# Patient Record
Sex: MALE | Race: WHITE | NOT HISPANIC OR LATINO | Employment: OTHER | URBAN - METROPOLITAN AREA
[De-identification: names, ages, dates, MRNs, and addresses within clinical notes are randomized per-mention and may not be internally consistent; named-entity substitution may affect disease eponyms.]

---

## 2017-01-20 ENCOUNTER — SURGERY - HEALTHEAST (OUTPATIENT)
Dept: CARDIOLOGY | Facility: CLINIC | Age: 77
End: 2017-01-20

## 2017-01-20 ENCOUNTER — OFFICE VISIT - HEALTHEAST (OUTPATIENT)
Dept: CARDIOLOGY | Facility: CLINIC | Age: 77
End: 2017-01-20

## 2017-01-20 DIAGNOSIS — I25.83 CORONARY ARTERY DISEASE DUE TO LIPID RICH PLAQUE: ICD-10-CM

## 2017-01-20 DIAGNOSIS — I48.0 PAROXYSMAL ATRIAL FIBRILLATION (H): ICD-10-CM

## 2017-01-20 DIAGNOSIS — G45.8 OTHER SPECIFIED TRANSIENT CEREBRAL ISCHEMIAS: ICD-10-CM

## 2017-01-20 DIAGNOSIS — I25.10 CORONARY ARTERY DISEASE DUE TO LIPID RICH PLAQUE: ICD-10-CM

## 2017-01-20 ASSESSMENT — MIFFLIN-ST. JEOR: SCORE: 1612.57

## 2017-02-10 ENCOUNTER — RECORDS - HEALTHEAST (OUTPATIENT)
Dept: ADMINISTRATIVE | Facility: OTHER | Age: 77
End: 2017-02-10

## 2017-02-10 ENCOUNTER — AMBULATORY - HEALTHEAST (OUTPATIENT)
Dept: CARDIOLOGY | Facility: CLINIC | Age: 77
End: 2017-02-10

## 2017-02-10 DIAGNOSIS — G45.9 BRAIN TIA: ICD-10-CM

## 2017-02-16 ENCOUNTER — OFFICE VISIT - HEALTHEAST (OUTPATIENT)
Dept: INTERNAL MEDICINE | Facility: CLINIC | Age: 77
End: 2017-02-16

## 2017-02-16 DIAGNOSIS — G45.8 OTHER SPECIFIED TRANSIENT CEREBRAL ISCHEMIAS: ICD-10-CM

## 2017-02-16 LAB — PSA SERPL-MCNC: 2.4 NG/ML (ref 0–6.5)

## 2017-02-16 ASSESSMENT — MIFFLIN-ST. JEOR: SCORE: 1594.43

## 2017-02-17 ENCOUNTER — COMMUNICATION - HEALTHEAST (OUTPATIENT)
Dept: INTERNAL MEDICINE | Facility: CLINIC | Age: 77
End: 2017-02-17

## 2017-02-20 ENCOUNTER — RECORDS - HEALTHEAST (OUTPATIENT)
Dept: ADMINISTRATIVE | Facility: OTHER | Age: 77
End: 2017-02-20

## 2017-02-21 ENCOUNTER — COMMUNICATION - HEALTHEAST (OUTPATIENT)
Dept: INTERNAL MEDICINE | Facility: CLINIC | Age: 77
End: 2017-02-21

## 2017-02-21 DIAGNOSIS — E78.5 HYPERLIPEMIA: ICD-10-CM

## 2017-03-17 ENCOUNTER — RECORDS - HEALTHEAST (OUTPATIENT)
Dept: ADMINISTRATIVE | Facility: OTHER | Age: 77
End: 2017-03-17

## 2017-03-17 ENCOUNTER — HOSPITAL ENCOUNTER (OUTPATIENT)
Dept: NEUROLOGY | Facility: CLINIC | Age: 77
Discharge: HOME | End: 2017-03-17

## 2017-03-17 DIAGNOSIS — G45.9 BRAIN TIA: ICD-10-CM

## 2017-03-17 DIAGNOSIS — R68.89 OTHER GENERAL SYMPTOMS AND SIGNS: ICD-10-CM

## 2017-03-30 ENCOUNTER — OFFICE VISIT - HEALTHEAST (OUTPATIENT)
Dept: INTERNAL MEDICINE | Facility: CLINIC | Age: 77
End: 2017-03-30

## 2017-03-30 ENCOUNTER — RECORDS - HEALTHEAST (OUTPATIENT)
Dept: ADMINISTRATIVE | Facility: OTHER | Age: 77
End: 2017-03-30

## 2017-03-30 DIAGNOSIS — G45.8 OTHER SPECIFIED TRANSIENT CEREBRAL ISCHEMIAS: ICD-10-CM

## 2017-03-30 LAB
CHOLEST SERPL-MCNC: 129 MG/DL
FASTING STATUS PATIENT QL REPORTED: YES
HDLC SERPL-MCNC: 53 MG/DL
LDLC SERPL CALC-MCNC: 65 MG/DL
TRIGL SERPL-MCNC: 57 MG/DL

## 2017-03-30 ASSESSMENT — MIFFLIN-ST. JEOR: SCORE: 1598.97

## 2017-03-31 ENCOUNTER — COMMUNICATION - HEALTHEAST (OUTPATIENT)
Dept: INTERNAL MEDICINE | Facility: CLINIC | Age: 77
End: 2017-03-31

## 2017-04-04 ENCOUNTER — COMMUNICATION - HEALTHEAST (OUTPATIENT)
Dept: INTERNAL MEDICINE | Facility: CLINIC | Age: 77
End: 2017-04-04

## 2017-04-04 ENCOUNTER — COMMUNICATION - HEALTHEAST (OUTPATIENT)
Dept: CARDIOLOGY | Facility: CLINIC | Age: 77
End: 2017-04-04

## 2017-05-09 ENCOUNTER — RECORDS - HEALTHEAST (OUTPATIENT)
Dept: ADMINISTRATIVE | Facility: OTHER | Age: 77
End: 2017-05-09

## 2017-05-16 ENCOUNTER — COMMUNICATION - HEALTHEAST (OUTPATIENT)
Dept: INTERNAL MEDICINE | Facility: CLINIC | Age: 77
End: 2017-05-16

## 2017-05-16 ENCOUNTER — OFFICE VISIT - HEALTHEAST (OUTPATIENT)
Dept: INTERNAL MEDICINE | Facility: CLINIC | Age: 77
End: 2017-05-16

## 2017-05-16 DIAGNOSIS — H26.9 CATARACT: ICD-10-CM

## 2017-05-16 ASSESSMENT — MIFFLIN-ST. JEOR: SCORE: 1589.89

## 2017-05-19 ENCOUNTER — COMMUNICATION - HEALTHEAST (OUTPATIENT)
Dept: INTERNAL MEDICINE | Facility: CLINIC | Age: 77
End: 2017-05-19

## 2017-05-30 ENCOUNTER — RECORDS - HEALTHEAST (OUTPATIENT)
Dept: ADMINISTRATIVE | Facility: OTHER | Age: 77
End: 2017-05-30

## 2017-07-06 ENCOUNTER — RECORDS - HEALTHEAST (OUTPATIENT)
Dept: ADMINISTRATIVE | Facility: OTHER | Age: 77
End: 2017-07-06

## 2017-07-07 ENCOUNTER — COMMUNICATION - HEALTHEAST (OUTPATIENT)
Dept: INTERNAL MEDICINE | Facility: CLINIC | Age: 77
End: 2017-07-07

## 2017-07-31 ENCOUNTER — COMMUNICATION - HEALTHEAST (OUTPATIENT)
Dept: INTERNAL MEDICINE | Facility: CLINIC | Age: 77
End: 2017-07-31

## 2017-07-31 DIAGNOSIS — D64.9 ANEMIA: ICD-10-CM

## 2017-08-01 ENCOUNTER — COMMUNICATION - HEALTHEAST (OUTPATIENT)
Dept: INTERNAL MEDICINE | Facility: CLINIC | Age: 77
End: 2017-08-01

## 2017-08-11 ENCOUNTER — COMMUNICATION - HEALTHEAST (OUTPATIENT)
Dept: INTERNAL MEDICINE | Facility: CLINIC | Age: 77
End: 2017-08-11

## 2017-08-17 ENCOUNTER — COMMUNICATION - HEALTHEAST (OUTPATIENT)
Dept: CARDIOLOGY | Facility: CLINIC | Age: 77
End: 2017-08-17

## 2017-08-17 DIAGNOSIS — I48.0 PAROXYSMAL A-FIB (H): ICD-10-CM

## 2017-08-17 DIAGNOSIS — I25.84 CORONARY ATHEROSCLEROSIS DUE TO SEVERELY CALCIFIED CORONARY LESION: ICD-10-CM

## 2017-08-23 ENCOUNTER — OFFICE VISIT - HEALTHEAST (OUTPATIENT)
Dept: INTERNAL MEDICINE | Facility: CLINIC | Age: 77
End: 2017-08-23

## 2017-08-23 DIAGNOSIS — G45.8 OTHER SPECIFIED TRANSIENT CEREBRAL ISCHEMIAS: ICD-10-CM

## 2017-08-23 LAB
CHOLEST SERPL-MCNC: 144 MG/DL
FASTING STATUS PATIENT QL REPORTED: YES
HDLC SERPL-MCNC: 55 MG/DL
LDLC SERPL CALC-MCNC: 75 MG/DL
TRIGL SERPL-MCNC: 71 MG/DL

## 2017-08-23 ASSESSMENT — MIFFLIN-ST. JEOR: SCORE: 1576.29

## 2017-08-24 ENCOUNTER — COMMUNICATION - HEALTHEAST (OUTPATIENT)
Dept: INTERNAL MEDICINE | Facility: CLINIC | Age: 77
End: 2017-08-24

## 2017-09-13 ENCOUNTER — COMMUNICATION - HEALTHEAST (OUTPATIENT)
Dept: INTERNAL MEDICINE | Facility: CLINIC | Age: 77
End: 2017-09-13

## 2017-09-14 ENCOUNTER — RECORDS - HEALTHEAST (OUTPATIENT)
Dept: ADMINISTRATIVE | Facility: OTHER | Age: 77
End: 2017-09-14

## 2017-10-17 ENCOUNTER — RECORDS - HEALTHEAST (OUTPATIENT)
Dept: ADMINISTRATIVE | Facility: OTHER | Age: 77
End: 2017-10-17

## 2017-10-25 ENCOUNTER — AMBULATORY - HEALTHEAST (OUTPATIENT)
Dept: NURSING | Facility: CLINIC | Age: 77
End: 2017-10-25

## 2017-10-25 DIAGNOSIS — Z23 NEED FOR IMMUNIZATION AGAINST INFLUENZA: ICD-10-CM

## 2017-11-03 ENCOUNTER — RECORDS - HEALTHEAST (OUTPATIENT)
Dept: ADMINISTRATIVE | Facility: OTHER | Age: 77
End: 2017-11-03

## 2017-11-17 ENCOUNTER — COMMUNICATION - HEALTHEAST (OUTPATIENT)
Dept: CARDIOLOGY | Facility: CLINIC | Age: 77
End: 2017-11-17

## 2017-11-17 DIAGNOSIS — I25.84 CORONARY ARTERY DISEASE DUE TO CALCIFIED CORONARY LESION: ICD-10-CM

## 2017-11-17 DIAGNOSIS — I10 ESSENTIAL HYPERTENSION: ICD-10-CM

## 2017-11-17 DIAGNOSIS — I25.10 CORONARY ARTERY DISEASE DUE TO CALCIFIED CORONARY LESION: ICD-10-CM

## 2017-12-12 ENCOUNTER — COMMUNICATION - HEALTHEAST (OUTPATIENT)
Dept: INTERNAL MEDICINE | Facility: CLINIC | Age: 77
End: 2017-12-12

## 2017-12-12 ENCOUNTER — AMBULATORY - HEALTHEAST (OUTPATIENT)
Dept: INTERNAL MEDICINE | Facility: CLINIC | Age: 77
End: 2017-12-12

## 2017-12-12 DIAGNOSIS — H91.90 HEARING LOSS: ICD-10-CM

## 2017-12-22 ENCOUNTER — COMMUNICATION - HEALTHEAST (OUTPATIENT)
Dept: ADMINISTRATIVE | Facility: CLINIC | Age: 77
End: 2017-12-22

## 2017-12-28 ENCOUNTER — OFFICE VISIT - HEALTHEAST (OUTPATIENT)
Dept: INTERNAL MEDICINE | Facility: CLINIC | Age: 77
End: 2017-12-28

## 2017-12-28 DIAGNOSIS — G45.8 OTHER SPECIFIED TRANSIENT CEREBRAL ISCHEMIAS: ICD-10-CM

## 2017-12-28 LAB
CHOLEST SERPL-MCNC: 128 MG/DL
FASTING STATUS PATIENT QL REPORTED: YES
HDLC SERPL-MCNC: 61 MG/DL
LDLC SERPL CALC-MCNC: 59 MG/DL
TRIGL SERPL-MCNC: 40 MG/DL

## 2017-12-28 ASSESSMENT — MIFFLIN-ST. JEOR: SCORE: 1567.21

## 2017-12-29 ENCOUNTER — COMMUNICATION - HEALTHEAST (OUTPATIENT)
Dept: INTERNAL MEDICINE | Facility: CLINIC | Age: 77
End: 2017-12-29

## 2018-01-02 ENCOUNTER — COMMUNICATION - HEALTHEAST (OUTPATIENT)
Dept: CARDIOLOGY | Facility: CLINIC | Age: 78
End: 2018-01-02

## 2018-02-13 ENCOUNTER — COMMUNICATION - HEALTHEAST (OUTPATIENT)
Dept: INTERNAL MEDICINE | Facility: CLINIC | Age: 78
End: 2018-02-13

## 2018-02-13 DIAGNOSIS — E78.5 HYPERLIPEMIA: ICD-10-CM

## 2018-02-20 ENCOUNTER — AMBULATORY - HEALTHEAST (OUTPATIENT)
Dept: CARDIOLOGY | Facility: CLINIC | Age: 78
End: 2018-02-20

## 2018-02-20 ENCOUNTER — RECORDS - HEALTHEAST (OUTPATIENT)
Dept: ADMINISTRATIVE | Facility: OTHER | Age: 78
End: 2018-02-20

## 2018-02-21 ENCOUNTER — OFFICE VISIT - HEALTHEAST (OUTPATIENT)
Dept: CARDIOLOGY | Facility: CLINIC | Age: 78
End: 2018-02-21

## 2018-02-21 ENCOUNTER — AMBULATORY - HEALTHEAST (OUTPATIENT)
Dept: NURSING | Facility: CLINIC | Age: 78
End: 2018-02-21

## 2018-02-21 DIAGNOSIS — Z23 NEED FOR PROPHYLACTIC VACCINATION WITH COMBINED DIPHTHERIA-TETANUS-PERTUSSIS (DTP) VACCINE: ICD-10-CM

## 2018-02-21 DIAGNOSIS — E78.5 DYSLIPIDEMIA, GOAL LDL BELOW 70: ICD-10-CM

## 2018-02-21 DIAGNOSIS — I25.84 CORONARY ATHEROSCLEROSIS DUE TO SEVERELY CALCIFIED CORONARY LESION: ICD-10-CM

## 2018-02-21 DIAGNOSIS — I10 ESSENTIAL HYPERTENSION: ICD-10-CM

## 2018-02-21 DIAGNOSIS — Z23 IMMUNIZATION DUE: ICD-10-CM

## 2018-02-21 ASSESSMENT — MIFFLIN-ST. JEOR: SCORE: 1551.34

## 2018-02-22 ENCOUNTER — COMMUNICATION - HEALTHEAST (OUTPATIENT)
Dept: INTERNAL MEDICINE | Facility: CLINIC | Age: 78
End: 2018-02-22

## 2018-02-22 ENCOUNTER — AMBULATORY - HEALTHEAST (OUTPATIENT)
Dept: CARDIOLOGY | Facility: CLINIC | Age: 78
End: 2018-02-22

## 2018-02-22 DIAGNOSIS — I25.84 CORONARY ATHEROSCLEROSIS DUE TO SEVERELY CALCIFIED CORONARY LESION: ICD-10-CM

## 2018-03-08 ENCOUNTER — RECORDS - HEALTHEAST (OUTPATIENT)
Dept: ADMINISTRATIVE | Facility: OTHER | Age: 78
End: 2018-03-08

## 2018-03-20 ENCOUNTER — OFFICE VISIT - HEALTHEAST (OUTPATIENT)
Dept: INTERNAL MEDICINE | Facility: CLINIC | Age: 78
End: 2018-03-20

## 2018-03-20 DIAGNOSIS — I25.10 CORONARY ARTERY DISEASE INVOLVING NATIVE HEART WITHOUT ANGINA PECTORIS, UNSPECIFIED VESSEL OR LESION TYPE: ICD-10-CM

## 2018-03-20 ASSESSMENT — MIFFLIN-ST. JEOR: SCORE: 1576.29

## 2018-04-30 ENCOUNTER — OFFICE VISIT - HEALTHEAST (OUTPATIENT)
Dept: INTERNAL MEDICINE | Facility: CLINIC | Age: 78
End: 2018-04-30

## 2018-04-30 ENCOUNTER — COMMUNICATION - HEALTHEAST (OUTPATIENT)
Dept: INTERNAL MEDICINE | Facility: CLINIC | Age: 78
End: 2018-04-30

## 2018-04-30 DIAGNOSIS — G45.8 OTHER SPECIFIED TRANSIENT CEREBRAL ISCHEMIAS: ICD-10-CM

## 2018-04-30 LAB
CHOLEST SERPL-MCNC: 140 MG/DL
FASTING STATUS PATIENT QL REPORTED: NORMAL
HDLC SERPL-MCNC: 61 MG/DL
LDLC SERPL CALC-MCNC: 69 MG/DL
TRIGL SERPL-MCNC: 50 MG/DL
TSH SERPL DL<=0.005 MIU/L-ACNC: 0.93 UIU/ML (ref 0.3–5)

## 2018-04-30 ASSESSMENT — MIFFLIN-ST. JEOR: SCORE: 1576.29

## 2018-06-12 ENCOUNTER — RECORDS - HEALTHEAST (OUTPATIENT)
Dept: ADMINISTRATIVE | Facility: OTHER | Age: 78
End: 2018-06-12

## 2018-07-12 ENCOUNTER — COMMUNICATION - HEALTHEAST (OUTPATIENT)
Dept: SCHEDULING | Facility: CLINIC | Age: 78
End: 2018-07-12

## 2018-07-12 ENCOUNTER — COMMUNICATION - HEALTHEAST (OUTPATIENT)
Dept: INTERNAL MEDICINE | Facility: CLINIC | Age: 78
End: 2018-07-12

## 2018-07-13 ENCOUNTER — OFFICE VISIT - HEALTHEAST (OUTPATIENT)
Dept: INTERNAL MEDICINE | Facility: CLINIC | Age: 78
End: 2018-07-13
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
## 2018-07-13 DIAGNOSIS — R21 RASH AND NONSPECIFIC SKIN ERUPTION: ICD-10-CM

## 2018-07-13 LAB
ERYTHROCYTE [DISTWIDTH] IN BLOOD BY AUTOMATED COUNT: 11.9 % (ref 11–14.5)
ERYTHROCYTE [SEDIMENTATION RATE] IN BLOOD BY WESTERGREN METHOD: 2 MM/HR (ref 0–15)
HCT VFR BLD AUTO: 41.8 % (ref 40–54)
HGB BLD-MCNC: 14.1 G/DL (ref 14–18)
MCH RBC QN AUTO: 31 PG (ref 27–34)
MCHC RBC AUTO-ENTMCNC: 33.8 G/DL (ref 32–36)
MCV RBC AUTO: 92 FL (ref 80–100)
PLATELET # BLD AUTO: 150 THOU/UL (ref 140–440)
PMV BLD AUTO: 9.2 FL (ref 7–10)
RBC # BLD AUTO: 4.56 MILL/UL (ref 4.4–6.2)
WBC: 4.5 THOU/UL (ref 4–11)

## 2018-07-16 ENCOUNTER — COMMUNICATION - HEALTHEAST (OUTPATIENT)
Dept: INTERNAL MEDICINE | Facility: CLINIC | Age: 78
End: 2018-07-16

## 2018-07-16 LAB — B BURGDOR IGG+IGM SER QL: 0.06 INDEX VALUE

## 2018-07-24 ENCOUNTER — RECORDS - HEALTHEAST (OUTPATIENT)
Dept: ADMINISTRATIVE | Facility: OTHER | Age: 78
End: 2018-07-24

## 2018-07-26 ENCOUNTER — OFFICE VISIT - HEALTHEAST (OUTPATIENT)
Dept: INTERNAL MEDICINE | Facility: CLINIC | Age: 78
End: 2018-07-26

## 2018-07-26 DIAGNOSIS — I25.10 CORONARY ARTERY DISEASE INVOLVING NATIVE HEART WITHOUT ANGINA PECTORIS, UNSPECIFIED VESSEL OR LESION TYPE: ICD-10-CM

## 2018-07-26 LAB
CHOLEST SERPL-MCNC: 152 MG/DL
FASTING STATUS PATIENT QL REPORTED: NORMAL
HDLC SERPL-MCNC: 69 MG/DL
LDLC SERPL CALC-MCNC: 69 MG/DL
TRIGL SERPL-MCNC: 71 MG/DL
VIT B12 SERPL-MCNC: 348 PG/ML (ref 213–816)

## 2018-07-26 ASSESSMENT — MIFFLIN-ST. JEOR: SCORE: 1562.68

## 2018-07-27 ENCOUNTER — COMMUNICATION - HEALTHEAST (OUTPATIENT)
Dept: INTERNAL MEDICINE | Facility: CLINIC | Age: 78
End: 2018-07-27

## 2018-07-30 ENCOUNTER — COMMUNICATION - HEALTHEAST (OUTPATIENT)
Dept: CARDIOLOGY | Facility: CLINIC | Age: 78
End: 2018-07-30

## 2018-08-12 ENCOUNTER — COMMUNICATION - HEALTHEAST (OUTPATIENT)
Dept: CARDIOLOGY | Facility: CLINIC | Age: 78
End: 2018-08-12

## 2018-08-12 DIAGNOSIS — I10 ESSENTIAL HYPERTENSION: ICD-10-CM

## 2018-08-12 DIAGNOSIS — I25.10 CORONARY ARTERY DISEASE DUE TO CALCIFIED CORONARY LESION: ICD-10-CM

## 2018-08-12 DIAGNOSIS — I25.84 CORONARY ARTERY DISEASE DUE TO CALCIFIED CORONARY LESION: ICD-10-CM

## 2018-08-30 ENCOUNTER — COMMUNICATION - HEALTHEAST (OUTPATIENT)
Dept: INTERNAL MEDICINE | Facility: CLINIC | Age: 78
End: 2018-08-30

## 2018-08-30 DIAGNOSIS — D64.9 ANEMIA: ICD-10-CM

## 2018-09-05 ENCOUNTER — RECORDS - HEALTHEAST (OUTPATIENT)
Dept: ADMINISTRATIVE | Facility: OTHER | Age: 78
End: 2018-09-05

## 2018-09-13 ENCOUNTER — RECORDS - HEALTHEAST (OUTPATIENT)
Dept: ADMINISTRATIVE | Facility: OTHER | Age: 78
End: 2018-09-13

## 2018-10-04 ENCOUNTER — RECORDS - HEALTHEAST (OUTPATIENT)
Dept: ADMINISTRATIVE | Facility: OTHER | Age: 78
End: 2018-10-04

## 2018-10-22 ENCOUNTER — RECORDS - HEALTHEAST (OUTPATIENT)
Dept: ADMINISTRATIVE | Facility: OTHER | Age: 78
End: 2018-10-22

## 2018-10-29 ENCOUNTER — AMBULATORY - HEALTHEAST (OUTPATIENT)
Dept: NURSING | Facility: CLINIC | Age: 78
End: 2018-10-29

## 2018-10-29 DIAGNOSIS — Z23 FLU VACCINE NEED: ICD-10-CM

## 2018-11-12 ENCOUNTER — RECORDS - HEALTHEAST (OUTPATIENT)
Dept: ADMINISTRATIVE | Facility: OTHER | Age: 78
End: 2018-11-12

## 2018-12-13 ENCOUNTER — OFFICE VISIT - HEALTHEAST (OUTPATIENT)
Dept: INTERNAL MEDICINE | Facility: CLINIC | Age: 78
End: 2018-12-13

## 2018-12-13 ENCOUNTER — AMBULATORY - HEALTHEAST (OUTPATIENT)
Dept: INTERNAL MEDICINE | Facility: CLINIC | Age: 78
End: 2018-12-13

## 2018-12-13 DIAGNOSIS — R31.29 MICROSCOPIC HEMATURIA: ICD-10-CM

## 2018-12-13 DIAGNOSIS — Z00.00 ROUTINE GENERAL MEDICAL EXAMINATION AT A HEALTH CARE FACILITY: ICD-10-CM

## 2018-12-13 DIAGNOSIS — Z12.5 SCREENING FOR PROSTATE CANCER: ICD-10-CM

## 2018-12-13 LAB
ALBUMIN SERPL-MCNC: 3.6 G/DL (ref 3.5–5)
ALBUMIN UR-MCNC: ABNORMAL MG/DL
ALP SERPL-CCNC: 75 U/L (ref 45–120)
ALT SERPL W P-5'-P-CCNC: 22 U/L (ref 0–45)
AMORPH CRY #/AREA URNS HPF: ABNORMAL /[HPF]
ANION GAP SERPL CALCULATED.3IONS-SCNC: 9 MMOL/L (ref 5–18)
APPEARANCE UR: CLEAR
AST SERPL W P-5'-P-CCNC: 19 U/L (ref 0–40)
BACTERIA #/AREA URNS HPF: ABNORMAL HPF
BILIRUB SERPL-MCNC: 0.5 MG/DL (ref 0–1)
BILIRUB UR QL STRIP: NEGATIVE
BUN SERPL-MCNC: 18 MG/DL (ref 8–28)
CALCIUM SERPL-MCNC: 9.6 MG/DL (ref 8.5–10.5)
CHLORIDE BLD-SCNC: 107 MMOL/L (ref 98–107)
CHOLEST SERPL-MCNC: 151 MG/DL
CO2 SERPL-SCNC: 24 MMOL/L (ref 22–31)
COLOR UR AUTO: YELLOW
CREAT SERPL-MCNC: 0.83 MG/DL (ref 0.7–1.3)
ERYTHROCYTE [DISTWIDTH] IN BLOOD BY AUTOMATED COUNT: 12.2 % (ref 11–14.5)
FASTING STATUS PATIENT QL REPORTED: YES
GFR SERPL CREATININE-BSD FRML MDRD: >60 ML/MIN/1.73M2
GLUCOSE BLD-MCNC: 90 MG/DL (ref 70–125)
GLUCOSE UR STRIP-MCNC: NEGATIVE MG/DL
HCT VFR BLD AUTO: 43.3 % (ref 40–54)
HDLC SERPL-MCNC: 67 MG/DL
HGB BLD-MCNC: 14.5 G/DL (ref 14–18)
HGB UR QL STRIP: ABNORMAL
HYALINE CASTS #/AREA URNS LPF: ABNORMAL LPF
KETONES UR STRIP-MCNC: NEGATIVE MG/DL
LDLC SERPL CALC-MCNC: 75 MG/DL
LEUKOCYTE ESTERASE UR QL STRIP: NEGATIVE
MCH RBC QN AUTO: 31 PG (ref 27–34)
MCHC RBC AUTO-ENTMCNC: 33.4 G/DL (ref 32–36)
MCV RBC AUTO: 93 FL (ref 80–100)
MUCOUS THREADS #/AREA URNS LPF: ABNORMAL LPF
NITRATE UR QL: NEGATIVE
PH UR STRIP: 5.5 [PH] (ref 5–8)
PLATELET # BLD AUTO: 167 THOU/UL (ref 140–440)
PMV BLD AUTO: 9.2 FL (ref 7–10)
POTASSIUM BLD-SCNC: 4.3 MMOL/L (ref 3.5–5)
PROT SERPL-MCNC: 5.9 G/DL (ref 6–8)
PSA SERPL-MCNC: 1.7 NG/ML (ref 0–6.5)
RBC # BLD AUTO: 4.67 MILL/UL (ref 4.4–6.2)
RBC #/AREA URNS AUTO: ABNORMAL HPF
SODIUM SERPL-SCNC: 140 MMOL/L (ref 136–145)
SP GR UR STRIP: >=1.03 (ref 1–1.03)
SQUAMOUS #/AREA URNS AUTO: ABNORMAL LPF
TRIGL SERPL-MCNC: 45 MG/DL
TSH SERPL DL<=0.005 MIU/L-ACNC: 1.34 UIU/ML (ref 0.3–5)
UROBILINOGEN UR STRIP-ACNC: ABNORMAL
WBC #/AREA URNS AUTO: ABNORMAL HPF
WBC: 4.1 THOU/UL (ref 4–11)

## 2018-12-13 ASSESSMENT — MIFFLIN-ST. JEOR: SCORE: 1603.5

## 2018-12-14 ENCOUNTER — COMMUNICATION - HEALTHEAST (OUTPATIENT)
Dept: INTERNAL MEDICINE | Facility: CLINIC | Age: 78
End: 2018-12-14

## 2018-12-17 ENCOUNTER — COMMUNICATION - HEALTHEAST (OUTPATIENT)
Dept: INTERNAL MEDICINE | Facility: CLINIC | Age: 78
End: 2018-12-17

## 2019-01-03 ENCOUNTER — RECORDS - HEALTHEAST (OUTPATIENT)
Dept: ADMINISTRATIVE | Facility: OTHER | Age: 79
End: 2019-01-03

## 2019-01-07 ENCOUNTER — COMMUNICATION - HEALTHEAST (OUTPATIENT)
Dept: ADMINISTRATIVE | Facility: CLINIC | Age: 79
End: 2019-01-07

## 2019-01-30 ENCOUNTER — COMMUNICATION - HEALTHEAST (OUTPATIENT)
Dept: INTERNAL MEDICINE | Facility: CLINIC | Age: 79
End: 2019-01-30

## 2019-01-30 DIAGNOSIS — E78.5 HYPERLIPEMIA: ICD-10-CM

## 2019-02-28 ENCOUNTER — OFFICE VISIT - HEALTHEAST (OUTPATIENT)
Dept: CARDIOLOGY | Facility: CLINIC | Age: 79
End: 2019-02-28

## 2019-02-28 DIAGNOSIS — I10 ESSENTIAL HYPERTENSION: ICD-10-CM

## 2019-02-28 DIAGNOSIS — E78.5 DYSLIPIDEMIA, GOAL LDL BELOW 70: ICD-10-CM

## 2019-02-28 DIAGNOSIS — I25.10 CORONARY ARTERY DISEASE DUE TO LIPID RICH PLAQUE: ICD-10-CM

## 2019-02-28 DIAGNOSIS — I25.83 CORONARY ARTERY DISEASE DUE TO LIPID RICH PLAQUE: ICD-10-CM

## 2019-02-28 ASSESSMENT — MIFFLIN-ST. JEOR: SCORE: 1612.57

## 2019-03-22 ENCOUNTER — RECORDS - HEALTHEAST (OUTPATIENT)
Dept: ADMINISTRATIVE | Facility: OTHER | Age: 79
End: 2019-03-22

## 2019-04-12 ENCOUNTER — COMMUNICATION - HEALTHEAST (OUTPATIENT)
Dept: CARDIOLOGY | Facility: CLINIC | Age: 79
End: 2019-04-12

## 2019-04-12 DIAGNOSIS — I25.84 CORONARY ATHEROSCLEROSIS DUE TO SEVERELY CALCIFIED CORONARY LESION: ICD-10-CM

## 2019-04-17 ENCOUNTER — AMBULATORY - HEALTHEAST (OUTPATIENT)
Dept: CARDIOLOGY | Facility: CLINIC | Age: 79
End: 2019-04-17

## 2019-04-17 DIAGNOSIS — I25.84 CORONARY ARTERY DISEASE DUE TO CALCIFIED CORONARY LESION: ICD-10-CM

## 2019-04-17 DIAGNOSIS — I10 ESSENTIAL HYPERTENSION: ICD-10-CM

## 2019-04-17 DIAGNOSIS — I25.10 CORONARY ARTERY DISEASE DUE TO CALCIFIED CORONARY LESION: ICD-10-CM

## 2019-05-15 ENCOUNTER — RECORDS - HEALTHEAST (OUTPATIENT)
Dept: ADMINISTRATIVE | Facility: OTHER | Age: 79
End: 2019-05-15

## 2019-05-31 ENCOUNTER — OFFICE VISIT - HEALTHEAST (OUTPATIENT)
Dept: INTERNAL MEDICINE | Facility: CLINIC | Age: 79
End: 2019-05-31

## 2019-05-31 DIAGNOSIS — I25.10 CORONARY ARTERY DISEASE DUE TO LIPID RICH PLAQUE: ICD-10-CM

## 2019-05-31 DIAGNOSIS — I25.83 CORONARY ARTERY DISEASE DUE TO LIPID RICH PLAQUE: ICD-10-CM

## 2019-05-31 LAB
CHOLEST SERPL-MCNC: 134 MG/DL
FASTING STATUS PATIENT QL REPORTED: YES
HDLC SERPL-MCNC: 63 MG/DL
LDLC SERPL CALC-MCNC: 61 MG/DL
TRIGL SERPL-MCNC: 48 MG/DL

## 2019-06-03 ENCOUNTER — COMMUNICATION - HEALTHEAST (OUTPATIENT)
Dept: INTERNAL MEDICINE | Facility: CLINIC | Age: 79
End: 2019-06-03

## 2019-09-05 ENCOUNTER — AMBULATORY - HEALTHEAST (OUTPATIENT)
Dept: INTERNAL MEDICINE | Facility: CLINIC | Age: 79
End: 2019-09-05

## 2019-09-05 ENCOUNTER — COMMUNICATION - HEALTHEAST (OUTPATIENT)
Dept: INTERNAL MEDICINE | Facility: CLINIC | Age: 79
End: 2019-09-05

## 2019-09-05 ENCOUNTER — COMMUNICATION - HEALTHEAST (OUTPATIENT)
Dept: SCHEDULING | Facility: CLINIC | Age: 79
End: 2019-09-05

## 2019-09-05 DIAGNOSIS — T17.320A CHOKING DUE TO FOOD (REGURGITATED): ICD-10-CM
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
## 2019-09-05 DIAGNOSIS — R13.10 DYSPHAGIA: ICD-10-CM

## 2019-09-06 ENCOUNTER — RECORDS - HEALTHEAST (OUTPATIENT)
Dept: ADMINISTRATIVE | Facility: OTHER | Age: 79
End: 2019-09-06

## 2019-10-04 ENCOUNTER — COMMUNICATION - HEALTHEAST (OUTPATIENT)
Dept: INTERNAL MEDICINE | Facility: CLINIC | Age: 79
End: 2019-10-04

## 2019-10-04 DIAGNOSIS — D64.9 ANEMIA: ICD-10-CM

## 2019-10-29 ENCOUNTER — OFFICE VISIT - HEALTHEAST (OUTPATIENT)
Dept: INTERNAL MEDICINE | Facility: CLINIC | Age: 79
End: 2019-10-29

## 2019-10-29 DIAGNOSIS — F33.9 EPISODE OF RECURRENT MAJOR DEPRESSIVE DISORDER, UNSPECIFIED DEPRESSION EPISODE SEVERITY (H): ICD-10-CM

## 2019-10-29 DIAGNOSIS — I10 ESSENTIAL HYPERTENSION: ICD-10-CM

## 2019-10-29 DIAGNOSIS — I48.0 PAROXYSMAL ATRIAL FIBRILLATION (H): ICD-10-CM

## 2019-10-29 ASSESSMENT — PATIENT HEALTH QUESTIONNAIRE - PHQ9: SUM OF ALL RESPONSES TO PHQ QUESTIONS 1-9: 2

## 2019-10-29 ASSESSMENT — MIFFLIN-ST. JEOR: SCORE: 1585.36

## 2019-10-30 ENCOUNTER — COMMUNICATION - HEALTHEAST (OUTPATIENT)
Dept: INTERNAL MEDICINE | Facility: CLINIC | Age: 79
End: 2019-10-30

## 2019-10-30 LAB
CHOLEST SERPL-MCNC: 135 MG/DL
FASTING STATUS PATIENT QL REPORTED: YES
HDLC SERPL-MCNC: 64 MG/DL
LDLC SERPL CALC-MCNC: 63 MG/DL
TRIGL SERPL-MCNC: 39 MG/DL

## 2019-11-13 ENCOUNTER — OFFICE VISIT - HEALTHEAST (OUTPATIENT)
Dept: PODIATRY | Facility: CLINIC | Age: 79
End: 2019-11-13

## 2019-11-13 DIAGNOSIS — M20.10 HALLUX VALGUS, UNSPECIFIED LATERALITY: ICD-10-CM

## 2019-11-13 DIAGNOSIS — M20.42 HAMMER TOE OF LEFT FOOT: ICD-10-CM

## 2019-11-13 ASSESSMENT — MIFFLIN-ST. JEOR: SCORE: 1585.36

## 2020-01-07 ENCOUNTER — COMMUNICATION - HEALTHEAST (OUTPATIENT)
Dept: CARDIOLOGY | Facility: CLINIC | Age: 80
End: 2020-01-07

## 2020-01-07 DIAGNOSIS — I10 ESSENTIAL HYPERTENSION: ICD-10-CM

## 2020-01-07 DIAGNOSIS — I25.84 CORONARY ATHEROSCLEROSIS DUE TO SEVERELY CALCIFIED CORONARY LESION: ICD-10-CM

## 2020-01-07 DIAGNOSIS — I25.10 CORONARY ARTERY DISEASE DUE TO CALCIFIED CORONARY LESION: ICD-10-CM

## 2020-01-07 DIAGNOSIS — I25.84 CORONARY ARTERY DISEASE DUE TO CALCIFIED CORONARY LESION: ICD-10-CM

## 2020-01-23 ENCOUNTER — RECORDS - HEALTHEAST (OUTPATIENT)
Dept: ADMINISTRATIVE | Facility: OTHER | Age: 80
End: 2020-01-23

## 2020-02-03 ENCOUNTER — AMBULATORY - HEALTHEAST (OUTPATIENT)
Dept: INTERNAL MEDICINE | Facility: CLINIC | Age: 80
End: 2020-02-03

## 2020-02-03 ENCOUNTER — COMMUNICATION - HEALTHEAST (OUTPATIENT)
Dept: INTERNAL MEDICINE | Facility: CLINIC | Age: 80
End: 2020-02-03

## 2020-02-03 DIAGNOSIS — T17.320A CHOKING DUE TO FOOD (REGURGITATED): ICD-10-CM

## 2020-02-03 DIAGNOSIS — R13.10 DYSPHAGIA: ICD-10-CM
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
## 2020-02-03 DIAGNOSIS — Z86.73 HISTORY OF TIA (TRANSIENT ISCHEMIC ATTACK) AND STROKE: ICD-10-CM

## 2020-02-04 ENCOUNTER — RECORDS - HEALTHEAST (OUTPATIENT)
Dept: ADMINISTRATIVE | Facility: OTHER | Age: 80
End: 2020-02-04

## 2020-02-11 ENCOUNTER — AMBULATORY - HEALTHEAST (OUTPATIENT)
Dept: INTERNAL MEDICINE | Facility: CLINIC | Age: 80
End: 2020-02-11

## 2020-02-11 ENCOUNTER — OFFICE VISIT - HEALTHEAST (OUTPATIENT)
Dept: INTERNAL MEDICINE | Facility: CLINIC | Age: 80
End: 2020-02-11

## 2020-02-11 DIAGNOSIS — Z00.00 ROUTINE GENERAL MEDICAL EXAMINATION AT A HEALTH CARE FACILITY: ICD-10-CM

## 2020-02-11 DIAGNOSIS — R31.29 MICROSCOPIC HEMATURIA: ICD-10-CM

## 2020-02-11 DIAGNOSIS — Z12.5 SCREENING FOR PROSTATE CANCER: ICD-10-CM

## 2020-02-11 LAB
ALBUMIN SERPL-MCNC: 3.7 G/DL (ref 3.5–5)
ALBUMIN UR-MCNC: ABNORMAL MG/DL
ALP SERPL-CCNC: 80 U/L (ref 45–120)
ALT SERPL W P-5'-P-CCNC: 22 U/L (ref 0–45)
ANION GAP SERPL CALCULATED.3IONS-SCNC: 8 MMOL/L (ref 5–18)
APPEARANCE UR: ABNORMAL
AST SERPL W P-5'-P-CCNC: 19 U/L (ref 0–40)
BACTERIA #/AREA URNS HPF: ABNORMAL HPF
BILIRUB SERPL-MCNC: 0.8 MG/DL (ref 0–1)
BILIRUB UR QL STRIP: NEGATIVE
BUN SERPL-MCNC: 22 MG/DL (ref 8–28)
CALCIUM SERPL-MCNC: 9.4 MG/DL (ref 8.5–10.5)
CHLORIDE BLD-SCNC: 109 MMOL/L (ref 98–107)
CHOLEST SERPL-MCNC: 138 MG/DL
CO2 SERPL-SCNC: 24 MMOL/L (ref 22–31)
COLOR UR AUTO: YELLOW
CREAT SERPL-MCNC: 0.74 MG/DL (ref 0.7–1.3)
ERYTHROCYTE [DISTWIDTH] IN BLOOD BY AUTOMATED COUNT: 12.9 % (ref 11–14.5)
FASTING STATUS PATIENT QL REPORTED: YES
GFR SERPL CREATININE-BSD FRML MDRD: >60 ML/MIN/1.73M2
GLUCOSE BLD-MCNC: 76 MG/DL (ref 70–125)
GLUCOSE UR STRIP-MCNC: NEGATIVE MG/DL
HCT VFR BLD AUTO: 41.2 % (ref 40–54)
HDLC SERPL-MCNC: 62 MG/DL
HGB BLD-MCNC: 14.2 G/DL (ref 14–18)
HGB UR QL STRIP: ABNORMAL
KETONES UR STRIP-MCNC: NEGATIVE MG/DL
LDLC SERPL CALC-MCNC: 70 MG/DL
LEUKOCYTE ESTERASE UR QL STRIP: NEGATIVE
MCH RBC QN AUTO: 31.6 PG (ref 27–34)
MCHC RBC AUTO-ENTMCNC: 34.4 G/DL (ref 32–36)
MCV RBC AUTO: 92 FL (ref 80–100)
MUCOUS THREADS #/AREA URNS LPF: ABNORMAL LPF
NITRATE UR QL: NEGATIVE
PH UR STRIP: 5.5 [PH] (ref 5–8)
PLATELET # BLD AUTO: 173 THOU/UL (ref 140–440)
PMV BLD AUTO: 8.7 FL (ref 7–10)
POTASSIUM BLD-SCNC: 4.4 MMOL/L (ref 3.5–5)
PROT SERPL-MCNC: 5.9 G/DL (ref 6–8)
PSA SERPL-MCNC: 2.1 NG/ML (ref 0–6.5)
RBC # BLD AUTO: 4.49 MILL/UL (ref 4.4–6.2)
RBC #/AREA URNS AUTO: ABNORMAL HPF
SODIUM SERPL-SCNC: 141 MMOL/L (ref 136–145)
SP GR UR STRIP: >=1.03 (ref 1–1.03)
SQUAMOUS #/AREA URNS AUTO: ABNORMAL LPF
TRIGL SERPL-MCNC: 32 MG/DL
TSH SERPL DL<=0.005 MIU/L-ACNC: 0.98 UIU/ML (ref 0.3–5)
URATE SERPL-MCNC: 4.5 MG/DL (ref 3–8)
UROBILINOGEN UR STRIP-ACNC: ABNORMAL
WBC #/AREA URNS AUTO: ABNORMAL HPF
WBC: 8.5 THOU/UL (ref 4–11)

## 2020-02-11 ASSESSMENT — PATIENT HEALTH QUESTIONNAIRE - PHQ9: SUM OF ALL RESPONSES TO PHQ QUESTIONS 1-9: 2

## 2020-02-11 ASSESSMENT — MIFFLIN-ST. JEOR: SCORE: 1585.36

## 2020-02-14 ENCOUNTER — OFFICE VISIT - HEALTHEAST (OUTPATIENT)
Dept: CARDIOLOGY | Facility: CLINIC | Age: 80
End: 2020-02-14

## 2020-02-14 DIAGNOSIS — I25.83 CORONARY ARTERY DISEASE DUE TO LIPID RICH PLAQUE: ICD-10-CM

## 2020-02-14 DIAGNOSIS — E78.5 DYSLIPIDEMIA, GOAL LDL BELOW 70: ICD-10-CM

## 2020-02-14 DIAGNOSIS — I25.10 CORONARY ARTERY DISEASE DUE TO LIPID RICH PLAQUE: ICD-10-CM

## 2020-02-14 ASSESSMENT — MIFFLIN-ST. JEOR: SCORE: 1589.89

## 2020-02-18 ENCOUNTER — RECORDS - HEALTHEAST (OUTPATIENT)
Dept: ADMINISTRATIVE | Facility: OTHER | Age: 80
End: 2020-02-18

## 2020-03-02 ENCOUNTER — COMMUNICATION - HEALTHEAST (OUTPATIENT)
Dept: CARDIOLOGY | Facility: CLINIC | Age: 80
End: 2020-03-02

## 2020-03-02 DIAGNOSIS — I25.84 CORONARY ATHEROSCLEROSIS DUE TO SEVERELY CALCIFIED CORONARY LESION: ICD-10-CM

## 2020-03-23 ENCOUNTER — COMMUNICATION - HEALTHEAST (OUTPATIENT)
Dept: CARDIOLOGY | Facility: CLINIC | Age: 80
End: 2020-03-23

## 2020-03-23 DIAGNOSIS — I10 ESSENTIAL HYPERTENSION: ICD-10-CM

## 2020-03-23 DIAGNOSIS — I25.84 CORONARY ARTERY DISEASE DUE TO CALCIFIED CORONARY LESION: ICD-10-CM

## 2020-03-23 DIAGNOSIS — E78.5 DYSLIPIDEMIA, GOAL LDL BELOW 70: ICD-10-CM

## 2020-03-23 DIAGNOSIS — I25.10 CORONARY ARTERY DISEASE DUE TO CALCIFIED CORONARY LESION: ICD-10-CM

## 2020-04-03 ENCOUNTER — COMMUNICATION - HEALTHEAST (OUTPATIENT)
Dept: INTERNAL MEDICINE | Facility: CLINIC | Age: 80
End: 2020-04-03

## 2020-06-04 ENCOUNTER — RECORDS - HEALTHEAST (OUTPATIENT)
Dept: ADMINISTRATIVE | Facility: OTHER | Age: 80
End: 2020-06-04

## 2020-07-10 ENCOUNTER — RECORDS - HEALTHEAST (OUTPATIENT)
Dept: ADMINISTRATIVE | Facility: OTHER | Age: 80
End: 2020-07-10

## 2020-07-23 ENCOUNTER — RECORDS - HEALTHEAST (OUTPATIENT)
Dept: ADMINISTRATIVE | Facility: OTHER | Age: 80
End: 2020-07-23

## 2020-08-18 ENCOUNTER — ANESTHESIA - HEALTHEAST (OUTPATIENT)
Dept: SURGERY | Facility: CLINIC | Age: 80
End: 2020-08-18

## 2020-08-18 ENCOUNTER — SURGERY - HEALTHEAST (OUTPATIENT)
Dept: SURGERY | Facility: CLINIC | Age: 80
End: 2020-08-18

## 2020-08-18 ASSESSMENT — MIFFLIN-ST. JEOR: SCORE: 1567.21

## 2020-08-19 ENCOUNTER — COMMUNICATION - HEALTHEAST (OUTPATIENT)
Dept: SCHEDULING | Facility: CLINIC | Age: 80
End: 2020-08-19

## 2020-08-20 ENCOUNTER — RECORDS - HEALTHEAST (OUTPATIENT)
Dept: ADMINISTRATIVE | Facility: OTHER | Age: 80
End: 2020-08-20

## 2020-08-21 ENCOUNTER — COMMUNICATION - HEALTHEAST (OUTPATIENT)
Dept: INTERNAL MEDICINE | Facility: CLINIC | Age: 80
End: 2020-08-21

## 2020-08-24 ENCOUNTER — RECORDS - HEALTHEAST (OUTPATIENT)
Dept: ADMINISTRATIVE | Facility: OTHER | Age: 80
End: 2020-08-24

## 2020-08-24 ENCOUNTER — OFFICE VISIT - HEALTHEAST (OUTPATIENT)
Dept: INTERNAL MEDICINE | Facility: CLINIC | Age: 80
End: 2020-08-24

## 2020-08-24 DIAGNOSIS — I10 ESSENTIAL HYPERTENSION: ICD-10-CM

## 2020-08-24 ASSESSMENT — PATIENT HEALTH QUESTIONNAIRE - PHQ9: SUM OF ALL RESPONSES TO PHQ QUESTIONS 1-9: 2

## 2020-08-28 ENCOUNTER — RECORDS - HEALTHEAST (OUTPATIENT)
Dept: ADMINISTRATIVE | Facility: OTHER | Age: 80
End: 2020-08-28

## 2020-08-28 ENCOUNTER — COMMUNICATION - HEALTHEAST (OUTPATIENT)
Dept: INTERNAL MEDICINE | Facility: CLINIC | Age: 80
End: 2020-08-28

## 2020-08-28 DIAGNOSIS — E88.810 METABOLIC SYNDROME: ICD-10-CM

## 2020-09-03 ENCOUNTER — RECORDS - HEALTHEAST (OUTPATIENT)
Dept: ADMINISTRATIVE | Facility: OTHER | Age: 80
End: 2020-09-03

## 2020-09-08 ENCOUNTER — COMMUNICATION - HEALTHEAST (OUTPATIENT)
Dept: INTERNAL MEDICINE | Facility: CLINIC | Age: 80
End: 2020-09-08

## 2020-09-08 ENCOUNTER — RECORDS - HEALTHEAST (OUTPATIENT)
Dept: INTERNAL MEDICINE | Facility: CLINIC | Age: 80
End: 2020-09-08

## 2020-09-08 ENCOUNTER — HOSPITAL ENCOUNTER (OUTPATIENT)
Dept: GENERAL RADIOLOGY | Facility: CLINIC | Age: 80
Discharge: HOME | End: 2020-09-08
Attending: INTERNAL MEDICINE | Admitting: INTERNAL MEDICINE
Payer: MEDICARE

## 2020-09-08 DIAGNOSIS — E88.810 METABOLIC SYNDROME: ICD-10-CM

## 2020-09-08 DIAGNOSIS — R13.19 ESOPHAGEAL DYSPHAGIA: ICD-10-CM

## 2020-09-08 DIAGNOSIS — K22.2 BENIGN ESOPHAGEAL STRICTURE: ICD-10-CM

## 2020-09-08 PROCEDURE — 25500045 ZZH RX 255: Performed by: INTERNAL MEDICINE

## 2020-09-08 PROCEDURE — 74220 X-RAY XM ESOPHAGUS 1CNTRST: CPT

## 2020-09-08 RX ADMIN — ANTACID/ANTIFLATULENT 4 G: 380; 550; 10; 10 GRANULE, EFFERVESCENT ORAL at 09:05

## 2020-11-11 ENCOUNTER — COMMUNICATION - HEALTHEAST (OUTPATIENT)
Dept: SCHEDULING | Facility: CLINIC | Age: 80
End: 2020-11-11

## 2021-01-11 ENCOUNTER — COMMUNICATION - HEALTHEAST (OUTPATIENT)
Dept: CARDIOLOGY | Facility: CLINIC | Age: 81
End: 2021-01-11

## 2021-01-11 DIAGNOSIS — I25.84 CORONARY ATHEROSCLEROSIS DUE TO SEVERELY CALCIFIED CORONARY LESION: ICD-10-CM

## 2021-01-18 ENCOUNTER — RECORDS - HEALTHEAST (OUTPATIENT)
Dept: ADMINISTRATIVE | Facility: OTHER | Age: 81
End: 2021-01-18

## 2021-01-31 ENCOUNTER — IMMUNIZATION (OUTPATIENT)
Dept: NURSING | Facility: CLINIC | Age: 81
End: 2021-01-31
Payer: COMMERCIAL

## 2021-01-31 PROCEDURE — 91300 PR COVID VAC PFIZER DIL RECON 30 MCG/0.3 ML IM: CPT

## 2021-01-31 PROCEDURE — 0001A PR COVID VAC PFIZER DIL RECON 30 MCG/0.3 ML IM: CPT

## 2021-02-04 ENCOUNTER — RECORDS - HEALTHEAST (OUTPATIENT)
Dept: ADMINISTRATIVE | Facility: OTHER | Age: 81
End: 2021-02-04

## 2021-02-17 ENCOUNTER — COMMUNICATION - HEALTHEAST (OUTPATIENT)
Dept: ADMINISTRATIVE | Facility: CLINIC | Age: 81
End: 2021-02-17

## 2021-02-21 ENCOUNTER — IMMUNIZATION (OUTPATIENT)
Dept: NURSING | Facility: CLINIC | Age: 81
End: 2021-02-21
Attending: INTERNAL MEDICINE
Payer: COMMERCIAL

## 2021-02-21 PROCEDURE — 91300 PR COVID VAC PFIZER DIL RECON 30 MCG/0.3 ML IM: CPT

## 2021-02-21 PROCEDURE — 0002A PR COVID VAC PFIZER DIL RECON 30 MCG/0.3 ML IM: CPT

## 2021-02-22 ENCOUNTER — COMMUNICATION - HEALTHEAST (OUTPATIENT)
Dept: CARDIOLOGY | Facility: CLINIC | Age: 81
End: 2021-02-22

## 2021-02-22 DIAGNOSIS — E78.5 DYSLIPIDEMIA, GOAL LDL BELOW 70: ICD-10-CM

## 2021-02-23 ENCOUNTER — COMMUNICATION - HEALTHEAST (OUTPATIENT)
Dept: INTERNAL MEDICINE | Facility: CLINIC | Age: 81
End: 2021-02-23

## 2021-02-23 ENCOUNTER — OFFICE VISIT - HEALTHEAST (OUTPATIENT)
Dept: INTERNAL MEDICINE | Facility: CLINIC | Age: 81
End: 2021-02-23

## 2021-02-23 DIAGNOSIS — Z12.5 SCREENING FOR PROSTATE CANCER: ICD-10-CM

## 2021-02-23 DIAGNOSIS — Z00.00 ROUTINE GENERAL MEDICAL EXAMINATION AT A HEALTH CARE FACILITY: ICD-10-CM

## 2021-02-23 LAB
ALBUMIN SERPL-MCNC: 3.5 G/DL (ref 3.5–5)
ALBUMIN UR-MCNC: ABNORMAL MG/DL
ALP SERPL-CCNC: 92 U/L (ref 45–120)
ALT SERPL W P-5'-P-CCNC: 20 U/L (ref 0–45)
ANION GAP SERPL CALCULATED.3IONS-SCNC: 6 MMOL/L (ref 5–18)
APPEARANCE UR: ABNORMAL
AST SERPL W P-5'-P-CCNC: 18 U/L (ref 0–40)
BACTERIA #/AREA URNS HPF: ABNORMAL HPF
BILIRUB SERPL-MCNC: 0.7 MG/DL (ref 0–1)
BILIRUB UR QL STRIP: NEGATIVE
BUN SERPL-MCNC: 20 MG/DL (ref 8–28)
CALCIUM SERPL-MCNC: 9 MG/DL (ref 8.5–10.5)
CHLORIDE BLD-SCNC: 106 MMOL/L (ref 98–107)
CHOLEST SERPL-MCNC: 117 MG/DL
CO2 SERPL-SCNC: 29 MMOL/L (ref 22–31)
COLOR UR AUTO: YELLOW
CREAT SERPL-MCNC: 0.76 MG/DL (ref 0.7–1.3)
ERYTHROCYTE [DISTWIDTH] IN BLOOD BY AUTOMATED COUNT: 12.4 % (ref 11–14.5)
FASTING STATUS PATIENT QL REPORTED: YES
GFR SERPL CREATININE-BSD FRML MDRD: >60 ML/MIN/1.73M2
GLUCOSE BLD-MCNC: 94 MG/DL (ref 70–125)
GLUCOSE UR STRIP-MCNC: NEGATIVE MG/DL
HCT VFR BLD AUTO: 39.1 % (ref 40–54)
HDLC SERPL-MCNC: 56 MG/DL
HGB BLD-MCNC: 13.6 G/DL (ref 14–18)
HGB UR QL STRIP: ABNORMAL
KETONES UR STRIP-MCNC: NEGATIVE MG/DL
LDLC SERPL CALC-MCNC: 54 MG/DL
LEUKOCYTE ESTERASE UR QL STRIP: NEGATIVE
MCH RBC QN AUTO: 31.3 PG (ref 27–34)
MCHC RBC AUTO-ENTMCNC: 34.8 G/DL (ref 32–36)
MCV RBC AUTO: 90 FL (ref 80–100)
MUCOUS THREADS #/AREA URNS LPF: ABNORMAL LPF
NITRATE UR QL: NEGATIVE
PH UR STRIP: 6 [PH] (ref 5–8)
PLATELET # BLD AUTO: 159 THOU/UL (ref 140–440)
PMV BLD AUTO: 10.6 FL (ref 7–10)
POTASSIUM BLD-SCNC: 4.2 MMOL/L (ref 3.5–5)
PROT SERPL-MCNC: 5.6 G/DL (ref 6–8)
PSA SERPL-MCNC: 2 NG/ML (ref 0–6.5)
RBC # BLD AUTO: 4.35 MILL/UL (ref 4.4–6.2)
RBC #/AREA URNS AUTO: >100 HPF
SODIUM SERPL-SCNC: 141 MMOL/L (ref 136–145)
SP GR UR STRIP: 1.02 (ref 1–1.03)
SQUAMOUS #/AREA URNS AUTO: ABNORMAL LPF
TRIGL SERPL-MCNC: 35 MG/DL
TSH SERPL DL<=0.005 MIU/L-ACNC: 1.24 UIU/ML (ref 0.3–5)
UROBILINOGEN UR STRIP-ACNC: ABNORMAL
WBC #/AREA URNS AUTO: ABNORMAL HPF
WBC: 3.7 THOU/UL (ref 4–11)

## 2021-02-23 ASSESSMENT — MIFFLIN-ST. JEOR: SCORE: 1542.27

## 2021-02-25 ENCOUNTER — COMMUNICATION - HEALTHEAST (OUTPATIENT)
Dept: INTERNAL MEDICINE | Facility: CLINIC | Age: 81
End: 2021-02-25

## 2021-03-18 ENCOUNTER — RECORDS - HEALTHEAST (OUTPATIENT)
Dept: ADMINISTRATIVE | Facility: OTHER | Age: 81
End: 2021-03-18

## 2021-03-19 ENCOUNTER — OFFICE VISIT - HEALTHEAST (OUTPATIENT)
Dept: CARDIOLOGY | Facility: CLINIC | Age: 81
End: 2021-03-19

## 2021-03-19 DIAGNOSIS — E78.5 DYSLIPIDEMIA, GOAL LDL BELOW 70: ICD-10-CM

## 2021-03-19 DIAGNOSIS — I25.83 CORONARY ARTERY DISEASE DUE TO LIPID RICH PLAQUE: ICD-10-CM

## 2021-03-19 DIAGNOSIS — I10 ESSENTIAL HYPERTENSION: ICD-10-CM

## 2021-03-19 DIAGNOSIS — I25.10 CORONARY ARTERY DISEASE DUE TO LIPID RICH PLAQUE: ICD-10-CM

## 2021-03-19 DIAGNOSIS — I25.84 CORONARY ARTERY DISEASE DUE TO CALCIFIED CORONARY LESION: ICD-10-CM

## 2021-03-19 DIAGNOSIS — I25.10 CORONARY ARTERY DISEASE DUE TO CALCIFIED CORONARY LESION: ICD-10-CM

## 2021-03-19 DIAGNOSIS — I48.0 PAROXYSMAL ATRIAL FIBRILLATION (H): ICD-10-CM

## 2021-03-19 DIAGNOSIS — I25.84 CORONARY ATHEROSCLEROSIS DUE TO SEVERELY CALCIFIED CORONARY LESION: ICD-10-CM

## 2021-05-22 ENCOUNTER — HEALTH MAINTENANCE LETTER (OUTPATIENT)
Age: 81
End: 2021-05-22

## 2021-05-26 ASSESSMENT — PATIENT HEALTH QUESTIONNAIRE - PHQ9: SUM OF ALL RESPONSES TO PHQ QUESTIONS 1-9: 2

## 2021-05-27 ASSESSMENT — PATIENT HEALTH QUESTIONNAIRE - PHQ9
SUM OF ALL RESPONSES TO PHQ QUESTIONS 1-9: 2
SUM OF ALL RESPONSES TO PHQ QUESTIONS 1-9: 2

## 2021-05-29 NOTE — TELEPHONE ENCOUNTER
Question following Office Visit  When did you see your provider: 5/31/19  What is your question: Patient stated he talked to Adonis Trotter MD about getting samples of Viagra but he never got them on Friday. Please the patient know if he can  the Viagra samples.  Okay to leave a detailed message: Yes

## 2021-05-29 NOTE — PROGRESS NOTES
Office Visit - Follow up    Juan Carlos Marley   78 y.o. male    Date of Visit: 5/31/2019    Chief Complaint   Patient presents with     Hypertension     Cardiology follow up     Coronary Artery Disease     Follow-up     fasting       Subjective: Coronary artery disease secondary to lipid rich plaque history of coronary bypass grafting to 5 vessels in 2016 tolerates this well no chest pain or shortness of breath.    Issues with erectile dysfunction try Viagra half tablet of sildenafil 100 mill discussed side effects and mechanism of action.    Spells of dizziness slow to speak TIA versus seizure no associated chest pain the spells have been less frequent which is encouraging.    Previous consultations with neurologist suggested some abnormality of the cerebral vasculature that was not amenable to surgical correction.    No blood in urine occasional blood in stool discussed Metamucil.  Last colonoscopy showed a tubular adenomatous colon polyp with [facility] presiding on February 4, 2015 background diverticulosis was also noted.    Medication list reviewed reconciled no known drug allergies.    ROS: A comprehensive review of systems was performed and was otherwise negative    Medications:  Prior to Admission medications    Medication Sig Start Date End Date Taking? Authorizing Provider   ascorbic acid (VITAMIN C) 500 MG tablet Take 500 mg by mouth daily.   Yes PROVIDER, HISTORICAL   aspirin 81 mg chewable tablet Chew 81 mg daily.   Yes PROVIDER, HISTORICAL   atorvastatin (LIPITOR) 40 MG tablet Take 1 tablet (40 mg total) by mouth daily. As directed 2/28/19  Yes Lorne House MD   CALCIUM CARBONATE (CALCIUM 600 ORAL) Take 60 mg by mouth daily.    Yes PROVIDER, HISTORICAL   clopidogrel (PLAVIX) 75 mg tablet  2/13/18  Yes PROVIDER, HISTORICAL   famotidine (PEPCID) 20 MG tablet Take 20 mg by mouth 2 (two) times a day.   Yes PROVIDER, HISTORICAL   ferrous gluconate (FERGON) 324 MG tablet Take 324 mg by mouth daily.  3/26/18  Yes PROVIDER, HISTORICAL   ferrous sulfate 325 (65 FE) MG tablet TAKE ONE TABLET BY MOUTH EVERY DAY 8/30/18  Yes Adonis Trotter MD   losartan (COZAAR) 25 MG tablet TAKE ONE TABLET BY MOUTH EVERY DAY 4/12/19  Yes Lorne House MD   metoprolol tartrate (LOPRESSOR) 25 MG tablet Take 1 tablet (25 mg total) by mouth 2 (two) times a day. 4/17/19  Yes Lorne House MD   multivitamin therapeutic (THERAGRAN) tablet Take 1 tablet by mouth daily. Patient breaks multivitamin in half; takes half tab in the morning and half tab in the evening   Yes PROVIDER, HISTORICAL   nitroglycerin (NITROSTAT) 0.4 MG SL tablet Place 1 tablet (0.4 mg total) under the tongue every 5 (five) minutes as needed for chest pain. 8/3/16  Yes Lorne House MD   tamsulosin (FLOMAX) 0.4 mg Cp24  3/9/18  Yes PROVIDER, HISTORICAL   sildenafil (VIAGRA) 100 MG tablet Take 1 tablet (100 mg total) by mouth as needed for erectile dysfunction. 5/31/19   Adonis Trotter MD   ketoconazole (NIZORAL) 2 % shampoo Apply 1 application topically once a week.  5/11/16 5/31/19  PROVIDER, HISTORICAL   omeprazole (PRILOSEC) 20 MG capsule Take 1 capsule by mouth daily. 9/3/13 5/31/19  PROVIDER, HISTORICAL       Allergies: No Known Allergies    Immunizations:   Immunization History   Administered Date(s) Administered     Influenza [id number], 01/13/2010     Influenza high dose, seasonal 10/23/2015, 11/28/2016, 10/25/2017, 10/29/2018     Influenza, inj, historic,unspecified 10/27/1993, 11/22/1995, 10/16/1996, 10/09/1997, 10/27/1998, 11/12/1999, 10/30/2002, 10/24/2003, 12/27/2004, 11/02/2005, 11/09/2006, 10/02/2007, 11/12/2008, 10/05/2009, 11/04/2010, 11/01/2011     Influenza, seasonal,quad inj 6-35 mos 12/06/2012, 11/22/2013, 11/07/2014     Pneumo Conj 13-V (2010&after) 01/19/2015     Pneumo Polysac 23-V 02/25/2008     Td, Adult, Absorbed 11/22/1995, 05/25/2002     Td,adult,historic,unspecified 02/25/2008     Tdap 02/21/2018     ZOSTER, LIVE  01/01/1900, 07/30/2012       Exam Chest clear to auscultation and percussion.  Heart tones regular rhythm without murmur rub or gallop.  Abdomen soft nontender no organomegaly.  No peritoneal signs.  Extremities free of edema cyanosis or clubbing.  Neck veins nondistended no thyromegaly or scleral icterus noted, carotids full.  Skin warm and dry easily conversant good spirited.  Normal intelligence.  Neurologically intact no gross localizing findings.    120/68 pulse 63 respirations 18 O2 sats 94% on room air.    Assessment and Plan  Coronary artery disease secondary to lipid rich plaque plus hypertension and hyperlipidemia and spells of transient ischemic attacks.  Continue same meds and cares.  Check lipid panel today also followed by cardiology group from [facility] here in [place] Dr. Lorne House presiding.    Tubular adenomatous colon polyp with diverticulosis Metamucil suggested 1 heaping tablespoon daily with occasional light hematochezia last colonoscopy dated February 4, 2015 if no better needs repeat colonoscopy or flexible sigmoidoscopy.    Erectile dysfunction discussed sildenafil 100 mg take half tablet daily as needed #20 dispensed 1 refill discussed potential side effects of mechanism of action.  RTC 3 months time.    Time: total time spent with the patient was 25 minutes of which >50% was spent in counseling and coordination of care    The following high BMI interventions were performed this visit: encouragement to exercise    Adonis Trotter MD    Patient Active Problem List   Diagnosis     Essential hypertension     Dyslipidemia, goal LDL below 70     Coronary artery disease due to lipid rich plaque     Major depressive disorder, recurrent episode (H)

## 2021-05-30 VITALS — WEIGHT: 197 LBS | HEIGHT: 69 IN | BODY MASS INDEX: 29.18 KG/M2

## 2021-05-30 VITALS — WEIGHT: 200 LBS | BODY MASS INDEX: 29.62 KG/M2 | HEIGHT: 69 IN

## 2021-05-30 VITALS — BODY MASS INDEX: 29.03 KG/M2 | HEIGHT: 69 IN | WEIGHT: 196 LBS

## 2021-05-31 ENCOUNTER — RECORDS - HEALTHEAST (OUTPATIENT)
Dept: ADMINISTRATIVE | Facility: CLINIC | Age: 81
End: 2021-05-31

## 2021-05-31 VITALS — HEIGHT: 69 IN | WEIGHT: 195 LBS | BODY MASS INDEX: 28.88 KG/M2

## 2021-05-31 VITALS — HEIGHT: 69 IN | BODY MASS INDEX: 28.14 KG/M2 | WEIGHT: 190 LBS

## 2021-05-31 VITALS — BODY MASS INDEX: 28.44 KG/M2 | HEIGHT: 69 IN | WEIGHT: 192 LBS

## 2021-06-01 ENCOUNTER — RECORDS - HEALTHEAST (OUTPATIENT)
Dept: ADMINISTRATIVE | Facility: CLINIC | Age: 81
End: 2021-06-01

## 2021-06-01 VITALS — BODY MASS INDEX: 28.44 KG/M2 | WEIGHT: 192.56 LBS

## 2021-06-01 VITALS — HEIGHT: 69 IN | WEIGHT: 192 LBS | BODY MASS INDEX: 28.44 KG/M2

## 2021-06-01 VITALS — BODY MASS INDEX: 27.99 KG/M2 | WEIGHT: 189 LBS | HEIGHT: 69 IN

## 2021-06-01 VITALS — WEIGHT: 186.5 LBS | BODY MASS INDEX: 27.62 KG/M2 | HEIGHT: 69 IN

## 2021-06-01 NOTE — TELEPHONE ENCOUNTER
Referral Request  Type of referral: Gastroenterology  Who s requesting: Patient  Why the request: Patient stated when he eats he chokes and his food gets stuck. Patient would like to go see a specialist to stretch his throat. Patient stated he's been seen for this before but his gastroenterologist has retired. Patient stated the last time he had this issue was 5 years ago. Patient was transferred to triage.  Have you been seen for this request: No:  Appointment Offered:  declined  Does patient have a preference on a group/provider? No  Okay to leave a detailed message?  Yes  [phone]

## 2021-06-01 NOTE — TELEPHONE ENCOUNTER
Coughing / choking on food last night       Yes was able to clear food on his own      Did have a hx of these spells in the past and had been seen by Dr Mtz (sp ?) - GI  - believes he has retired and had undergone occasional procedures to stretch the esphagus       Has not needed them until 4-5 months ago when this issue recurred         Pt is just requesting referral out to GI (unless Dr Trotter needs to see him first)    A/P:   > I will get message over to his team to review              Pt tele# [phone]    OK to talk freely with whoever answers the

## 2021-06-01 NOTE — TELEPHONE ENCOUNTER
Felicia for [facility] consult for evaluation of dysphagia with choking spells.  Please call patient and set this referral up and I will be glad to cosign.  Please assist patient with GI consult.  [facility] presiding.

## 2021-06-01 NOTE — TELEPHONE ENCOUNTER
Referral Request  Type of referral: Gastroentrologist  Who s requesting: Patient  Why the request: Food gets caught in throat , feels like throat is closing off at times when eating- Patient has appointment scheduled with Dr Trotter in October and is asking if it would be recommended by PCP to see gastroenterologist instead?  Have you been seen for this request: Yes  Does patient have a preference on a group/provider?  Would like to stay in network with insurance, Patient typically sees a Dr. Mtz in Gastroenterology, however, this provider is retiring soon.  Okay to leave a detailed message?  Yes

## 2021-06-02 VITALS — BODY MASS INDEX: 29.33 KG/M2 | HEIGHT: 69 IN | WEIGHT: 198 LBS

## 2021-06-02 VITALS — WEIGHT: 200 LBS | HEIGHT: 69 IN | BODY MASS INDEX: 29.62 KG/M2

## 2021-06-02 NOTE — PROGRESS NOTES
Office Visit - Follow up    Juan Carlos Marley   79 y.o. male    Date of Visit: 10/29/2019    Chief Complaint   Patient presents with     Coronary Artery Disease     Follow-up     ER Esophageal obstruction     Cold Feet       Subjective: Hypertension with history of coronary artery disease.  In the remote past 2013 he did have coronary bypass grafting to 5 vessels.    Feet get cold.    Emergency room visit for esophageal obstruction related to food.  Chicken cleared on its own.  Seen by mid Dr. Aguirre [facility] for dysphagia on September 6, 2019.  He has questions regarding Plavix and aspirin and anticipated colonoscopy I believe early part of 2020.  I asked the patient to consult with the gastroenterologist and/or his neurologist.  He has had multiple TIAs necessitating the presence of both aspirin and Plavix in his medicine regimen.  The patient of late has not had any TIAs or stroke completed strokes.    No chest pain no shortness of breath no blood in the urine.  Occasional blood in the stool if he exercises or walks.  Occasional chest pain at night.    Medication list reviewed well-tolerated normal effects reconciled in the chart.    He does not smoke he does not abuse alcohol.    Colonoscopy done by Dr. Mtz of [facility] on February 4, 2015 showed a tubular adenomatous colon polyps and background diverticulosis with no cancer.    ROS: A comprehensive review of systems was performed and was otherwise negative    Medications:  Prior to Admission medications    Medication Sig Start Date End Date Taking? Authorizing Provider   ascorbic acid (VITAMIN C) 500 MG tablet Take 500 mg by mouth daily.   Yes PROVIDER, HISTORICAL   aspirin 81 mg chewable tablet Chew 81 mg daily.   Yes PROVIDER, HISTORICAL   atorvastatin (LIPITOR) 40 MG tablet Take 1 tablet (40 mg total) by mouth daily. As directed 2/28/19  Yes Lorne House MD   CALCIUM CARBONATE (CALCIUM 600 ORAL) Take 60 mg by mouth daily.    Yes PROVIDER,  HISTORICAL   clopidogrel (PLAVIX) 75 mg tablet  2/13/18  Yes PROVIDER, HISTORICAL   famotidine (PEPCID) 20 MG tablet Take 20 mg by mouth 2 (two) times a day.   Yes PROVIDER, HISTORICAL   ferrous sulfate 325 (65 FE) MG tablet TAKE ONE TABLET BY MOUTH EVERY DAY 10/4/19  Yes Adonis Trotter MD   losartan (COZAAR) 25 MG tablet TAKE ONE TABLET BY MOUTH EVERY DAY 4/12/19  Yes Lorne House MD   metoprolol tartrate (LOPRESSOR) 25 MG tablet Take 1 tablet (25 mg total) by mouth 2 (two) times a day. 4/17/19  Yes Lorne House MD   multivitamin therapeutic (THERAGRAN) tablet Take 1 tablet by mouth daily. Patient breaks multivitamin in half; takes half tab in the morning and half tab in the evening   Yes PROVIDER, HISTORICAL   tamsulosin (FLOMAX) 0.4 mg Cp24  3/9/18  Yes PROVIDER, HISTORICAL   ferrous gluconate (FERGON) 324 MG tablet Take 324 mg by mouth daily. 3/26/18 10/29/19 Yes PROVIDER, HISTORICAL   nitroglycerin (NITROSTAT) 0.4 MG SL tablet Place 1 tablet (0.4 mg total) under the tongue every 5 (five) minutes as needed for chest pain. 8/3/16   Lorne House MD   sildenafil (VIAGRA) 100 MG tablet Take 1 tablet (100 mg total) by mouth as needed for erectile dysfunction. 5/31/19   Adonis Trotter MD       Allergies: No Known Allergies    Immunizations:   Immunization History   Administered Date(s) Administered     Influenza [id number], 01/13/2010     Influenza high dose,seasonal,PF, 65+ yrs 10/23/2015, 11/28/2016, 10/25/2017, 10/29/2018, 10/29/2019     Influenza, inj, historic,unspecified 10/27/1993, 11/22/1995, 10/16/1996, 10/09/1997, 10/27/1998, 11/12/1999, 10/30/2002, 10/24/2003, 12/27/2004, 11/02/2005, 11/09/2006, 10/02/2007, 11/12/2008, 10/05/2009, 11/04/2010, 11/01/2011     Influenza, seasonal,quad inj 6-35 mos 12/06/2012, 11/22/2013, 11/07/2014     Pneumo Conj 13-V (2010&after) 01/19/2015     Pneumo Polysac 23-V 02/25/2008     Td, Adult, Absorbed 11/22/1995, 05/25/2002      Td,adult,historic,unspecified 02/25/2008     Tdap 02/21/2018     ZOSTER, LIVE 01/01/1900, 07/30/2012       Exam Chest clear to auscultation and percussion.  Heart tones regular rhythm without murmur rub or gallop.  Abdomen soft nontender no organomegaly.  No peritoneal signs.  Extremities free of edema cyanosis or clubbing.  Neck veins nondistended no thyromegaly or scleral icterus noted, carotids full.  Skin warm and dry easily conversant good spirited.  Normal intelligence.  Neurologically intact no gross localizing findings.    126/64 pulse 60 respirations 18 O2 sats 96% on room air.  Weight down 2 pounds from previous current weight 194 pounds BMI elevated at 29.    Assessment and Plan  Hypertension with coronary artery disease and chronic bypass grafting to 5 vessels check lipid panel today.    History of recurrent transient ischemic attacks continue aspirin and Plavix.    Dysphagia needs repeat EGD and possible esophageal dilatation for recurrent esophageal stricture.  May be a risk on Plavix and aspirin.    Screening colonoscopy for prior history of tubular adenomatous colon polyps noted on February 4, 2015.  The issue of continuation of aspirin and Plavix in anticipation of these endoscopic procedures including colonoscopy and EGD poses a significant problem I asked the patient to consult with his consultants from neurology gastroenterology and/or cardiology regarding continuation of Plavix and aspirin in light of these upcoming GI endoscopy procedures.  RTC 3 months time fasting.        The following high BMI interventions were performed this visit: encouragement to exercise    Adonis Trotter MD    Patient Active Problem List   Diagnosis     Essential hypertension     Paroxysmal atrial fibrillation (H)     Dyslipidemia, goal LDL below 70     Coronary artery disease due to lipid rich plaque     Major depressive disorder, recurrent episode (H)

## 2021-06-03 VITALS
DIASTOLIC BLOOD PRESSURE: 64 MMHG | WEIGHT: 194 LBS | BODY MASS INDEX: 28.73 KG/M2 | OXYGEN SATURATION: 96 % | HEART RATE: 60 BPM | HEIGHT: 69 IN | SYSTOLIC BLOOD PRESSURE: 126 MMHG

## 2021-06-03 VITALS — BODY MASS INDEX: 28.94 KG/M2 | WEIGHT: 196 LBS

## 2021-06-03 VITALS
HEART RATE: 68 BPM | OXYGEN SATURATION: 97 % | HEIGHT: 69 IN | BODY MASS INDEX: 28.73 KG/M2 | DIASTOLIC BLOOD PRESSURE: 78 MMHG | WEIGHT: 194 LBS | SYSTOLIC BLOOD PRESSURE: 142 MMHG

## 2021-06-03 NOTE — PROGRESS NOTES
FOOT AND ANKLE SURGERY/PODIATRY CONSULT NOTE         ASSESSMENT:   Hallux abductovalgus right foot  Hammertoe second toe left foot      TREATMENT:  I recommend the patient continue to use his toe separators and pads to protect the areas from shoe irritation.  If the patient's pain progresses I would recommend a bunionectomy of the right foot and a hammertoe correction of the second toe left foot        HPI:Juan Carlos Marley presented to the clinic today complaining of mild pain on the top of the second toe left foot.  The patient stated that he does get some mild shoe pressure.  He wears a pad on the toe to protect from his shoe gear.  He has had this problem for several months.  He stated that he can tolerate the pain but he notices that the toe has changed over the past several months.  He notices that the toe was not straight as it once was.  He denies any trauma to the toe.  He has not had any redness or swelling.  The pain is relieved after removing his shoe gear.  The patient also complained of pain on the inside portion of the second toe right foot because the right great toe is irritating the second toe.  He stated that this can be quite painful but he was a toe separator between the 2 toes to give him relief.        Past Medical History:   Diagnosis Date     Cataract, nuclear sclerotic, left eye 6/2/2017     Coronary artery disease due to lipid rich plaque      Dyslipidemia, goal LDL below 70 12/20/2014     Essential hypertension with goal blood pressure less than 130/85      Nonsustained ventricular tachycardia (H)     6 beat run, asymptomatic per Dr. Hackett     Paroxysmal atrial fibrillation (H) 09/02/2013    at the time of CABG; [facility] did a 4-week monitor in 2017 and did not find any atrial fibrillation and therefore stopped his Eliquis.     Transient ischemic attack 11/20/2016     Transient ischemic attack 01/24/2016    left cerebral hemisphere     Transient ischemic attack 12/20/2014     Transient  ischemic attack 09/2018    transient speech difficulty and dizziness while in [place]; did not seek medical attention     Transient ischemic attack 10/2018    same symptoms as in [place]       Past Surgical History:   Procedure Laterality Date     CORONARY ARTERY BYPASS GRAFT  09/2013    underwent 5-vessel coronary bypass grafting at the [facility].     FOOT SURGERY Left 12/19/2014    Dr. Carr     FRACTURE SURGERY Right     hand     HERNIA REPAIR  11/2013     ORCHIECTOMY Right      ORIF FEMUR FRACTURE Right      PROSTATECTOMY  11/2013       No Known Allergies      Current Outpatient Medications:      ascorbic acid (VITAMIN C) 500 MG tablet, Take 500 mg by mouth daily., Disp: , Rfl:      aspirin 81 mg chewable tablet, Chew 81 mg daily., Disp: , Rfl:      atorvastatin (LIPITOR) 40 MG tablet, Take 1 tablet (40 mg total) by mouth daily. As directed, Disp: 90 tablet, Rfl: 3     CALCIUM CARBONATE (CALCIUM 600 ORAL), Take 60 mg by mouth daily. , Disp: , Rfl:      clopidogrel (PLAVIX) 75 mg tablet, , Disp: , Rfl:      famotidine (PEPCID) 20 MG tablet, Take 20 mg by mouth 2 (two) times a day., Disp: , Rfl:      ferrous sulfate 325 (65 FE) MG tablet, TAKE ONE TABLET BY MOUTH EVERY DAY, Disp: 100 tablet, Rfl: 10     losartan (COZAAR) 25 MG tablet, TAKE ONE TABLET BY MOUTH EVERY DAY, Disp: 90 tablet, Rfl: 2     metoprolol tartrate (LOPRESSOR) 25 MG tablet, Take 1 tablet (25 mg total) by mouth 2 (two) times a day., Disp: 180 tablet, Rfl: 2     multivitamin therapeutic (THERAGRAN) tablet, Take 1 tablet by mouth daily. Patient breaks multivitamin in half; takes half tab in the morning and half tab in the evening, Disp: , Rfl:      nitroglycerin (NITROSTAT) 0.4 MG SL tablet, Place 1 tablet (0.4 mg total) under the tongue every 5 (five) minutes as needed for chest pain., Disp: 25 tablet, Rfl: 11     sildenafil (VIAGRA) 100 MG tablet, Take 1 tablet (100 mg total) by mouth as needed for erectile dysfunction., Disp: 20 tablet, Rfl:  1     tamsulosin (FLOMAX) 0.4 mg Cp24, , Disp: , Rfl:     Family History   Problem Relation Age of Onset     Aortic aneurysm Father          84     Other Mother          accident age 49     Other Sister         autistic      No Medical Problems Daughter      Pulmonary Hypertension Neg Hx      Congenital heart disease Neg Hx        Social History     Socioeconomic History     Marital status:      Spouse name: Salud     Number of children: 1     Years of education: Not on file     Highest education level: Not on file   Occupational History     Occupation:  at [employer], commercial      Employer: RETIRED   Social Needs     Financial resource strain: Not on file     Food insecurity:     Worry: Not on file     Inability: Not on file     Transportation needs:     Medical: Not on file     Non-medical: Not on file   Tobacco Use     Smoking status: Former Smoker     Packs/day: 1.00     Years: 10.00     Pack years: 10.00     Types: Cigarettes, Pipe     Last attempt to quit: 1970     Years since quittin.7     Smokeless tobacco: Never Used   Substance and Sexual Activity     Alcohol use: Yes     Alcohol/week: 10.0 standard drinks     Types: 10 Glasses of wine per week     Comment: 1-2 glass of wine before dinner     Drug use: No     Sexual activity: Yes     Partners: Female     Birth control/protection: Post-menopausal   Lifestyle     Physical activity:     Days per week: Not on file     Minutes per session: Not on file     Stress: Not on file   Relationships     Social connections:     Talks on phone: Not on file     Gets together: Not on file     Attends Mandaeism service: Not on file     Active member of club or organization: Not on file     Attends meetings of clubs or organizations: Not on file     Relationship status: Not on file     Intimate partner violence:     Fear of current or ex partner: Not on file     Emotionally abused: Not on file     Physically abused: Not  on file     Forced sexual activity: Not on file   Other Topics Concern     Not on file   Social History Narrative    Lives with his wife Salud in [place] in a twin home.  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  One daughter.         Review of Systems - Patient denies fever, chills, rash, wound, stiffness, limping, numbness, weakness, heart burn, blood in stool, chest pain with activity, calf pain when walking, shortness of breath with activity, chronic cough, easy bleeding/bruising, swelling of ankles, excessive thirst, fatigue, depression, anxiety.  Patient admits to bilateral foot pain.      OBJECTIVE:  Appearance: alert, well appearing, and in no distress.    Vitals:    11/13/19 1259   BP: 142/78   Pulse: 68   SpO2: 97%       BMI= Body mass index is 28.65 kg/m .    General appearance: Patient is alert and fully cooperative with history & exam.  No sign of distress is noted during the visit.  Psychiatric: Affect is pleasant & appropriate.  Patient appears motivated to improve health.  Respiratory: Breathing is regular & unlabored while sitting.  HEENT: Hearing is intact to spoken word.  Speech is clear.  No gross evidence of visual impairment that would impact ambulation.    Vascular: Dorsalis pedis and posterior tibial pulses are palpable. There is no pedal hair growth bilaterally.  CFT < 3 sec from anterior tibial surface to distal digits bilaterally. There is no appreciable edema noted.  Dermatologic: Turgor and texture are within normal limits. No coloration or temperature changes. No primary or secondary lesions noted.  Neurologic: All epicritic and proprioceptive sensations are grossly intact bilaterally.  Musculoskeletal: All active and passive ankle, subtalar, midtarsal, and 1st MPJ range of motion are grossly intact without pain or crepitus, with the exception of none. Manual muscle strength is bilaterally. All dorsiflexors, plantarflexors, invertors, evertors are intact bilaterally.  No tenderness  present to bilateral feet on palpation.  No tenderness to bilateral feet with range of motion.  There is a semirigid flexion deformity noted at the proximal interphalangeal joint second toe left foot.  There is a large firm subcutaneous mass on the mid tarsal right foot.  The right great toe is laterally deviated and buttresses the second toe right foot.  Calf is soft/non-tender with/without warmth/induration    Imaging:     No results found.       TREATMENT:  I informed the patient that he would require surgical correction of his foot deformities to alleviate his mild symptoms.  The patient is currently taking 2 blood thinners.  I told the patient that if he can tolerate the discomfort and he gets relief from his padding to protect the area from his shoe gear I would recommend he continue to do so.  I told the patient however if his pain persist and he is unable to participate in his favorite activities.  He is unable to wear shoes that I would recommend a hammertoe correction of the second toe left foot and a bunionectomy of the right foot.  The patient is to return to the clinic as needed.    Maykel Farr; ERIN  [facility] Foot & Ankle Surgery/Podiatry

## 2021-06-04 VITALS
HEIGHT: 69 IN | SYSTOLIC BLOOD PRESSURE: 118 MMHG | WEIGHT: 195 LBS | DIASTOLIC BLOOD PRESSURE: 68 MMHG | BODY MASS INDEX: 28.88 KG/M2 | RESPIRATION RATE: 16 BRPM | HEART RATE: 56 BPM

## 2021-06-04 VITALS — BODY MASS INDEX: 28.14 KG/M2 | HEIGHT: 69 IN | WEIGHT: 190 LBS

## 2021-06-04 VITALS
BODY MASS INDEX: 28.73 KG/M2 | DIASTOLIC BLOOD PRESSURE: 72 MMHG | OXYGEN SATURATION: 98 % | WEIGHT: 194 LBS | HEIGHT: 69 IN | SYSTOLIC BLOOD PRESSURE: 130 MMHG | HEART RATE: 57 BPM

## 2021-06-05 VITALS
WEIGHT: 184 LBS | SYSTOLIC BLOOD PRESSURE: 116 MMHG | BODY MASS INDEX: 27.17 KG/M2 | DIASTOLIC BLOOD PRESSURE: 58 MMHG | OXYGEN SATURATION: 95 % | HEART RATE: 57 BPM | RESPIRATION RATE: 16 BRPM

## 2021-06-05 VITALS
DIASTOLIC BLOOD PRESSURE: 60 MMHG | SYSTOLIC BLOOD PRESSURE: 108 MMHG | HEIGHT: 69 IN | TEMPERATURE: 96.2 F | RESPIRATION RATE: 18 BRPM | BODY MASS INDEX: 27.33 KG/M2 | HEART RATE: 56 BPM | WEIGHT: 184.5 LBS | OXYGEN SATURATION: 98 %

## 2021-06-05 NOTE — TELEPHONE ENCOUNTER
Spoke with the patient and relayed message below from Dr. Trotter.  He verbalized understanding and had no further questions at this time.    Referrals have been placed for Dr. Trotter to review per message below.  Madhavi MARQUIS, BRADLY/CMT....................12:26 PM

## 2021-06-05 NOTE — TELEPHONE ENCOUNTER
Referral Request  Type of referral: [facility]   Who s requesting: Patient  Why the request: [facility] states needs referrals to see them  Have you been seen for this request: Yes  Does patient have a preference on a group/provider? [facility]  Okay to leave a detailed message?  Yes

## 2021-06-05 NOTE — TELEPHONE ENCOUNTER
Felicia Escudero to place referrals as requested below for [place]?    Imelda JADE LPN .......... 11:14 AM  02/03/20

## 2021-06-06 NOTE — PROGRESS NOTES
Annual wellness visit   Assessment and Plan:   Annual wellness visit    1. Routine general medical examination at a health care facility  Annual wellness visit  - Comprehensive Metabolic Panel  - HM2(CBC w/o Differential)  - Lipid Cascade  - Thyroid Stimulating Hormone (TSH)  - Urinalysis-UC if Indicated  - Uric Acid    2. Screening for prostate cancer  Annual wellness visit and screen for prostate cancer.  - PSA (Prostatic-Specific Antigen), Annual Screen     The patient's current medical problems were reviewed.    I have had an Advance Directives discussion with the patient.  The following health maintenance schedule was reviewed with the patient and provided in printed form in the after visit summary:   Health Maintenance   Topic Date Due     DEPRESSION ACTION PLAN  1940     ZOSTER VACCINES (2 of 3) 09/24/2012     MEDICARE ANNUAL WELLNESS VISIT  12/13/2019     FALL RISK ASSESSMENT  10/29/2020     ADVANCE CARE PLANNING  12/13/2023     LIPID  10/29/2024     TD 18+ HE  02/21/2028     PNEUMOCOCCAL IMMUNIZATION 65+ LOW/MEDIUM RISK  Completed     INFLUENZA VACCINE RULE BASED  Completed        Subjective:   Chief Complaint: Juan Carlos Marley is an 79 y.o. male here for an Annual Wellness visit.   HPI: Annual wellness visit and screen for prostate cancer for this 79-year-old retired .    Choking episodes.  Suffered ugi stricture in the past with history of dilatation and upcoming GI appointment with Dr. Mtz plan.    Rainouts phenomena wonders if uric acid level is linked to it.    Blas yin discussed this medication.    Appointment with cardiologist Dr. Lorne House slaprashanth for this Friday.    Colonoscopy forthcoming last colonoscopy showed tubular adenomatous colon polyp on February 4, 2015 with Dr. Mtz.  Prior history of adenomatous polyps plus background diverticulosis and esophageal stricture.  Questions regarding Plavix answered in terms of upcoming esophageal stricture dilatation.    No  known drug allergies.    Non-smoker no excess alcohol.  One daughter with type 2 diabetes 1 Kocher spaniel.  Wife living and well also patient of this examiner previous treatment for leukocytoclastic vasculitis.    Immunizations reviewed and up-to-date.  Prior history of dermatology consult for sebaceous cyst infected requiring multiple incisions and drainage.    2 glasses of wine each evening non-smoker.  History of cataract surgery bilateral May 2017 at [facility].    2013 required coronary artery bypass grafting to 5 vessels and history of paroxysmal atrial fibrillation now on Plavix.  Followed by cardiology.  Dr. Lorne House.  Occasional chest pain at night after eating.  Previously the patient had been on warfarin and Eliquis now Plavix.  Symptoms of transient ischemic attacks may be related to an anomalous and variant cerebral arterial circulation.  Right side.    Prior hernia repairs multiple.    TURP in the past for BPH.    Mother  49 accidental.    Father  84 ruptured abdominal aortic aneurysm.  As noted earlier one daughter with type 2 diabetes no grandchildren.  Daughter is single.  Wife supportive and well prior history of leukocytoclastic vasculitis also patient of this examiner.  Retired  now lives in a twin home in [facility] [place] [facility].  Does not exercise regularly encourage daily exercise.  Simple walking for 30 minutes.  Would be beneficial.    Review of Systems:    Please see above.  The rest of the review of systems are negative for all systems.    Patient Care Team:  Adonis Trotter MD as PCP - General  Adonis Trotter MD Bozivich, Michael J, MD as Assigned PCP     Patient Active Problem List   Diagnosis     Essential hypertension     Paroxysmal atrial fibrillation (H)     Dyslipidemia, goal LDL below 70     Coronary artery disease due to lipid rich plaque     Major depressive disorder, recurrent episode (H)     Past Medical History:   Diagnosis  Date     Cataract, nuclear sclerotic, left eye 2017     Coronary artery disease due to lipid rich plaque      Dyslipidemia, goal LDL below 70 2014     Essential hypertension with goal blood pressure less than 130/85      Nonsustained ventricular tachycardia (H)     6 beat run, asymptomatic per Dr. Hackett     Paroxysmal atrial fibrillation (H) 2013    at the time of CABG; [facility] did a 4-week monitor in 2017 and did not find any atrial fibrillation and therefore stopped his Eliquis.     Transient ischemic attack 2016     Transient ischemic attack 2016    left cerebral hemisphere     Transient ischemic attack 2014     Transient ischemic attack 2018    transient speech difficulty and dizziness while in [place]; did not seek medical attention     Transient ischemic attack 10/2018    same symptoms as in [place]      Past Surgical History:   Procedure Laterality Date     CORONARY ARTERY BYPASS GRAFT  2013    underwent 5-vessel coronary bypass grafting at the [facility].     FOOT SURGERY Left 2014    Dr. Carr     FRACTURE SURGERY Right     hand     HERNIA REPAIR  2013     ORCHIECTOMY Right      ORIF FEMUR FRACTURE Right      PROSTATECTOMY  2013      Family History   Problem Relation Age of Onset     Aortic aneurysm Father          84     Other Mother          accident age 49     Other Sister         autistic      No Medical Problems Daughter      Pulmonary Hypertension Neg Hx      Congenital heart disease Neg Hx       Social History     Socioeconomic History     Marital status:      Spouse name: Salud     Number of children: 1     Years of education: Not on file     Highest education level: Not on file   Occupational History     Occupation:  at [employer], commercial      Employer: RETIRED   Social Needs     Financial resource strain: Not on file     Food insecurity:     Worry: Not on file     Inability: Not on file      Transportation needs:     Medical: Not on file     Non-medical: Not on file   Tobacco Use     Smoking status: Former Smoker     Packs/day: 1.00     Years: 10.00     Pack years: 10.00     Types: Cigarettes, Pipe     Last attempt to quit: 1970     Years since quittin.9     Smokeless tobacco: Never Used   Substance and Sexual Activity     Alcohol use: Yes     Alcohol/week: 8.0 standard drinks     Types: 8 Glasses of wine per week     Comment: 1-2 glass of wine before dinner     Drug use: No     Sexual activity: Yes     Partners: Female     Birth control/protection: Post-menopausal   Lifestyle     Physical activity:     Days per week: Not on file     Minutes per session: Not on file     Stress: Not on file   Relationships     Social connections:     Talks on phone: Not on file     Gets together: Not on file     Attends Gnosticist service: Not on file     Active member of club or organization: Not on file     Attends meetings of clubs or organizations: Not on file     Relationship status: Not on file     Intimate partner violence:     Fear of current or ex partner: Not on file     Emotionally abused: Not on file     Physically abused: Not on file     Forced sexual activity: Not on file   Other Topics Concern     Not on file   Social History Narrative    Lives with his wife Salud in [place] in a twin home.  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  One daughter.        Current Outpatient Medications   Medication Sig Dispense Refill     ascorbic acid (VITAMIN C) 500 MG tablet Take 500 mg by mouth daily.       aspirin 81 mg chewable tablet Chew 81 mg daily.       atorvastatin (LIPITOR) 40 MG tablet Take 1 tablet (40 mg total) by mouth daily. As directed 90 tablet 3     CALCIUM CARBONATE (CALCIUM 600 ORAL) Take 60 mg by mouth daily.        clopidogrel (PLAVIX) 75 mg tablet        famotidine (PEPCID) 20 MG tablet Take 20 mg by mouth 2 (two) times a day.       ferrous sulfate 325 (65 FE) MG tablet TAKE ONE TABLET  "BY MOUTH EVERY  tablet 10     losartan (COZAAR) 25 MG tablet TAKE ONE TABLET BY MOUTH EVERY DAY 90 tablet 0     metoprolol tartrate (LOPRESSOR) 25 MG tablet TAKE ONE TABLET BY MOUTH TWICE A  tablet 0     multivitamin therapeutic (THERAGRAN) tablet Take 1 tablet by mouth daily. Patient breaks multivitamin in half; takes half tab in the morning and half tab in the evening       tamsulosin (FLOMAX) 0.4 mg Cp24        nitroglycerin (NITROSTAT) 0.4 MG SL tablet Place 1 tablet (0.4 mg total) under the tongue every 5 (five) minutes as needed for chest pain. 25 tablet 11     sildenafil (VIAGRA) 100 MG tablet Take 1 tablet (100 mg total) by mouth as needed for erectile dysfunction. 20 tablet 1     No current facility-administered medications for this visit.       Objective:   Vital Signs:   Visit Vitals  /72 (Patient Site: Right Arm, Patient Position: Sitting)   Pulse (!) 57   Ht 5' 9\" (1.753 m)   Wt 194 lb (88 kg)   SpO2 98%   BMI 28.65 kg/m         VisionScreening:  No exam data present     PHYSICAL EXAM  Chest clear to auscultation and percussion.  Heart tones regular rhythm without murmur rub or gallop.  Abdomen soft nontender no organomegaly.  No peritoneal signs.  Extremities free of edema cyanosis or clubbing.  Neck veins nondistended no thyromegaly or scleral icterus noted, carotids full.  Skin warm and dry easily conversant good spirited.  Normal intelligence.  Neurologically intact no gross localizing findings.  Skin negative lymph negative neuro negative psych normal HEENT negative back straight no severe spine tenderness genital rectal examination negative except prostate was enlarged at 3-4 over 4 without nodularity nothing to suggest malignancy the prostate was smooth.  Good pulse tone in all 4 extremities no carotid bruits or thyromegaly.  Abdomen benign heart tones were occasionally irregular.  Premature beats?.  Dr. Manav House from [facility] Department of cardiology to " evaluate this coming Friday.    Assessment Results 2/11/2020   Activities of Daily Living No help needed   Instrumental Activities of Daily Living No help needed   Get Up and Go Score -   Mini Cog Total Score 3   Some recent data might be hidden     A Mini-Cog score of 0-2 suggests the possibility of dementia, score of 3-5 suggests no dementia    Identified Health Risks:     The patient was provided with suggestions to help him develop a healthy physical lifestyle.   He is at risk for lack of exercise and has been provided with information to increase physical activity for the benefit of his well-being.  The patient was counseled and encouraged to consider modifying their diet and eating habits. He was provided with information on recommended healthy diet options.  He is at risk for falling and has been provided with information to reduce the risk of falling at home.  Patient's advanced directive was discussed and I am comfortable with the patient's wishes.

## 2021-06-06 NOTE — PATIENT INSTRUCTIONS - HE
Juan Carlos,    It was a pleasure to see you today at [facility].     Please use the nitro tabs if you develop angina; call 911 if you don't get relief from 3 tabs in 15 minutes.    You will see Dr. House in one year.     Please call us if you have any questions or concerns about your heart.      Manav House M.D.

## 2021-06-07 NOTE — TELEPHONE ENCOUNTER
Type of referral:  Gastroenterology  What provider or facility are you being referred to?    [facility]  [phone]  Do you have an appointment scheduled?  No  What is your question?    Patient states [place] needs the referral faxed to the above clinic.  Please call and get fax number.  Thank you  Okay to leave a detailed message: Yes     Please reach out to patient to schedule at:  [phone].  Please let it ring at least 5 times per patient.

## 2021-06-07 NOTE — TELEPHONE ENCOUNTER
Spoke with [facility] they have what is needed for the referral. They haven't had any openings since we sent the referral and schedules are now blocked. Will be evaluated in May.     Relayed this message to patient.

## 2021-06-08 NOTE — PROGRESS NOTES
Office Visit - Follow up    Juan Carlos Marley   76 y.o. male    Date of Visit: 2/16/2017    Chief Complaint   Patient presents with     Hypertension     Coronary Artery Disease     Hematuria     noticed three days ago       Subjective: TIA on blood thinners specifically apixaban 5 mg twice daily.    Other meds noted.  Gross hematuria experienced.  History of TIAs.  Seen by cardiologist as well as neurology.  The ladder on February 10 the form on February 2.  Electroencephalogram sleep deprivation is planned in March by neurology.  Dr. PERRY.    The patient notes some blood after defecation also hematuria.  In the past he's had benign prostatic hypertrophy and much of his prostate removed via transurethral resection.  Dr. MCKEON at [facility] urology presiding then.    Colonoscopy done February 4, 2015 by Dr. Mtz showed a tubular adenomatous polyp 5 year follow-up suggested.    No chest pain or shortness of breath medication list reviewed generally well tolerated.    ROS: A comprehensive review of systems was performed and was otherwise negative    Medications:  Prior to Admission medications    Medication Sig Start Date End Date Taking? Authorizing Provider   apixaban (ELIQUIS) 5 mg Tab tablet Take 1 tablet (5 mg total) by mouth 2 (two) times a day. 11/23/16  Yes Lorne House MD   ascorbic acid (VITAMIN C) 500 MG tablet Take 500 mg by mouth daily.   Yes PROVIDER, HISTORICAL   atorvastatin (LIPITOR) 20 MG tablet Take 20 mg by mouth bedtime.   Yes PROVIDER, HISTORICAL   CALCIUM CARBONATE (CALCIUM 600 ORAL) Take 60 mg by mouth daily.    Yes PROVIDER, HISTORICAL   ferrous sulfate 325 (65 FE) MG tablet Take 1 tablet by mouth daily with breakfast.   Yes PROVIDER, HISTORICAL   ketoconazole (NIZORAL) 2 % shampoo Apply 1 application topically once a week.  5/11/16  Yes PROVIDER, HISTORICAL   losartan (COZAAR) 25 MG tablet Take 1 tablet (25 mg total) by mouth daily. 11/28/16 11/28/17 Yes Lorne House MD   metoprolol tartrate  (LOPRESSOR) 25 MG tablet Take 1 tablet (25 mg total) by mouth 2 (two) times a day. 11/22/16  Yes Lorne House MD   mirabegron (MYRBETRIQ) 25 mg Tb24 Take 25 mg by mouth daily.   Yes PROVIDER, HISTORICAL   multivitamin therapeutic (THERAGRAN) tablet Take 1 tablet by mouth daily. Patient breaks multivitamin in half; takes half tab in the morning and half tab in the evening   Yes PROVIDER, HISTORICAL   omeprazole (PRILOSEC) 20 MG capsule TAKE ONE CAPSULE BY MOUTH EVERY DAY 11/28/16  Yes Adonis Trotter MD   nitroglycerin (NITROSTAT) 0.4 MG SL tablet Place 1 tablet (0.4 mg total) under the tongue every 5 (five) minutes as needed for chest pain. 8/3/16   Lorne House MD       Allergies: No Known Allergies    Immunizations:   Immunization History   Administered Date(s) Administered     Influenza [id number], 01/13/2010     Influenza high dose, seasonal 10/23/2015, 11/28/2016     Influenza, inj, historic 11/12/2008, 10/05/2009, 11/04/2010, 11/01/2011     Influenza, seasonal,quad inj 6-35 mos 12/06/2012, 11/22/2013, 11/07/2014     Pneumo Conj 13-V (2010&after) 01/19/2015     Pneumo Polysac 23-V 02/25/2008     Td, historic 02/25/2008     ZOSTER 01/01/1900, 07/30/2012       Exam Chest clear to auscultation and percussion.  Heart tones regular rhythm without murmur rub or gallop.  Abdomen soft nontender no organomegaly.  No peritoneal signs.  Extremities free of edema cyanosis or clubbing.  Neck veins nondistended no thyromegaly or scleral icterus noted, carotids full.  Skin warm and dry easily conversant good spirited.  Normal intelligence.  Neurologically intact no gross localizing findings.      Assessment and Plan   TIA on blood thinners specifically as noted above with history of hypertension hyperlipidemia and coronary artery disease the latter necessitating coronary bypass grafting.    Gross hematuria with history of TURP for BPH or in the remote past by Dr. MCKEON at [facility] urology.  Suggest UA UC today plus PSA  plus [facility] consult.  Orders sent.  RTC 1 month    Time: total time spent with the patient was 25 minutes of which >50% was spent in counseling and coordination of care    The following high BMI interventions were performed this visit: encouragement to exercise    Adonis Trotter MD    Patient Active Problem List   Diagnosis     Essential hypertension with goal blood pressure less than 130/85     Paroxysmal atrial fibrillation     Dyslipidemia, goal LDL below 70     Coronary artery disease due to lipid rich plaque     TIA (transient ischemic attack)

## 2021-06-09 NOTE — PROGRESS NOTES
Office Visit - Follow up    Juan Carlos Marley   76 y.o. male    Date of Visit: 3/30/2017    Chief Complaint   Patient presents with     Coronary Artery Disease     Hypertension     Transient Ischemic Attack     history of       Medication Questions       Subjective: TIAs.  Hypertension.  Coronary artery disease.    Concerns regarding TIAs versus seizures.  Suggested [facility] consultation.  Previously seen by neurologic associate Dr. Narinder MCCRARY  Questions regarding electroencephalogram done 2 weeks prior formal report not in chart.    Medications questions regarding omeprazole.    DEXA bone density scan okay at a later date no recent long bone or vertebral body fractures no history of osteoporosis.  Father  abdominal aortic aneurysm patient has a history of atherosclerosis and coronary artery disease.    Colon polyp seen on colonoscopy dated 2015 tubular adenomatous polyp was noted.    No blood in stool or urine medication list reviewed generally well-tolerated.    ROS: A comprehensive review of systems was performed and was otherwise negative    Medications:  Prior to Admission medications    Medication Sig Start Date End Date Taking? Authorizing Provider   apixaban (ELIQUIS) 5 mg Tab tablet Take 1 tablet (5 mg total) by mouth 2 (two) times a day. 16  Yes Lorne House MD   ascorbic acid (VITAMIN C) 500 MG tablet Take 500 mg by mouth daily.   Yes PROVIDER, HISTORICAL   atorvastatin (LIPITOR) 20 MG tablet TAKE ONE TABLET BY MOUTH EVERY DAY AS DIRECTED 17  Yes Adonis Trotter MD   CALCIUM CARBONATE (CALCIUM 600 ORAL) Take 60 mg by mouth daily.    Yes PROVIDER, HISTORICAL   ferrous sulfate 325 (65 FE) MG tablet Take 1 tablet by mouth daily with breakfast.   Yes PROVIDER, HISTORICAL   losartan (COZAAR) 25 MG tablet Take 1 tablet (25 mg total) by mouth daily. 16 Yes Lorne House MD   metoprolol tartrate (LOPRESSOR) 25 MG tablet Take 1 tablet (25 mg total) by mouth 2 (two)  times a day. 11/22/16  Yes Lorne House MD   multivitamin therapeutic (THERAGRAN) tablet Take 1 tablet by mouth daily. Patient breaks multivitamin in half; takes half tab in the morning and half tab in the evening   Yes PROVIDER, HISTORICAL   omeprazole (PRILOSEC) 20 MG capsule TAKE ONE CAPSULE BY MOUTH EVERY DAY 11/28/16  Yes Adonis Trotter MD   finasteride (PROSCAR) 5 mg tablet  2/21/17   PROVIDER, HISTORICAL   ketoconazole (NIZORAL) 2 % shampoo Apply 1 application topically once a week.  5/11/16   PROVIDER, HISTORICAL   nitroglycerin (NITROSTAT) 0.4 MG SL tablet Place 1 tablet (0.4 mg total) under the tongue every 5 (five) minutes as needed for chest pain. 8/3/16   Lorne House MD   atorvastatin (LIPITOR) 20 MG tablet Take 20 mg by mouth bedtime.  3/30/17  PROVIDER, HISTORICAL   mirabegron (MYRBETRIQ) 25 mg Tb24 Take 25 mg by mouth daily.  3/30/17  PROVIDER, HISTORICAL       Allergies: No Known Allergies    Immunizations:   Immunization History   Administered Date(s) Administered     Influenza [id number], 01/13/2010     Influenza high dose, seasonal 10/23/2015, 11/28/2016     Influenza, inj, historic 11/12/2008, 10/05/2009, 11/04/2010, 11/01/2011     Influenza, seasonal,quad inj 6-35 mos 12/06/2012, 11/22/2013, 11/07/2014     Pneumo Conj 13-V (2010&after) 01/19/2015     Pneumo Polysac 23-V 02/25/2008     Td, historic 02/25/2008     ZOSTER 01/01/1900, 07/30/2012       Exam Chest clear to auscultation and percussion.  Heart tones regular rhythm without murmur rub or gallop.  Abdomen soft nontender no organomegaly.  No peritoneal signs.  Extremities free of edema cyanosis or clubbing.  Neck veins nondistended no thyromegaly or scleral icterus noted, carotids full.  Skin warm and dry easily conversant good spirited.  Normal intelligence.  Neurologically intact no gross localizing findings.    Assessment and Plan  TIAs versus seizures suggest [facility] consultation and close follow-up with [facility]    Hypertension and coronary artery disease.    Father  of abdominal aortic aneurysm rupture check ultrasound of abdomen today check lipid panel linked to diagnosis #1.    Colon polyp tubular adenomatous in type see colonoscopy report 2015.  RTC in 1 month's time.    Time: total time spent with the patient was 25 minutes of which >50% was spent in counseling and coordination of care    The following high BMI interventions were performed this visit: encouragement to exercise    Adonis Trotter MD    Patient Active Problem List   Diagnosis     Essential hypertension with goal blood pressure less than 130/85     Paroxysmal atrial fibrillation     Dyslipidemia, goal LDL below 70     Coronary artery disease due to lipid rich plaque     TIA (transient ischemic attack)

## 2021-06-10 NOTE — TELEPHONE ENCOUNTER
Patient requesting to go back on Omeprazole. Ok to set up for you to sign?    Betsy Salinas, CMA

## 2021-06-10 NOTE — PROGRESS NOTES
Office Visit - Physical    Juan Carlos Marley   76 y.o. male    Date of Visit: 5/16/2017    Chief Complaint   Patient presents with     Pre-op Exam     right cataract Dr. Lázaro Garduno on 5/25/2017 at [place] fax [phone]       Subjective: Preop    Cataract surgery 76-year-old male surgery date May 25, 2017 [facility].    Dr. Lázaro Garduno.    Fax #[phone].    Impaired vision cataract surgery to be done perhaps bilaterally.        ROS: A comprehensive review of systems was performed and was otherwise negative    Medications:   Prior to Admission medications    Medication Sig Start Date End Date Taking? Authorizing Provider   apixaban (ELIQUIS) 5 mg Tab tablet Take 1 tablet (5 mg total) by mouth 2 (two) times a day. 11/23/16  Yes Lorne House MD   ascorbic acid (VITAMIN C) 500 MG tablet Take 500 mg by mouth daily.   Yes PROVIDER, HISTORICAL   atorvastatin (LIPITOR) 20 MG tablet TAKE ONE TABLET BY MOUTH EVERY DAY AS DIRECTED 2/21/17  Yes Adonis Trotter MD   CALCIUM CARBONATE (CALCIUM 600 ORAL) Take 60 mg by mouth daily.    Yes PROVIDER, HISTORICAL   ferrous sulfate 325 (65 FE) MG tablet Take 1 tablet by mouth daily with breakfast.   Yes PROVIDER, HISTORICAL   finasteride (PROSCAR) 5 mg tablet  2/21/17  Yes PROVIDER, HISTORICAL   ketoconazole (NIZORAL) 2 % shampoo Apply 1 application topically once a week.  5/11/16  Yes PROVIDER, HISTORICAL   losartan (COZAAR) 25 MG tablet Take 1 tablet (25 mg total) by mouth daily. 11/28/16 11/28/17 Yes Lorne House MD   metoprolol tartrate (LOPRESSOR) 25 MG tablet Take 1 tablet (25 mg total) by mouth 2 (two) times a day. 11/22/16  Yes Lorne House MD   multivitamin therapeutic (THERAGRAN) tablet Take 1 tablet by mouth daily. Patient breaks multivitamin in half; takes half tab in the morning and half tab in the evening   Yes PROVIDER, HISTORICAL   omeprazole (PRILOSEC) 20 MG capsule TAKE ONE CAPSULE BY MOUTH EVERY DAY 11/28/16  Yes Adonis Trotter MD   nitroglycerin  (NITROSTAT) 0.4 MG SL tablet Place 1 tablet (0.4 mg total) under the tongue every 5 (five) minutes as needed for chest pain. 8/3/16   Lorne House MD       Allergies:No Known Allergies    Immunizations:   Immunization History   Administered Date(s) Administered     Influenza [id number], 2010     Influenza high dose, seasonal 10/23/2015, 2016     Influenza, inj, historic 2008, 10/05/2009, 2010, 2011     Influenza, seasonal,quad inj 6-35 mos 2012, 2013, 2014     Pneumo Conj 13-V (2010&after) 2015     Pneumo Polysac 23-V 2008     Td, historic 2008     ZOSTER 1900, 2012       Health Maintenance: Immunizations reviewed up-to-date.    Colonoscopy dated 2015 with Dr. Mtz showed tubular adenomatous polyp follow-up suggested 5 years time.    Non-smoker alcohol social perhaps 2 glasses of wine each night.    Wife accompanies him here today very supportive she has had recent knee problems.    History of atrial fibrillation paroxysmal.  No known drug allergies at this juncture.    Past Medical History: History of atrial fibrillation.  Off warfarin currently on Eliquis.    And coronary artery disease with hypertension and hyperlipidemia.    Past Surgical History: Coronary bypass grafting to 5 vessels in 2013.    TURP for BPH.    Multiple hernia repairs.    Family History: Mother  49 accidental.    Father  80 ruptured abdominal aortic aneurysm.    One daughter with type 2 diabetes.    Wife supportive present during exam history of leukocytoclastic vasculitis.    Social History: Retired .  Lives in a twin home in [facility].  Formerly a neighbor of this examiner.    Exam Chest clear to auscultation and percussion.  Heart tones regular rhythm without murmur rub or gallop.  Abdomen soft nontender no organomegaly.  No peritoneal signs.  Extremities free of edema cyanosis or clubbing.  Neck veins  nondistended no thyromegaly or scleral icterus noted, carotids full.  Skin warm and dry easily conversant good spirited.  Normal intelligence.  Neurologically intact no gross localizing findings.    Assessment and Plan  Cataracts bilateral need surgical intervention check potassium level today.    Coronary artery disease by history status post coronary bypass grafting to 5 vessels in September 2013.  Stable.    Hypertension and hyperlipidemia.    History of paroxysmal atrial fibrillation.  Eliquis.    Total time spent with the patient today was 40 minutes of which greater than 50% was spent in counseling and coordination of care.    Adonis Trotter MD    Patient Active Problem List   Diagnosis     Essential hypertension with goal blood pressure less than 130/85     Paroxysmal atrial fibrillation     Dyslipidemia, goal LDL below 70     Coronary artery disease due to lipid rich plaque     TIA (transient ischemic attack)

## 2021-06-10 NOTE — PROGRESS NOTES
"Juan Carlos Marley is a 80 y.o. male who is being evaluated via a billable telephone visit.      The patient has been notified of following:     \"This telephone visit will be conducted via a call between you and your physician/provider. We have found that certain health care needs can be provided without the need for a physical exam.  This service lets us provide the care you need with a short phone conversation.  If a prescription is necessary we can send it directly to your pharmacy.  If lab work is needed we can place an order for that and you can then stop by our lab to have the test done at a later time.    Telephone visits are billed at different rates depending on your insurance coverage. During this emergency period, for some insurers they may be billed the same as an in-person visit.  Please reach out to your insurance provider with any questions.    If during the course of the call the physician/provider feels a telephone visit is not appropriate, you will not be charged for this service.\"    Patient has given verbal consent to a Telephone visit? Yes    What phone number would you like to be contacted at? [phone]    Patient would like to receive their AVS by AVS Preference: Bridget.    Additional provider notes: Hypertension.    History of coronary artery disease coronary bypass grafting.    History of strokes recurrent on Eliquis switch to Plavix at the [facility].    The patient needs esophageal dilatation by Dr. Mtz at [facility] for esophageal stricture recurrent.  The patient and [facility] artery were elected to proceed with dilatation of the esophagus with esophageal stricture in light of the fact that he is on Plavix.    The patient had urinary retention Lawrence catheter placed Dr. MCKEON [facility] now catheter out only on tamsulosin must consider Avodart.    No blood in the stool or urine medication list reviewed reconciled in the chart.  Non-smoker no excess alcohol no known " drug allergies.  Wife is also on the call with him today extensive conversation had among the 3 of us.    Assessment/Plan:  Coronary artery disease status post coronary bypass grafting with hypertension controlled.    Urinary retention status post Lawrence catheter placement consider addition of Avodart to tamsulosin [facility] presiding Dr. MCKEON specifically.    Esophageal stricture needs dilatation on Plavix for recurrent TIAs.  Off Eliquis.  Patient cannot be bridged with Lovenox while on Plavix and I suggested the patient consider esophageal dilatation gently with Dr. Mtz presiding from [facility].  Patient may not be a candidate for discontinuation of Plavix even temporarily.    The patient was advised to recontact the [facility] physician who prescribed the Plavix and switch from Eliquis to Plavix which has been successful in preventing TIAs we had a good lengthy discussion.  Call in 1 month's time for follow-up.      Phone call duration:  21 minutes    Salud Suarez CMA

## 2021-06-10 NOTE — TELEPHONE ENCOUNTER
Who is calling:  Juan Carlos  Reason for Call:  He would like to speak to Dr. Trotter regarding some referrals he wants to get.  One to [facility] and another to a speech pathologist.  Patient is in need of esophageal stretching but can't find a doctor to do it because he is taking Plavix and ASA 81 mg.  He is having closing of the throat and choking issues.  He also thinks he needs to have his prostate surgery redone.    Date of last appointment with primary care: 2/11/20  Okay to leave a detailed message: Yes

## 2021-06-10 NOTE — TELEPHONE ENCOUNTER
Medication Request  Medication name:    Disp  Refills  Start  End     famotidine (PEPCID) 20 MG tablet         Sig - Route: Take 20 mg by mouth 2 (two) times a day. - Oral     Class: Historical Med         Requested Pharmacy: [facility]   Reason for request: Caller is wanting to get back on to Omeprazole 90 day supply again since this medication wont be to harsh on the stomach for him.    When did you use medication last?:    Patient offered appointment:  patient declined  Okay to leave a detailed message: yes

## 2021-06-10 NOTE — TELEPHONE ENCOUNTER
Felicia for omeprazole 20 mg tablets number 90 tablets take 1 tablet 30 minutes before main meal with 3 refills.  Please call patient and pharmacy.

## 2021-06-11 NOTE — TELEPHONE ENCOUNTER
FYI - Status Update  Who is Calling: Patient  Update: Patient will  prescription at [facility] but would like the remaining refills to go to [facility].  Okay to leave a detailed message?:  No return call needed

## 2021-06-12 NOTE — PROGRESS NOTES
Office Visit - Follow up    Juan Carlos Marley   77 y.o. male    Date of Visit: 8/23/2017    Chief Complaint   Patient presents with     Follow-up     ER for TIA       Subjective: Transient ischemic attack with history of coronary artery disease hypertension and hyperlipidemia.    Emergency room visit for TIA recurrent fifth 1 on August 21, 2017.  Advised to take aspirin low-dose 81 mg daily with Eliquis.  Similar symptoms ×5.  They are dizziness with slurred slowed speech no headache.  Generalized weakness lasting 45 minutes.  Anticipates [facility] consultation tomorrow 8/24/2017.  For further evaluation and treatment.  Questionable diagnosis of atrial fibrillation paroxysmal versus permanent.    No blood in stool or urine no chest pain currently no syncopal spells.  Medication list reviewed well-tolerated normal effects.    ROS: A comprehensive review of systems was performed and was otherwise negative    Medications:  Prior to Admission medications    Medication Sig Start Date End Date Taking? Authorizing Provider   apixaban (ELIQUIS) 5 mg Tab tablet Take 1 tablet (5 mg total) by mouth 2 (two) times a day. 8/17/17  Yes Lorne House MD   ascorbic acid (VITAMIN C) 500 MG tablet Take 500 mg by mouth daily.   Yes PROVIDER, HISTORICAL   atorvastatin (LIPITOR) 20 MG tablet TAKE ONE TABLET BY MOUTH EVERY DAY AS DIRECTED 2/21/17  Yes Adonis Trotter MD   CALCIUM CARBONATE (CALCIUM 600 ORAL) Take 60 mg by mouth daily.    Yes PROVIDER, HISTORICAL   ferrous sulfate 325 (65 FE) MG tablet TAKE ONE TABLET BY MOUTH EVERY DAY 7/31/17  Yes Adonis Trotter MD   finasteride (PROSCAR) 5 mg tablet  2/21/17  Yes PROVIDER, HISTORICAL   ketoconazole (NIZORAL) 2 % shampoo Apply 1 application topically once a week.  5/11/16  Yes PROVIDER, HISTORICAL   losartan (COZAAR) 25 MG tablet Take 1 tablet (25 mg total) by mouth daily. 8/17/17 8/17/18 Yes Lorne House MD   metoprolol tartrate (LOPRESSOR) 25 MG tablet Take 1 tablet (25  mg total) by mouth 2 (two) times a day. 11/22/16  Yes Lorne House MD   multivitamin therapeutic (THERAGRAN) tablet Take 1 tablet by mouth daily. Patient breaks multivitamin in half; takes half tab in the morning and half tab in the evening   Yes PROVIDER, HISTORICAL   nitroglycerin (NITROSTAT) 0.4 MG SL tablet Place 1 tablet (0.4 mg total) under the tongue every 5 (five) minutes as needed for chest pain. 8/3/16  Yes Lorne House MD   ferrous sulfate 325 (65 FE) MG tablet Take 1 tablet by mouth daily with breakfast.    PROVIDER, HISTORICAL   omeprazole (PRILOSEC) 20 MG capsule TAKE ONE CAPSULE BY MOUTH EVERY DAY 5/19/17   Adonis Trotter MD       Allergies: No Known Allergies    Immunizations:   Immunization History   Administered Date(s) Administered     Influenza [id number], 01/13/2010     Influenza high dose, seasonal 10/23/2015, 11/28/2016     Influenza, inj, historic 11/12/2008, 10/05/2009, 11/04/2010, 11/01/2011     Influenza, seasonal,quad inj 6-35 mos 12/06/2012, 11/22/2013, 11/07/2014     Pneumo Conj 13-V (2010&after) 01/19/2015     Pneumo Polysac 23-V 02/25/2008     Td, historic 02/25/2008     ZOSTER 01/01/1900, 07/30/2012       Exam Chest clear to auscultation and percussion.  Heart tones regular rhythm without murmur rub or gallop.  Abdomen soft nontender no organomegaly.  No peritoneal signs.  Extremities free of edema cyanosis or clubbing.  Neck veins nondistended no thyromegaly or scleral icterus noted, carotids full.  Skin warm and dry easily conversant good spirited.  Normal intelligence.  Neurologically intact no gross localizing findings.  Heart tone seemed irregular 82 pulse.  Not in acute distress wife at his side very supportive no neck vein distention skin warm and dry not toxic appearing not acute or chronically ill mild centripetal obesity noted discussed.    Assessment and Plan  Transient ischemic attack with history of hypertension and coronary artery disease status post coronary  bypass grafting advised continuation of aspirin 81 mg daily plus Eliquis same 5 mg twice daily.  Return to clinic 1 month's time.  Check lipid panel today.  [facility] consultation reaffirmed for tomorrow 8/24/2017.  Encouraged close follow-up with [facility] Department of cardiology Dr. Lorne House presiding.    Time: total time spent with the patient was 25 minutes of which >50% was spent in counseling and coordination of care    The following high BMI interventions were performed this visit: encouragement to exercise    Adonis Trotter MD    Patient Active Problem List   Diagnosis     Essential hypertension with goal blood pressure less than 130/85     Paroxysmal atrial fibrillation     Dyslipidemia, goal LDL below 70     Coronary artery disease due to lipid rich plaque     TIA (transient ischemic attack)

## 2021-06-13 NOTE — PROGRESS NOTES
Juan Carlos Marley is here today for his flu shot. Consent form reviewed and verified. Immunization given without incident.     Zofia Morales LPN

## 2021-06-13 NOTE — TELEPHONE ENCOUNTER
Caller states he slipped and fell on a pile of rocks while feeding the birds  3 days ago;  He is on coumadin  Has pain in his  side and fears he cracked a rib; has pain with movement and  minimal pain with deep breath; also hit his head and  has hand and facial bruising but denies headache or any neuro symptoms     Triage protocol reviewed ;advised in home care and demonstrates deep breathing and coughing  Advised to be seen  tomorrow in clinic  Advised to call for any new or worsening symptoms   Caller understands and will comply   Call transferred to   Danielle Mccray RN  FNA    Reason for Disposition    [1] After 72 hours AND [2] chest pain not improving    Additional Information    Negative: Major injury from dangerous force or speed (e.g., MVA, fall > 10 feet or 3 meters)    Negative: Bullet wound, knife wound, or other penetrating object    Negative: Puncture wound that sounds life-threatening to the triager    Negative: Severe difficulty breathing (e.g., struggling for each breath, speaks in single words)    Negative: [1] Major bleeding (e.g., actively dripping or spurting) AND [2] can't be stopped    Negative: Open wound of the chest with sound of moving air (sucking wound) or visible air bubbles    Negative: Shock suspected (e.g., cold/pale/clammy skin, too weak to stand, low BP, rapid pulse)    Negative: Coughing or spitting up blood    Negative: Bluish (or gray) lips or face now    Negative: Unconscious or was unconscious    Negative: Sounds like a life-threatening emergency to the triager    Negative: [1] Injuries at more than 1 site AND [2] unsure which guideline to use    Negative: Chest pain not from an injury OR cause is unknown    Negative: Wound looks infected    Negative: SEVERE chest pain    Negative: [1] Difficulty breathing AND [2] not severe    Negative: Skin split open or gaping    Negative: [1] Bleeding AND [2] won't stop after 10 minutes of direct pressure (using correct  technique)    Negative: Sounds like a serious injury to the triager    Negative: [1] Can't take a deep breath BUT [2] no respiratory distress    Negative: Shallow puncture wound    Negative: [1] Collarbone is painful AND [2] difficulty raising arm    Negative: Suspicious history for the injury    Negative: Patient is confused or is an unreliable provider of information (e.g., dementia, profound mental retardation, alcohol intoxication)    Negative: [1] High-risk adult (e.g., age > 60, osteoporosis, chronic steroid use) AND [2] still hurts    Negative: [1] No prior tetanus shots (or is not fully vaccinated) AND [2] any wound (e.g., cut, scrape)    Protocols used: CHEST INJURY-A-AH

## 2021-06-15 NOTE — TELEPHONE ENCOUNTER
Patient calling today to give some updates to his chart.  He would like these entered into our computer system if possible.    Patient reports he did receive his flu shot from a [email or web address] in October of 2020.    He also wanted to let us know that he did get is first Covid 19 vaccine from a [facility] location just recently and is scheduled for his second in the coming weeks.    No need for call back per patient, he just wants his medical documents here to reflect the above information.

## 2021-06-15 NOTE — PROGRESS NOTES
Office Visit - Follow up    Juan Carlos Marley   77 y.o. male    Date of Visit: 12/28/2017    Chief Complaint   Patient presents with     Cough     X Monday     Transient Ischemic Attack     2 months ago and thinks he had one on Tuesday night, had vertigo and layed in bed        Subjective: URI with TIA.  Wants lipids checked.  Symptoms started with vomiting and no diarrhea but decreased appetite now rhinitis and eyes are irritated injected.  Symptoms of TIA when he mistakenly went off Eliquis and when off Plavix as well low-dose aspirin to continue.  No blood in sputum but there is occasional blood in the urine with negative urologic workup previously also occasional blood in stool with negative colon exam recently.  Medication list reviewed well-tolerated.    ROS: A comprehensive review of systems was performed and was otherwise negative    Medications:  Prior to Admission medications    Medication Sig Start Date End Date Taking? Authorizing Provider   apixaban (ELIQUIS) 5 mg Tab tablet Take 1 tablet (5 mg total) by mouth 2 (two) times a day. 8/17/17   Lorne House MD   ascorbic acid (VITAMIN C) 500 MG tablet Take 500 mg by mouth daily.    PROVIDER, HISTORICAL   atorvastatin (LIPITOR) 20 MG tablet TAKE ONE TABLET BY MOUTH EVERY DAY AS DIRECTED 2/21/17   Adonis Trotter MD   azithromycin (ZITHROMAX) 250 MG tablet Take 500mg (2 tablets) day one, and then 250mg (1 tablet) days 2-5 12/28/17   Adonis Trotter MD   CALCIUM CARBONATE (CALCIUM 600 ORAL) Take 60 mg by mouth daily.     PROVIDER, HISTORICAL   ferrous sulfate 325 (65 FE) MG tablet Take 1 tablet by mouth daily with breakfast.    PROVIDER, HISTORICAL   ferrous sulfate 325 (65 FE) MG tablet TAKE ONE TABLET BY MOUTH EVERY DAY 7/31/17   Adonis Trotter MD   finasteride (PROSCAR) 5 mg tablet  2/21/17   PROVIDER, HISTORICAL   ketoconazole (NIZORAL) 2 % shampoo Apply 1 application topically once a week.  5/11/16   PROVIDER, HISTORICAL   losartan (COZAAR)  25 MG tablet Take 1 tablet (25 mg total) by mouth daily. 8/17/17 8/17/18  Lorne House MD   metoprolol tartrate (LOPRESSOR) 25 MG tablet Take 1 tablet (25 mg total) by mouth 2 (two) times a day. 11/17/17   Lorne House MD   multivitamin therapeutic (THERAGRAN) tablet Take 1 tablet by mouth daily. Patient breaks multivitamin in half; takes half tab in the morning and half tab in the evening    PROVIDER, ALBERTA   nitroglycerin (NITROSTAT) 0.4 MG SL tablet Place 1 tablet (0.4 mg total) under the tongue every 5 (five) minutes as needed for chest pain. 8/3/16   Lorne House MD   omeprazole (PRILOSEC) 20 MG capsule TAKE ONE CAPSULE BY MOUTH EVERY DAY 5/19/17   Adonis Trotter MD       Allergies: No Known Allergies    Immunizations:   Immunization History   Administered Date(s) Administered     Influenza [id number], 01/13/2010     Influenza high dose, seasonal 10/23/2015, 11/28/2016, 10/25/2017     Influenza, inj, historic,unspecified 10/27/1993, 11/22/1995, 10/16/1996, 10/09/1997, 10/27/1998, 11/12/1999, 10/30/2002, 10/24/2003, 12/27/2004, 11/02/2005, 11/09/2006, 10/02/2007, 11/12/2008, 10/05/2009, 11/04/2010, 11/01/2011     Influenza, seasonal,quad inj 6-35 mos 12/06/2012, 11/22/2013, 11/07/2014     Pneumo Conj 13-V (2010&after) 01/19/2015     Pneumo Polysac 23-V 02/25/2008     Td, Adult, Absorbed 11/22/1995, 05/25/2002     Td,adult,historic,unspecified 02/25/2008     ZOSTER 01/01/1900, 07/30/2012       Exam Chest clear to auscultation and percussion.  Heart tones regular rhythm without murmur rub or gallop.  Abdomen soft nontender no organomegaly.  No peritoneal signs.  Extremities free of edema cyanosis or clubbing.  Neck veins nondistended no thyromegaly or scleral icterus noted, carotids full.  Skin warm and dry easily conversant good spirited.  Normal intelligence.  Neurologically intact no gross localizing findings.  Wearing a mass chest is clear heart tones normal no rales rhonchi or wheezes  anteriorly or posteriorly.  Not toxic appearing temperature normal.  No central acrocyanosis no tachypnea.    Assessment and Plan  TIA check lipid panel today go on Plavix or Eliquis daily with aspirin.  Follow-up closely with neurologic service.  Previously seen at the [facility] and they are advising him as well.    Sinusitis upper respiratory infection.  Z-Jose M push fluids rest.  Extra strength acetaminophen 2 tablets of 650 mg each 3 times a day.    Time: total time spent with the patient was 25 minutes of which >50% was spent in counseling and coordination of care    The following high BMI interventions were performed this visit: encouragement to exercise    Adonis Trotter MD    Patient Active Problem List   Diagnosis     Essential hypertension with goal blood pressure less than 130/85     Paroxysmal atrial fibrillation     Dyslipidemia, goal LDL below 70     Coronary artery disease due to lipid rich plaque     TIA (transient ischemic attack)

## 2021-06-15 NOTE — PROGRESS NOTES
Assessment and Plan:   Annual wellness visit  Patient has been advised of split billing requirements and indicates understanding: Yes  1. Routine general medical examination at a health care facility  Annual wellness visit  - HM2(CBC w/o Differential)  - Comprehensive Metabolic Panel  - Lipid Cascade  - Thyroid Stimulating Hormone (TSH)  - Urinalysis-UC if Indicated    2. Screening for prostate cancer  Annual wellness visit and screen for prostate cancer.  - PSA (Prostatic-Specific Antigen), Annual Screen     The patient's current medical problems were reviewed.    I have had an Advance Directives discussion with the patient.  The following health maintenance schedule was reviewed with the patient and provided in printed form in the after visit summary:   Health Maintenance   Topic Date Due     DEPRESSION ACTION PLAN  1940     HEPATITIS C SCREENING  1940     ZOSTER VACCINES (2 of 3) 09/24/2012     MEDICARE ANNUAL WELLNESS VISIT  02/23/2022     FALL RISK ASSESSMENT  02/23/2022     LIPID  02/11/2025     ADVANCE CARE PLANNING  02/11/2025     TD 18+ HE  02/21/2028     Pneumococcal Vaccine: 65+ Years  Completed     INFLUENZA VACCINE RULE BASED  Completed     COVID-19 Vaccine  Completed     Pneumococcal Vaccine: Pediatrics (0 to 5 Years) and At-Risk Patients (6 to 64 Years)  Aged Out     HEPATITIS B VACCINES  Aged Out        Subjective:   Chief Complaint: Juan Carlos Marley is an 80 y.o. male here for an Annual Wellness visit.   HPI: Annual wellness visit screen for prostate cancer part of this physical examination.  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  Accompanied today by his wife.  She excused herself during the genital rectal exam.    Left shoulder pain after fall also some right knee pain but range of motion is full has been seen by orthopedist.    Problems with urination requiring catheterization at one time with a leg bag for short period followed by [facility] Dr. MCKEON presiding.    Dermatology  treatment for actinic keratoses on his scalp.  Embarrassing as to the effect of the chemical use.?  5-FU.    Colonoscopy dated 2020 with Dr. Mtz showed diverticulosis otherwise normal.  No known drug allergies.  Non-smoker no excess alcohol.  Retired now.  Lives in [place] with his wife 1 daughter.  Also lives with him she has 7 cognitive challenges.    Prior history of esophageal stricture requiring dilatation.  Followed by [facility].  Raynaud's phenomena.    Seen by Dr. House from [facility] Department of cardiology for coronary artery disease.  In 2013 he required 5 vessel coronary bypass grafting.  Paroxysmal atrial fibrillation with TIA strokelike syndromes.  Previously on warfarin Eliquis stop Plavix.  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  1 daughter with type 2 diabetes.  Wife has been treated for leukocytoclastic vasculitis by this examiner.  Prior history of recurrent sebaceous cyst with infection and drainage by dermatology.  Bilateral cataract surgery in May 2017.    Prior hernia repairs multiple.    History of TURP for BPH Dr. MCKEON presiding at [facility].    Mother  accidentally age 49.    Father  age 84 ruptured abdominal aortic aneurysm.  1 daughter with type 2 diabetes no grandchildren.  Wife is otherwise well previously had not been a regular exerciser retired  now lives in [facility].  Encourage activity exercise walking.  When able.    Review of Systems:    Please see above.  The rest of the review of systems are negative for all systems.    Patient Care Team:  Adonis Trotter MD as PCP - General  Adonis Trotter MD Bozivich, Michael J, MD as Assigned PCP  Maykel Farr DPM as Assigned Musculoskeletal Provider  Lorne House MD as Assigned Heart and Vascular Provider     Patient  Active Problem List   Diagnosis     Essential hypertension     Paroxysmal atrial fibrillation (H)     Dyslipidemia, goal LDL below 70     Coronary artery disease due to lipid rich plaque     Major depressive disorder, recurrent episode (H)     Foreign body in esophagus, initial encounter     Past Medical History:   Diagnosis Date     Cataract, nuclear sclerotic, left eye 2017     Coronary artery disease due to lipid rich plaque      Dyslipidemia, goal LDL below 70 2014     Essential hypertension with goal blood pressure less than 130/85      Nonsustained ventricular tachycardia (H)     6 beat run, asymptomatic per Dr. Hackett     Paroxysmal atrial fibrillation (H) 2013    at the time of CABG; [facility] did a 4-week monitor in  and did not find any atrial fibrillation and therefore stopped his Eliquis.     Stricture of esophagus     dilated twice since then     Transient ischemic attack 2016     Transient ischemic attack 2016    left cerebral hemisphere     Transient ischemic attack 2014     Transient ischemic attack 2018    transient speech difficulty and dizziness while in [place]; did not seek medical attention     Transient ischemic attack 10/2018    same symptoms as in [place]      Past Surgical History:   Procedure Laterality Date     CORONARY ARTERY BYPASS GRAFT  2013    underwent 5-vessel coronary bypass grafting at the [facility].     FOOT SURGERY Left 2014    Dr. Carr     FRACTURE SURGERY Right     hand     HERNIA REPAIR  2013     ORCHIECTOMY Right      ORIF FEMUR FRACTURE Right      PROSTATECTOMY  2013      Family History   Problem Relation Age of Onset     Aortic aneurysm Father          84     Other Mother          accident age 49     Other Sister         autistic      No Medical Problems Daughter      Pulmonary Hypertension Neg Hx      Congenital heart disease Neg Hx       Social History     Socioeconomic History     Marital status:       Spouse name: Salud     Number of children: 1     Years of education: Not on file     Highest education level: Not on file   Occupational History     Occupation:  at [employer], commercial      Employer: RETIRED   Social Needs     Financial resource strain: Not on file     Food insecurity     Worry: Not on file     Inability: Not on file     Transportation needs     Medical: Not on file     Non-medical: Not on file   Tobacco Use     Smoking status: Former Smoker     Packs/day: 1.00     Years: 10.00     Pack years: 10.00     Types: Cigarettes, Pipe     Quit date: 1970     Years since quittin.0     Smokeless tobacco: Never Used   Substance and Sexual Activity     Alcohol use: Yes     Alcohol/week: 8.0 standard drinks     Types: 8 Glasses of wine per week     Comment: 1-2 glass of wine before dinner     Drug use: No     Sexual activity: Yes     Partners: Female     Birth control/protection: Post-menopausal   Lifestyle     Physical activity     Days per week: Not on file     Minutes per session: Not on file     Stress: Not on file   Relationships     Social connections     Talks on phone: Not on file     Gets together: Not on file     Attends Mandaeism service: Not on file     Active member of club or organization: Not on file     Attends meetings of clubs or organizations: Not on file     Relationship status: Not on file     Intimate partner violence     Fear of current or ex partner: Not on file     Emotionally abused: Not on file     Physically abused: Not on file     Forced sexual activity: Not on file   Other Topics Concern     Not on file   Social History Narrative    Lives with his wife Salud in [place] in a twin home.  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  One daughter.        Current Outpatient Medications   Medication Sig Dispense Refill     ascorbic acid (VITAMIN C) 500 MG tablet Take 500 mg by mouth daily.       aspirin 81 mg chewable tablet Chew 81 mg  "daily.       atorvastatin (LIPITOR) 40 MG tablet Take 1 tablet (40 mg total) by mouth daily. As directed 90 tablet 0     CALCIUM CARBONATE (CALCIUM 600 ORAL) Take 60 mg by mouth daily.        clobetasoL (TEMOVATE) 0.05 % external solution Apply 1 application topically 2 (two) times a day.       clopidogrel (PLAVIX) 75 mg tablet        ferrous sulfate 325 (65 FE) MG tablet TAKE ONE TABLET BY MOUTH EVERY  tablet 10     fluorouraciL 5 % Soln Apply 1 application topically as needed.       ketoconazole (NIZORAL) 2 % shampoo Apply 1 application topically as needed.       losartan (COZAAR) 25 MG tablet TAKE ONE TABLET BY MOUTH EVERY DAY 90 tablet 0     metoprolol tartrate (LOPRESSOR) 25 MG tablet Take 1 tablet (25 mg total) by mouth 2 (two) times a day. 180 tablet 3     multivitamin therapeutic (THERAGRAN) tablet Take 1 tablet by mouth daily. Patient breaks multivitamin in half; takes half tab in the morning and half tab in the evening       nitroglycerin (NITROSTAT) 0.4 MG SL tablet Place 1 tablet (0.4 mg total) under the tongue every 5 (five) minutes as needed for chest pain. 25 tablet 2     omeprazole (PRILOSEC) 20 MG capsule Take 1 capsule (20 mg total) by mouth daily before breakfast. 90 capsule 3     sildenafil (VIAGRA) 100 MG tablet Take 1 tablet (100 mg total) by mouth as needed for erectile dysfunction. 20 tablet 1     tamsulosin (FLOMAX) 0.4 mg Cp24        famotidine (PEPCID) 20 MG tablet Take 20 mg by mouth 2 (two) times a day.       No current facility-administered medications for this visit.       Objective:   Vital Signs:   Visit Vitals  /60 (Patient Site: Right Arm, Patient Position: Sitting, Cuff Size: Adult Regular)   Pulse (!) 56   Temp (!) 96.2  F (35.7  C) (Temporal)   Resp 18   Ht 5' 9\" (1.753 m)   Wt 184 lb 8 oz (83.7 kg)   SpO2 98%   BMI 27.25 kg/m           VisionScreening:  No exam data present     PHYSICAL EXAM  Chest clear to auscultation and percussion.  Heart tones regular rhythm " without murmur rub or gallop.  Abdomen soft nontender no organomegaly.  No peritoneal signs.  Extremities free of edema cyanosis or clubbing.  Neck veins nondistended no thyromegaly or scleral icterus noted, carotids full.  Skin warm and dry easily conversant good spirited.  Normal intelligence.  Neurologically intact no gross localizing findings.  Skin is dry lymph negative neuro negative psych normal HEENT negative bilateral ceruminosis noted wears hearing aids at times.  Wears a mask when he is out in public    The patient's vital signs are as noted in the chart afebrile.  Not in acute distress not toxic he is easily conversant good spirited.  Prostate is minimally enlarged no nodularity.  Genital exam negative testicles are atrophic bilaterally.  No groin hernias.  Good pulse in all 4 extremities no carotid bruits.  Review of the vital signs showed a blood pressure 108/68 right arm sitting his BMI is 27+ pulse 56 irregular temperature this morning 96.2 respiratory rate 18 unlabored.  O2 sats 98% on room air he is 184 pounds +5 foot 9 inches tall.        Assessment Results 2/23/2021   Activities of Daily Living No help needed   Instrumental Activities of Daily Living 1 - Full function   Get Up and Go Score Less than 12 seconds   Mini Cog Total Score 3   Some recent data might be hidden     A Mini-Cog score of 0-2 suggests the possibility of dementia, score of 3-5 suggests no dementia    Identified Health Risks:     The patient was provided with suggestions to help him develop a healthy physical lifestyle.   He is at risk for lack of exercise and has been provided with information to increase physical activity for the benefit of his well-being.  The patient was counseled and encouraged to consider modifying their diet and eating habits. He was provided with information on recommended healthy diet options.  The patient reports that he has difficulty with instrumental activities of daily living.  He was provided  with a list of local organizations that provide support services and advised to make a follow up appointment in 12 weeks to address this further.   The patient was provided with written information regarding signs of hearing loss.  He is at risk for falling and has been provided with information to reduce the risk of falling at home.  Patient's advanced directive was discussed and I am comfortable with the patient's wishes.

## 2021-06-15 NOTE — TELEPHONE ENCOUNTER
----- Message from Adonis Trotter MD sent at 2/23/2021  8:40 AM CST -----  Considerable microscopic hematuria noted and would recommend close follow-up with [facility] Dr. VERONICA Irizarry .  Hemogram satisfactory mild anemia noted that is normochromic normocytic and mild leukopenia noted with normal platelet count.

## 2021-06-16 NOTE — PROGRESS NOTES
Juan Carlos Marley arrives in clinic today for his Tdap booster. Injection was given in Lt deltoid without incident. Patient notified of common side effects (soreness in arm, mild fever, possible swelling & redness in injection site, etc and verbalized understanding.     Irene [facility] - Internal Medicine

## 2021-06-16 NOTE — PROGRESS NOTES
Office Visit - Follow up    Juan Carlos Marley   77 y.o. male    Date of Visit: 3/20/2018    Chief Complaint   Patient presents with     Coronary Artery Disease     Atrial Fibrillation     Hypertension     Medication Questions     Plavix and ASA       Subjective: Coronary artery disease with history of atrial fibrillation and hypertension.  Currently on aspirin and Plavix.  Has been seen at the [facility] and has been seen by a local cardiologist and neurologist.    The patient has dizzy spells associated with a thick tongue and feeling weak one-sided weakness no tonic-clonic activity no incontinence of stool or urine no jerky movements no loss of consciousness.  Nothing to suggest electrical seizure disorder.    Medication list reviewed generally well-tolerated no known drug allergies no blood in stool or urine denies chest pain or shortness of breath.  Prior history of paroxysmal atrial fibrillation and transient aches ischemic attacks as well as coronary artery disease.    ROS: A comprehensive review of systems was performed and was otherwise negative    Medications:  Prior to Admission medications    Medication Sig Start Date End Date Taking? Authorizing Provider   ascorbic acid (VITAMIN C) 500 MG tablet Take 500 mg by mouth daily.   Yes PROVIDER, HISTORICAL   aspirin 81 mg chewable tablet Chew 81 mg daily.   Yes PROVIDER, HISTORICAL   atorvastatin (LIPITOR) 20 MG tablet TAKE ONE TABLET BY MOUTH EVERY DAY AS DIRECTED 2/13/18  Yes Adonis Trotter MD   CALCIUM CARBONATE (CALCIUM 600 ORAL) Take 60 mg by mouth daily.    Yes PROVIDER, HISTORICAL   ferrous sulfate 325 (65 FE) MG tablet TAKE ONE TABLET BY MOUTH EVERY DAY 7/31/17  Yes Adonis Trotter MD   losartan (COZAAR) 25 MG tablet Take 1 tablet (25 mg total) by mouth daily. 2/22/18 2/22/19 Yes Lorne House MD   metoprolol tartrate (LOPRESSOR) 25 MG tablet Take 1 tablet (25 mg total) by mouth 2 (two) times a day. 2/21/18  Yes Lorne House MD    multivitamin therapeutic (THERAGRAN) tablet Take 1 tablet by mouth daily. Patient breaks multivitamin in half; takes half tab in the morning and half tab in the evening   Yes PROVIDER, HISTORICAL   ferrous sulfate 325 (65 FE) MG tablet Take 1 tablet by mouth daily with breakfast.  3/20/18 Yes PROVIDER, HISTORICAL   clopidogrel (PLAVIX) 75 mg tablet  2/13/18   PROVIDER, HISTORICAL   ketoconazole (NIZORAL) 2 % shampoo Apply 1 application topically once a week.  5/11/16   PROVIDER, HISTORICAL   nitroglycerin (NITROSTAT) 0.4 MG SL tablet Place 1 tablet (0.4 mg total) under the tongue every 5 (five) minutes as needed for chest pain. 8/3/16   Lorne House MD   tamsulosin (FLOMAX) 0.4 mg Cp24  3/9/18   PROVIDER, HISTORICAL   azithromycin (ZITHROMAX) 250 MG tablet Take 500mg (2 tablets) day one, and then 250mg (1 tablet) days 2-5 12/28/17 3/20/18  Adonis Trotter MD   dutasteride (AVODART) 0.5 mg capsule  2/13/18 3/20/18  PROVIDER, HISTORICAL   finasteride (PROSCAR) 5 mg tablet  2/21/17 3/20/18  PROVIDER, HISTORICAL   omeprazole (PRILOSEC) 20 MG capsule TAKE ONE CAPSULE BY MOUTH EVERY DAY 5/19/17 3/20/18  Adonis Trotter MD       Allergies: No Known Allergies    Immunizations:   Immunization History   Administered Date(s) Administered     Influenza Y2o5-30, 01/13/2010     Influenza high dose, seasonal 10/23/2015, 11/28/2016, 10/25/2017     Influenza, inj, historic,unspecified 10/27/1993, 11/22/1995, 10/16/1996, 10/09/1997, 10/27/1998, 11/12/1999, 10/30/2002, 10/24/2003, 12/27/2004, 11/02/2005, 11/09/2006, 10/02/2007, 11/12/2008, 10/05/2009, 11/04/2010, 11/01/2011     Influenza, seasonal,quad inj 6-35 mos 12/06/2012, 11/22/2013, 11/07/2014     Pneumo Conj 13-V (2010&after) 01/19/2015     Pneumo Polysac 23-V 02/25/2008     Td, Adult, Absorbed 11/22/1995, 05/25/2002     Td,adult,historic,unspecified 02/25/2008     Tdap 02/21/2018     ZOSTER, LIVE 01/01/1900, 07/30/2012       Exam Chest clear to auscultation and  percussion.  Heart tones regular rhythm without murmur rub or gallop.  Abdomen soft nontender no organomegaly.  No peritoneal signs.  Extremities free of edema cyanosis or clubbing.  Neck veins nondistended no thyromegaly or scleral icterus noted, carotids full.  Skin warm and dry easily conversant good spirited.  Normal intelligence.  Neurologically intact no gross localizing findings.  Some irregularity in the heart tones.  Neck veins were nondistended no carotid bruits extremities are free of edema accompanied by his wife who is very supportive.    Assessment and Plan  Coronary artery disease with history of episodic spells sounds like transient ischemic attacks recurrent.  Paroxysmal atrial fibrillation has been seen by cardiology neurology and at the [facility].    Paroxysmal atrial fibrillation hypertension.  Transient ischemic attacks with associated depressive reaction.  The latter may be contributing?.  Advised salt restriction regular exercise maintenance of good weight BMI slightly elevated overweight at 28+.  O2 sats today 95%.  Periodic checks of lipid panel plus blood pressure control are good with hydration and more exercise daily.    Time: total time spent with the patient was 25 minutes of which >50% was spent in counseling and coordination of care    The following high BMI interventions were performed this visit: encouragement to exercise    Adonis Trotter MD    Patient Active Problem List   Diagnosis     Essential hypertension with goal blood pressure less than 130/85     Paroxysmal atrial fibrillation     Dyslipidemia, goal LDL below 70     Coronary artery disease due to lipid rich plaque     TIA (transient ischemic attack)     Major depressive disorder, recurrent episode

## 2021-06-16 NOTE — PATIENT INSTRUCTIONS - HE
Juan Carlos,    It was a pleasure to see you today at [facility].    Please discuss your memory difficulties with Dr. Trotter.    You will return to the Heart Clinic in one year.    Please call us if you have any questions or concerns about your heart.      Manav House M.D.  [facility]

## 2021-06-17 NOTE — PROGRESS NOTES
Office Visit - Follow up    Juan Carlos Marley   77 y.o. male    Date of Visit: 4/30/2018    Chief Complaint   Patient presents with     Hypertension       Subjective: TIAs problems focusing history of atrial fibrillation hypertension and coronary artery disease.  No known drug allergies.  Issues are a problem when he centered around trouble focusing.  And memory issues.    Prior history of at least 5-6 recurrent TIAs.  Hearing aids to help.    No blood in stool or urine or sputum medication list reviewed well-tolerated.    History of coronary artery bypass grafting    ROS: A comprehensive review of systems was performed and was otherwise negative    Medications:  Prior to Admission medications    Medication Sig Start Date End Date Taking? Authorizing Provider   ascorbic acid (VITAMIN C) 500 MG tablet Take 500 mg by mouth daily.   Yes PROVIDER, HISTORICAL   aspirin 81 mg chewable tablet Chew 81 mg daily.   Yes PROVIDER, HISTORICAL   atorvastatin (LIPITOR) 20 MG tablet TAKE ONE TABLET BY MOUTH EVERY DAY AS DIRECTED 2/13/18  Yes Adonis Trotter MD   CALCIUM CARBONATE (CALCIUM 600 ORAL) Take 60 mg by mouth daily.    Yes PROVIDER, HISTORICAL   clopidogrel (PLAVIX) 75 mg tablet  2/13/18  Yes PROVIDER, HISTORICAL   ferrous sulfate 325 (65 FE) MG tablet TAKE ONE TABLET BY MOUTH EVERY DAY 7/31/17  Yes Adonis Trotter MD   ketoconazole (NIZORAL) 2 % shampoo Apply 1 application topically once a week.  5/11/16  Yes PROVIDER, HISTORICAL   losartan (COZAAR) 25 MG tablet Take 1 tablet (25 mg total) by mouth daily. 2/22/18 2/22/19 Yes Lorne House MD   metoprolol tartrate (LOPRESSOR) 25 MG tablet Take 1 tablet (25 mg total) by mouth 2 (two) times a day. 2/21/18  Yes Lorne House MD   multivitamin therapeutic (THERAGRAN) tablet Take 1 tablet by mouth daily. Patient breaks multivitamin in half; takes half tab in the morning and half tab in the evening   Yes PROVIDER, HISTORICAL   nitroglycerin (NITROSTAT) 0.4 MG SL  tablet Place 1 tablet (0.4 mg total) under the tongue every 5 (five) minutes as needed for chest pain. 8/3/16  Yes Lorne House MD   tamsulosin (FLOMAX) 0.4 mg Cp24  3/9/18  Yes PROVIDER, HISTORICAL       Allergies: No Known Allergies    Immunizations:   Immunization History   Administered Date(s) Administered     Influenza V2l6-40, 01/13/2010     Influenza high dose, seasonal 10/23/2015, 11/28/2016, 10/25/2017     Influenza, inj, historic,unspecified 10/27/1993, 11/22/1995, 10/16/1996, 10/09/1997, 10/27/1998, 11/12/1999, 10/30/2002, 10/24/2003, 12/27/2004, 11/02/2005, 11/09/2006, 10/02/2007, 11/12/2008, 10/05/2009, 11/04/2010, 11/01/2011     Influenza, seasonal,quad inj 6-35 mos 12/06/2012, 11/22/2013, 11/07/2014     Pneumo Conj 13-V (2010&after) 01/19/2015     Pneumo Polysac 23-V 02/25/2008     Td, Adult, Absorbed 11/22/1995, 05/25/2002     Td,adult,historic,unspecified 02/25/2008     Tdap 02/21/2018     ZOSTER, LIVE 01/01/1900, 07/30/2012       Exam Chest clear to auscultation and percussion.  Heart tones regular rhythm without murmur rub or gallop.  Abdomen soft nontender no organomegaly.  No peritoneal signs.  Extremities free of edema cyanosis or clubbing.  Neck veins nondistended no thyromegaly or scleral icterus noted, carotids full.  Skin warm and dry easily conversant good spirited.  Normal intelligence.  Neurologically intact no gross localizing findings.  No blood in stool or urine medication list reviewed well-tolerated.    No known drug allergies.    Assessment and Plan  TIAs recurrent with troubles focusing.  Neurologic consultation plus TSH and lipid panel.  Also notes cold intolerance.    Hypertension and coronary artery disease status post coronary bypass grafting problem list includes atrial fibrillation that is paroxysmal.    Colon polyps tubular adenomatous in type with background diverticulosis see colonoscopy report February 4, 2015.    Time: total time spent with the patient was 25  minutes of which >50% was spent in counseling and coordination of care    The following high BMI interventions were performed this visit: encouragement to exercise return to clinic 6 weeks time encourage more exercise and daily program.    Adonis Trotter MD    Patient Active Problem List   Diagnosis     Essential hypertension with goal blood pressure less than 130/85     Paroxysmal atrial fibrillation     Dyslipidemia, goal LDL below 70     Coronary artery disease due to lipid rich plaque     TIA (transient ischemic attack)     Major depressive disorder, recurrent episode

## 2021-06-18 NOTE — PATIENT INSTRUCTIONS - HE
Patient Instructions by Adonis Trotter MD at 2/23/2021  8:00 AM     Author: Adonis Trotter MD Service: -- Author Type: Physician    Filed: 2/23/2021  8:06 AM Encounter Date: 2/23/2021 Status: Signed    : Adonis Trotter MD (Physician)         Patient Education     Your Health Risk Assessment indicates you feel you are not in good physical health.    A healthy lifestyle helps keep the body fit and the mind alert. It helps protect you from disease, helps you fight disease, and helps prevent chronic disease (disease that doesn't go away) from getting worse. This is important as you get older and begin to notice twinges in muscles and joints and a decline in the strength and stamina you once took for granted. A healthy lifestyle includes good healthcare, good nutrition, weight control, recreation, and regular exercise. Avoid harmful substances and do what you can to keep safe. Another part of a healthy lifestyle is stay mentally active and socially involved.    Good healthcare     Have a wellness visit every year.     If you have new symptoms, let us know right away. Don't wait until the next checkup.     Take medicines exactly as prescribed and keep your medicines in a safe place. Tell us if your medicine causes problems.   Healthy diet and weight control     Eat 3 or 4 small, nutritious, low-fat, high-fiber meals a day. Include a variety of fruits, vegetables, and whole-grain foods.     Make sure you get enough calcium in your diet. Calcium, vitamin D, and exercise help prevent osteoporosis (bone thinning).     If you live alone, try eating with others when you can. That way you get a good meal and have company while you eat it.     Try to keep a healthy weight. If you eat more calories than your body uses for energy, it will be stored as fat and you will gain weight.     Recreation   Recreation is not limited to sports and team events. It includes any activity that provides relaxation,  interest, enjoyment, and exercise. Recreation provides an outlet for physical, mental, and social energy. It can give a sense of worth and achievement. It can help you stay healthy.       Patient Education     Exercise for a Healthier Heart  You may wonder how you can improve the health of your heart. If youre thinking about exercise, youre on the right track. You dont need to become an athlete, but you do need a certain amount of brisk exercise to help strengthen your heart. If you have been diagnosed with a heart condition, your doctor may recommend exercise to help stabilize your condition. To help make exercise a habit, choose safe, fun activities.       Be sure to check with your health care provider before starting an exercise program.    Why exercise?  Exercising regularly offers many healthy rewards. It can help you do all of the following:    Improve your blood cholesterol levels to help prevent further heart trouble    Lower your blood pressure to help prevent a stroke or heart attack    Control diabetes, or reduce your risk of getting this disease    Improve your heart and lung function    Reach and maintain a healthy weight    Make your muscles stronger and more limber so you can stay active    Prevent falls and fractures by slowing the loss of bone mass (osteoporosis)    Manage stress better  Exercise tips  Ease into your routine. Set small goals. Then build on them.  Exercise on most days. Aim for a total of 150 or more minutes of moderate to  vigorous intensity activity each week. Consider 40 minutes, 3 to 4 times a week. For best results, activity should last for 40 minutes on average. It is OK to work up to the 40 minute period over time. Examples of moderate-intensity activity is walking one mile in 15 minutes or 30 to 45 minutes of yard work.  Step up your daily activity level. Along with your exercise program, try being more active throughout the day. Walk instead of drive. Do more household  tasks or yard work.  Choose one or more activities you enjoy. Walking is one of the easiest things you can do. You can also try swimming, riding a bike, or taking an exercise class.  Stop exercising and call your doctor if you:    Have chest pain or feel dizzy or lightheaded    Feel burning, tightness, pressure, or heaviness in your chest, neck, shoulders, back, or arms    Have unusual shortness of breath    Have increased joint or muscle pain    Have palpitations or an irregular heartbeat      9494-7618 [employer]. [address] [place] [address]. All rights reserved. This information is not intended as a substitute for professional medical care. Always follow your healthcare professional's instructions.         Patient Education   Understanding [facility] MyPlate  The USDA (US Department of Agriculture) has guidelines to help you make healthy food choices. These are called MyPlate. MyPlate shows the food groups that make up healthy meals using the image of a place setting. Before you eat, think about the healthiest choices for what to put onto your plate or into your cup or bowl. To learn more about building a healthy plate, visit www.choosemyplate.gov.       The Food Groups    Fruits: Any fruit or 100% fruit juice counts as part of the Fruit Group. Fruits may be fresh, canned, frozen, or dried, and may be whole, cut-up, or pureed. Make half your plate fruits and vegetables.    Vegetables: Any vegetable or 100% vegetable juice counts as a member of the Vegetable Group. Vegetables may be fresh, frozen, canned, or dried. They can be served raw or cooked and may be whole, cut-up, or mashed. Make half your plate fruits and vegetables.     Grains: All foods made from grains are part of the Grains Group. These include wheat, rice, oats, cornmeal, and barley such as bread, pasta, oatmeal, cereal, tortillas, and grits. Grains should be no more than a quarter of your plate. At least half of your grains  should be whole grains.    Protein: This group includes meat, poultry, seafood, beans and peas, eggs, processed soy products (like tofu), nuts (including nut butters), and seeds. Make protein choices no more than a quarter of your plate. Meat and poultry choices should be lean or low fat.    Dairy: All fluid milk products and foods made from milk that contain calcium, like yogurt and cheese are part of the Dairy Group. (Foods that have little calcium, such as cream, butter, and cream cheese, are not part of the group.) Most dairy choices should be low-fat or fat-free.    Oils: These are fats that are liquid at room temperature. They include canola, corn, olive, soybean, and sunflower oil. Foods that are mainly oil include mayonnaise, certain salad dressings, and soft margarines. You should have only 5 to 7 teaspoons of oils a day. You probably already get this much from the food you eat.  Use [employer] to Help Build Your Meals  The SuperTracker can help you plan and track your meals and activity. You can look up individual foods to see or compare their nutritional value. You can get guidelines for what and how much you should eat. You can compare your food choices. And you can assess personal physical activities and see ways you can improve. Go to www.[employer].gov/supertracker/.    3229-0616 The [employer]. [address] [place] [address], [place] [address]. All rights reserved. This information is not intended as a substitute for professional medical care. Always follow your healthcare professional's instructions.           Patient Education   Instrumental Activities of Daily Living  Your Health Risk Assessment indicates you have difficulties with instrumental activities of daily living which include laundry, housekeeping, banking, shopping, using the telephone, food preparation, transportation, or taking your own medications. Please make a follow up appointment for us to address this issue in more  detail.    [facility] has resources available on the following website: [email or web address]     Also, here is a local agency that provides help with meals and other assistance:   [facility] Line: [phone]     Patient Education   Signs of Hearing Loss  Hearing loss is a problem shared by many people. In fact, it is one of the most common health conditions, particularly as people age. Most people over age 65 have some hearing loss, and by age 80, almost everyone does. Because hearing loss usually occurs slowly over the years, you may not realize your hearing ability has gotten worse.       Have your hearing checked  Contact your [facility] care provider if you:    Have to strain to hear normal conversation.    Have to watch other peoples faces very carefully to follow what theyre saying.    Need to ask people to repeat what theyve said.    Often misunderstand what people are saying.    Turn the volume of the television or radio up so high that others complain.    Feel that people are mumbling when theyre talking to you.    Find that the effort to hear leaves you feeling tired and irritated.    Notice, when using the phone, that you hear better with 1 ear than the other.    3203-5253 [employer]. [address] [place] [address], [place] [address]. All rights reserved. This information is not intended as a substitute for professional medical care. Always follow your healthcare professional's instructions.         Patient Education   Preventing Falls in the Home  As you get older, falls are more likely. Thats because your reaction time slows. Your muscles and joints may also get stiffer, making them less flexible. Illness, medications, and vision changes can also affect your balance. A fall could leave you unable to live on your own. To make your home safer, follow these tips:    Floors    Put nonskid pads under area rugs.    Remove throw rugs.    Replace worn floor coverings.    Tack  carpets firmly to each step on carpeted stairs. Put nonskid strips on the edges of uncarpeted stairs.    Keep floors and stairs free of clutter and cords.    Arrange furniture so there are clear pathways.    Clean up any spills right away.    Bathrooms    Install grab bars in the tub or shower.    Apply nonskid strips or put a nonskid rubber mat in the tub or shower.    Sit on a bath chair to bathe.    Use bathmats with nonskid backing.    Lighting    Keep a flashlight in each room.    Put a nightlight along the pathway between the bedroom and the bathroom.    3828-3419 [employer]. [address] [place] [address], [place] [address]. All rights reserved. This information is not intended as a substitute for professional medical care. Always follow your healthcare professional's instructions.           Advance Directive  Patients advance directive was discussed and I am comfortable with the patients wishes.  Patient Education   Personalized Prevention Plan  You are due for the preventive services outlined below.  Your care team is available to assist you in scheduling these services.  If you have already completed any of these items, please share that information with your care team to update in your medical record.  Health Maintenance   Topic Date Due   ? DEPRESSION ACTION PLAN  1940   ? HEPATITIS C SCREENING  1940   ? ZOSTER VACCINES (2 of 3) 09/24/2012   ? MEDICARE ANNUAL WELLNESS VISIT  02/23/2022   ? FALL RISK ASSESSMENT  02/23/2022   ? LIPID  02/11/2025   ? ADVANCE CARE PLANNING  02/11/2025   ? TD 18+ HE  02/21/2028   ? Pneumococcal Vaccine: 65+ Years  Completed   ? INFLUENZA VACCINE RULE BASED  Completed   ? COVID-19 Vaccine  Completed   ? Pneumococcal Vaccine: Pediatrics (0 to 5 Years) and At-Risk Patients (6 to 64 Years)  Aged Out   ? HEPATITIS B VACCINES  Aged Out

## 2021-06-18 NOTE — LETTER
Letter by Lorne House MD at      Author: Lorne oHuse MD Service: -- Author Type: --    Filed:  Encounter Date: 1/7/2019 Status: (Other)       Juan Carlos Marley  [address] [facility]  [place] [address]      January 7, 2019      Dear Juan Carlos,    This letter is to remind you that you will be due for your follow up appointment with Dr. Lorne House  . To help ensure you are in the best health possible, a regular follow-up with your cardiologist is essential.     Please call our Patient Scheduling Line at [phone] to schedule your appointment at your earliest convenience.  If you have recently scheduled an appointment, please disregard this letter.    We look forward to seeing you again. As always, we are available at the number  above for any questions or concerns you may have.      Sincerely,     The Physicians and Staff of [facility]

## 2021-06-18 NOTE — PATIENT INSTRUCTIONS - HE
Patient Instructions by Adonis Trotter MD at 2/11/2020  9:40 AM     Author: Adonis Trotter MD Service: -- Author Type: Physician    Filed: 2/11/2020  9:59 AM Encounter Date: 2/11/2020 Status: Signed    : Adonis Trotter MD (Physician)         Patient Education     Your Health Risk Assessment indicates you feel you are not in good physical health.    A healthy lifestyle helps keep the body fit and the mind alert. It helps protect you from disease, helps you fight disease, and helps prevent chronic disease (disease that doesn't go away) from getting worse. This is important as you get older and begin to notice twinges in muscles and joints and a decline in the strength and stamina you once took for granted. A healthy lifestyle includes good healthcare, good nutrition, weight control, recreation, and regular exercise. Avoid harmful substances and do what you can to keep safe. Another part of a healthy lifestyle is stay mentally active and socially involved.    Good healthcare     Have a wellness visit every year.     If you have new symptoms, let us know right away. Don't wait until the next checkup.     Take medicines exactly as prescribed and keep your medicines in a safe place. Tell us if your medicine causes problems.   Healthy diet and weight control     Eat 3 or 4 small, nutritious, low-fat, high-fiber meals a day. Include a variety of fruits, vegetables, and whole-grain foods.     Make sure you get enough calcium in your diet. Calcium, vitamin D, and exercise help prevent osteoporosis (bone thinning).     If you live alone, try eating with others when you can. That way you get a good meal and have company while you eat it.     Try to keep a healthy weight. If you eat more calories than your body uses for energy, it will be stored as fat and you will gain weight.     Recreation   Recreation is not limited to sports and team events. It includes any activity that provides relaxation,  interest, enjoyment, and exercise. Recreation provides an outlet for physical, mental, and social energy. It can give a sense of worth and achievement. It can help you stay healthy.       Patient Education     Exercise for a Healthier Heart  You may wonder how you can improve the health of your heart. If youre thinking about exercise, youre on the right track. You dont need to become an athlete, but you do need a certain amount of brisk exercise to help strengthen your heart. If you have been diagnosed with a heart condition, your doctor may recommend exercise to help stabilize your condition. To help make exercise a habit, choose safe, fun activities.       Be sure to check with your health care provider before starting an exercise program.    Why exercise?  Exercising regularly offers many healthy rewards. It can help you do all of the following:    Improve your blood cholesterol levels to help prevent further heart trouble    Lower your blood pressure to help prevent a stroke or heart attack    Control diabetes, or reduce your risk of getting this disease    Improve your heart and lung function    Reach and maintain a healthy weight    Make your muscles stronger and more limber so you can stay active    Prevent falls and fractures by slowing the loss of bone mass (osteoporosis)    Manage stress better  Exercise tips  Ease into your routine. Set small goals. Then build on them.  Exercise on most days. Aim for a total of 150 or more minutes of moderate to  vigorous intensity activity each week. Consider 40 minutes, 3 to 4 times a week. For best results, activity should last for 40 minutes on average. It is OK to work up to the 40 minute period over time. Examples of moderate-intensity activity is walking one mile in 15 minutes or 30 to 45 minutes of yard work.  Step up your daily activity level. Along with your exercise program, try being more active throughout the day. Walk instead of drive. Do more household  tasks or yard work.  Choose one or more activities you enjoy. Walking is one of the easiest things you can do. You can also try swimming, riding a bike, or taking an exercise class.  Stop exercising and call your doctor if you:    Have chest pain or feel dizzy or lightheaded    Feel burning, tightness, pressure, or heaviness in your chest, neck, shoulders, back, or arms    Have unusual shortness of breath    Have increased joint or muscle pain    Have palpitations or an irregular heartbeat      4030-1524 [employer]. [address] [place] [address]. All rights reserved. This information is not intended as a substitute for professional medical care. Always follow your healthcare professional's instructions.         Patient Education   Understanding [employer] MyPlate  The USDA (US Department of Agriculture) has guidelines to help you make healthy food choices. These are called MyPlate. MyPlate shows the food groups that make up healthy meals using the image of a place setting. Before you eat, think about the healthiest choices for what to put onto your plate or into your cup or bowl. To learn more about building a healthy plate, visit www.choosemyplate.gov.       The Food Groups    Fruits: Any fruit or 100% fruit juice counts as part of the Fruit Group. Fruits may be fresh, canned, frozen, or dried, and may be whole, cut-up, or pureed. Make half your plate fruits and vegetables.    Vegetables: Any vegetable or 100% vegetable juice counts as a member of the Vegetable Group. Vegetables may be fresh, frozen, canned, or dried. They can be served raw or cooked and may be whole, cut-up, or mashed. Make half your plate fruits and vegetables.     Grains: All foods made from grains are part of the Grains Group. These include wheat, rice, oats, cornmeal, and barley such as bread, pasta, oatmeal, cereal, tortillas, and grits. Grains should be no more than a quarter of your plate. At least half of your grains  should be whole grains.    Protein: This group includes meat, poultry, seafood, beans and peas, eggs, processed soy products (like tofu), nuts (including nut butters), and seeds. Make protein choices no more than a quarter of your plate. Meat and poultry choices should be lean or low fat.    Dairy: All fluid milk products and foods made from milk that contain calcium, like yogurt and cheese are part of the Dairy Group. (Foods that have little calcium, such as cream, butter, and cream cheese, are not part of the group.) Most dairy choices should be low-fat or fat-free.    Oils: These are fats that are liquid at room temperature. They include canola, corn, olive, soybean, and sunflower oil. Foods that are mainly oil include mayonnaise, certain salad dressings, and soft margarines. You should have only 5 to 7 teaspoons of oils a day. You probably already get this much from the food you eat.  Use [employer] to Help Build Your Meals  The SuperTracker can help you plan and track your meals and activity. You can look up individual foods to see or compare their nutritional value. You can get guidelines for what and how much you should eat. You can compare your food choices. And you can assess personal physical activities and see ways you can improve. Go to www.[employer].gov/supertracker/.    5824-0182 The [employer]. [address] [place] [address], [place] [address]. All rights reserved. This information is not intended as a substitute for professional medical care. Always follow your healthcare professional's instructions.           Patient Education   Preventing Falls in the Home  As you get older, falls are more likely. Thats because your reaction time slows. Your muscles and joints may also get stiffer, making them less flexible. Illness, medications, and vision changes can also affect your balance. A fall could leave you unable to live on your own. To make your home safer, follow these tips:    Floors    Put  nonskid pads under area rugs.    Remove throw rugs.    Replace worn floor coverings.    Tack carpets firmly to each step on carpeted stairs. Put nonskid strips on the edges of uncarpeted stairs.    Keep floors and stairs free of clutter and cords.    Arrange furniture so there are clear pathways.    Clean up any spills right away.    Bathrooms    Install grab bars in the tub or shower.    Apply nonskid strips or put a nonskid rubber mat in the tub or shower.    Sit on a bath chair to bathe.    Use bathmats with nonskid backing.    Lighting    Keep a flashlight in each room.    Put a nightlight along the pathway between the bedroom and the bathroom.    0056-9649 [employer]. [address] [place] [address]. All rights reserved. This information is not intended as a substitute for professional medical care. Always follow your healthcare professional's instructions.           Advance Directive  Patients advance directive was discussed and I am comfortable with the patients wishes.  Patient Education   Personalized Prevention Plan  You are due for the preventive services outlined below.  Your care team is available to assist you in scheduling these services.  If you have already completed any of these items, please share that information with your care team to update in your medical record.  Health Maintenance   Topic Date Due   ? DEPRESSION ACTION PLAN  1940   ? ZOSTER VACCINES (2 of 3) 09/24/2012   ? MEDICARE ANNUAL WELLNESS VISIT  12/13/2019   ? FALL RISK ASSESSMENT  10/29/2020   ? ADVANCE CARE PLANNING  12/13/2023   ? LIPID  10/29/2024   ? TD 18+ HE  02/21/2028   ? PNEUMOCOCCAL IMMUNIZATION 65+ LOW/MEDIUM RISK  Completed   ? INFLUENZA VACCINE RULE BASED  Completed

## 2021-06-19 NOTE — LETTER
Letter by Adonis Trotter MD at      Author: Adonis Trotter MD Service: -- Author Type: --    Filed:  Encounter Date: 6/3/2019 Status: (Other)         Juan Carlos Marley  [address] [facility] [place] [address]             Dana 3, 2019         Dear Terrence Marley,    Below are the results from your recent visit:    Resulted Orders   Lipid Cascade   Result Value Ref Range    Cholesterol 134 <=199 mg/dL    Triglycerides 48 <=149 mg/dL    HDL Cholesterol 63 >=40 mg/dL    LDL Calculated 61 <=129 mg/dL    Patient Fasting > 8hrs? Yes        All very good results    Please call with questions or contact us using [employer].    Sincerely,        Electronically signed by Adonis Trotter MD

## 2021-06-19 NOTE — PROGRESS NOTES
[facility] Follow Up Note    Assessment/Plan:  1. 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  Rash is 3 weeks old and has significantly faded.  He is very worried for Lyme disease.  Recommendation: We will proceed with Lyme testing.  Prescription provided for doxycycline but he may choose to hold until results are available.    - HM2(CBC w/o Differential)  - Erythrocyte Sedimentation Rate  - Lyme Antibody Cascade    2. Rash and nonspecific skin eruption  Additionally, he has a diffuse maculopapular rash that is dry and scaly on extremities and trunk.  This seemed to occur after the above.  Recommendation: Tapered low-dose prednisone.  Eucerin cream daily      Follow-up with Dr. Trotter as needed    Alba Singh MD    Chief Complaint:  Chief Complaint   Patient presents with     Insect Bite     on left thigh 2 weeks ago       History of Present Illness:  Juan Carlos is a 78 y.o. male who is here today because he is worried about luz Lyme disease.  Of note, he was working in his yard 3 weeks ago.  He states his yard has deer and numerous tics.  He believes that he may have had a tick bite.  He noted an area in the left groin that looked like a blackhead.  It had a target lesion surrounding it.  He tended to ignore it.  The circular lesion faded.  He then developed a diffuse maculopapular rash.  Initially, this was pruritic but now that has faded.  He states that he realizes that he should have been in sooner.  He denies any current systemic symptoms such as fever, chills, arthralgias or aches and pains.  He denies any lightheadedness or dizziness that could occur with a low heart rate.  He otherwise is in good shape.  He denies the addition of any new medications.    Medical history is reviewed.  He denies any medication allergies.    Review of Systems:  A comprehensive review of systems was performed and was otherwise negative    PFSH:  Social  History: He is accompanied by his significant other here today.  History   Smoking Status     Former Smoker     Packs/day: 1.00     Years: 10.00     Types: Cigarettes, Pipe     Quit date: 2/24/1970   Smokeless Tobacco     Never Used       Past History:   Past Medical History:   Diagnosis Date     Cataract, nuclear sclerotic, left eye 6/2/2017     Coronary artery disease due to lipid rich plaque      Dyslipidemia, goal LDL below 70 12/20/2014     Essential hypertension with goal blood pressure less than 130/85      Nonsustained ventricular tachycardia (H)     6 beat run, asymptomatic per Dr. Hackett     Paroxysmal atrial fibrillation (H) 9/2/2013     TIA (Left hemisphere) 01/24/2016     TIA (transient ischemic attack) 11/20/2016     Transient ischemic attack (TIA) 12/20/2014       Current Outpatient Prescriptions   Medication Sig Dispense Refill     ascorbic acid (VITAMIN C) 500 MG tablet Take 500 mg by mouth daily.       aspirin 81 mg chewable tablet Chew 81 mg daily.       atorvastatin (LIPITOR) 20 MG tablet TAKE ONE TABLET BY MOUTH EVERY DAY AS DIRECTED 90 tablet 3     CALCIUM CARBONATE (CALCIUM 600 ORAL) Take 60 mg by mouth daily.        clopidogrel (PLAVIX) 75 mg tablet        ferrous sulfate 325 (65 FE) MG tablet TAKE ONE TABLET BY MOUTH EVERY  tablet 10     ketoconazole (NIZORAL) 2 % shampoo Apply 1 application topically once a week.        losartan (COZAAR) 25 MG tablet Take 1 tablet (25 mg total) by mouth daily. 90 tablet 3     metoprolol tartrate (LOPRESSOR) 25 MG tablet Take 1 tablet (25 mg total) by mouth 2 (two) times a day. 180 tablet 3     multivitamin therapeutic (THERAGRAN) tablet Take 1 tablet by mouth daily. Patient breaks multivitamin in half; takes half tab in the morning and half tab in the evening       nitroglycerin (NITROSTAT) 0.4 MG SL tablet Place 1 tablet (0.4 mg total) under the tongue every 5 (five) minutes as needed for chest pain. 25 tablet 11     tamsulosin (FLOMAX) 0.4 mg Cp24         doxycycline (VIBRA-TABS) 100 MG tablet Take 1 tablet (100 mg total) by mouth 2 (two) times a day for 10 days. 20 tablet 0     predniSONE (DELTASONE) 10 mg tablet 2 tablets daily for 5 days One tablet daily for 5 days Half tab for 4 days then discontinue 17 tablet 0     No current facility-administered medications for this visit.        Family History: Noncontributory    Physical Exam:  General Appearance:   He is pleasant.  He does not appear acutely ill.  Vitals:    07/13/18 1430   BP: 110/58   Patient Site: Left Arm   Patient Position: Sitting   Cuff Size: Adult Regular   Pulse: 72   SpO2: 95%   Weight: 192 lb 9 oz (87.3 kg)     Wt Readings from Last 3 Encounters:   07/13/18 192 lb 9 oz (87.3 kg)   04/30/18 192 lb (87.1 kg)   03/20/18 192 lb (87.1 kg)     Body mass index is 28.44 kg/(m^2).    Skin is examined.  There is a maculopapular rash that is somewhat scaly noted on his arms abdomen back and lower extremities.  No mucosal lesions are noted.  Left groin area is examined.  There is mild induration at the center with a hint of a rim of erythema.  Cardiac exam reveals a regular heart rate.  Lungs are there to auscultation    Data Review:    Analysis and Summary of Old Records (2): Briefly reviewed records    Records Requested (1):       Other History Summarized (from other people in the room) (2):     Radiology Tests Summarized (XRAY/CT/MRI/DXA) (1):     Labs Reviewed (1): Labs ordered    Medicine Tests Reviewed (EKG/ECHO/COLONOSCOPY/EGD) (1):     Independent Review of EKG or X-RAY (2):

## 2021-06-19 NOTE — PROGRESS NOTES
Office Visit - Follow up    Juan Carlos Marley   78 y.o. male    Date of Visit: 7/26/2018    Chief Complaint   Patient presents with     Coronary Artery Disease     Memory Loss     saw a neurologist       Subjective: Coronary artery disease the patient is fasting today.    Recently seen by neurologist for recurrent transient ischemic attacks.  The patient has problems with short-term memory as well the patient requested a vitamin B12 level to be done as well.  A TIA yesterday was mainly consistent to be mild and associated with dizziness lightheadedness but no vertigo.  He thought that there was some right hand paresthesias but no definite weakness no speech or language difficulty and no problems with vision.  The patient had been seen for a rash that was a target-like lesion and prescribed prednisone with doxycycline by Dr. Alba ARMASAat the [place] [facility] on July 13, 2018 serologic test for Lyme disease were negative.  The patient was seen by neurologist yesterday.  And another mild TIA that lasted only moments occurred as described above consisting mainly of dizziness or lightheadedness no vertigo and some short-lived sensation in his right hand.    ROS: A comprehensive review of systems was performed and was otherwise negative    Medications:  Prior to Admission medications    Medication Sig Start Date End Date Taking? Authorizing Provider   aspirin 81 mg chewable tablet Chew 81 mg daily.   Yes PROVIDER, HISTORICAL   atorvastatin (LIPITOR) 20 MG tablet TAKE ONE TABLET BY MOUTH EVERY DAY AS DIRECTED 2/13/18  Yes Adonis Trotter MD   CALCIUM CARBONATE (CALCIUM 600 ORAL) Take 60 mg by mouth daily.    Yes PROVIDER, HISTORICAL   clopidogrel (PLAVIX) 75 mg tablet  2/13/18  Yes PROVIDER, HISTORICAL   famotidine (PEPCID) 20 MG tablet Take 20 mg by mouth 2 (two) times a day.   Yes PROVIDER, HISTORICAL   metoprolol tartrate (LOPRESSOR) 25 MG tablet Take 1 tablet (25 mg total) by mouth 2 (two) times a day. 2/21/18  Yes  Lorne House MD   multivitamin therapeutic (THERAGRAN) tablet Take 1 tablet by mouth daily. Patient breaks multivitamin in half; takes half tab in the morning and half tab in the evening   Yes PROVIDER, HISTORICAL   tamsulosin (FLOMAX) 0.4 mg Cp24  3/9/18  Yes PROVIDER, HISTORICAL   ascorbic acid (VITAMIN C) 500 MG tablet Take 500 mg by mouth daily.    PROVIDER, HISTORICAL   ferrous sulfate 325 (65 FE) MG tablet TAKE ONE TABLET BY MOUTH EVERY DAY 7/31/17   Adonis Trotter MD   ketoconazole (NIZORAL) 2 % shampoo Apply 1 application topically once a week.  5/11/16   PROVIDER, HISTORICAL   losartan (COZAAR) 25 MG tablet Take 1 tablet (25 mg total) by mouth daily. 2/22/18 2/22/19  Lorne House MD   nitroglycerin (NITROSTAT) 0.4 MG SL tablet Place 1 tablet (0.4 mg total) under the tongue every 5 (five) minutes as needed for chest pain. 8/3/16   Lorne House MD   predniSONE (DELTASONE) 10 mg tablet 2 tablets daily for 5 days One tablet daily for 5 days Half tab for 4 days then discontinue 7/13/18 7/26/18  Alba Singh MD       Allergies: No Known Allergies    Immunizations:   Immunization History   Administered Date(s) Administered     Influenza [id number], 01/13/2010     Influenza high dose, seasonal 10/23/2015, 11/28/2016, 10/25/2017     Influenza, inj, historic,unspecified 10/27/1993, 11/22/1995, 10/16/1996, 10/09/1997, 10/27/1998, 11/12/1999, 10/30/2002, 10/24/2003, 12/27/2004, 11/02/2005, 11/09/2006, 10/02/2007, 11/12/2008, 10/05/2009, 11/04/2010, 11/01/2011     Influenza, seasonal,quad inj 6-35 mos 12/06/2012, 11/22/2013, 11/07/2014     Pneumo Conj 13-V (2010&after) 01/19/2015     Pneumo Polysac 23-V 02/25/2008     Td, Adult, Absorbed 11/22/1995, 05/25/2002     Td,adult,historic,unspecified 02/25/2008     Tdap 02/21/2018     ZOSTER, LIVE 01/01/1900, 07/30/2012       Exam Chest clear to auscultation and percussion.  Heart tones regular rhythm without murmur rub or gallop.  Abdomen soft nontender no  organomegaly.  No peritoneal signs.  Extremities free of edema cyanosis or clubbing.  Neck veins nondistended no thyromegaly or scleral icterus noted, carotids full.  Skin warm and dry easily conversant good spirited.  Normal intelligence.  Neurologically intact no gross localizing findings.  No blood in stool or urine medication list reviewed well-tolerated no known drug allergies.    Assessment and Plan  Proximal atrial fibrillation with transient ischemic attacks and history of coronary artery disease.  Recent TIA again yesterday with mild short-lived.  Seen by neurologist Dr. VENCES part of our [facility] neurology team also seen by Dr. Alba Singh from the [facility] for a rash with serologic test negative for Lyme disease.    We had a long discussion regarding course of treatment in light of recurrent TIAs and paroxysmal atrial fibrillation.  Ideally the patient should be on warfarin and/or Eliquis as a novel anticoagulant.  However the patient was taken off both warfarin and Eliquis by a [facility] neurologist and he suggested a more potent combination of aspirin and Plavix.  The patient will have lipid panel and vitamin B12 level checked today.    It may be necessary for the patient to reconsult cardiology regarding paroxysmal atrial fibrillation coronary artery disease and recurrent mild TIAs.  It may be time to switch back to Eliquis and/or warfarin for this individual in light of his recurrent TIAs on the current regimen of low-dose aspirin 81 mg plus Plavix 75 mg daily    Time: total time spent with the patient was 40 minutes of which >50% was spent in counseling and coordination of care    The following high BMI interventions were performed this visit: encouragement to exercise    Adonis Trotter MD    Patient Active Problem List   Diagnosis     Essential hypertension with goal blood pressure less than 130/85     Paroxysmal atrial fibrillation (H)     Dyslipidemia, goal LDL below 70     Coronary  artery disease due to lipid rich plaque     TIA (transient ischemic attack)     Major depressive disorder, recurrent episode (H)

## 2021-06-19 NOTE — LETTER
Letter by Adonis Trotter MD at      Author: Adonis Trotter MD Service: -- Author Type: --    Filed:  Encounter Date: 10/30/2019 Status: Signed         Juan Carlos Marley  [address] [facility]  [place] [address]             October 30, 2019         Dear Terrence Marley,    Below are the results from your recent visit:    Resulted Orders   Lipid Cascade   Result Value Ref Range    Cholesterol 135 <=199 mg/dL    Triglycerides 39 <=149 mg/dL    HDL Cholesterol 64 >=40 mg/dL    LDL Calculated 63 <=129 mg/dL    Patient Fasting > 8hrs? Yes         All very good results     Please call with questions or contact us using [employer].    Sincerely,        Electronically signed by Adonis Trotter MD

## 2021-06-21 NOTE — LETTER
Letter by Adonis Trotter MD at      Author: Adonis Trotter MD Service: -- Author Type: --    Filed:  Encounter Date: 2/25/2021 Status: (Other)         Juan Carlos Marley  [address]  [place] [address]             February 25, 2021         Dear Mr. Marley,    Below are the results from your recent visit:    Resulted Orders   HM2(CBC w/o Differential)   Result Value Ref Range    WBC 3.7 (L) 4.0 - 11.0 thou/uL    RBC 4.35 (L) 4.40 - 6.20 mill/uL    Hemoglobin 13.6 (L) 14.0 - 18.0 g/dL    Hematocrit 39.1 (L) 40.0 - 54.0 %    MCV 90 80 - 100 fL    MCH 31.3 27.0 - 34.0 pg    MCHC 34.8 32.0 - 36.0 g/dL    RDW 12.4 11.0 - 14.5 %    Platelets 159 140 - 440 thou/uL    MPV 10.6 (H) 7.0 - 10.0 fL   Comprehensive Metabolic Panel   Result Value Ref Range    Sodium 141 136 - 145 mmol/L    Potassium 4.2 3.5 - 5.0 mmol/L    Chloride 106 98 - 107 mmol/L    CO2 29 22 - 31 mmol/L    Anion Gap, Calculation 6 5 - 18 mmol/L    Glucose 94 70 - 125 mg/dL    BUN 20 8 - 28 mg/dL    Creatinine 0.76 0.70 - 1.30 mg/dL    GFR MDRD Af Amer >60 >60 mL/min/1.73m2    GFR MDRD Non Af Amer >60 >60 mL/min/1.73m2    Bilirubin, Total 0.7 0.0 - 1.0 mg/dL    Calcium 9.0 8.5 - 10.5 mg/dL    Protein, Total 5.6 (L) 6.0 - 8.0 g/dL    Albumin 3.5 3.5 - 5.0 g/dL    Alkaline Phosphatase 92 45 - 120 U/L    AST 18 0 - 40 U/L    ALT 20 0 - 45 U/L    Narrative    Fasting Glucose reference range is 70-99 mg/dL per  American Diabetes Association (ADA) guidelines.   Lipid Cascade   Result Value Ref Range    Cholesterol 117 <=199 mg/dL    Triglycerides 35 <=149 mg/dL    HDL Cholesterol 56 >=40 mg/dL    LDL Calculated 54 <=129 mg/dL    Patient Fasting > 8hrs? Yes    Thyroid Stimulating Hormone (TSH)   Result Value Ref Range    TSH 1.24 0.30 - 5.00 uIU/mL   Urinalysis-UC if Indicated   Result Value Ref Range    Color, UA Yellow Colorless, Yellow, Straw, Light Yellow    Clarity, UA Cloudy (!) Clear    Glucose, UA Negative Negative    Bilirubin, UA Negative  Negative    Ketones, UA Negative Negative    Specific Gravity, UA 1.025 1.005 - 1.030    Blood, UA Moderate (!) Negative    pH, UA 6.0 5.0 - 8.0    Protein, UA 30 mg/dL (!) Negative mg/dL    Urobilinogen, UA 0.2 E.U./dL 0.2 E.U./dL, 1.0 E.U./dL    Nitrite, UA Negative Negative    Leukocytes, UA Negative Negative    Bacteria, UA None Seen None Seen hpf    RBC, UA >100 (!) None Seen, 0-2 hpf    WBC, UA None Seen None Seen, 0-5 hpf    Squam Epithel, UA None Seen None Seen, 0-5 lpf    Mucus, UA Moderate (!) None Seen lpf    Narrative    UC not indicated   PSA (Prostatic-Specific Antigen), Annual Screen   Result Value Ref Range    PSA 2.0 0.0 - 6.5 ng/mL    Narrative    Method is Abbott Prostate-Specific Antigen (PSA)  Standard-WHO 1st International (90:10)       Considerable microscopic hematuria noted and would recommend close follow-up with [place] [facility] Dr. VERONICA Irizarry .  Hemogram satisfactory mild anemia noted that is normochromic normocytic and mild leukopenia noted with normal platelet count. All other labs  Very good    Please call with questions or contact us using [employer].    Sincerely,        Electronically signed by Adonis Trotter MD

## 2021-06-22 NOTE — PROGRESS NOTES
Assessment and Plan:   Annual wellness visit.        1. Routine general medical examination at a health care facility  Annual wellness visit.  - HM2(CBC w/o Differential)  - Comprehensive Metabolic Panel  - Lipid Cascade  - Thyroid Stimulating Hormone (TSH)  - Urinalysis-UC if Indicated    2. Screening for prostate cancer  Annual wellness visit  - PSA (Prostatic-Specific Antigen), Annual Screen     The patient's current medical problems were reviewed.    I have had an Advance Directives discussion with the patient.  The following health maintenance schedule was reviewed with the patient and provided in printed form in the after visit summary:   Health Maintenance   Topic Date Due     ZOSTER VACCINES (2 of 3) 09/24/2012     DEPRESSION FOLLOW UP  06/13/2019     FALL RISK ASSESSMENT  12/13/2019     ADVANCE DIRECTIVES DISCUSSED WITH PATIENT  12/13/2023     TD 18+ HE  02/21/2028     PNEUMOCOCCAL POLYSACCHARIDE VACCINE AGE 65 AND OVER  Completed     INFLUENZA VACCINE RULE BASED  Completed     PNEUMOCOCCAL CONJUGATE VACCINE FOR ADULTS (PCV13 OR PREVNAR)  Completed        Subjective:   Chief Complaint: Juan Carlos Marley is an 78 y.o. male here for an Annual Wellness visit.   HPI: Annual wellness visit physical exam.    78-year-old retired .    Recent dermatology consults x3 with incision and drainage sebaceous cyst.    Colonoscopy dated February 4, 2015 showed a tubular adenomatous colon polyp with Dr. Mtz.  Background diverticulosis noted.    2 wine glasses each evening non-smoker.    Allergies none known.  History of bilateral cataract surgery in May 2017 [facility].    History of coronary artery disease and coronary bypass grafting to 5 vessels in September 2013.  Paroxysmal atrial fibrillation now on include Apidra gel.  Followed by cardiology.  Previously the patient been treated with warfarin and Eliquis.    Prior history of hypertension and hyperlipidemia.    BPH and history of TURP for  same.    Multiple hernia repairs previously.    Mother  49 accidental.    Father  age 84 ruptured abdominal aortic aneurysm.    One daughter with type 2 diabetes.  No grandchildren.  Wife well supportive with history of leukocytoclastic vasculitis.    Twin home in [facility].  Retired .    Review of Systems:    Please see above.  The rest of the review of systems are negative for all systems.    Patient Care Team:  Adonis Trotter MD as PCP - General  Adonis Trotter MD     Patient Active Problem List   Diagnosis     Essential hypertension with goal blood pressure less than 130/85     Paroxysmal atrial fibrillation (H)     Dyslipidemia, goal LDL below 70     Coronary artery disease due to lipid rich plaque     TIA (transient ischemic attack)     Major depressive disorder, recurrent episode (H)     Past Medical History:   Diagnosis Date     Cataract, nuclear sclerotic, left eye 2017     Coronary artery disease due to lipid rich plaque      Dyslipidemia, goal LDL below 70 2014     Essential hypertension with goal blood pressure less than 130/85      Nonsustained ventricular tachycardia (H)     6 beat run, asymptomatic per Dr. Hackett     Paroxysmal atrial fibrillation (H) 2013     TIA (Left hemisphere) 2016     TIA (transient ischemic attack) 2016     Transient ischemic attack (TIA) 2014      Past Surgical History:   Procedure Laterality Date     CORONARY ARTERY BYPASS GRAFT  2013    underwent 5-vessel coronary bypass grafting at the [facility].     FOOT SURGERY Left 2014    Dr. Carr     FRACTURE SURGERY Right     hand     HERNIA REPAIR  2013     ORCHIECTOMY Right      ORIF FEMUR FRACTURE Right      PROSTATECTOMY  2013      Family History   Problem Relation Age of Onset     Aortic aneurysm Father          84     Other Mother          accident age 49     Other Sister         autistic      No Medical Problems Daughter       Pulmonary Hypertension Neg Hx      Congenital heart disease Neg Hx       Social History     Socioeconomic History     Marital status:      Spouse name: Salud     Number of children: 1     Years of education: Not on file     Highest education level: Not on file   Social Needs     Financial resource strain: Not on file     Food insecurity - worry: Not on file     Food insecurity - inability: Not on file     Transportation needs - medical: Not on file     Transportation needs - non-medical: Not on file   Occupational History     Occupation:  at [employer], commercial      Employer: RETIRED   Tobacco Use     Smoking status: Former Smoker     Packs/day: 1.00     Years: 10.00     Pack years: 10.00     Types: Cigarettes, Pipe     Last attempt to quit: 1970     Years since quittin.8     Smokeless tobacco: Never Used   Substance and Sexual Activity     Alcohol use: Yes     Alcohol/week: 6.0 oz     Types: 10 Glasses of wine per week     Comment: 1-2 glass of wine before dinner     Drug use: No     Sexual activity: Yes     Partners: Female     Birth control/protection: Post-menopausal   Other Topics Concern     Not on file   Social History Narrative    Lives with his wife Salud in [place] in a twin home.  Retired from [employer].  One daughter.        Current Outpatient Medications   Medication Sig Dispense Refill     ascorbic acid (VITAMIN C) 500 MG tablet Take 500 mg by mouth daily.       aspirin 81 mg chewable tablet Chew 81 mg daily.       atorvastatin (LIPITOR) 20 MG tablet TAKE ONE TABLET BY MOUTH EVERY DAY AS DIRECTED 90 tablet 3     CALCIUM CARBONATE (CALCIUM 600 ORAL) Take 60 mg by mouth daily.        clopidogrel (PLAVIX) 75 mg tablet        famotidine (PEPCID) 20 MG tablet Take 20 mg by mouth 2 (two) times a day.       ferrous sulfate 325 (65 FE) MG tablet TAKE ONE TABLET BY MOUTH EVERY  tablet 10     ketoconazole (NIZORAL) 2 % shampoo Apply 1  "application topically once a week.        losartan (COZAAR) 25 MG tablet Take 1 tablet (25 mg total) by mouth daily. 90 tablet 3     metoprolol tartrate (LOPRESSOR) 25 MG tablet TAKE ONE TABLET BY MOUTH TWICE A  tablet 2     multivitamin therapeutic (THERAGRAN) tablet Take 1 tablet by mouth daily. Patient breaks multivitamin in half; takes half tab in the morning and half tab in the evening       tamsulosin (FLOMAX) 0.4 mg Cp24        nitroglycerin (NITROSTAT) 0.4 MG SL tablet Place 1 tablet (0.4 mg total) under the tongue every 5 (five) minutes as needed for chest pain. 25 tablet 11     No current facility-administered medications for this visit.       Objective:   Vital Signs:   Visit Vitals  /68 (Patient Site: Right Arm, Patient Position: Sitting)   Pulse 64   Ht 5' 9\" (1.753 m)   Wt 198 lb (89.8 kg)   SpO2 98%   BMI 29.24 kg/m         VisionScreening:  No exam data present     PHYSICAL EXAM  Chest clear to auscultation and percussion.  Heart tones regular rhythm without murmur rub or gallop.  Abdomen soft nontender no organomegaly.  No peritoneal signs.  Extremities free of edema cyanosis or clubbing.  Neck veins nondistended no thyromegaly or scleral icterus noted, carotids full.  Skin warm and dry easily conversant good spirited.  Normal intelligence.  Neurologically intact no gross localizing findings.  One testicle absent scrotal contents otherwise negative prostate was enlarged no nodularity 1-2/4 good pulses noted in all 4 extremities no carotid bruits skin pale lymph negative neuro negative psych normal skin showed a resolving sebaceous cyst after incision and drainage of right back.  No sign of malignancy HEENT negative no carotid bruits or thyromegaly rest of the examination was unremarkable.  He is pleasant easily conversant his weight is up 9 pounds discussed the importance of caloric restriction regular exercise weight loss and salt restriction and diet.    Assessment Results 12/13/2018 "   Activities of Daily Living No help needed   Instrumental Activities of Daily Living No help needed   Get Up and Go Score Less than 12 seconds   Mini Cog Total Score 5   Some recent data might be hidden     A Mini-Cog score of 0-2 suggests the possibility of dementia, score of 3-5 suggests no dementia    Identified Health Risks:     The patient was provided with suggestions to help him develop a healthy physical lifestyle.   He is at risk for lack of exercise and has been provided with information to increase physical activity for the benefit of his well-being.  The patient was counseled and encouraged to consider modifying their diet and eating habits. He was provided with information on recommended healthy diet options.  The patient was provided with written information regarding signs of hearing loss.  Patient's advanced directive was discussed and I am comfortable with the patient's wishes.

## 2021-06-24 NOTE — PATIENT INSTRUCTIONS - HE
Juan Carlos,    It was a pleasure to see you today at the [facility].     I strongly recommend that you discuss your two most recent TIA events with Dr. Trotter to get his advice about wether or not you should see Dr. Moore or the [place] neurologist again.    Please increase your atorvastatin to 40 mg orally daily; we will recheck your lipid profile in 3 months.    You may use Viagra or a similar ED medication but cannot take nitro within 24 hours of using it.    You will see Dr. House in one year.     Please call us if you have any questions or concerns about your heart.      Manav House M.D.

## 2021-06-25 NOTE — PROGRESS NOTES
Progress Notes by Lorne House MD at 1/20/2017  8:50 AM     Author: Lorne House MD Service: -- Author Type: Physician    Filed: 1/20/2017  9:24 AM Encounter Date: 1/20/2017 Status: Signed    : Lorne House MD (Physician)           Click to link to [facility] Note      Assessment:    1. Paroxysmal atrial fibrillation  Echo BLAKE W Bubble   2. Other specified transient cerebral ischemias     3. Coronary artery disease due to lipid rich plaque         Plan:    1.  BLAKE to investigate possible cardiac causes of recurrent TIA while on Eliquis for paroxysmal atrial fibrillation.  2.  Juan Carlos will follow-up with Dr. Narinder Moore; I indicated that Dr. Moore might add aspirin and/or dipyridamole to his medical regimen.  3.  Return to see Dr. House in one year.    An After Visit Summary was printed and given to the patient.    Subjective:    Juan Carlos Marley returned for and unplanned follow up visit.    He had another TIA late last year.  He was seen in consultation by my colleagues.  We reviewed the cardiac test results that he has had done today.  The repeat Holter monitor did not demonstrate atrial fibrillation, though, that does not change his CHADS-VASC score, which is 4.    Juan Carlos does not experience angina pectoris, unusual shortness of breath, orthopnea, loss of consciousness, or lower extremity edema.  He has gained 8 pounds of weight over the holidays due to increased eating and reduced physical activity.    Past Medical History:    Patient Active Problem List   Diagnosis   ? Essential hypertension with goal blood pressure less than 130/85   ? Paroxysmal atrial fibrillation   ? Dyslipidemia, goal LDL below 70   ? Coronary artery disease due to lipid rich plaque   ? TIA (transient ischemic attack)       Past Medical History   Diagnosis Date   ? Coronary artery disease due to lipid rich plaque    ? Dyslipidemia, goal LDL below 70 12/20/2014   ? Essential hypertension  with goal blood pressure less than 130/85    ? Nonsustained ventricular tachycardia      6 beat run, asymptomatic per Dr. Hackett   ? Paroxysmal atrial fibrillation 2013   ? TIA (Left hemisphere) 2016   ? TIA (transient ischemic attack) 2016   ? Transient ischemic attack (TIA) 2014       Past Surgical History   Procedure Laterality Date   ? Coronary artery bypass graft  2013     underwent 5-vessel coronary bypass grafting at the [facility].   ? Prostatectomy  2013   ? Hernia repair  2013   ? Orif femur fracture Right    ? Orchiectomy Right    ? Foot surgery Left 2014     Dr. Carr   ? Fracture surgery Right      hand       Social History     Social History   ? Marital status:      Spouse name: Salud   ? Number of children: 1   ? Years of education: N/A     Occupational History   ?  at [employer]  Retired     Social History Main Topics   ? Smoking status: Former Smoker     Packs/day: 1.00     Years: 10.00     Types: Cigarettes, Pipe     Quit date: 1970   ? Smokeless tobacco: Never Used   ? Alcohol use 6.0 oz/week     10 Glasses of wine per week      Comment: 1-2 glass of wine before dinner   ? Drug use: No   ? Sexual activity: Yes     Partners: Female     Birth control/ protection: Post-menopausal     Other Topics Concern   ? Not on file     Social History Narrative    Lives with his wife Salud in [place] in a twin home.  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  One daughter.         Family History   Problem Relation Age of Onset   ? Aortic aneurysm Father       84   ? Other Mother       accident age 49   ? Other Sister      autistic    ? No Medical Problems Daughter    ? Pulmonary Hypertension Neg Hx    ? Congenital heart disease Neg Hx        Outpatient Encounter Prescriptions as of 2017   Medication Sig Dispense Refill   ? apixaban (ELIQUIS) 5 mg Tab tablet Take 1 tablet (5 mg total) by mouth 2 (two) times a  "day. 180 tablet 2   ? ascorbic acid (VITAMIN C) 500 MG tablet Take 500 mg by mouth daily.     ? atorvastatin (LIPITOR) 20 MG tablet Take 20 mg by mouth bedtime.     ? CALCIUM CARBONATE (CALCIUM 600 ORAL) Take 60 mg by mouth daily.      ? ferrous sulfate 325 (65 FE) MG tablet Take 1 tablet by mouth daily with breakfast.     ? ketoconazole (NIZORAL) 2 % shampoo Apply 1 application topically once a week.      ? losartan (COZAAR) 25 MG tablet Take 1 tablet (25 mg total) by mouth daily. 90 tablet 2   ? metoprolol tartrate (LOPRESSOR) 25 MG tablet Take 1 tablet (25 mg total) by mouth 2 (two) times a day. 180 tablet 3   ? mirabegron (MYRBETRIQ) 25 mg Tb24 Take 25 mg by mouth daily.     ? multivitamin therapeutic (THERAGRAN) tablet Take 1 tablet by mouth daily. Patient breaks multivitamin in half; takes half tab in the morning and half tab in the evening     ? nitroglycerin (NITROSTAT) 0.4 MG SL tablet Place 1 tablet (0.4 mg total) under the tongue every 5 (five) minutes as needed for chest pain. 25 tablet 11   ? omeprazole (PRILOSEC) 20 MG capsule TAKE ONE CAPSULE BY MOUTH EVERY DAY 90 capsule 1     No facility-administered encounter medications on file as of 1/20/2017.        Review of Systems:     General: Weight Gain  Eyes: WNL  Ears/Nose/Throat: WNL  Lungs: WNL  Heart: WNL  Stomach: WNL  Bladder: Frequent Urination at Night  Muscle/Joints: WNL  Skin: WNL  Nervous System: WNL  Mental Health: WNL     Blood: Easy Bleeding    Objective:     Visit Vitals   ? /70 (Patient Site: Left Arm, Patient Position: Sitting, Cuff Size: Adult Large)   ? Pulse 60   ? Resp 16   ? Ht 5' 9\" (1.753 m)   ? Wt 200 lb (90.7 kg)   ? BMI 29.53 kg/m2     Wt Readings from Last 5 Encounters:   01/20/17 200 lb (90.7 kg)   12/27/16 198 lb (89.8 kg)   12/01/16 190 lb (86.2 kg)   11/21/16 189 lb 3 oz (85.8 kg)   09/13/16 192 lb (87.1 kg)        Physical Exam:    GENERAL APPEARANCE: alert, no apparent distress  EYES: no scleral icterus  NECK: no " carotid bruits or adenopathy, jugular venous pressure is difficult to evaluate due to obesity  CHEST: symmetric, the lungs are clear to auscultation  CARDIOVASCULAR: regular rhythm without murmur or gallop  EXTREMITIES: no cyanosis, clubbing or edema  SKIN: no xanthelasma  NEUROLOGIC: normal gait and coordination  PSYCHIATRIC: mood and affect are normal    Cardiac Testing:    CTA coronaries 2016:    Severe coronary artery disease    Widely patent Left ZAK graft to the left anterior descending.    Widely patent jump SVG to the ramus and 1st obtuse marginal branches. Initial anastomosis to 1st diag appears occluded.    Widely patent SVG to RPDA    HOLTER MONITOR      IMPRESSION:  1. A 24-hour Holter monitor was applied 2016 with date of analysis  interpretation 2016.  2. Sinus rhythm is present with normal electrocardiographic intervals. The average  heart rate is 60 beats per minute. Heart rate ranges between 43 to 91 beats per  minute. Longest pause was 1.6 seconds occurring after an ectopic beat.  3. Infrequent noncomplex ventricular ectopy; 180 premature ventricular contractions  (PVCs) are seen. These are all singular.  4. Rare supraventricular ectopy with 93 premature atrial contractions (PACs). This  does include a 4 beat albino of atrial tachycardia at 3:26 p.m. at 138 beats per  minute. Otherwise, no complex atrial ectopy is seen, no atrial tachycardia or  atrial fibrillation.  5. Patient activity diary is completed, but no symptoms noted.     YUE MOLINA MD     Results for orders placed during the hospital encounter of 16   NM Pharmacologic Stress Test [WPC587] 2016    [facility] Nuclear Cardiology  Pharmacological Stress Test Report    Patient: Juan Carlos Marley   MRN: [id number]  : 1940  Primary Care Provider: Adonis Trotter MD  Ordering Physician: Lorne House MD  Indication: Chest pain [R07.9]<br />CAD (coronary artery disease)  [I25.10]         STRESS SUMMARY: 0.4 mg of intravenous Lexiscan was administered over 15   seconds under the supervision of Dr. Severino Owens . No cardiac symptoms   were reported. The stress electrocardiogram was negative for inducible   ischemia.     TECHNICAL COMMENTS: Nuclear imaging was accomplished utilizing 3.97 mCi of     thallium-201 injected at rest and 28.0 mCi of 99m technetium sestamibi   injected at   the completion of Lexiscan infusion. The  images are   satisfactory.     FINDINGS: The Lexiscan stress and rest images demonstrate normal left   ventricular   size and tracer uptake. The gated images reveal normal regional wall   motion and global systolic performance. The measured left ventricular   ejection fraction is 68%.     CONCLUSION:   1. Lexiscan stress nuclear study is negative for inducible myocardial   ischemia or infarction.     Lorne House MD  7/6/2016 1:46 PM            Imaging:    No results found.    Lab Results:    Lab Results   Component Value Date    CREATININE 0.62 (L) 11/21/2016    BUN 13 11/21/2016     11/21/2016    K 4.0 11/21/2016     (H) 11/21/2016    CO2 25 11/21/2016     Lab Results   Component Value Date    CHOL 156 12/27/2016    TRIG 32 12/27/2016    HDL 77 12/27/2016     BNP (pg/mL)   Date Value   02/24/2015 162 (H)     CREATININE (mg/dL)   Date Value   11/21/2016 0.62 (L)   11/20/2016 0.75   01/24/2016 0.74   01/18/2016 0.72     LDL CALCULATED (mg/dL)   Date Value   12/27/2016 73   09/13/2016 59   07/08/2016 69     Lab Results   Component Value Date    WBC 5.6 11/20/2016    WBC 5.7 07/14/2015    HGB 13.6 (L) 11/20/2016    HCT 39.3 (L) 11/20/2016    MCV 90 11/20/2016     11/20/2016           Much or all of the text in this note was generated through the use of the Dragon Dictate voice-to-text software. Errors in spelling or words which seem out of context are unintentional. Sound alike errors, in particular, may have escaped  editing.

## 2021-06-26 NOTE — PROGRESS NOTES
Progress Notes by Lorne House MD at 2/21/2018 11:30 AM     Author: Lorne House MD Service: -- Author Type: Physician    Filed: 2/21/2018 12:06 PM Encounter Date: 2/21/2018 Status: Signed    : Lorne House MD (Physician)           Click to link to [facility] Clinic Note      Assessment:    1. Coronary atherosclerosis due to severely calcified coronary lesion    2. Essential hypertension    3. Dyslipidemia, goal LDL below 70        Plan:    1.  Juan Carlos will discuss a possible second opinion regarding his neurological spells with Dr. Adonis Trotter.  2.  I told Juan Carlos that I share his wife's concern about his ability to manage his medications.  He recently failed to take Plavix for almost a week and his [facility] neurologist thought this was factorial in a recent neurological spell.  3.  I also counseled Juan Carlos that I agreed with his wife that he should restrict his intake of cholesterol and saturated fats.  4.  Return to see Dr. House in 1 year.    An After Visit Summary was printed and given to the patient.    Subjective:    Juan Carlos Marley returned for a planned annual follow up visit.  His wife, Salud, accompanied him to the visit.    We spent most of our visit reviewing his records from [facility].  Juan Carlos initially told me that the [facility] stopped his Eliquis and put him on Pradaxa.  However, in reviewing the mail records it was clear that his neurologist there stopped Eliquis and put him on Plavix.  Following that, Juan Carlos failed to put it in his pillbox and did not take it for almost a week.  The [facility] records indicate that he has basilar artery stenosis and this is thought to be the cause of his recurrent neurological episodes.  He had a 30 day monitor placed at [facility] to see if he would qualify for the watchman device.  No atrial fibrillation was found according to his wife.  Therefore, the watchman device was not recommended.  Wilmer and his wife do not know what the  next steps will be.  He would like to get a second opinion and I recommended that he discuss that with Dr. Stroud.    He does not describe angina pectoris, orthopnea, unusual shortness of breath, loss of consciousness, or lower extremity edema.    Past Medical History:    Patient Active Problem List   Diagnosis   ? Essential hypertension with goal blood pressure less than 130/85   ? Paroxysmal atrial fibrillation   ? Dyslipidemia, goal LDL below 70   ? Coronary artery disease due to lipid rich plaque   ? TIA (transient ischemic attack)   ? Major depressive disorder, recurrent episode       Past Medical History:   Diagnosis Date   ? Cataract, nuclear sclerotic, left eye 2017   ? Coronary artery disease due to lipid rich plaque    ? Dyslipidemia, goal LDL below 70 2014   ? Essential hypertension with goal blood pressure less than 130/85    ? Nonsustained ventricular tachycardia     6 beat run, asymptomatic per Dr. Hackett   ? Paroxysmal atrial fibrillation 2013   ? TIA (Left hemisphere) 2016   ? TIA (transient ischemic attack) 2016   ? Transient ischemic attack (TIA) 2014       Past Surgical History:   Procedure Laterality Date   ? CORONARY ARTERY BYPASS GRAFT  2013    underwent 5-vessel coronary bypass grafting at the [facility].   ? FOOT SURGERY Left 2014    Dr. Carr   ? FRACTURE SURGERY Right     hand   ? HERNIA REPAIR  2013   ? ORCHIECTOMY Right    ? ORIF FEMUR FRACTURE Right    ? PROSTATECTOMY  2013        reports that he quit smoking about 48 years ago. His smoking use included Cigarettes and Pipe. He has a 10.00 pack-year smoking history. He has never used smokeless tobacco. He reports that he drinks about 6.0 oz of alcohol per week  He reports that he does not use illicit drugs.    Family History   Problem Relation Age of Onset   ? Aortic aneurysm Father       84   ? Other Mother       accident age 49   ? Other Sister      autistic    ? No Medical  Problems Daughter    ? Pulmonary Hypertension Neg Hx    ? Congenital heart disease Neg Hx        Outpatient Encounter Prescriptions as of 2/21/2018   Medication Sig Dispense Refill   ? ascorbic acid (VITAMIN C) 500 MG tablet Take 500 mg by mouth daily.     ? aspirin 81 mg chewable tablet Chew 81 mg daily.     ? atorvastatin (LIPITOR) 20 MG tablet TAKE ONE TABLET BY MOUTH EVERY DAY AS DIRECTED 90 tablet 3   ? CALCIUM CARBONATE (CALCIUM 600 ORAL) Take 60 mg by mouth daily.      ? clopidogrel (PLAVIX) 75 mg tablet      ? dutasteride (AVODART) 0.5 mg capsule      ? ferrous sulfate 325 (65 FE) MG tablet Take 1 tablet by mouth daily with breakfast.     ? losartan (COZAAR) 25 MG tablet Take 1 tablet (25 mg total) by mouth daily. 90 tablet 3   ? metoprolol tartrate (LOPRESSOR) 25 MG tablet Take 1 tablet (25 mg total) by mouth 2 (two) times a day. 180 tablet 3   ? multivitamin therapeutic (THERAGRAN) tablet Take 1 tablet by mouth daily. Patient breaks multivitamin in half; takes half tab in the morning and half tab in the evening     ? nitroglycerin (NITROSTAT) 0.4 MG SL tablet Place 1 tablet (0.4 mg total) under the tongue every 5 (five) minutes as needed for chest pain. 25 tablet 11   ? [DISCONTINUED] losartan (COZAAR) 25 MG tablet Take 1 tablet (25 mg total) by mouth daily. 90 tablet 2   ? [DISCONTINUED] metoprolol tartrate (LOPRESSOR) 25 MG tablet Take 1 tablet (25 mg total) by mouth 2 (two) times a day. 180 tablet 2   ? azithromycin (ZITHROMAX) 250 MG tablet Take 500mg (2 tablets) day one, and then 250mg (1 tablet) days 2-5 6 tablet 0   ? ferrous sulfate 325 (65 FE) MG tablet TAKE ONE TABLET BY MOUTH EVERY  tablet 10   ? finasteride (PROSCAR) 5 mg tablet      ? ketoconazole (NIZORAL) 2 % shampoo Apply 1 application topically once a week.      ? omeprazole (PRILOSEC) 20 MG capsule TAKE ONE CAPSULE BY MOUTH EVERY DAY 90 capsule 1   ? [DISCONTINUED] apixaban (ELIQUIS) 5 mg Tab tablet Take 1 tablet (5 mg total) by  "mouth 2 (two) times a day. 180 tablet 2     No facility-administered encounter medications on file as of 2018.        Review of Systems:     General: Weight Gain  Eyes: WNL  Ears/Nose/Throat: Hearing Loss  Lungs: WNL  Heart: WNL  Stomach: WNL  Bladder: WNL  Muscle/Joints: WNL  Skin: WNL  Nervous System: Dizziness, Loss of Balance  Mental Health: WNL     Blood: WNL    Objective:     /56 (Patient Site: Left Arm, Patient Position: Sitting, Cuff Size: Adult Regular)  Pulse 65  Ht 5' 9\" (1.753 m)  Wt 186 lb 8 oz (84.6 kg)  BMI 27.54 kg/m2  Wt Readings from Last 5 Encounters:   18 186 lb 8 oz (84.6 kg)   17 190 lb (86.2 kg)   17 192 lb (87.1 kg)   17 195 lb (88.5 kg)   17 195 lb 12.8 oz (88.8 kg)        Physical Exam:    GENERAL APPEARANCE: alert, no apparent distress  EYES: no scleral icterus  NECK: no carotid bruits or adenopathy, jugular venous pressure is less than 5 cm  CHEST: symmetric, the lungs are clear to auscultation  CARDIOVASCULAR: regular rhythm without murmur or gallop  EXTREMITIES: no cyanosis, clubbing or edema  SKIN: no xanthelasma  NEUROLOGIC: normal gait and coordination  PSYCHIATRIC: mood and affect are normal    Cardiac Testing:    TE echocardiogram:      Normal left ventricular size and systolic function.LVEF estimated at 60%    No thrombus detected    Negative echo contrast bubble study    Mild biatrial enlargement    No significant valve abnormality    Left ventricle ejection fraction is normal.       Results for orders placed during the hospital encounter of 16   NM Pharmacologic Stress Test [XXW778] 2016    [facility] Nuclear Cardiology  Pharmacological Stress Test Report    Patient: Juan Carlos Marley   MRN: [id number]  : 1940  Primary Care Provider: Adonis Trotetr MD  Ordering Physician: Lorne House MD  Indication: Chest pain [R07.9]<br />CAD (coronary artery disease) [I25.10]         STRESS SUMMARY: " 0.4 mg of intravenous Lexiscan was administered over 15   seconds under the supervision of Dr. Severino Owens . No cardiac symptoms   were reported. The stress electrocardiogram was negative for inducible   ischemia.     TECHNICAL COMMENTS: Nuclear imaging was accomplished utilizing 3.97 mCi of     thallium-201 injected at rest and 28.0 mCi of 99m technetium sestamibi   injected at   the completion of Lexiscan infusion. The  images are   satisfactory.     FINDINGS: The Lexiscan stress and rest images demonstrate normal left   ventricular   size and tracer uptake. The gated images reveal normal regional wall   motion and global systolic performance. The measured left ventricular   ejection fraction is 68%.     CONCLUSION:   1. Lexiscan stress nuclear study is negative for inducible myocardial   ischemia or infarction.     Lorne House MD  7/6/2016 1:46 PM              Imaging:    No results found.    Lab Results:    Lab Results   Component Value Date    CREATININE 0.77 08/21/2017    BUN 17 08/21/2017     08/21/2017    K 4.4 08/21/2017     (H) 08/21/2017    CO2 27 08/21/2017     Lab Results   Component Value Date    CHOL 128 12/28/2017    TRIG 40 12/28/2017    HDL 61 12/28/2017     BNP (pg/mL)   Date Value   02/24/2015 162 (H)     Creatinine (mg/dL)   Date Value   08/21/2017 0.77   11/21/2016 0.62 (L)   11/20/2016 0.75   01/24/2016 0.74     LDL Calculated (mg/dL)   Date Value   12/28/2017 59   08/23/2017 75   03/30/2017 65     Lab Results   Component Value Date    WBC 6.5 08/21/2017    WBC 5.7 07/14/2015    HGB 14.1 08/21/2017    HCT 40.7 08/21/2017    MCV 92 08/21/2017     08/21/2017           Much or all of the text in this note was generated through the use of the Dragon Dictate voice-to-text software. Errors in spelling or words which seem out of context are unintentional. Sound alike errors, in particular, may have escaped editing.

## 2021-06-27 NOTE — PROGRESS NOTES
Progress Notes by Lorne House MD at 2/28/2019  8:30 AM     Author: Lorne House MD Service: -- Author Type: Physician    Filed: 2/28/2019  9:07 AM Encounter Date: 2/28/2019 Status: Signed    : Lorne House MD (Physician)           Click to link to [facility] Clinic Note      Assessment:    1. Coronary artery disease due to lipid rich plaque     2. Dyslipidemia, goal LDL below 70  atorvastatin (LIPITOR) 40 MG tablet    Lipid Profile   3. Essential hypertension         Plan:    1. Increase atorvastatin to 40 mg orally daily and recheck lipid profile in 3 months.  2. I counseled Wilmer that he needs to discuss his neurological symptoms with Dr. Adonis Trotter.  3. He may use erectile dysfunction aid such as Viagra or Cialis provided that he does not use nitroglycerin within 24 hours.  4. Return to see Dr. House in 1 year.    An After Visit Summary was printed and given to the patient.    Subjective:    Juan Carlos Marley returned for a planned annual follow up visit.    Juan Carlos reports prolonged bleeding as he is on dual antiplatelet therapy as advised by his neurologist at the [facility].  We discussed how I was not comfortable changing his blood thinner regimen which is based on his care at the [facility] including coronary bypass surgery in 2013 with associated paroxysmal atrial fibrillation, and subsequent extensive neurological consultation including discontinuing Eliquis and putting him on dual antiplatelet therapy.    He also brought up that he had 2 more episodes of what he felt were transient cerebral ischemia in September and October of last year.  He did not discussed either of these events at his visit in December with Dr. Stroud and I strongly encouraged him to return to see that provider and get further advice.  Of note, Wilmer also forgot that he had seen Dr. Maykel Moore last July for a neurological opinion.    He does not have any cardiovascular  symptoms to report today with the exception of a infrequent nonexertional focal dime sized area of burning in the left lower pectoral area while he is watching television.  There are no associated symptoms such as diaphoresis, dyspnea, nausea, or weakness.    Past Medical History:    Patient Active Problem List   Diagnosis   ? Essential hypertension   ? Dyslipidemia, goal LDL below 70   ? Coronary artery disease due to lipid rich plaque   ? Major depressive disorder, recurrent episode (H)       Past Medical History:   Diagnosis Date   ? Cataract, nuclear sclerotic, left eye 6/2/2017   ? Coronary artery disease due to lipid rich plaque    ? Dyslipidemia, goal LDL below 70 12/20/2014   ? Essential hypertension with goal blood pressure less than 130/85    ? Nonsustained ventricular tachycardia (H)     6 beat run, asymptomatic per Dr. Hackett   ? Paroxysmal atrial fibrillation (H) 09/02/2013    at the time of CABG; [facility] did a 4-week monitor in 2017 and did not find any atrial fibrillation and therefore stopped his Eliquis.   ? Transient ischemic attack 11/20/2016   ? Transient ischemic attack 01/24/2016    left cerebral hemisphere   ? Transient ischemic attack 12/20/2014   ? Transient ischemic attack 09/2018    transient speech difficulty and dizziness while in [place]; did not seek medical attention   ? Transient ischemic attack 10/2018    same symptoms as in [place]       Past Surgical History:   Procedure Laterality Date   ? CORONARY ARTERY BYPASS GRAFT  09/2013    underwent 5-vessel coronary bypass grafting at the [facility].   ? FOOT SURGERY Left 12/19/2014    Dr. Carr   ? FRACTURE SURGERY Right     hand   ? HERNIA REPAIR  11/2013   ? ORCHIECTOMY Right    ? ORIF FEMUR FRACTURE Right    ? PROSTATECTOMY  11/2013        reports that he quit smoking about 49 years ago. His smoking use included cigarettes and pipe. He has a 10.00 pack-year smoking history. he has never used smokeless tobacco. He reports that he  drinks about 6.0 oz of alcohol per week. He reports that he does not use drugs.    Family History   Problem Relation Age of Onset   ? Aortic aneurysm Father          84   ? Other Mother          accident age 49   ? Other Sister         autistic    ? No Medical Problems Daughter    ? Pulmonary Hypertension Neg Hx    ? Congenital heart disease Neg Hx        Outpatient Encounter Medications as of 2019   Medication Sig Dispense Refill   ? ascorbic acid (VITAMIN C) 500 MG tablet Take 500 mg by mouth daily.     ? aspirin 81 mg chewable tablet Chew 81 mg daily.     ? atorvastatin (LIPITOR) 40 MG tablet Take 1 tablet (40 mg total) by mouth daily. As directed 90 tablet 3   ? CALCIUM CARBONATE (CALCIUM 600 ORAL) Take 60 mg by mouth daily.      ? clopidogrel (PLAVIX) 75 mg tablet      ? famotidine (PEPCID) 20 MG tablet Take 20 mg by mouth 2 (two) times a day.     ? ferrous gluconate (FERGON) 324 MG tablet Take 324 mg by mouth daily.     ? ketoconazole (NIZORAL) 2 % shampoo Apply 1 application topically once a week.      ? losartan (COZAAR) 25 MG tablet Take 1 tablet (25 mg total) by mouth daily. 90 tablet 3   ? metoprolol tartrate (LOPRESSOR) 25 MG tablet TAKE ONE TABLET BY MOUTH TWICE A  tablet 2   ? multivitamin therapeutic (THERAGRAN) tablet Take 1 tablet by mouth daily. Patient breaks multivitamin in half; takes half tab in the morning and half tab in the evening     ? nitroglycerin (NITROSTAT) 0.4 MG SL tablet Place 1 tablet (0.4 mg total) under the tongue every 5 (five) minutes as needed for chest pain. 25 tablet 11   ? omeprazole (PRILOSEC) 20 MG capsule Take 1 capsule by mouth daily.     ? tamsulosin (FLOMAX) 0.4 mg Cp24      ? [DISCONTINUED] atorvastatin (LIPITOR) 20 MG tablet TAKE ONE TABLET BY MOUTH EVERY DAY AS DIRECTED 90 tablet 3   ? ferrous sulfate 325 (65 FE) MG tablet TAKE ONE TABLET BY MOUTH EVERY  tablet 10     No facility-administered encounter medications on file as of 2019.  "       Review of Systems:     General: Weight Gain  Eyes: WNL  Ears/Nose/Throat: WNL  Lungs: WNL  Heart: Chest Pain  Stomach: WNL  Bladder: Frequent Urination at Night  Muscle/Joints: WNL  Skin: Poor Wound Healing  Nervous System: WNL  Mental Health: WNL     Blood: Easy Bleeding, Easy Bruising    Objective:     /68 (Patient Site: Left Arm, Patient Position: Sitting, Cuff Size: Adult Large)   Pulse 72   Resp 16   Ht 5' 9\" (1.753 m)   Wt 200 lb (90.7 kg)   BMI 29.53 kg/m    Wt Readings from Last 5 Encounters:   02/28/19 200 lb (90.7 kg)   12/13/18 198 lb (89.8 kg)   07/26/18 189 lb (85.7 kg)   07/13/18 192 lb 9 oz (87.3 kg)   04/30/18 192 lb (87.1 kg)        Physical Exam:    GENERAL APPEARANCE: alert, no apparent distress  EYES: no scleral icterus  NECK: no carotid bruits or adenopathy, jugular venous pressure is less than 5 cm  CHEST: symmetric, the lungs are clear to auscultation  CARDIOVASCULAR: regular rhythm without murmur or gallop  EXTREMITIES: no cyanosis, clubbing or edema  SKIN: no xanthelasma  NEUROLOGIC: normal gait and coordination  PSYCHIATRIC: mood and affect are normal    Cardiac Testing:    Echocardiogram:   Results for orders placed during the hospital encounter of 02/02/17   Echo BLAKE W Bubble [ECH12] 02/02/2017    Narrative   Normal left ventricular size and systolic function.LVEF estimated at 60%    No thrombus detected    Negative echo contrast bubble study    Mild biatrial enlargement    No significant valve abnormality    Left ventricle ejection fraction is normal.          Cardiac Catheterization:   Results for orders placed during the hospital encounter of 07/17/15   CV R L HC [place] Possible Pulmonary Vasodiolator Study [CATH74] 07/17/2015    Narrative Cardiac Diagnostic Report     Demographics      Patient Name ERNESTO CRISTINA    Referring Physician    ANA URBAN MD      MRN #        [id number]          Diagnostic Physician   KENDY CHONG MD      Account #    [id number]    "        Interventional                                   Physician      YESENIA        1940         Report Status:      Date of      07/17/2015 08:39   Study        AM     Procedure     Procedure Type      Diagnostic procedure:Left Heart Catheterization , Right Heart   Cath, Coronary Angiogram, I Stat      Conclusions      Procedure Summary   Known multivessel CAD s/p CABG      Referred for R&LHC for pulm HTN      Found to have no significant pulm HTN      PA 35/13/19   PCWP mean 12   RV 37/6   RA mean 7   LVEDP 13mmHg      CO 4.2, CI 2.1      No significant step ups in O2 between rt sided chambers   (PA 75, RA 75, RA 76, IVC 78, SVC 68%)      Normal Rt and Lt sided filling pressures      Severe native CAD with subtotally occluded LAD and LCx and   occluded RCA   Presumed patent LIMA to LAD with competitive flow in LAD   Patent seq SVG to ramus and OM (op report described this as   having 3 touchdowns but the anastamosis to diagonal not seen,   and presumably that limb of the sequential graft is occluded)   Patent SVG to RCA      Recommendations   Cont risk factor control      Signatures      Electronically signed by KENDY CHONG MD   (Diagnostic Physician) on 07/17/2015 at 09:44 AM     Angiographic Findings      Diagnostic Findings      Cardiac Arteries and Lesion Findings     LMCA: Minimal disease.20% distal stenosis    LAD: Severe disease.90% ostial, 99% mid with competitive flow from known  LIMA to LAD    LCx: Severe disease.95% ostial, no major OMs arising from Cx    RCA: Severe disease.occluded mid RCA    Procedure Data  Procedure Date  Date: 07/17/2015Start: 08:39 AMEnd: 09:41 AM    The procedure was explained in detail to the patient. Risks, complications  and alternative treatments were reviewed. Written consent was obtained.    Diagnostic Cath Status: Elective    Indications: CAD.    Entry Locations    - Percutaneous access was performed through the Right Femoral artery. A 4      Fr sheath was  inserted.      - Percutaneous access was performed through the Right Femoral vein. A 7 Fr      sheath was inserted.    Procedure Medications    - Fentanyl I.V. /per verbal M.D. order 25 mcg.      - Midazolam (Versed) I.V. /per verbal M.D. order 0.5 mg.      - Fentanyl I.V. /per verbal M.D. order 25 mcg.      - Midazolam (Versed) I.V. /per verbal M.D. order 0.5 mg.    Diagnostic Catheters    - A4 FrCordis 4.0 Fr JL 4.0was used for:Left coronary angiography.      - A4 FrCordis 4.0 Fr JR 4.0was used for:Right coronary angiography.      - A4 FrCordis 4.0 Fr PIGTAIL STRAIGHTwas used for:Left ventriculography.      - A4 FrCordis 4.0 Fr JR 4.0was used for:Right coronary angiography.    Contrast Material    - Omnipaque 350 mgl/ml54 ml    Fluoroscopy Time: Diagnostic: 7:19 minutes. Total: 7:19 minutes.    Medical History    Allergies    - No known allergies.     Risk Factors      The patient risk factors include:last creatinine 0.8.     Admission Data  Admission Date: 07/17/2015 Admission Time: 06:24 AM    Hemodynamics      Condition: Rest      O2 Consumption: Estimated: 215.64Heart Rate: 49 bpm     Oxygen Saturation  +--------+-----+----+----------------------+----+--------------------------+  !Location!pCO2 !pO2 !% Saturation          !Hgb !O2 Content                !  +--------+-----+----+----------------------+----+--------------------------+  !AO      !     !    !97                    !14.3!                          !  +--------+-----+----+----------------------+----+--------------------------+  !PA      !     !    !75                    !14.3!                          !  +--------+-----+----+----------------------+----+--------------------------+  !RA      !     !    !79                    !14.3!                          !  +--------+-----+----+----------------------+----+--------------------------+  !IVC     !     !    !78                    !14.3!                           !  +--------+-----+----+----------------------+----+--------------------------+  !SVC     !     !    !68                    !14.3!                          !  +--------+-----+----+----------------------+----+--------------------------+    Pressures (mmHg)  +-----+--------------------------------------------------------------------+  !Site !Pressure                                                            !  +-----+--------------------------------------------------------------------+  !AO   !149/72 (103)                                                        !  +-----+--------------------------------------------------------------------+  !LV   !156/-1 ,28                                                          !  +-----+--------------------------------------------------------------------+  !LV   !141/-10 ,13                                                         !  +-----+--------------------------------------------------------------------+  !AO   !151/62 (98)                                                         !  +-----+--------------------------------------------------------------------+  !LV   !145/-9 ,16                                                          !  +-----+--------------------------------------------------------------------+  !PA   !35/13 (19)                                                          !  +-----+--------------------------------------------------------------------+  !PA   !35/10 (19)                                                          !  +-----+--------------------------------------------------------------------+  !PCW  !20/14 (12)                                                          !  +-----+--------------------------------------------------------------------+  !RV   !37/-1 ,6                                                            !  +-----+--------------------------------------------------------------------+  !RA   !9/9 (7)                                                              !  +-----+--------------------------------------------------------------------+    Cardiac Output  +------+-----------------------+--------------------------+----------------+  !Method!CO (l/min)             !CI (l/min/m2)             !SV (ml)         !  +------+-----------------------+--------------------------+----------------+  !Sally  !4.18                   !2.1                       !84.78           !  +------+-----------------------+--------------------------+----------------+    Valve Gradients and Areas  +----------+--------+--------+--------+---------+----------+---------------+  !Valve     !Peak    !Mean    !Area    !Index    !Flow      !Source         !  +----------+--------+--------+--------+---------+----------+---------------+  !Aortic    !0       !0       !        !         !861.86    !Sally           !  +----------+--------+--------+--------+---------+----------+---------------+  !Aortic    !0       !0       !        !         !          !               !  +----------+--------+--------+--------+---------+----------+---------------+    Shunts    Oxygen Values      O2 Capacity 194.48 O2 Consumption 215.64      Flows (l/min)      Qs 4.18     Vascular Resistance    +-----------------------------------+-----+-----+----+---+---------+-------+  !CO method                          !TSVR !SVR  !TPVR!PVR!TPVR/TSVR!PVR/SVR!  +-----------------------------------+-----+-----+----+---+---------+-------+  !Qp or Qs                           !23.41!21.81!    !   !         !       !  +-----------------------------------+-----+-----+----+---+---------+-------+  !Sally                               !23.41!21.81!4.45!1.6!0.19     !0.07   !  +-----------------------------------+-----+-----+----+---+---------+-------+  Discharge Data    Hospital Status: Outpatient          Results for orders placed during the hospital encounter of 07/06/16   NM Pharmacologic Stress Test [ENQ988] 07/06/2016    Narrative  [facility]  Pharmacological Stress Test Report    Patient: Juan Carlos Marley   MRN: [id number]  : 1940  Primary Care Provider: Adonis Trotter MD  Ordering Physician: Lorne House MD  Indication: Chest pain [R07.9]<br />CAD (coronary artery disease) [I25.10]         STRESS SUMMARY: 0.4 mg of intravenous Lexiscan was administered over 15   seconds under the supervision of Dr. Severino Owens . No cardiac symptoms   were reported. The stress electrocardiogram was negative for inducible   ischemia.     TECHNICAL COMMENTS: Nuclear imaging was accomplished utilizing 3.97 mCi of     thallium-201 injected at rest and 28.0 mCi of 99m technetium sestamibi   injected at   the completion of Lexiscan infusion. The  images are   satisfactory.     FINDINGS: The Lexiscan stress and rest images demonstrate normal left   ventricular   size and tracer uptake. The gated images reveal normal regional wall   motion and global systolic performance. The measured left ventricular   ejection fraction is 68%.     CONCLUSION:   1. Lexiscan stress nuclear study is negative for inducible myocardial   ischemia or infarction.     Lorne House MD  2016 1:46 PM              Imaging:    No results found.    Lab Results:    Lab Results   Component Value Date    CREATININE 0.83 2018    BUN 18 2018     2018    K 4.3 2018     2018    CO2 24 2018     Lab Results   Component Value Date    CHOL 151 2018    TRIG 45 2018    HDL 67 2018     BNP (pg/mL)   Date Value   2015 162 (H)     Creatinine (mg/dL)   Date Value   2018 0.83   2017 0.77   2016 0.62 (L)   2016 0.75     LDL Calculated (mg/dL)   Date Value   2018 75   2018 69   2018 69     Lab Results   Component Value Date    WBC 4.1 2018    WBC 5.7 2015    HGB 14.5 2018    HCT 43.3 2018    MCV 93 2018      12/13/2018           Much or all of the text in this note was generated through the use of the Dragon Dictate voice-to-text software. Errors in spelling or words which seem out of context are unintentional. Sound alike errors, in particular, may have escaped editing.

## 2021-06-28 NOTE — PROGRESS NOTES
Progress Notes by Lorne House MD at 2/14/2020  1:30 PM     Author: Lorne House MD Service: -- Author Type: Physician    Filed: 2/14/2020  2:16 PM Encounter Date: 2/14/2020 Status: Signed    : Lorne House MD (Physician)             Assessment:    1. Coronary artery disease due to lipid rich plaque  nitroglycerin (NITROSTAT) 0.4 MG SL tablet   2. Dyslipidemia, goal LDL below 70         Plan:    1. There are no cardiac contraindications to dilatation of esophageal stricture.  The decision as to whether or not he should hold his dual antiplatelet therapy is best decided by the endoscopist and his medical team at the [facility], who recommended this antiplatelet regimen.  2. Continue current cardiovascular medications.  3. Return to see Dr. House in 1 year.    An After Visit Summary was printed and given to the patient.    Subjective:    Juan Carlos Marley returned for a planned annual follow up visit.  His wife, Salud, accompanied him to the visit.    He continues to have chronic, nonexertional focal left pectoral discomfort.  It typically happens when he is watching television.  It may last for up to 30 minutes.  It does not radiate nor does it have any associated symptoms such as nausea, diaphoresis or dyspnea.  We perform stress testing for this symptom a few years ago and it was negative for ischemia.  I advised Juan Carlos that I do not recommend repeating the stress test at this time and less he feels that his stamina or quality of life are changing.  He stated they were not.    He will have esophageal dilatation again in the near future with Dr. Mtz in [place].  He wonders how many times he can have the stricture dilated and I asked him to discuss that with Dr. Mtz.  He is concerned about holding dual antiplatelet therapy given his history of multiple transient ischemic attacks.    He also describes that his feet feel cold when he is seated inside in the evening.  He does not experience  blanching of his digits or redness or swelling or pain.  He states a friend who is a nurse told him he might have Raynaud's phenomena.  He stated he had discussed this with Dr. Trotter who did not agree.  I told him I share Dr. Stroud's opinion that this is not typical of Raynaud's phenomena.    Past Medical History:    Patient Active Problem List   Diagnosis   ? Essential hypertension   ? Paroxysmal atrial fibrillation (H)   ? Dyslipidemia, goal LDL below 70   ? Coronary artery disease due to lipid rich plaque   ? Major depressive disorder, recurrent episode (H)       Past Medical History:   Diagnosis Date   ? Cataract, nuclear sclerotic, left eye 6/2/2017   ? Coronary artery disease due to lipid rich plaque    ? Dyslipidemia, goal LDL below 70 12/20/2014   ? Essential hypertension with goal blood pressure less than 130/85    ? Nonsustained ventricular tachycardia (H)     6 beat run, asymptomatic per Dr. Hackett   ? Paroxysmal atrial fibrillation (H) 09/02/2013    at the time of CABG; [facility] did a 4-week monitor in 2017 and did not find any atrial fibrillation and therefore stopped his Eliquis.   ? Stricture of esophagus 2013    dilated twice since then   ? Transient ischemic attack 11/20/2016   ? Transient ischemic attack 01/24/2016    left cerebral hemisphere   ? Transient ischemic attack 12/20/2014   ? Transient ischemic attack 09/2018    transient speech difficulty and dizziness while in [place]; did not seek medical attention   ? Transient ischemic attack 10/2018    same symptoms as in [place]       Past Surgical History:   Procedure Laterality Date   ? CORONARY ARTERY BYPASS GRAFT  09/2013    underwent 5-vessel coronary bypass grafting at the [facility].   ? FOOT SURGERY Left 12/19/2014    Dr. Carr   ? FRACTURE SURGERY Right     hand   ? HERNIA REPAIR  11/2013   ? ORCHIECTOMY Right    ? ORIF FEMUR FRACTURE Right    ? PROSTATECTOMY  11/2013        reports that he quit smoking about 50 years ago. His  smoking use included cigarettes and pipe. He has a 10.00 pack-year smoking history. He has never used smokeless tobacco. He reports current alcohol use of about 8.0 standard drinks of alcohol per week. He reports that he does not use drugs.    Family History   Problem Relation Age of Onset   ? Aortic aneurysm Father          84   ? Other Mother          accident age 49   ? Other Sister         autistic    ? No Medical Problems Daughter    ? Pulmonary Hypertension Neg Hx    ? Congenital heart disease Neg Hx        Outpatient Encounter Medications as of 2020   Medication Sig Dispense Refill   ? ascorbic acid (VITAMIN C) 500 MG tablet Take 500 mg by mouth daily.     ? aspirin 81 mg chewable tablet Chew 81 mg daily.     ? atorvastatin (LIPITOR) 40 MG tablet Take 1 tablet (40 mg total) by mouth daily. As directed 90 tablet 3   ? clopidogrel (PLAVIX) 75 mg tablet      ? famotidine (PEPCID) 20 MG tablet Take 20 mg by mouth 2 (two) times a day.     ? ferrous sulfate 325 (65 FE) MG tablet TAKE ONE TABLET BY MOUTH EVERY  tablet 10   ? losartan (COZAAR) 25 MG tablet TAKE ONE TABLET BY MOUTH EVERY DAY 90 tablet 0   ? metoprolol tartrate (LOPRESSOR) 25 MG tablet TAKE ONE TABLET BY MOUTH TWICE A  tablet 0   ? multivitamin therapeutic (THERAGRAN) tablet Take 1 tablet by mouth daily. Patient breaks multivitamin in half; takes half tab in the morning and half tab in the evening     ? nitroglycerin (NITROSTAT) 0.4 MG SL tablet Place 1 tablet (0.4 mg total) under the tongue every 5 (five) minutes as needed for chest pain. 25 tablet 2   ? tamsulosin (FLOMAX) 0.4 mg Cp24      ? [DISCONTINUED] nitroglycerin (NITROSTAT) 0.4 MG SL tablet Place 1 tablet (0.4 mg total) under the tongue every 5 (five) minutes as needed for chest pain. 25 tablet 11   ? CALCIUM CARBONATE (CALCIUM 600 ORAL) Take 60 mg by mouth daily.      ? sildenafil (VIAGRA) 100 MG tablet Take 1 tablet (100 mg total) by mouth as needed for erectile  "dysfunction. 20 tablet 1     No facility-administered encounter medications on file as of 2/14/2020.        Review of Systems:     General: WNL  Eyes: WNL  Ears/Nose/Throat: WNL  Lungs: WNL  Heart: WNL  Stomach: WNL  Bladder: Frequent Urination at Night  Muscle/Joints: WNL  Skin: WNL  Nervous System: WNL  Mental Health: WNL     Blood: WNL    Objective:     /68 (Patient Site: Left Arm, Patient Position: Sitting, Cuff Size: Adult Regular)   Pulse (!) 56   Resp 16   Ht 5' 9\" (1.753 m)   Wt 195 lb (88.5 kg)   BMI 28.80 kg/m    Wt Readings from Last 5 Encounters:   02/14/20 195 lb (88.5 kg)   02/11/20 194 lb (88 kg)   11/13/19 194 lb (88 kg)   10/29/19 194 lb (88 kg)   10/27/19 190 lb (86.2 kg)        Physical Exam:    GENERAL APPEARANCE: alert, no apparent distress  EYES: no scleral icterus  NECK: no carotid bruits or adenopathy, jugular venous pressure is less than 5 cm  CHEST: symmetric, the lungs are clear to auscultation  CARDIOVASCULAR: regular rhythm without murmur or gallop  EXTREMITIES: no cyanosis, clubbing or edema  SKIN: no xanthelasma  NEUROLOGIC: normal gait and coordination  PSYCHIATRIC: mood and affect are normal    Cardiac Testing:    Echocardiogram:   Results for orders placed during the hospital encounter of 02/02/17   Echo BLAKE W Bubble [ECH12] 02/02/2017    Narrative   Normal left ventricular size and systolic function.LVEF estimated at 60%    No thrombus detected    Negative echo contrast bubble study    Mild biatrial enlargement    No significant valve abnormality    Left ventricle ejection fraction is normal.          Cardiac Catheterization:   Results for orders placed during the hospital encounter of 07/17/15   CV R L HC [place] Possible Pulmonary Vasodiolator Study [CATH74] 07/17/2015    Narrative Cardiac Diagnostic Report     Demographics      Patient Name ERNESTO GUERLINE CRISTINA    Referring Physician    ANA URBAN MD      MRN #        [id number]          Diagnostic Physician   ARLETTE, " KENDY DRAKE      Account #    [id number]           Interventional                                   Physician      D.OTerrenceB        1940         Report Status:      Date of      07/17/2015 08:39   Study        AM     Procedure     Procedure Type      Diagnostic procedure:Left Heart Catheterization , Right Heart   Cath, Coronary Angiogram, I Stat      Conclusions      Procedure Summary   Known multivessel CAD s/p CABG      Referred for R&[facility] for pulm HTN      Found to have no significant pulm HTN      PA 35/13/19   PCWP mean 12   RV 37/6   RA mean 7   LVEDP 13mmHg      CO 4.2, CI 2.1      No significant step ups in O2 between rt sided chambers   (PA 75, RA 75, RA 76, IVC 78, SVC 68%)      Normal Rt and Lt sided filling pressures      Severe native CAD with subtotally occluded LAD and LCx and   occluded RCA   Presumed patent LIMA to LAD with competitive flow in LAD   Patent seq SVG to ramus and OM (op report described this as   having 3 touchdowns but the anastamosis to diagonal not seen,   and presumably that limb of the sequential graft is occluded)   Patent SVG to RCA      Recommendations   Cont risk factor control      Signatures      Electronically signed by KENDY CHONG MD   (Diagnostic Physician) on 07/17/2015 at 09:44 AM     Angiographic Findings      Diagnostic Findings      Cardiac Arteries and Lesion Findings     LMCA: Minimal disease.20% distal stenosis    LAD: Severe disease.90% ostial, 99% mid with competitive flow from known  LIMA to LAD    LCx: Severe disease.95% ostial, no major OMs arising from Cx    RCA: Severe disease.occluded mid RCA    Procedure Data  Procedure Date  Date: 07/17/2015Start: 08:39 AMEnd: 09:41 AM    The procedure was explained in detail to the patient. Risks, complications  and alternative treatments were reviewed. Written consent was obtained.    Diagnostic Cath Status: Elective    Indications: CAD.    Entry Locations    - Percutaneous access was performed through the Right  Femoral artery. A 4      Fr sheath was inserted.      - Percutaneous access was performed through the Right Femoral vein. A 7 Fr      sheath was inserted.    Procedure Medications    - Fentanyl I.V. /per verbal M.D. order 25 mcg.      - Midazolam (Versed) I.V. /per verbal M.D. order 0.5 mg.      - Fentanyl I.V. /per verbal M.D. order 25 mcg.      - Midazolam (Versed) I.V. /per verbal M.D. order 0.5 mg.    Diagnostic Catheters    - A4 FrCordis 4.0 Fr JL 4.0was used for:Left coronary angiography.      - A4 FrCordis 4.0 Fr JR 4.0was used for:Right coronary angiography.      - A4 FrCordis 4.0 Fr PIGTAIL STRAIGHTwas used for:Left ventriculography.      - A4 FrCordis 4.0 Fr JR 4.0was used for:Right coronary angiography.    Contrast Material    - Omnipaque 350 mgl/ml54 ml    Fluoroscopy Time: Diagnostic: 7:19 minutes. Total: 7:19 minutes.    Medical History    Allergies    - No known allergies.     Risk Factors      The patient risk factors include:last creatinine 0.8.     Admission Data  Admission Date: 07/17/2015 Admission Time: 06:24 AM    Hemodynamics      Condition: Rest      O2 Consumption: Estimated: 215.64Heart Rate: 49 bpm     Oxygen Saturation  +--------+-----+----+----------------------+----+--------------------------+  !Location!pCO2 !pO2 !% Saturation          !Hgb !O2 Content                !  +--------+-----+----+----------------------+----+--------------------------+  !AO      !     !    !97                    !14.3!                          !  +--------+-----+----+----------------------+----+--------------------------+  !PA      !     !    !75                    !14.3!                          !  +--------+-----+----+----------------------+----+--------------------------+  !RA      !     !    !79                    !14.3!                          !  +--------+-----+----+----------------------+----+--------------------------+  !IVC     !     !    !78                    !14.3!                           !  +--------+-----+----+----------------------+----+--------------------------+  !SVC     !     !    !68                    !14.3!                          !  +--------+-----+----+----------------------+----+--------------------------+    Pressures (mmHg)  +-----+--------------------------------------------------------------------+  !Site !Pressure                                                            !  +-----+--------------------------------------------------------------------+  !AO   !149/72 (103)                                                        !  +-----+--------------------------------------------------------------------+  !LV   !156/-1 ,28                                                          !  +-----+--------------------------------------------------------------------+  !LV   !141/-10 ,13                                                         !  +-----+--------------------------------------------------------------------+  !AO   !151/62 (98)                                                         !  +-----+--------------------------------------------------------------------+  !LV   !145/-9 ,16                                                          !  +-----+--------------------------------------------------------------------+  !PA   !35/13 (19)                                                          !  +-----+--------------------------------------------------------------------+  !PA   !35/10 (19)                                                          !  +-----+--------------------------------------------------------------------+  !PCW  !20/14 (12)                                                          !  +-----+--------------------------------------------------------------------+  !RV   !37/-1 ,6                                                            !  +-----+--------------------------------------------------------------------+  !RA   !9/9 (7)                                                              !  +-----+--------------------------------------------------------------------+    Cardiac Output  +------+-----------------------+--------------------------+----------------+  !Method!CO (l/min)             !CI (l/min/m2)             !SV (ml)         !  +------+-----------------------+--------------------------+----------------+  !Sally  !4.18                   !2.1                       !84.78           !  +------+-----------------------+--------------------------+----------------+    Valve Gradients and Areas  +----------+--------+--------+--------+---------+----------+---------------+  !Valve     !Peak    !Mean    !Area    !Index    !Flow      !Source         !  +----------+--------+--------+--------+---------+----------+---------------+  !Aortic    !0       !0       !        !         !861.86    !Sally           !  +----------+--------+--------+--------+---------+----------+---------------+  !Aortic    !0       !0       !        !         !          !               !  +----------+--------+--------+--------+---------+----------+---------------+    Shunts    Oxygen Values      O2 Capacity 194.48 O2 Consumption 215.64      Flows (l/min)      Qs 4.18     Vascular Resistance    +-----------------------------------+-----+-----+----+---+---------+-------+  !CO method                          !TSVR !SVR  !TPVR!PVR!TPVR/TSVR!PVR/SVR!  +-----------------------------------+-----+-----+----+---+---------+-------+  !Qp or Qs                           !23.41!21.81!    !   !         !       !  +-----------------------------------+-----+-----+----+---+---------+-------+  !Sally                               !23.41!21.81!4.45!1.6!0.19     !0.07   !  +-----------------------------------+-----+-----+----+---+---------+-------+  Discharge Data    Hospital Status: Outpatient          Results for orders placed during the hospital encounter of 07/06/16   NM Pharmacologic Stress Test [NOT960] 07/06/2016    Narrative  [facility]  Pharmacological Stress Test Report    Patient: Juan Carlos Marley   MRN: [id number]  : 1940  Primary Care Provider: Adonis Trotter MD  Ordering Physician: Lorne House MD  Indication: Chest pain [R07.9]<br />CAD (coronary artery disease) [I25.10]         STRESS SUMMARY: 0.4 mg of intravenous Lexiscan was administered over 15   seconds under the supervision of Dr. Severino Owens . No cardiac symptoms   were reported. The stress electrocardiogram was negative for inducible   ischemia.     TECHNICAL COMMENTS: Nuclear imaging was accomplished utilizing 3.97 mCi of     thallium-201 injected at rest and 28.0 mCi of 99m technetium sestamibi   injected at   the completion of Lexiscan infusion. The  images are   satisfactory.     FINDINGS: The Lexiscan stress and rest images demonstrate normal left   ventricular   size and tracer uptake. The gated images reveal normal regional wall   motion and global systolic performance. The measured left ventricular   ejection fraction is 68%.     CONCLUSION:   1. Lexiscan stress nuclear study is negative for inducible myocardial   ischemia or infarction.     Lorne House MD  2016 1:46 PM              Imaging:    No results found.    Lab Results:    Lab Results   Component Value Date    CREATININE 0.74 2020    BUN 22 2020     2020    K 4.4 2020     (H) 2020    CO2 24 2020     Lab Results   Component Value Date    CHOL 138 2020    TRIG 32 2020    HDL 62 2020     BNP (pg/mL)   Date Value   2015 162 (H)     Creatinine (mg/dL)   Date Value   2020 0.74   2018 0.83   2017 0.77   2016 0.62 (L)     LDL Calculated (mg/dL)   Date Value   2020 70   10/29/2019 63   2019 61     Lab Results   Component Value Date    WBC 8.5 2020    WBC 5.7 2015    HGB 14.2 2020    HCT 41.2 2020    MCV 92  02/11/2020     02/11/2020           Much or all of the text in this note was generated through the use of the Dragon Dictate voice-to-text software. Errors in spelling or words which seem out of context are unintentional. Sound alike errors, in particular, may have escaped editing.

## 2021-06-30 NOTE — PROGRESS NOTES
Progress Notes by Lorne House MD at 3/19/2021  8:30 AM     Author: Lorne House MD Service: -- Author Type: Physician    Filed: 3/19/2021  9:17 AM Encounter Date: 3/19/2021 Status: Signed    : Lorne House MD (Physician)             Assessment:    1. Coronary artery disease due to lipid rich plaque  nitroglycerin (NITROSTAT) 0.4 MG SL tablet   2. Dyslipidemia, goal LDL below 70  atorvastatin (LIPITOR) 40 MG tablet   3. Paroxysmal atrial fibrillation (H)     4. Essential hypertension  metoprolol tartrate (LOPRESSOR) 25 MG tablet       Plan:    1. He was reminded not to use nitroglycerin within 24 hours of using sildenafil or vice versa. Juan Carlos said he has sildenafil but no longer takes it.  2. His cardiac medications were renewed for one year; [facility].    An After Visit Summary was printed and given to the patient.    Please note that Emeka is not working and this note was typed by hand and is brief as a result.    Subjective:    Juan Carlos Marley returned for a planned annual follow up visit.    He has done well overall during the last year. His wife, Salud, is concerned about his memory and they were advised to review those concerns with his personal physician.    Juan Carlos has a multi year history of nonexertional sharp left pectoral pain without associated symptoms. It has not changed. Nuclear stress testing was done a few years ago (see report below) and it was negative for ischemia. CT coronary angiography found that his LIMA and two SVG bypasses were patent with occlusion of the SVG to the diagonal branch.    Past Medical History:    Patient Active Problem List   Diagnosis   ? Essential hypertension   ? Dyslipidemia, goal LDL below 70   ? Coronary artery disease due to lipid rich plaque   ? Major depressive disorder, recurrent episode (H)       Past Medical History:   Diagnosis Date   ? Cataract, nuclear sclerotic, left eye 6/2/2017   ? Coronary artery  disease due to lipid rich plaque    ? Dyslipidemia, goal LDL below 70 12/20/2014   ? Essential hypertension with goal blood pressure less than 130/85    ? Nonsustained ventricular tachycardia (H)     6 beat run, asymptomatic per Dr. Hackett   ? Paroxysmal atrial fibrillation (H) 09/02/2013    at the time of CABG; [facility] did a 4-week monitor in 2017 and did not find any atrial fibrillation and therefore stopped his Eliquis.   ? Stricture of esophagus 2013    dilated twice since then   ? Transient ischemic attack 11/20/2016   ? Transient ischemic attack 01/24/2016    left cerebral hemisphere   ? Transient ischemic attack 12/20/2014   ? Transient ischemic attack 09/2018    transient speech difficulty and dizziness while in [place]; did not seek medical attention   ? Transient ischemic attack 10/2018    same symptoms as in [place]       Past Surgical History:   Procedure Laterality Date   ? CORONARY ARTERY BYPASS GRAFT  09/2013    underwent 5-vessel coronary bypass grafting at the [facility]   ? FOOT SURGERY Left 12/19/2014    Dr. Carr   ? FRACTURE SURGERY Right     hand   ? HERNIA REPAIR  11/2013   ? ORCHIECTOMY Right    ? ORIF FEMUR FRACTURE Right    ? PROSTATECTOMY  11/2013        reports that he quit smoking about 51 years ago. His smoking use included cigarettes and pipe. He has a 10.00 pack-year smoking history. He has never used smokeless tobacco. He reports current alcohol use of about 8.0 standard drinks of alcohol per week. He reports that he does not use drugs.  Social History     Socioeconomic History   ? Marital status:      Spouse name: Salud   ? Number of children: 1   ? Years of education: Not on file   ? Highest education level: Not on file   Occupational History   ? Occupation:  at [employer], commercial      Employer: RETIRED   Social Needs   ? Financial resource strain: Not on file   ? Food insecurity     Worry: Not on file     Inability: Not on file   ?  Transportation needs     Medical: Not on file     Non-medical: Not on file   Tobacco Use   ? Smoking status: Former Smoker     Packs/day: 1.00     Years: 10.00     Pack years: 10.00     Types: Cigarettes, Pipe     Quit date: 1970     Years since quittin.0   ? Smokeless tobacco: Never Used   Substance and Sexual Activity   ? Alcohol use: Yes     Alcohol/week: 8.0 standard drinks     Types: 8 Glasses of wine per week     Comment: 1-2 glass of wine before dinner   ? Drug use: No   ? Sexual activity: Yes     Partners: Female     Birth control/protection: Post-menopausal   Lifestyle   ? Physical activity     Days per week: Not on file     Minutes per session: Not on file   ? Stress: Not on file   Relationships   ? Social connections     Talks on phone: Not on file     Gets together: Not on file     Attends Episcopal service: Not on file     Active member of club or organization: Not on file     Attends meetings of clubs or organizations: Not on file     Relationship status: Not on file   ? Intimate partner violence     Fear of current or ex partner: Not on file     Emotionally abused: Not on file     Physically abused: Not on file     Forced sexual activity: Not on file   Other Topics Concern   ? Not on file   Social History Narrative    Lives with his wife Salud in [place] in a twin home.  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  One daughter.         Family History   Problem Relation Age of Onset   ? Aortic aneurysm Father          84   ? Other Mother          accident age 49   ? Other Sister         autistic    ? No Medical Problems Daughter    ? Pulmonary Hypertension Neg Hx    ? Congenital heart disease Neg Hx        Outpatient Encounter Medications as of 3/19/2021   Medication Sig Dispense Refill   ? ascorbic acid (VITAMIN C) 500 MG tablet Take 500 mg by mouth daily.     ? aspirin 81 mg chewable tablet Chew 81 mg daily.     ? atorvastatin (LIPITOR) 40 MG tablet Take 1 tablet (40 mg total) by  mouth daily. As directed 90 tablet 3   ? CALCIUM CARBONATE (CALCIUM 600 ORAL) Take 60 mg by mouth daily.      ? clopidogrel (PLAVIX) 75 mg tablet      ? ferrous sulfate 325 (65 FE) MG tablet TAKE ONE TABLET BY MOUTH EVERY  tablet 10   ? ketoconazole (NIZORAL) 2 % shampoo Apply 1 application topically as needed.     ? losartan (COZAAR) 25 MG tablet Take 1 tablet (25 mg total) by mouth daily. 90 tablet 3   ? metoprolol tartrate (LOPRESSOR) 25 MG tablet Take 1 tablet (25 mg total) by mouth 2 (two) times a day. 180 tablet 3   ? multivitamin therapeutic (THERAGRAN) tablet Take 1 tablet by mouth daily. Patient breaks multivitamin in half; takes half tab in the morning and half tab in the evening     ? nitroglycerin (NITROSTAT) 0.4 MG SL tablet Place 1 tablet (0.4 mg total) under the tongue every 5 (five) minutes as needed for chest pain. 25 tablet 2   ? omeprazole (PRILOSEC) 20 MG capsule Take 1 capsule (20 mg total) by mouth daily before breakfast. 90 capsule 3   ? tamsulosin (FLOMAX) 0.4 mg Cp24      ? [DISCONTINUED] atorvastatin (LIPITOR) 40 MG tablet Take 1 tablet (40 mg total) by mouth daily. As directed 90 tablet 0   ? [DISCONTINUED] losartan (COZAAR) 25 MG tablet TAKE ONE TABLET BY MOUTH EVERY DAY 90 tablet 0   ? [DISCONTINUED] metoprolol tartrate (LOPRESSOR) 25 MG tablet Take 1 tablet (25 mg total) by mouth 2 (two) times a day. 180 tablet 3   ? [DISCONTINUED] nitroglycerin (NITROSTAT) 0.4 MG SL tablet Place 1 tablet (0.4 mg total) under the tongue every 5 (five) minutes as needed for chest pain. 25 tablet 2   ? clobetasoL (TEMOVATE) 0.05 % external solution Apply 1 application topically 2 (two) times a day.     ? famotidine (PEPCID) 20 MG tablet Take 20 mg by mouth 2 (two) times a day.     ? fluorouraciL 5 % Soln Apply 1 application topically as needed.     ? sildenafil (VIAGRA) 100 MG tablet Take 1 tablet (100 mg total) by mouth as needed for erectile dysfunction. 20 tablet 1     No facility-administered  encounter medications on file as of 3/19/2021.        Review of Systems:     General: WNL  Eyes: WNL  Ears/Nose/Throat: WNL  Lungs: WNL  Heart: WNL  Stomach: WNL  Bladder: Frequent Urination at Night  Muscle/Joints: WNL  Skin: Poor Wound Healing  Nervous System: WNL  Mental Health: WNL     Blood: Easy Bleeding, Easy Bruising    Objective:     /58 (Patient Site: Left Arm, Patient Position: Sitting, Cuff Size: Adult Regular)   Pulse (!) 57   Resp 16   Wt 184 lb (83.5 kg)   SpO2 95%   BMI 27.17 kg/m    Wt Readings from Last 5 Encounters:   03/19/21 184 lb (83.5 kg)   02/23/21 184 lb 8 oz (83.7 kg)   08/18/20 190 lb (86.2 kg)   02/14/20 195 lb (88.5 kg)   02/11/20 194 lb (88 kg)        Physical Exam:    GENERAL APPEARANCE: alert, no apparent distress  EYES: no scleral icterus  NECK: no carotid bruits or adenopathy, jugular venous pressure is less than 5 cm  CHEST: symmetric, the lungs are clear to auscultation  CARDIOVASCULAR: regular rhythm without murmur or gallop  EXTREMITIES: no cyanosis, clubbing or edema  SKIN: no xanthelasma  NEUROLOGIC: normal gait and coordination  PSYCHIATRIC: mood and affect are normal    Cardiac Testing:    Echocardiogram:   Results for orders placed during the hospital encounter of 02/02/17   Echo BLAKE W Bubble [ECH12] 02/02/2017    Narrative   Normal left ventricular size and systolic function.LVEF estimated at 60%    No thrombus detected    Negative echo contrast bubble study    Mild biatrial enlargement    No significant valve abnormality    Left ventricle ejection fraction is normal.          Cardiac Catheterization:   Results for orders placed during the hospital encounter of 07/17/15   CV R L HC [place] Possible Pulmonary Vasodiolator Study [CATH74] 07/17/2015    Narrative Cardiac Diagnostic Report     Demographics      Patient Name ERNESTO CRISTINA    Referring Physician    ANA URBAN MD      MRN #        [id number]          Diagnostic Physician   KENDY CHONG MD       Account #    [id number]           Interventional                                   Physician      DTerrenceOTerrenceB        1940         Report Status:      Date of      07/17/2015 08:39   Study        AM     Procedure     Procedure Type      Diagnostic procedure:Left Heart Catheterization , Right Heart   Cath, Coronary Angiogram, I Stat      Conclusions      Procedure Summary   Known multivessel CAD s/p CABG      Referred for R&L[facility] for pulm HTN      Found to have no significant pulm HTN      PA 35/13/19   PCWP mean 12   RV 37/6   RA mean 7   LVEDP 13mmHg      CO 4.2, CI 2.1      No significant step ups in O2 between rt sided chambers   (PA 75, RA 75, RA 76, IVC 78, SVC 68%)      Normal Rt and Lt sided filling pressures      Severe native CAD with subtotally occluded LAD and LCx and   occluded RCA   Presumed patent LIMA to LAD with competitive flow in LAD   Patent seq SVG to ramus and OM (op report described this as   having 3 touchdowns but the anastamosis to diagonal not seen,   and presumably that limb of the sequential graft is occluded)   Patent SVG to RCA      Recommendations   Cont risk factor control      Signatures      Electronically signed by KENDY CHONG MD   (Diagnostic Physician) on 07/17/2015 at 09:44 AM     Angiographic Findings      Diagnostic Findings      Cardiac Arteries and Lesion Findings     LMCA: Minimal disease.20% distal stenosis    LAD: Severe disease.90% ostial, 99% mid with competitive flow from known  LIMA to LAD    LCx: Severe disease.95% ostial, no major OMs arising from Cx    RCA: Severe disease.occluded mid RCA    Procedure Data  Procedure Date  Date: 07/17/2015Start: 08:39 AMEnd: 09:41 AM    The procedure was explained in detail to the patient. Risks, complications  and alternative treatments were reviewed. Written consent was obtained.    Diagnostic Cath Status: Elective    Indications: CAD.    Entry Locations    - Percutaneous access was performed through the Right Femoral artery. A  4      Fr sheath was inserted.      - Percutaneous access was performed through the Right Femoral vein. A 7 Fr      sheath was inserted.    Procedure Medications    - Fentanyl I.V. /per verbal M.D. order 25 mcg.      - Midazolam (Versed) I.V. /per verbal M.D. order 0.5 mg.      - Fentanyl I.V. /per verbal M.D. order 25 mcg.      - Midazolam (Versed) I.V. /per verbal M.D. order 0.5 mg.    Diagnostic Catheters    - A4 FrCordis 4.0 Fr JL 4.0was used for:Left coronary angiography.      - A4 FrCordis 4.0 Fr JR 4.0was used for:Right coronary angiography.      - A4 FrCordis 4.0 Fr PIGTAIL STRAIGHTwas used for:Left ventriculography.      - A4 FrCordis 4.0 Fr JR 4.0was used for:Right coronary angiography.    Contrast Material    - Omnipaque 350 mgl/ml54 ml    Fluoroscopy Time: Diagnostic: 7:19 minutes. Total: 7:19 minutes.    Medical History    Allergies    - No known allergies.     Risk Factors      The patient risk factors include:last creatinine 0.8.     Admission Data  Admission Date: 07/17/2015 Admission Time: 06:24 AM    Hemodynamics      Condition: Rest      O2 Consumption: Estimated: 215.64Heart Rate: 49 bpm     Oxygen Saturation  +--------+-----+----+----------------------+----+--------------------------+  !Location!pCO2 !pO2 !% Saturation          !Hgb !O2 Content                !  +--------+-----+----+----------------------+----+--------------------------+  !AO      !     !    !97                    !14.3!                          !  +--------+-----+----+----------------------+----+--------------------------+  !PA      !     !    !75                    !14.3!                          !  +--------+-----+----+----------------------+----+--------------------------+  !RA      !     !    !79                    !14.3!                          !  +--------+-----+----+----------------------+----+--------------------------+  !IVC     !     !    !78                    !14.3!                           !  +--------+-----+----+----------------------+----+--------------------------+  !SVC     !     !    !68                    !14.3!                          !  +--------+-----+----+----------------------+----+--------------------------+    Pressures (mmHg)  +-----+--------------------------------------------------------------------+  !Site !Pressure                                                            !  +-----+--------------------------------------------------------------------+  !AO   !149/72 (103)                                                        !  +-----+--------------------------------------------------------------------+  !LV   !156/-1 ,28                                                          !  +-----+--------------------------------------------------------------------+  !LV   !141/-10 ,13                                                         !  +-----+--------------------------------------------------------------------+  !AO   !151/62 (98)                                                         !  +-----+--------------------------------------------------------------------+  !LV   !145/-9 ,16                                                          !  +-----+--------------------------------------------------------------------+  !PA   !35/13 (19)                                                          !  +-----+--------------------------------------------------------------------+  !PA   !35/10 (19)                                                          !  +-----+--------------------------------------------------------------------+  !PCW  !20/14 (12)                                                          !  +-----+--------------------------------------------------------------------+  !RV   !37/-1 ,6                                                            !  +-----+--------------------------------------------------------------------+  !RA   !9/9 (7)                                                              !  +-----+--------------------------------------------------------------------+    Cardiac Output  +------+-----------------------+--------------------------+----------------+  !Method!CO (l/min)             !CI (l/min/m2)             !SV (ml)         !  +------+-----------------------+--------------------------+----------------+  !Sally  !4.18                   !2.1                       !84.78           !  +------+-----------------------+--------------------------+----------------+    Valve Gradients and Areas  +----------+--------+--------+--------+---------+----------+---------------+  !Valve     !Peak    !Mean    !Area    !Index    !Flow      !Source         !  +----------+--------+--------+--------+---------+----------+---------------+  !Aortic    !0       !0       !        !         !861.86    !Sally           !  +----------+--------+--------+--------+---------+----------+---------------+  !Aortic    !0       !0       !        !         !          !               !  +----------+--------+--------+--------+---------+----------+---------------+    Shunts    Oxygen Values      O2 Capacity 194.48 O2 Consumption 215.64      Flows (l/min)      Qs 4.18     Vascular Resistance    +-----------------------------------+-----+-----+----+---+---------+-------+  !CO method                          !TSVR !SVR  !TPVR!PVR!TPVR/TSVR!PVR/SVR!  +-----------------------------------+-----+-----+----+---+---------+-------+  !Qp or Qs                           !23.41!21.81!    !   !         !       !  +-----------------------------------+-----+-----+----+---+---------+-------+  !Sally                               !23.41!21.81!4.45!1.6!0.19     !0.07   !  +-----------------------------------+-----+-----+----+---+---------+-------+  Discharge Data    Hospital Status: Outpatient          Results for orders placed during the hospital encounter of 07/06/16   NM Pharmacologic Stress Test [WSL609] 07/06/2016    Narrative  [facility] Nuclear Cardiology  Pharmacological Stress Test Report    Patient: Juan Carlos Marley   MRN: [id number]  : 1940  Primary Care Provider: Adonis Trotter MD  Ordering Physician: Lorne House MD  Indication: Chest pain [R07.9]<br />CAD (coronary artery disease) [I25.10]         STRESS SUMMARY: 0.4 mg of intravenous Lexiscan was administered over 15   seconds under the supervision of Dr. Severino Owens . No cardiac symptoms   were reported. The stress electrocardiogram was negative for inducible   ischemia.     TECHNICAL COMMENTS: Nuclear imaging was accomplished utilizing 3.97 mCi of     thallium-201 injected at rest and 28.0 mCi of 99m technetium sestamibi   injected at   the completion of Lexiscan infusion. The  images are   satisfactory.     FINDINGS: The Lexiscan stress and rest images demonstrate normal left   ventricular   size and tracer uptake. The gated images reveal normal regional wall   motion and global systolic performance. The measured left ventricular   ejection fraction is 68%.     CONCLUSION:   1. Lexiscan stress nuclear study is negative for inducible myocardial   ischemia or infarction.     Lorne House MD  2016 1:46 PM              Imaging:    No results found.    Lab Results:    Lab Results   Component Value Date    CREATININE 0.76 2021    BUN 20 2021     2021    K 4.2 2021     2021    CO2 29 2021     Lab Results   Component Value Date    CHOL 117 2021    TRIG 35 2021    HDL 56 2021     BNP (pg/mL)   Date Value   2015 162 (H)     Creatinine (mg/dL)   Date Value   2021 0.76   2020 0.82   2020 0.74   2018 0.83     LDL Calculated (mg/dL)   Date Value   2021 54   2020 70   10/29/2019 63     Lab Results   Component Value Date    WBC 3.7 (L) 2021    WBC 5.7 2015    HGB 13.6 (L) 2021    HCT 39.1 (L) 2021    MCV 90  02/23/2021     02/23/2021           Much or all of the text in this note was generated through the use of the Dragon Dictate voice-to-text software. Errors in spelling or words which seem out of context are unintentional. Sound alike errors, in particular, may have escaped editing.

## 2021-07-03 NOTE — ADDENDUM NOTE
Addendum Note by Priyank Bianchi MLT at 12/13/2018  8:20 AM     Author: Priyank Bianchi MLT Service: -- Author Type:     Filed: 12/13/2018  8:45 AM Encounter Date: 12/13/2018 Status: Signed    : Priyank Bianchi MLT ()    Addended by: PRIYANK BIANCHI on: 12/13/2018 08:45 AM        Modules accepted: Orders

## 2021-07-12 ENCOUNTER — TRANSFERRED RECORDS (OUTPATIENT)
Dept: HEALTH INFORMATION MANAGEMENT | Facility: CLINIC | Age: 81
End: 2021-07-12

## 2021-07-22 ENCOUNTER — NURSE TRIAGE (OUTPATIENT)
Dept: NURSING | Facility: CLINIC | Age: 81
End: 2021-07-22

## 2021-07-22 NOTE — TELEPHONE ENCOUNTER
"Pt reports he is having a Mohs procedure a week from today. Pt states he takes Plavix 75mg daily due to strokes, prescribed by neurologist Dr. Rashaun Caba at [facility]. Pt reports he was told to contact his PCP regarding Plavix dose preprocedure. Pt states previously he held Plavix preprocedure and \"it didn't work, ended up in the hospital\".     Advised pt message will be sent to PCP to advise. Call back if no response by tomorrow.    Pt verbalizes understanding and agrees to plan.     Reason for Disposition    Caller has NON-URGENT medication question about med that PCP prescribed and triager unable to answer question    Protocols used: MEDICATION QUESTION CALL-A-OH      "

## 2021-07-23 ENCOUNTER — TELEPHONE (OUTPATIENT)
Dept: INTERNAL MEDICINE | Facility: CLINIC | Age: 81
End: 2021-07-23

## 2021-07-23 NOTE — TELEPHONE ENCOUNTER
Dr. Trotter    Patient is seeing [facility] and he is having a MOHS procedure done on 7/29/21.  He is wondering if he has ever had this done before because he is on plavix and a daily aspirin.  He would like a call back at .

## 2021-07-29 ENCOUNTER — TRANSFERRED RECORDS (OUTPATIENT)
Dept: HEALTH INFORMATION MANAGEMENT | Facility: CLINIC | Age: 81
End: 2021-07-29
Payer: COMMERCIAL

## 2021-08-18 ENCOUNTER — NURSE TRIAGE (OUTPATIENT)
Dept: NURSING | Facility: CLINIC | Age: 81
End: 2021-08-18

## 2021-08-18 ENCOUNTER — OFFICE VISIT (OUTPATIENT)
Dept: INTERNAL MEDICINE | Facility: CLINIC | Age: 81
End: 2021-08-18
Payer: COMMERCIAL

## 2021-08-18 VITALS
OXYGEN SATURATION: 97 % | DIASTOLIC BLOOD PRESSURE: 58 MMHG | SYSTOLIC BLOOD PRESSURE: 120 MMHG | HEIGHT: 69 IN | HEART RATE: 68 BPM | BODY MASS INDEX: 26.81 KG/M2 | WEIGHT: 181 LBS

## 2021-08-18 DIAGNOSIS — N40.1 BENIGN PROSTATIC HYPERPLASIA WITH WEAK URINARY STREAM: ICD-10-CM

## 2021-08-18 DIAGNOSIS — Z79.01 CHRONIC ANTICOAGULATION: ICD-10-CM

## 2021-08-18 DIAGNOSIS — R39.12 BENIGN PROSTATIC HYPERPLASIA WITH WEAK URINARY STREAM: ICD-10-CM

## 2021-08-18 DIAGNOSIS — R31.0 GROSS HEMATURIA: Primary | ICD-10-CM

## 2021-08-18 LAB
BACTERIA #/AREA URNS HPF: ABNORMAL /HPF
RBC #/AREA URNS AUTO: >100 /HPF
SQUAMOUS #/AREA URNS AUTO: ABNORMAL /LPF
WBC #/AREA URNS AUTO: ABNORMAL /HPF

## 2021-08-18 PROCEDURE — 81015 MICROSCOPIC EXAM OF URINE: CPT | Performed by: INTERNAL MEDICINE

## 2021-08-18 PROCEDURE — 87086 URINE CULTURE/COLONY COUNT: CPT | Performed by: INTERNAL MEDICINE

## 2021-08-18 PROCEDURE — 99214 OFFICE O/P EST MOD 30 MIN: CPT | Performed by: INTERNAL MEDICINE

## 2021-08-18 RX ORDER — NITROGLYCERIN 0.4 MG/1
0.4 TABLET SUBLINGUAL DAILY
COMMUNITY
Start: 2021-03-19 | End: 2021-09-08

## 2021-08-18 RX ORDER — ASPIRIN 81 MG/1
81 TABLET, CHEWABLE ORAL DAILY
COMMUNITY

## 2021-08-18 RX ORDER — TRIAMCINOLONE ACETONIDE 1 MG/G
CREAM TOPICAL
COMMUNITY
Start: 2021-03-12 | End: 2021-09-08

## 2021-08-18 RX ORDER — SULFAMETHOXAZOLE/TRIMETHOPRIM 800-160 MG
1 TABLET ORAL 2 TIMES DAILY
Qty: 14 TABLET | Refills: 0 | Status: SHIPPED | OUTPATIENT
Start: 2021-08-18 | End: 2021-08-26

## 2021-08-18 RX ORDER — LOSARTAN POTASSIUM 25 MG/1
25 TABLET ORAL DAILY
COMMUNITY
Start: 2021-03-19 | End: 2022-02-28

## 2021-08-18 RX ORDER — ATORVASTATIN CALCIUM 40 MG/1
40 TABLET, FILM COATED ORAL DAILY
COMMUNITY
Start: 2021-03-19 | End: 2022-03-31

## 2021-08-18 RX ORDER — TAMSULOSIN HYDROCHLORIDE 0.4 MG/1
1 CAPSULE ORAL 2 TIMES DAILY
COMMUNITY
Start: 2021-06-21 | End: 2024-01-10

## 2021-08-18 RX ORDER — FLUOROURACIL 50 MG/ML
1 SOLUTION TOPICAL PRN
COMMUNITY
Start: 2021-02-05 | End: 2022-04-26

## 2021-08-18 RX ORDER — METOPROLOL TARTRATE 25 MG/1
25 TABLET, FILM COATED ORAL 2 TIMES DAILY
COMMUNITY
Start: 2021-03-19 | End: 2022-03-03

## 2021-08-18 RX ORDER — ASCORBIC ACID 500 MG
500 TABLET ORAL DAILY
COMMUNITY
Start: 2021-02-12

## 2021-08-18 RX ORDER — CLOPIDOGREL BISULFATE 75 MG/1
1 TABLET ORAL DAILY
COMMUNITY
Start: 2021-06-08 | End: 2021-08-25

## 2021-08-18 ASSESSMENT — MIFFLIN-ST. JEOR: SCORE: 1521.39

## 2021-08-18 NOTE — TELEPHONE ENCOUNTER
RN triage   Call from pt   Pt states he is passing blood with urine - since yesterday afternoon   No clots -- urine is red but not all blood   Taking plavix and baby asa Q day -- did not take this AM dose   Pt denies pain -- states he is able to urinate  No dizziness -- feeling OK   T = 98.6   Saw urology 5 months ago for same symptoms -- symptoms passed before seeing urologist   Reviewed home care advice -- transferred to      Sharon Patel RN  [facility]  Triage Nurse Advisor    COVID 19 Nurse Triage Plan/Patient Instructions    Please be aware that novel coronavirus (COVID-19) may be circulating in the community. If you develop symptoms such as fever, cough, or SOB or if you have concerns about the presence of another infection including coronavirus (COVID-19), please contact your health care provider or visit [email or web address].     Disposition/Instructions    In-Person Visit with provider recommended. Reference Visit Selection Guide.    Thank you for taking steps to prevent the spread of this virus.  o Limit your contact with others.  o Wear a simple mask to cover your cough.  o Wash your hands well and often.    Resources    [facility]: About COVID-19: [email or web address]    CDC: What to Do If You're Sick: www.cdc.gov/coronavirus/2019-ncov/about/steps-when-sick.html    CDC: Ending Home Isolation: www.cdc.gov/coronavirus/2019-ncov/hcp/disposition-in-home-patients.html     CDC: Caring for Someone: www.cdc.gov/coronavirus/2019-ncov/if-you-are-sick/care-for-someone.html     [facility]: Interim Guidance for Hospital Discharge to Home: www.health.[facility].[place].us/diseases/coronavirus/hcp/hospdischarge.pdf    [facility] clinical trials (COVID-19 research studies): clinicalaffairs.[facility]/umn-clinical-trials     Below are the COVID-19 hotlines at the [place] Department of Health ([facility]). Interpreters are available.   o For health questions: Call 684-866-2519 or 1-818.379.9202 (7 a.m.  to 7 p.m.)  o For questions about schools and childcare: Call 803-111-3248 or 1-613.267.3238 (7 a.m. to 7 p.m.)                             Reason for Disposition    Patient wants to be seen    Blood in the urine is main symptom    Taking Coumadin (warfarin) or other strong blood thinner, or known bleeding disorder (e.g., thrombocytopenia)    Additional Information    Negative: Shock suspected (e.g., cold/pale/clammy skin, too weak to stand, low BP, rapid pulse)    Negative: Sounds like a life-threatening emergency to the triager    Negative: Unable to urinate (or only a few drops) > 4 hours and bladder feels very full (e.g., palpable bladder or strong urge to urinate)    Negative: Decreased urination and drinking very little and dehydration suspected (e.g., dark urine, no urine > 12 hours, very dry mouth, very lightheaded)    Negative: Side (flank) or lower back pain present    Negative: Can't control passage of urine (i.e., urinary incontinence) and new onset (< 2 weeks) or worsening    Negative: Urinating more frequently than usual (i.e., frequency)    Negative: Bad or foul-smelling urine    Negative: Shock suspected (e.g., cold/pale/clammy skin, too weak to stand, low BP, rapid pulse)    Negative: Sounds like a life-threatening emergency to the triager    Negative: Unable to urinate (or only a few drops) > 4 hours and bladder feels very full (e.g., palpable bladder or strong urge to urinate)    Negative: Passing pure blood or large blood clots (i.e., larger than a dime or grape)    Negative: Patient sounds very sick or weak to the triager    Protocols used: URINARY SYMPTOMS-A-OH, URINE - BLOOD IN-A-OH

## 2021-08-18 NOTE — PROGRESS NOTES
Office Visit - Follow Up   Juan Carlos Marley   81 year old male    Date of Visit: 8/18/2021    Chief Complaint   Patient presents with     Urinary Problem     hematuria - started at 6 pm last night - no pains, no dysuria, no fevers         Assessment and Plan   1. Gross hematuria  We discussed the differential of gross hematuria.  Urinalysis today shows blood and bacteria.  I am going to treat him empirically with Bactrim.  We will do CT abdomen pelvis to evaluate for renal masses or other cause of hematuria.  He needs follow-up with his urologist.  He would like to establish care with a new urologist.  We will see if we can facilitate that.  He was instructed to drink plenty of fluids to prevent clotting and urinary retention.  - UA Macro with Reflex to Micro and Culture - lab collect; Future  - Urine Microscopic  - Adult Urology Referral; Future  - CT Abdomen Pelvis w/o & w Contrast; Future  - sulfamethoxazole-trimethoprim (BACTRIM DS) 800-160 MG tablet; Take 1 tablet by mouth 2 times daily for 7 days  Dispense: 14 tablet; Refill: 0  - UA Macro with Reflex to Micro and Culture - lab collect; Future  - Urine Culture; Future    2. Benign prostatic hyperplasia with weak urinary stream  As above.    3. Chronic anticoagulation  He wonders about holding his anticoagulation I urged him not to do that at this time.        No follow-ups on file.     History of Present Illness   This 81 year old patient on chronic anticoagulation who follows with Dr. Peterson which comes in today for urgent evaluation of gross hematuria.  Started last night.  Has had this in the past.  Never been further evaluated.  Denies any dysuria.  Stream is slow but his stream is always slow due to BPH.  He is on Plavix and aspirin for cerebrovascular disease.  No fevers chills night sweats.  Denies other somatic concerns today.    Review of Systems: A comprehensive review of systems was negative except as noted.     Medications, Allergies and Problem  "List   Reviewed, reconciled and updated  Post Discharge Medication Reconciliation Status:      Physical Exam   General Appearance:   Pleasant gentleman.  He does not appear pale.  No distress.    /58 (BP Location: Left arm, Patient Position: Sitting, Cuff Size: Adult Small)   Pulse 68   Ht 1.753 m (5' 9\")   Wt 82.1 kg (181 lb)   SpO2 97%   BMI 26.73 kg/m           Additional Information   Current Outpatient Medications   Medication Sig Dispense Refill     aspirin (ASA) 81 MG chewable tablet Take 81 mg by mouth daily       atorvastatin (LIPITOR) 40 MG tablet Take 40 mg by mouth daily       fluorouracil 5 % SOLN Apply 1 Application topically as needed       losartan (COZAAR) 25 MG tablet Take 25 mg by mouth daily       metoprolol tartrate (LOPRESSOR) 25 MG tablet Take 25 mg by mouth 2 times daily       Multiple Vitamins-Minerals (MULTIVITAMIN ADULT, MINERALS,) TABS Take 1 tablet by mouth daily       nitroGLYcerin (NITROSTAT) 0.4 MG sublingual tablet Place 0.4 mg under the tongue daily       omeprazole (PRILOSEC) 20 MG DR capsule Take 20 mg by mouth daily       sulfamethoxazole-trimethoprim (BACTRIM DS) 800-160 MG tablet Take 1 tablet by mouth 2 times daily for 7 days 14 tablet 0     tamsulosin (FLOMAX) 0.4 MG capsule Take 1 capsule by mouth daily       triamcinolone (KENALOG) 0.1 % external cream 1 APPLICATION TWICE A DAY TOPICALLY TO SCALP FOR 7 DAYS       vitamin C (ASCORBIC ACID) 500 MG tablet Take 500 mg by mouth daily       clopidogrel (PLAVIX) 75 MG tablet Take 1 tablet by mouth daily (Patient not taking: Reported on 2021)       No Known Allergies  Social History     Tobacco Use     Smoking status: Former Smoker     Packs/day: 1.00     Years: 10.00     Pack years: 10.00     Types: Cigarettes, Pipe, Cigarettes, Pipe     Quit date: 1970     Years since quittin.5     Smokeless tobacco: Never Used   Substance Use Topics     Alcohol use: Yes     Alcohol/week: 8.0 standard drinks     Comment: " Alcoholic Drinks/day: 1-2 glass of wine before dinner     Drug use: No       Review and/or order of clinical lab tests:  Review and/or order of radiology tests:  Review and/or order of medicine tests:  Discussion of test results with performing physician:  Decision to obtain old records and/or obtain history from someone other than the patient:  Review and summarization of old records and/or obtaining history from someone other than the patient and.or discussion of case with another health care provider:  Independent visualization of image, tracing or specimen itself:    Time:      ELIZABETH JURADO MD

## 2021-08-19 ENCOUNTER — HOSPITAL ENCOUNTER (OUTPATIENT)
Dept: CT IMAGING | Facility: CLINIC | Age: 81
Discharge: HOME | End: 2021-08-19
Attending: INTERNAL MEDICINE | Admitting: INTERNAL MEDICINE
Payer: COMMERCIAL

## 2021-08-19 DIAGNOSIS — R31.0 GROSS HEMATURIA: ICD-10-CM

## 2021-08-19 LAB
BACTERIA UR CULT: NO GROWTH
CREAT BLD-MCNC: 0.9 MG/DL (ref 0.7–1.3)
GFR SERPL CREATININE-BSD FRML MDRD: >60 ML/MIN/1.73M2

## 2021-08-19 PROCEDURE — 82565 ASSAY OF CREATININE: CPT

## 2021-08-19 PROCEDURE — 74178 CT ABD&PLV WO CNTR FLWD CNTR: CPT

## 2021-08-19 PROCEDURE — 250N000011 HC RX IP 250 OP 636: Performed by: INTERNAL MEDICINE

## 2021-08-19 RX ORDER — IOPAMIDOL 755 MG/ML
100 INJECTION, SOLUTION INTRAVASCULAR ONCE
Status: COMPLETED | OUTPATIENT
Start: 2021-08-19 | End: 2021-08-19

## 2021-08-19 RX ADMIN — IOPAMIDOL 100 ML: 755 INJECTION, SOLUTION INTRAVENOUS at 11:03

## 2021-08-20 ENCOUNTER — TELEPHONE (OUTPATIENT)
Dept: INTERNAL MEDICINE | Facility: CLINIC | Age: 81
End: 2021-08-20

## 2021-08-20 NOTE — TELEPHONE ENCOUNTER
Reason for Call:  Please call patient to discuss CT results.    Detailed comments: had the scan 8/20/21 was told to call and talk to the doctor.    Phone Number Patient can be reached at: Home number on file [phone] (home)    Best Time: any    Can we leave a detailed message on this number? YES    Call taken on 8/20/2021 at 12:45 PM by Silvia Thomas

## 2021-08-20 NOTE — TELEPHONE ENCOUNTER
Needs virtual visit with me over the telephone to review and discuss recent CT scan.  Please call patient therapy

## 2021-08-23 NOTE — TELEPHONE ENCOUNTER
Called to patient. He is scheduled to see urology on Thursday and did not want a phone visit at this time. Would like to wait until after Urology appointment

## 2021-08-24 DIAGNOSIS — R31.9 HEMATURIA: Primary | ICD-10-CM

## 2021-08-25 DIAGNOSIS — I10 ESSENTIAL HYPERTENSION: Primary | ICD-10-CM

## 2021-08-25 RX ORDER — CLOPIDOGREL BISULFATE 75 MG/1
75 TABLET ORAL DAILY
Qty: 90 TABLET | Refills: 0 | Status: SHIPPED | OUTPATIENT
Start: 2021-08-25 | End: 2021-11-22

## 2021-08-25 NOTE — TELEPHONE ENCOUNTER
Dr. Rashaun Caba Neurologist has recommended patient seek RX from PCP as patient has not seen Dr. Caba for the past two years.     Clopidogrel 75MG    Patient has 5 or 6 days remaining.    Patient would like to get RX for 90 fill.    [facility] is the preferred pharmacy.    Patient would like a phone call to confirm the RX is sent - [phone]

## 2021-08-25 NOTE — TELEPHONE ENCOUNTER
Last visit: 8/18/2021- Dr. Rosas.    Last visit with PCP: 2/23/2021- Dr. Trotter  1. Routine general medical examination at a health care facility  Annual wellness visit  - HM2(CBC w/o Differential)  - Comprehensive Metabolic Panel  - Lipid Cascade  - Thyroid Stimulating Hormone (TSH)  - Urinalysis-UC if Indicated     2. Screening for prostate cancer  Annual wellness visit and screen for prostate cancer.  - PSA (Prostatic-Specific Antigen), Annual Screen        Last filled:   clopidogrel (PLAVIX) 75 MG tablet   6/8/2021  --   Sig - Route: Take 1 tablet by mouth daily - Oral   Patient not taking: Reported on 8/18/2021        Class: Historical   Order: [id number]     Next visit: Nothing scheduled at this time

## 2021-08-26 ENCOUNTER — TELEPHONE (OUTPATIENT)
Dept: INTERNAL MEDICINE | Facility: CLINIC | Age: 81
End: 2021-08-26

## 2021-08-26 ENCOUNTER — TELEPHONE (OUTPATIENT)
Dept: UROLOGY | Facility: CLINIC | Age: 81
End: 2021-08-26

## 2021-08-26 ENCOUNTER — TELEPHONE (OUTPATIENT)
Dept: SURGERY | Facility: CLINIC | Age: 81
End: 2021-08-26

## 2021-08-26 ENCOUNTER — TELEPHONE (OUTPATIENT)
Dept: CARDIOLOGY | Facility: CLINIC | Age: 81
End: 2021-08-26

## 2021-08-26 ENCOUNTER — OFFICE VISIT (OUTPATIENT)
Dept: UROLOGY | Facility: CLINIC | Age: 81
End: 2021-08-26
Payer: COMMERCIAL

## 2021-08-26 VITALS
DIASTOLIC BLOOD PRESSURE: 44 MMHG | SYSTOLIC BLOOD PRESSURE: 84 MMHG | BODY MASS INDEX: 27.4 KG/M2 | HEIGHT: 69 IN | WEIGHT: 185 LBS

## 2021-08-26 DIAGNOSIS — R39.12 BENIGN PROSTATIC HYPERPLASIA WITH WEAK URINARY STREAM: ICD-10-CM

## 2021-08-26 DIAGNOSIS — N40.1 BENIGN PROSTATIC HYPERPLASIA WITH WEAK URINARY STREAM: ICD-10-CM

## 2021-08-26 DIAGNOSIS — K80.20 CALCULUS OF GALLBLADDER WITHOUT CHOLECYSTITIS WITHOUT OBSTRUCTION: ICD-10-CM

## 2021-08-26 DIAGNOSIS — R31.0 GROSS HEMATURIA: Primary | ICD-10-CM

## 2021-08-26 DIAGNOSIS — K40.90 NON-RECURRENT UNILATERAL INGUINAL HERNIA WITHOUT OBSTRUCTION OR GANGRENE: ICD-10-CM

## 2021-08-26 DIAGNOSIS — K42.9 UMBILICAL HERNIA WITHOUT OBSTRUCTION AND WITHOUT GANGRENE: ICD-10-CM

## 2021-08-26 LAB
ALBUMIN UR-MCNC: ABNORMAL MG/DL
APPEARANCE UR: CLEAR
BILIRUB UR QL STRIP: NEGATIVE
COLOR UR AUTO: YELLOW
GLUCOSE UR STRIP-MCNC: NEGATIVE MG/DL
HGB UR QL STRIP: ABNORMAL
KETONES UR STRIP-MCNC: ABNORMAL MG/DL
LEUKOCYTE ESTERASE UR QL STRIP: NEGATIVE
NITRATE UR QL: NEGATIVE
PH UR STRIP: 5.5 [PH] (ref 5–7)
SP GR UR STRIP: >=1.03 (ref 1–1.03)
UROBILINOGEN UR STRIP-ACNC: 0.2 E.U./DL

## 2021-08-26 PROCEDURE — 81003 URINALYSIS AUTO W/O SCOPE: CPT | Mod: QW | Performed by: STUDENT IN AN ORGANIZED HEALTH CARE EDUCATION/TRAINING PROGRAM

## 2021-08-26 PROCEDURE — 88112 CYTOPATH CELL ENHANCE TECH: CPT | Performed by: PATHOLOGY

## 2021-08-26 PROCEDURE — 99204 OFFICE O/P NEW MOD 45 MIN: CPT | Performed by: STUDENT IN AN ORGANIZED HEALTH CARE EDUCATION/TRAINING PROGRAM

## 2021-08-26 RX ORDER — LIDOCAINE HYDROCHLORIDE 20 MG/ML
JELLY TOPICAL ONCE
Status: COMPLETED | OUTPATIENT
Start: 2021-08-26 | End: 2021-08-26

## 2021-08-26 RX ORDER — FINASTERIDE 5 MG/1
5 TABLET, FILM COATED ORAL DAILY
Qty: 90 TABLET | Refills: 1 | Status: SHIPPED | OUTPATIENT
Start: 2021-08-26 | End: 2021-12-28

## 2021-08-26 RX ORDER — MULTIVIT-MIN/IRON/FOLIC ACID/K 18-600-40
CAPSULE ORAL
COMMUNITY
End: 2021-09-08

## 2021-08-26 RX ORDER — OMEGA-3 FATTY ACIDS/FISH OIL 300-1000MG
CAPSULE ORAL
COMMUNITY
End: 2021-09-08

## 2021-08-26 RX ORDER — CALCIUM CARBONATE 500(1250)
1 TABLET ORAL ONCE
COMMUNITY

## 2021-08-26 RX ORDER — FERROUS GLUCONATE 324(37.5)
324 TABLET ORAL 2 TIMES DAILY
COMMUNITY

## 2021-08-26 RX ADMIN — LIDOCAINE HYDROCHLORIDE: 20 JELLY TOPICAL at 12:52

## 2021-08-26 ASSESSMENT — PAIN SCALES - GENERAL: PAINLEVEL: NO PAIN (0)

## 2021-08-26 ASSESSMENT — MIFFLIN-ST. JEOR: SCORE: 1539.53

## 2021-08-26 NOTE — PROGRESS NOTES
Chief Complaint:    Gross Hematuria   Decreased urinary flow         Consult or Referral:     Mr. Juan Carlos Marley is a 81 year old male seen at the request of Dr. Rosas         History of Present Illness:     Juan Carlos Marley is a 81 year old male being seen for history of Gross hematuria.  Duration of problem: on off since 2007  Previous treatments: TURP       Reviewed previous notes from Dr. Familia Rosas  Juan Carlos is here to follow up on his recent onset gross hematuria a few episodes.  No noted dysuria or flank pain associated with the episode  Prior evaluation for the same in 2007 was negative . He is a former smoker who quit a long time ago.  Has some obstructive LUTS           Past Medical History:     Past Medical History:   Diagnosis Date     Cataract, nuclear sclerotic, left eye 6/2/2017     Coronary artery disease due to lipid rich plaque      Dyslipidemia, goal LDL below 70 12/20/2014     Essential hypertension with goal blood pressure less than 130/85      Hernia, abdominal      History of spinal cord injury      Nonsustained ventricular tachycardia (H)     6 beat run, asymptomatic per Dr. Hackett     Paroxysmal atrial fibrillation (H) 09/02/2013    at the time of CABG; [facility] did a 4-week monitor in 2017 and did not find any atrial fibrillation and therefore stopped his Eliquis.     Stricture of esophagus 2013    dilated twice since then     Transient ischemic attack 09/2018    transient speech difficulty and dizziness while in [place]; did not seek medical attention     Transient ischemic attack 10/2018    same symptoms as in [place]     Transient ischemic attack 11/20/2016     Transient ischemic attack 01/24/2016    left cerebral hemisphere     Transient ischemic attack 12/20/2014            Past Surgical History:     Past Surgical History:   Procedure Laterality Date     BYPASS GRAFT ARTERY CORONARY  09/2013    underwent 5-vessel coronary bypass grafting at the [facility]     CYSTOSCOPY       FOOT  SURGERY Left 2014    Dr. Carr     FRACTURE SURGERY Right     hand     HERNIA REPAIR  2013     ORCHIECTOMY SCROTAL Right      ORIF FEMUR FRACTURE Right      PROSTATE SURGERY      half removed     PROSTATECTOMY  2013     TESTICLE SURGERY      removal of testicle            Medications     Current Outpatient Medications   Medication     Ascorbic Acid (VITAMIN C) 500 MG CAPS     aspirin (ASA) 81 MG chewable tablet     atorvastatin (LIPITOR) 40 MG tablet     calcium carbonate (OS-ISABEL) 500 MG tablet     clopidogrel (PLAVIX) 75 MG tablet     Ferrous Gluconate 324 (37.5 Fe) MG TABS     finasteride (PROSCAR) 5 MG tablet     fluorouracil 5 % SOLN     losartan (COZAAR) 25 MG tablet     metoprolol tartrate (LOPRESSOR) 25 MG tablet     Multiple Vitamins-Minerals (MULTIVITAMIN ADULT, MINERALS,) TABS     nitroGLYcerin (NITROSTAT) 0.4 MG sublingual tablet     omega 3 1000 MG CAPS     omeprazole (PRILOSEC) 20 MG DR capsule     tamsulosin (FLOMAX) 0.4 MG capsule     triamcinolone (KENALOG) 0.1 % external cream     vitamin C (ASCORBIC ACID) 500 MG tablet     No current facility-administered medications for this visit.            Family History:     Family History   Problem Relation Age of Onset     Aortic aneurysm Father          84     Other - See Comments Mother          accident age 49     Other - See Comments Sister         autistic      No Known Problems Daughter      Pulmonary Hypertension No family hx of      Congenital heart disease No family hx of             Allergies:   Patient has no known allergies.         Review of Systems:  From intake questionnaire     Skin: negative  Eyes: negative  Ears/Nose/Throat: negative  Respiratory: No shortness of breath, dyspnea on exertion, cough, or hemoptysis  Cardiovascular: No chest pain or palpitations  Gastrointestinal: negative; no nausea/vomiting, constipation or diarrhea  Genitourinary: as per HPI  Musculoskeletal: negative  Neurologic: negative  Psychiatric:  "negative  Hematologic/Lymphatic/Immunologic: negative  Endocrine: negative         Physical Exam:     Patient is a 81 year old  male   Vitals: Blood pressure (!) 84/44, height 1.753 m (5' 9\"), weight 83.9 kg (185 lb).  Constitutional: Body mass index is 27.32 kg/m .  Alert, no acute distress, oriented, conversant  Eyes: no scleral icterus; extraocular muscles intact, moist conjunctivae  Neck: trachea midline, no thyromegaly  Ears/nose/mouth: throat/mouth:normal, good dentition  Respiratory: no respiratory distress, or pursed lip breathing  Cardiovascular: pulses strong and intact; no obvious jugular venous distension present  Gastrointestinal: soft, nontender, no organomegaly or masses,   Lymphatics: No inguinal adenopathy  Musculoskeletal: extremities normal, no peripheral edema  Skin: no suspicious lesions or rashes  Neuro: Alert, oriented, speech and mentation normal  Psych: affect and mood normal, alert and oriented to person, place and time  Gait: Normal  : deferred to cystoscopy           CYSTOSCOPY  Pre-procedure diagnosis:hematuria  Post procedure diagnosis: normal cystoscopy  Procedure performed: cystoscopy  Surgeon: Roderick Vergara MD  Anesthesia: local    Indications for procedure: Patient is a 81 year old year old male with a history of hematuria.  Here today for cysto    Description of procedure: After fully informed voluntary consent was obtained patient was brought into the procedure room, identified and placed in a supine position on the cysto table.  The groin/scrotum were prepped and draped in a sterile fashion with betadine.  A 15F flexible cystoscope was inserted into the urethra and the bladder and urethra examined in a systematic manner.  There were no tumor stones or diverticula.  Ureteric orifices were normal in position and number and effluxing clear urine.  The prostate was 5 cm long and did  show bilobar hypertrophy.  There was  median lobe.  Distal urethra was normal.  The patient " tolerated the procedure well and there were no complications.      Assessment/Plan: Patient with a history of hematuria with negative cystoscopy.    We will obtain a urine cytology today.      Labs and Pathology:    The following labs were reviewed by me and discussed with the patient:    Significant for   Lab Results   Component Value Date    CR 0.9 08/19/2021    CR 0.76 02/23/2021    CR 0.82 08/18/2020    CR 0.74 02/11/2020    CR 0.83 12/13/2018     Prostate Specific Antigen Screen   Date Value Ref Range Status   02/23/2021 2.0 0.00 - 6.50 ng/mL Final   02/11/2020 2.1 0.00 - 6.50 ng/mL Final   12/13/2018 1.7 0.00 - 6.50 ng/mL Final   02/16/2017 2.4 0.00 - 6.50 ng/mL Final   12/22/2015 2.2 0.00 - 6.50 ng/mL Final   11/24/2014 2.4 0.00 - 4.00 ng/mL Final   11/22/2013 2.50 <4.01 ng/mL Final   05/13/2011 2.32 <4.01 ng/mL Final             Imaging:    The following imaging exams were independently viewed and interpreted by me and discussed with patient:  CT Scan Abd/Pelvis: Abnormal:   I reviewed the Ct images to find a large prostate 65 X 71 X 63 mm in size.  He also has gall stones . No identifiable lesions in the bladder or the kidneys.  Also noted the inguinal and the umbilical hernia           Assessment and Plan:     Hematuria  Looks Normal on Cysto.  Likely related to the enlarged prostate. Will order cytology and FISH today for completion of workup  - UA without Microscopic [[id number]]  - Cytology non gyn; Future  - lidocaine (XYLOCAINE) 2 % external gel  - Cytology non gyn  - FISH bladder cancer    Benign prostatic hyperplasia with weak urinary stream  Start on finasteride and should help with urinary symptoms as well as with hematuria as it is likely prostatic in origin  - finasteride (PROSCAR) 5 MG tablet; Take 1 tablet (5 mg) by mouth daily    Non-recurrent unilateral inguinal hernia without obstruction or gangrene  Referred to surgery    Umbilical hernia without obstruction and without gangrene      Calculus  of gallbladder without cholecystitis without obstruction    - Adult General Surg Referral; Future      Plan:  Follow up in 3 months and review with general surgery.    Orders  Orders Placed This Encounter   Procedures     Adult General Surg Referral       Roderick Vergara MD  [facility] [place]      ==========================    Additional Billing and Coding Information:  Review of external notes as documented above   Review of the result(s) of each unique test - Creat PSA    Independent interpretation of a test performed by another physician/other qualified health care professional (not separately reported) - CT abdomen and pelvis    Discussion of management or test interpretation with external physician/other qualified healthcare professional/appropriate source -       Diagnosis or treatment significantly limited by social determinants of health -       15 minutes spent on the date of the encounter doing chart review, review of outside records, review of test results, interpretation of tests, patient visit and documentation apart from the time spent at cystoscopy     ==========================

## 2021-08-26 NOTE — PATIENT INSTRUCTIONS

## 2021-08-26 NOTE — TELEPHONE ENCOUNTER
Returned phone call and informed patient that referral was for a gall stone. Informed patient that this is not Urology Related and that's why he is being referred to general surgery. Patient expressed understanding and will follow-up with them as recommended.     Ramona Arango LPN

## 2021-08-26 NOTE — LETTER
8/26/2021       RE: Juan Carlos Marley  [address] [facility] [address]     Dear Colleague,    Thank you for referring your patient, Juan Carlos Marley, to the [facility]OLOG[facility] [place] at [facility]. Please see a copy of my visit note below.          Chief Complaint:    Gross Hematuria   Decreased urinary flow         Consult or Referral:     Mr. Juan Carlos Marley is a 81 year old male seen at the request of Dr. Rosas         History of Present Illness:     Juan Carlos Marley is a 81 year old male being seen for history of Gross hematuria.  Duration of problem: on off since 2007  Previous treatments: TURP       Reviewed previous notes from Dr. Familia Rosas  Juan Carlos is here to follow up on his recent onset gross hematuria a few episodes.  No noted dysuria or flank pain associated with the episode  Prior evaluation for the same in 2007 was negative . He is a former smoker who quit a long time ago.  Has some obstructive LUTS           Past Medical History:     Past Medical History:   Diagnosis Date     Cataract, nuclear sclerotic, left eye 6/2/2017     Coronary artery disease due to lipid rich plaque      Dyslipidemia, goal LDL below 70 12/20/2014     Essential hypertension with goal blood pressure less than 130/85      Hernia, abdominal      History of spinal cord injury      Nonsustained ventricular tachycardia (H)     6 beat run, asymptomatic per Dr. Hackett     Paroxysmal atrial fibrillation (H) 09/02/2013    at the time of CABG; [facility] did a 4-week monitor in 2017 and did not find any atrial fibrillation and therefore stopped his Eliquis.     Stricture of esophagus 2013    dilated twice since then     Transient ischemic attack 09/2018    transient speech difficulty and dizziness while in [place]; did not seek medical attention     Transient ischemic attack 10/2018    same symptoms as in [place]     Transient ischemic attack 11/20/2016     Transient ischemic  attack 2016    left cerebral hemisphere     Transient ischemic attack 2014            Past Surgical History:     Past Surgical History:   Procedure Laterality Date     BYPASS GRAFT ARTERY CORONARY  2013    underwent 5-vessel coronary bypass grafting at the [facility]     CYSTOSCOPY       FOOT SURGERY Left 2014    Dr. Carr     FRACTURE SURGERY Right     hand     HERNIA REPAIR  2013     ORCHIECTOMY SCROTAL Right      ORIF FEMUR FRACTURE Right      PROSTATE SURGERY      half removed     PROSTATECTOMY  2013     TESTICLE SURGERY      removal of testicle            Medications     Current Outpatient Medications   Medication     Ascorbic Acid (VITAMIN C) 500 MG CAPS     aspirin (ASA) 81 MG chewable tablet     atorvastatin (LIPITOR) 40 MG tablet     calcium carbonate (OS-SIABEL) 500 MG tablet     clopidogrel (PLAVIX) 75 MG tablet     Ferrous Gluconate 324 (37.5 Fe) MG TABS     finasteride (PROSCAR) 5 MG tablet     fluorouracil 5 % SOLN     losartan (COZAAR) 25 MG tablet     metoprolol tartrate (LOPRESSOR) 25 MG tablet     Multiple Vitamins-Minerals (MULTIVITAMIN ADULT, MINERALS,) TABS     nitroGLYcerin (NITROSTAT) 0.4 MG sublingual tablet     omega 3 1000 MG CAPS     omeprazole (PRILOSEC) 20 MG DR capsule     tamsulosin (FLOMAX) 0.4 MG capsule     triamcinolone (KENALOG) 0.1 % external cream     vitamin C (ASCORBIC ACID) 500 MG tablet     No current facility-administered medications for this visit.            Family History:     Family History   Problem Relation Age of Onset     Aortic aneurysm Father          84     Other - See Comments Mother          accident age 49     Other - See Comments Sister         autistic      No Known Problems Daughter      Pulmonary Hypertension No family hx of      Congenital heart disease No family hx of             Allergies:   Patient has no known allergies.         Review of Systems:  From intake questionnaire     Skin: negative  Eyes:  "negative  Ears/Nose/Throat: negative  Respiratory: No shortness of breath, dyspnea on exertion, cough, or hemoptysis  Cardiovascular: No chest pain or palpitations  Gastrointestinal: negative; no nausea/vomiting, constipation or diarrhea  Genitourinary: as per HPI  Musculoskeletal: negative  Neurologic: negative  Psychiatric: negative  Hematologic/Lymphatic/Immunologic: negative  Endocrine: negative         Physical Exam:     Patient is a 81 year old  male   Vitals: Blood pressure (!) 84/44, height 1.753 m (5' 9\"), weight 83.9 kg (185 lb).  Constitutional: Body mass index is 27.32 kg/m .  Alert, no acute distress, oriented, conversant  Eyes: no scleral icterus; extraocular muscles intact, moist conjunctivae  Neck: trachea midline, no thyromegaly  Ears/nose/mouth: throat/mouth:normal, good dentition  Respiratory: no respiratory distress, or pursed lip breathing  Cardiovascular: pulses strong and intact; no obvious jugular venous distension present  Gastrointestinal: soft, nontender, no organomegaly or masses,   Lymphatics: No inguinal adenopathy  Musculoskeletal: extremities normal, no peripheral edema  Skin: no suspicious lesions or rashes  Neuro: Alert, oriented, speech and mentation normal  Psych: affect and mood normal, alert and oriented to person, place and time  Gait: Normal  : deferred to cystoscopy           CYSTOSCOPY  Pre-procedure diagnosis:hematuria  Post procedure diagnosis: normal cystoscopy  Procedure performed: cystoscopy  Surgeon: Roderick Vergara MD  Anesthesia: local    Indications for procedure: Patient is a 81 year old year old male with a history of hematuria.  Here today for cysto    Description of procedure: After fully informed voluntary consent was obtained patient was brought into the procedure room, identified and placed in a supine position on the cysto table.  The groin/scrotum were prepped and draped in a sterile fashion with betadine.  A 15F flexible cystoscope was inserted into the " urethra and the bladder and urethra examined in a systematic manner.  There were no tumor stones or diverticula.  Ureteric orifices were normal in position and number and effluxing clear urine.  The prostate was 5 cm long and did  show bilobar hypertrophy.  There was  median lobe.  Distal urethra was normal.  The patient tolerated the procedure well and there were no complications.      Assessment/Plan: Patient with a history of hematuria with negative cystoscopy.    We will obtain a urine cytology today.      Labs and Pathology:    The following labs were reviewed by me and discussed with the patient:    Significant for   Lab Results   Component Value Date    CR 0.9 08/19/2021    CR 0.76 02/23/2021    CR 0.82 08/18/2020    CR 0.74 02/11/2020    CR 0.83 12/13/2018     Prostate Specific Antigen Screen   Date Value Ref Range Status   02/23/2021 2.0 0.00 - 6.50 ng/mL Final   02/11/2020 2.1 0.00 - 6.50 ng/mL Final   12/13/2018 1.7 0.00 - 6.50 ng/mL Final   02/16/2017 2.4 0.00 - 6.50 ng/mL Final   12/22/2015 2.2 0.00 - 6.50 ng/mL Final   11/24/2014 2.4 0.00 - 4.00 ng/mL Final   11/22/2013 2.50 <4.01 ng/mL Final   05/13/2011 2.32 <4.01 ng/mL Final             Imaging:    The following imaging exams were independently viewed and interpreted by me and discussed with patient:  CT Scan Abd/Pelvis: Abnormal:   I reviewed the Ct images to find a large prostate 65 X 71 X 63 mm in size.  He also has gall stones . No identifiable lesions in the bladder or the kidneys.  Also noted the inguinal and the umbilical hernia           Assessment and Plan:     Hematuria  Looks Normal on Cysto.  Likely related to the enlarged prostate. Will order cytology and FISH today for completion of workup  - UA without Microscopic [[id number]]  - Cytology non gyn; Future  - lidocaine (XYLOCAINE) 2 % external gel  - Cytology non gyn  - FISH bladder cancer    Benign prostatic hyperplasia with weak urinary stream  Start on finasteride and should help with  urinary symptoms as well as with hematuria as it is likely prostatic in origin  - finasteride (PROSCAR) 5 MG tablet; Take 1 tablet (5 mg) by mouth daily    Non-recurrent unilateral inguinal hernia without obstruction or gangrene  Referred to surgery    Umbilical hernia without obstruction and without gangrene      Calculus of gallbladder without cholecystitis without obstruction    - Adult General Surg Referral; Future      Plan:  Follow up in 3 months and review with general surgery.    Orders  Orders Placed This Encounter   Procedures     Adult General Surg Referral       Roderick Vergara MD  [facility] UROLOGY [facility] [place]      ==========================    Additional Billing and Coding Information:  Review of external notes as documented above   Review of the result(s) of each unique test - Creat PSA    Independent interpretation of a test performed by another physician/other qualified health care professional (not separately reported) - CT abdomen and pelvis    Discussion of management or test interpretation with external physician/other qualified healthcare professional/appropriate source -       Diagnosis or treatment significantly limited by social determinants of health -       15 minutes spent on the date of the encounter doing chart review, review of outside records, review of test results, interpretation of tests, patient visit and documentation apart from the time spent at cystoscopy     ==========================

## 2021-08-26 NOTE — TELEPHONE ENCOUNTER
Return call to patient - informed him of Dr. House's recent senior living and recommended he follow-up with his PCP to address BP concerns - reassure him that PCP can refer him for urgent cardiology evaluation if necessary and that he will be contacted early next year to arrange new patient appt with Dr. House's colleague as yrly follow-up - patient verbalized understanding and agreed with plan.  mg

## 2021-08-26 NOTE — TELEPHONE ENCOUNTER
Referral received from Juan Carlos Marley  for GB    Attempt #1:    Called patient at [phone] (home)  .  No answer - left message for patient to return call to clinic [phone].

## 2021-08-26 NOTE — TELEPHONE ENCOUNTER
[facility] Call Center    Phone Message    May a detailed message be left on voicemail: yes     Reason for Call: Other: Pt calling because he saw  today and they had discussed possible surgeries. He reports that later he received a call from Gen surg regarding his surgery referral for Gallbladder. He has a couple questions about this he was hoping to discuss with . Please call back to discuss.     Action Taken: Message routed to:  [facility] uro    Travel Screening: Not Applicable

## 2021-08-26 NOTE — TELEPHONE ENCOUNTER
----- Message from Rosita MELITON Turner sent at 8/26/2021  3:07 PM CDT -----  Regarding: Harsh Pt  General phone call:    Caller: Juan Carlos  [facility] cardiologist: Harsh  Detailed reason for call: Patient is calling and states that his BP was pretty low at another doctor's visit today and was asked to contact Dr. House about changing his Metoprolol Tartrate dosage.  New or active symptoms? N/A  Best phone number: [phone]  Best time to contact: anytime  Ok to leave a detailedmessage? Yes  Device? No    Additional Info: Patient states that the phone will ring 5x before they  the line.

## 2021-08-26 NOTE — TELEPHONE ENCOUNTER
Patient is seeking a recommendation for a new Cardiologist.     Dr. House has retired.  He was advised to reach out to PCP for recommendations.  Ideally he would like someone near [place].

## 2021-08-26 NOTE — NURSING NOTE
Prior to the start of the procedure and with procedural staff participation, I verbally confirmed the patient s identity using two indicators, relevant allergies, that the procedure was appropriate and matched the consent or emergent situation, and that the correct equipment/implants were available. Immediately prior to starting the procedure I conducted the Time Out with the procedural staff and re-confirmed the patient s name, procedure, and site/side. I have wiped the patient off with the povidone-Iodine solution, draped them,  used Lidocaine hydrochloride jelly, and instilled sterile water into the bladder. (The Joint Commission universal protocol was followed.)  Yes    Sedation (Moderate or Deep): None  5mL 2% lidocaine hydrochloride Urojet instilled into urethra.    NDC# [address]-6  Lot #:[id number]  Expiration Date: 1-23  Marcela Ayon LPN

## 2021-08-27 ENCOUNTER — TELEPHONE (OUTPATIENT)
Dept: UROLOGY | Facility: CLINIC | Age: 81
End: 2021-08-27

## 2021-08-27 NOTE — TELEPHONE ENCOUNTER
Dr. Trotter    I notified patient of your message regarding the cardiologist.  He said that he has another question for you.    He went to see his urologist and his BP was 88/44.  He is currently taking Metoprolol 25mg bid.  He is wondering if he should cut this in half and only take it once a day.  He has been feeling lightheaded the past week.  He would like a call back at .

## 2021-08-27 NOTE — TELEPHONE ENCOUNTER
[facility] Call Center    Phone Message    May a detailed message be left on voicemail: yes     Reason for Call: Medication Question or concern regarding medication   Prescription Clarification  Name of Medication: finasteride (PROSCAR) 5 MG tablet  Prescribing Provider: Dr. Wade   Pharmacy: NA   What on the order needs clarification? Juan Carlos calling to make sure Dr. Wade was aware that he was already taking tamsulosin when Dr. Wade prescribed the finasteride (PROSCAR) 5 MG tablet.  He is looking for clarification that he should continue with the tamsulosin when he states the finasteride.  Please call him back to discuss.          Action Taken: Message routed to:  Other: [facility]    Travel Screening: Not Applicable

## 2021-08-27 NOTE — TELEPHONE ENCOUNTER
Returned phone call and spoke with patient. Informed him that he can take both together. He is aware that the Flomax helps him to urinate and Finasteride helps to shrink the prostate.     Ramona Arango LPN

## 2021-08-30 LAB
PATH REPORT.COMMENTS IMP SPEC: NORMAL
PATH REPORT.FINAL DX SPEC: NORMAL
PATH REPORT.GROSS SPEC: NORMAL
PATH REPORT.MICROSCOPIC SPEC OTHER STN: NORMAL
PATH REPORT.RELEVANT HX SPEC: NORMAL

## 2021-08-31 ENCOUNTER — TELEPHONE (OUTPATIENT)
Dept: UROLOGY | Facility: CLINIC | Age: 81
End: 2021-08-31

## 2021-08-31 NOTE — TELEPHONE ENCOUNTER
[facility] Call Center    Phone Message    May a detailed message be left on voicemail: yes     Reason for Call: Other: Juan Carlos is calling to discuss the treatment plan offered to him by the general surgery dept for his gall stones. Juan Carlos is wanting Dr Vergara's opinion on if he feels this is the appropriate treatment for him. Please call Juan Carlos back to discuss. If he doesn't answer it's okay to discuss with his wife per Juan Carlos. Thanks    Action Taken: Other: Uro    Travel Screening: Not Applicable

## 2021-08-31 NOTE — TELEPHONE ENCOUNTER
Returned phone call and LM for patient that unless he has a specific question. This is not something we deal with in Urology. Instructed patient to call back if he had a question as it pertains to Urology.     Ramona Arango LPN

## 2021-09-08 ENCOUNTER — OFFICE VISIT (OUTPATIENT)
Dept: SURGERY | Facility: CLINIC | Age: 81
End: 2021-09-08
Payer: COMMERCIAL

## 2021-09-08 VITALS
HEART RATE: 52 BPM | SYSTOLIC BLOOD PRESSURE: 92 MMHG | OXYGEN SATURATION: 98 % | WEIGHT: 185 LBS | DIASTOLIC BLOOD PRESSURE: 58 MMHG | BODY MASS INDEX: 27.4 KG/M2 | RESPIRATION RATE: 16 BRPM | HEIGHT: 69 IN

## 2021-09-08 DIAGNOSIS — K42.9 UMBILICAL HERNIA WITHOUT OBSTRUCTION AND WITHOUT GANGRENE: ICD-10-CM

## 2021-09-08 DIAGNOSIS — K43.9 EPIGASTRIC HERNIA: ICD-10-CM

## 2021-09-08 DIAGNOSIS — K80.20 GALLSTONES: Primary | ICD-10-CM

## 2021-09-08 DIAGNOSIS — K40.90 INGUINAL HERNIA OF LEFT SIDE WITHOUT OBSTRUCTION OR GANGRENE: ICD-10-CM

## 2021-09-08 PROCEDURE — 99204 OFFICE O/P NEW MOD 45 MIN: CPT | Performed by: SURGERY

## 2021-09-08 ASSESSMENT — MIFFLIN-ST. JEOR: SCORE: 1539.53

## 2021-09-08 NOTE — LETTER
2021       Re: Juan Carlos Marley - 1940    Juan Carlos Marley is a 81 year old male seen in consultation for gallstones at the request of Dr Vergara        Assessment and Plan:  Juan Carlos is an 81-year-old male with coronary artery disease status post CABG, paroxysmal A. 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  He has no symptoms of biliary colic.  With asymptomatic gallstones I do not recommend surgical treatment.  I recommended continue to monitor for any symptoms of upper abdominal pain after eating fatty meals and bloating.  On exam today he also notes an umbilical and epigastric hernia and also has a left inguinal hernia on exam.  He states the umbilical hernia has not changed in size and does not cause him discomfort at the present moment, he has noted some left lower abdominal/groin pain which is likely due to the left inguinal hernia but this is not too bothersome for him.  With his ongoing work-up for the prostate and urinary issues and due to his higher risk of VTE off Plavix I recommended we continue to observe the hernias.  We discussed signs and symptoms of hernia incarceration or were he to have worsening pain or bulging he should come back and see me to further discuss repair.  We discussed the low likelihood of incarceration or emergent surgery needed with an inguinal hernia.  This time he prefers to watch and wait for the hernias as he is having other health issues that are more pressing.     Chief complaint:  Gallstones     HPI:  Juan Carlos Marley is a 81 year old male who presents to discuss finding of gallstones on recent CT scan abdomen.  He denies any upper abdominal pain and is able to eat normally without problems.  Never had any symptoms consistent with  "biliary colic.  He mentions he has noted he has a bellybutton hernia and one hernia above that which occasionally bulges out.  They have been present for years and have not changed in size and do not cause pain.  He also mentions left lower quadrant abdominal pain which is sporadic.  He has had a right inguinal hernia repair, open x2.  He is currently getting worked up for urinary retention and hematuria by urology.  He states when he had to go off his Plavix and aspirin for a foot procedure he had problems and ended up in the hospital.  He used to be on Eliquis but that has been stopped.     I have reviewed their documentation/notes      Review of Systems:  The 10 point review of systems is negative other than noted in the HPI and above.     Physical Exam:  Vitals: BP 92/58   Pulse 52   Resp 16   Ht 1.753 m (5' 9\")   Wt 83.9 kg (185 lb)   SpO2 98%   BMI 27.32 kg/m    BMI= Body mass index is 27.32 kg/m .  General - Well developed, well nourished male in no apparent distress.  Mild difficulty getting on exam table.  Abdomen: soft, flat, non-distended with no tenderness noted diffusely.  There is a small umbilical and small epigastric hernia, each about 1 cm in size.  : There is a left groin bulge with Valsalva.  No obvious right groin bulge.  Neurologic: alert, speech is clear, nonfocal  Psychiatric: Mood and affect appropriate     Relevant labs:     WBC -         Lab Results   Component Value Date     WBC 3.7 (L) 02/23/2021         HgB -         Lab Results   Component Value Date     HGB 13.6 (L) 02/23/2021         Plt-         Lab Results   Component Value Date      02/23/2021         Liver Function Studies -       Recent Labs   Lab Test 02/23/21  0813   PROTTOTAL 5.6*   ALBUMIN 3.5   BILITOTAL 0.7   ALKPHOS 92   AST 18   ALT 20         Lipase- No results found for: LIPASE     Imaging:     All imaging studies reviewed by me.          Recent Results (from the past 744 hour(s))   CT Abdomen Pelvis w/o " & w Contrast     Narrative     EXAM: CT ABDOMEN PELVIS W/O and W CONTRAST  LOCATION: [facility]  DATE/TIME: 8/19/2021 11:01 AM     INDICATION: Hematuria, unknown cause  COMPARISON: 12/24/1950  TECHNIQUE: CT scan of the abdomen and pelvis was performed before and after injection of IV contrast. Multiplanar reformats were obtained. Dose reduction techniques were used.  CONTRAST: 100 mL of Isovue-370.      FINDINGS:   LOWER CHEST: Mild subpleural scarring in the lower lobes. There are 2 stable simple right lower lobe cysts. Coronary artery calcification. Moderate hiatal hernia.     HEPATOBILIARY: Cholelithiasis.     PANCREAS: Normal.     SPLEEN: Normal.     ADRENAL GLANDS: Normal.     KIDNEYS/BLADDER: Simple parapelvic cysts, no follow-up required. No hydronephrosis. No ureteral stone. Stable mild circumferential bladder wall thickening, likely due to chronic outlet obstruction.     BOWEL: Diverticulosis of the colon. No acute inflammatory change. No obstruction. Normal appendix.     LYMPH NODES: Normal.     VASCULATURE: Moderate atherosclerosis.     PELVIC ORGANS: Increasing prostatomegaly measuring 6.5 cm in transverse dimension, previously 6.1 m. Prostate extension into the bladder base. No free fluid in the pelvis.     MUSCULOSKELETAL: Stable small fat-containing left inguinal hernia. Slightly enlarged fat-containing umbilical hernia measuring 2.4 cm at the neck. Degenerative changes of the spine and hips. L5 spondylolysis and anterolisthesis.        Impression     IMPRESSION:   1.  No acute abnormality in the abdomen or pelvis.  2.  Increasing prostatomegaly.  3.  Cholelithiasis.  4.  Moderate hiatal hernia.                  Kristal Moore MD  [facility] [place]

## 2021-09-08 NOTE — PROGRESS NOTES
[facility]  New Patient Office Visit      Juan Carlos Marley is a 81 year old male seen in consultation for gallstones at the request of Dr Vergara      Assessment and Plan:  Juan Carlos is an 81-year-old male with coronary artery disease status post CABG, paroxysmal A. 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  He has no symptoms of biliary colic.  With asymptomatic gallstones I do not recommend surgical treatment.  I recommended continue to monitor for any symptoms of upper abdominal pain after eating fatty meals and bloating.  On exam today he also notes an umbilical and epigastric hernia and also has a left inguinal hernia on exam.  He states the umbilical hernia has not changed in size and does not cause him discomfort at the present moment, he has noted some left lower abdominal/groin pain which is likely due to the left inguinal hernia but this is not too bothersome for him.  With his ongoing work-up for the prostate and urinary issues and due to his higher risk of VTE off Plavix I recommended we continue to observe the hernias.  We discussed signs and symptoms of hernia incarceration or were he to have worsening pain or bulging he should come back and see me to further discuss repair.  We discussed the low likelihood of incarceration or emergent surgery needed with an inguinal hernia.  This time he prefers to watch and wait for the hernias as he is having other health issues that are more pressing.                      Chief complaint:  Gallstones    HPI:  Juan Carlos Marley is a 81 year old male who presents to discuss finding of gallstones on recent CT scan abdomen.  He denies any upper abdominal pain and is able to eat normally without problems.  Never had any symptoms consistent with  biliary colic.  He mentions he has noted he has a bellybutton hernia and one hernia above that which occasionally bulges out.  They have been present for years and have not changed in size and do not cause pain.  He also mentions left lower quadrant abdominal pain which is sporadic.  He has had a right inguinal hernia repair, open x2.  He is currently getting worked up for urinary retention and hematuria by urology.  He states when he had to go off his Plavix and aspirin for a foot procedure he had problems and ended up in the hospital.  He used to be on Eliquis but that has been stopped.    I have reviewed their documentation/notes    Past Medical History:  Past Medical History:   Diagnosis Date     Cataract, nuclear sclerotic, left eye 6/2/2017     Coronary artery disease due to lipid rich plaque      Dyslipidemia, goal LDL below 70 12/20/2014     Essential hypertension with goal blood pressure less than 130/85      Hernia, abdominal      History of spinal cord injury      Nonsustained ventricular tachycardia (H)     6 beat run, asymptomatic per Dr. Hackett     Paroxysmal atrial fibrillation (H) 09/02/2013    at the time of CABG; [facility] did a 4-week monitor in 2017 and did not find any atrial fibrillation and therefore stopped his Eliquis.     Stricture of esophagus 2013    dilated twice since then     Transient ischemic attack 09/2018    transient speech difficulty and dizziness while in [place]; did not seek medical attention     Transient ischemic attack 10/2018    same symptoms as in [place]     Transient ischemic attack 11/20/2016     Transient ischemic attack 01/24/2016    left cerebral hemisphere     Transient ischemic attack 12/20/2014     Current Outpatient Medications   Medication     aspirin (ASA) 81 MG chewable tablet     atorvastatin (LIPITOR) 40 MG tablet     calcium carbonate (OS-ISABEL) 500 MG tablet     clopidogrel (PLAVIX) 75 MG tablet     Ferrous Gluconate 324 (37.5 Fe) MG TABS     finasteride  "(PROSCAR) 5 MG tablet     fluorouracil 5 % SOLN     losartan (COZAAR) 25 MG tablet     metoprolol tartrate (LOPRESSOR) 25 MG tablet     Multiple Vitamins-Minerals (MULTIVITAMIN ADULT, MINERALS,) TABS     omeprazole (PRILOSEC) 20 MG DR capsule     tamsulosin (FLOMAX) 0.4 MG capsule     vitamin C (ASCORBIC ACID) 500 MG tablet     No current facility-administered medications for this visit.       Past Surgical History:  Past Surgical History:   Procedure Laterality Date     BYPASS GRAFT ARTERY CORONARY  2013    underwent 5-vessel coronary bypass grafting at the [facility]     CYSTOSCOPY       FOOT SURGERY Left 2014    Dr. Carr     FRACTURE SURGERY Right     hand     HERNIA REPAIR  2013     ORCHIECTOMY SCROTAL Right      ORIF FEMUR FRACTURE Right      PROSTATE SURGERY      half removed     PROSTATECTOMY  2013     TESTICLE SURGERY      removal of testicle       Social History:  Social History     Tobacco Use     Smoking status: Former Smoker     Packs/day: 1.00     Years: 10.00     Pack years: 10.00     Types: Cigarettes, Pipe     Quit date: 1970     Years since quittin.5     Smokeless tobacco: Never Used   Substance Use Topics     Alcohol use: Yes     Alcohol/week: 8.0 standard drinks     Comment: Alcoholic Drinks/day: 1-2 glass of wine before dinner     Drug use: No        Family History:  Family History   Problem Relation Age of Onset     Aortic aneurysm Father          84     Other - See Comments Mother          accident age 49     Other - See Comments Sister         autistic      No Known Problems Daughter      Pulmonary Hypertension No family hx of      Congenital heart disease No family hx of        Review of Systems:  The 10 point review of systems is negative other than noted in the HPI and above.    Physical Exam:  Vitals: BP 92/58   Pulse 52   Resp 16   Ht 1.753 m (5' 9\")   Wt 83.9 kg (185 lb)   SpO2 98%   BMI 27.32 kg/m    BMI= Body mass index is 27.32 kg/m .  General " - Well developed, well nourished male in no apparent distress.  Mild difficulty getting on exam table.  Abdomen: soft, flat, non-distended with no tenderness noted diffusely.  There is a small umbilical and small epigastric hernia, each about 1 cm in size.  : There is a left groin bulge with Valsalva.  No obvious right groin bulge.  Neurologic: alert, speech is clear, nonfocal  Psychiatric: Mood and affect appropriate    Relevant labs:    WBC -   Lab Results   Component Value Date    WBC 3.7 (L) 02/23/2021       HgB -   Lab Results   Component Value Date    HGB 13.6 (L) 02/23/2021       Plt-   Lab Results   Component Value Date     02/23/2021       Liver Function Studies -   Recent Labs   Lab Test 02/23/21  0813   PROTTOTAL 5.6*   ALBUMIN 3.5   BILITOTAL 0.7   ALKPHOS 92   AST 18   ALT 20       Lipase- No results found for: LIPASE        Imaging:    All imaging studies reviewed by me.        Recent Results (from the past 744 hour(s))   CT Abdomen Pelvis w/o & w Contrast    Narrative    EXAM: CT ABDOMEN PELVIS W/O and W CONTRAST  LOCATION: [facility]  DATE/TIME: 8/19/2021 11:01 AM    INDICATION: Hematuria, unknown cause  COMPARISON: 12/24/1950  TECHNIQUE: CT scan of the abdomen and pelvis was performed before and after injection of IV contrast. Multiplanar reformats were obtained. Dose reduction techniques were used.  CONTRAST: 100 mL of Isovue-370.     FINDINGS:   LOWER CHEST: Mild subpleural scarring in the lower lobes. There are 2 stable simple right lower lobe cysts. Coronary artery calcification. Moderate hiatal hernia.    HEPATOBILIARY: Cholelithiasis.    PANCREAS: Normal.    SPLEEN: Normal.    ADRENAL GLANDS: Normal.    KIDNEYS/BLADDER: Simple parapelvic cysts, no follow-up required. No hydronephrosis. No ureteral stone. Stable mild circumferential bladder wall thickening, likely due to chronic outlet obstruction.    BOWEL: Diverticulosis of the colon. No acute inflammatory  change. No obstruction. Normal appendix.    LYMPH NODES: Normal.    VASCULATURE: Moderate atherosclerosis.    PELVIC ORGANS: Increasing prostatomegaly measuring 6.5 cm in transverse dimension, previously 6.1 m. Prostate extension into the bladder base. No free fluid in the pelvis.    MUSCULOSKELETAL: Stable small fat-containing left inguinal hernia. Slightly enlarged fat-containing umbilical hernia measuring 2.4 cm at the neck. Degenerative changes of the spine and hips. L5 spondylolysis and anterolisthesis.      Impression    IMPRESSION:   1.  No acute abnormality in the abdomen or pelvis.  2.  Increasing prostatomegaly.  3.  Cholelithiasis.  4.  Moderate hiatal hernia.           This note was created using voice recognition software. Undetected word substitutions or other errors may have occurred.     45 minutes spent on the date of the encounter doing chart review, history and exam, documentation and further activities as noted above      Kristal Moore MD  [facility] [place]    Please route or send letter to:  Primary Care Provider (PCP) and Referring Provider

## 2021-09-11 ENCOUNTER — HEALTH MAINTENANCE LETTER (OUTPATIENT)
Age: 81
End: 2021-09-11

## 2021-09-21 ENCOUNTER — TRANSFERRED RECORDS (OUTPATIENT)
Dept: HEALTH INFORMATION MANAGEMENT | Facility: CLINIC | Age: 81
End: 2021-09-21

## 2021-10-20 DIAGNOSIS — I10 ESSENTIAL HYPERTENSION: Primary | ICD-10-CM

## 2021-10-20 NOTE — TELEPHONE ENCOUNTER
Reason for Call:  Other prescription    Detailed comments: Patient will be out of medication Omeprazol in 2 weeks.  Would like mail order [employer].  Please contact patient.  Thank you.    Phone Number Patient can be reached at: Home number on file [phone] (home)    Best Time: any    Can we leave a detailed message on this number? YES    Call taken on 10/20/2021 at 12:33 PM by Cortney Palomo

## 2021-11-17 ENCOUNTER — TELEPHONE (OUTPATIENT)
Dept: UROLOGY | Facility: CLINIC | Age: 81
End: 2021-11-17
Payer: COMMERCIAL

## 2021-11-17 NOTE — TELEPHONE ENCOUNTER
Urology pt of Dr. Vergara last seen 8/26/21 and has follow-up visit scheduled for this coming Monday, 11/22. Juan Carlos calls today stating that Dr. Vergara had discussed with him the possibility of a laser procedure on his prostate and was going to talk to one of his partners about it. Pt wants to know if Dr. Vergara has had a chance to do this so they could talk about it at the next appt. In Basket message sent to provider with this question.

## 2021-11-22 ENCOUNTER — OFFICE VISIT (OUTPATIENT)
Dept: UROLOGY | Facility: CLINIC | Age: 81
End: 2021-11-22
Payer: COMMERCIAL

## 2021-11-22 VITALS
DIASTOLIC BLOOD PRESSURE: 68 MMHG | WEIGHT: 182 LBS | BODY MASS INDEX: 26.96 KG/M2 | HEIGHT: 69 IN | SYSTOLIC BLOOD PRESSURE: 137 MMHG | OXYGEN SATURATION: 100 % | HEART RATE: 53 BPM

## 2021-11-22 DIAGNOSIS — R31.0 GROSS HEMATURIA: Primary | ICD-10-CM

## 2021-11-22 DIAGNOSIS — R39.12 BENIGN PROSTATIC HYPERPLASIA WITH WEAK URINARY STREAM: ICD-10-CM

## 2021-11-22 DIAGNOSIS — I10 ESSENTIAL HYPERTENSION: ICD-10-CM

## 2021-11-22 DIAGNOSIS — N40.1 BENIGN PROSTATIC HYPERPLASIA WITH WEAK URINARY STREAM: ICD-10-CM

## 2021-11-22 LAB
ALBUMIN UR-MCNC: NEGATIVE MG/DL
APPEARANCE UR: CLEAR
BILIRUB UR QL STRIP: NEGATIVE
COLOR UR AUTO: YELLOW
GLUCOSE UR STRIP-MCNC: NEGATIVE MG/DL
HGB UR QL STRIP: NEGATIVE
KETONES UR STRIP-MCNC: NEGATIVE MG/DL
LEUKOCYTE ESTERASE UR QL STRIP: NEGATIVE
NITRATE UR QL: NEGATIVE
PH UR STRIP: 6 [PH] (ref 5–7)
RESIDUAL VOLUME (RV) (EXTERNAL): 71
SP GR UR STRIP: 1.02 (ref 1–1.03)
UROBILINOGEN UR STRIP-ACNC: 0.2 E.U./DL

## 2021-11-22 PROCEDURE — 99213 OFFICE O/P EST LOW 20 MIN: CPT | Mod: 25 | Performed by: STUDENT IN AN ORGANIZED HEALTH CARE EDUCATION/TRAINING PROGRAM

## 2021-11-22 PROCEDURE — 81003 URINALYSIS AUTO W/O SCOPE: CPT | Mod: QW | Performed by: STUDENT IN AN ORGANIZED HEALTH CARE EDUCATION/TRAINING PROGRAM

## 2021-11-22 PROCEDURE — 51798 US URINE CAPACITY MEASURE: CPT | Performed by: STUDENT IN AN ORGANIZED HEALTH CARE EDUCATION/TRAINING PROGRAM

## 2021-11-22 RX ORDER — CLOPIDOGREL BISULFATE 75 MG/1
75 TABLET ORAL DAILY
Qty: 90 TABLET | Refills: 3 | Status: SHIPPED | OUTPATIENT
Start: 2021-11-22 | End: 2022-10-21

## 2021-11-22 ASSESSMENT — PAIN SCALES - GENERAL: PAINLEVEL: NO PAIN (0)

## 2021-11-22 ASSESSMENT — MIFFLIN-ST. JEOR: SCORE: 1525.93

## 2021-11-22 NOTE — PATIENT INSTRUCTIONS
Follow up in 6 months  Uroflow metry and PVR at [facility] on the day of the visit  PLease schedule follow up with Renée Stallworth or Sheri for discussing Laser Prostatectomy at that time

## 2021-11-22 NOTE — NURSING NOTE
Chief Complaint   Patient presents with     Gross hematuria     3 month f/u     BPH with weak urinary stream     Pt denies any blood in urine since last visit. Pt states more difficulty with urination since last visit, pt states he has to push urine out    PVR: 71 mL    Natividad Nj, CMA

## 2021-11-22 NOTE — LETTER
"11/22/2021       RE: Juan Carlos Marley  [address] [facility] [address]     Dear Colleague,    Thank you for referring your patient, Juan Carlos Marley, to the [facility] [place] at [facility]. Please see a copy of my visit note below.    CHIEF COMPLAINT   It was my pleasure to see Juan Carlos Marley who is a 81 year old male for follow-up of BPH with hematuria.      HPI:  Juan Carlos Marley is a 81 year old male being seen for follow-up.  Duration of problem: Few years  Previous treatments: TURP in 2007  Joined today by his wife  Symptoms are unchanged.  No hematuria though.  Progressively worsening urinary flow  Has to be on Plavix for long-term.  Wants to discuss about possible laser prostatectomy    Exam:  /68   Pulse 53   Ht 1.753 m (5' 9\")   Wt 82.6 kg (182 lb)   SpO2 100%   BMI 26.88 kg/m    General: age-appropriate appearing male in NAD sitting in an exam chair  Resp: no respiratory distress  CV: heart rate regular  Abdomen: Degree of obesity is none. Abdomen is soft and nontender. No organomegaly.   Neuro: grossly non focal. Normal reflexes  Motor: excellent strength throughout  PVR 70 cc  Review of Imaging:  The following imaging exams were independently viewed and interpreted by me and discussed with patient:      Review of Labs:  The following labs were reviewed by me and discussed with the patient:  UA: Normal    Assessment & Plan     Gross hematuria  On chronic antiplatelet therapy.  Now has no hematuria.  Microscopic is normal too  - UA without Microscopic [[id number]]; Future  - UA without Microscopic [[id number]]    Benign prostatic hyperplasia with weak urinary stream  Worsening urinary stream.  Wants to discuss further surgical options.  He is on chronic antiplatelet therapy.  Could not stop that the last time around because of his neurologist advised.  At the moment does not have a significant residual urine  However, could be a " consideration for laser prostatectomy.  I recommend he follow-up in 6 months with one of my partners to discuss laser prostatectomy in the future.  - MEASURE POST-VOID RESIDUAL URINE/BLADDER CAPACITY, US NON-IMAGING (00532)      Roderick Vergara MD  [facility] [place]      ==========================    Additional Billing and Coding Information:  Review of external notes as documented above   Review of the result(s) of each unique test - UA    Independent interpretation of a test performed by another physician/other qualified health care professional (not separately reported) -       Discussion of management or test interpretation with external physician/other qualified healthcare professional/appropriate source -       Diagnosis or treatment significantly limited by social determinants of health -       12 minutes spent on the date of the encounter doing chart review, review of test results, interpretation of tests, patient visit, documentation and discussion with family     ==========================

## 2021-11-22 NOTE — PROGRESS NOTES
"CHIEF COMPLAINT   It was my pleasure to see Juan Carlos Marley who is a 81 year old male for follow-up of BPH with hematuria.      HPI:  Juan Carlos Marley is a 81 year old male being seen for follow-up.  Duration of problem: Few years  Previous treatments: TURP in 2007  Joined today by his wife  Symptoms are unchanged.  No hematuria though.  Progressively worsening urinary flow  Has to be on Plavix for long-term.  Wants to discuss about possible laser prostatectomy    Exam:  /68   Pulse 53   Ht 1.753 m (5' 9\")   Wt 82.6 kg (182 lb)   SpO2 100%   BMI 26.88 kg/m    General: age-appropriate appearing male in NAD sitting in an exam chair  Resp: no respiratory distress  CV: heart rate regular  Abdomen: Degree of obesity is none. Abdomen is soft and nontender. No organomegaly.   Neuro: grossly non focal. Normal reflexes  Motor: excellent strength throughout  PVR 70 cc  Review of Imaging:  The following imaging exams were independently viewed and interpreted by me and discussed with patient:      Review of Labs:  The following labs were reviewed by me and discussed with the patient:  UA: Normal    Assessment & Plan     Gross hematuria  On chronic antiplatelet therapy.  Now has no hematuria.  Microscopic is normal too  - UA without Microscopic [[id number]]; Future  - UA without Microscopic [[id number]]    Benign prostatic hyperplasia with weak urinary stream  Worsening urinary stream.  Wants to discuss further surgical options.  He is on chronic antiplatelet therapy.  Could not stop that the last time around because of his neurologist advised.  At the moment does not have a significant residual urine  However, could be a consideration for laser prostatectomy.  I recommend he follow-up in 6 months with one of my partners to discuss laser prostatectomy in the future.  - MEASURE POST-VOID RESIDUAL URINE/BLADDER CAPACITY, US NON-IMAGING ([id number])      Roderick Vergara MD  [facility] " [place]      ==========================    Additional Billing and Coding Information:  Review of external notes as documented above   Review of the result(s) of each unique test - UA    Independent interpretation of a test performed by another physician/other qualified health care professional (not separately reported) -       Discussion of management or test interpretation with external physician/other qualified healthcare professional/appropriate source -       Diagnosis or treatment significantly limited by social determinants of health -       12 minutes spent on the date of the encounter doing chart review, review of test results, interpretation of tests, patient visit, documentation and discussion with family     ==========================

## 2021-12-20 ENCOUNTER — TRANSFERRED RECORDS (OUTPATIENT)
Dept: HEALTH INFORMATION MANAGEMENT | Facility: CLINIC | Age: 81
End: 2021-12-20
Payer: COMMERCIAL

## 2021-12-28 DIAGNOSIS — N40.1 BENIGN PROSTATIC HYPERPLASIA WITH WEAK URINARY STREAM: ICD-10-CM

## 2021-12-28 DIAGNOSIS — R39.12 BENIGN PROSTATIC HYPERPLASIA WITH WEAK URINARY STREAM: ICD-10-CM

## 2021-12-28 RX ORDER — FINASTERIDE 5 MG/1
5 TABLET, FILM COATED ORAL DAILY
Qty: 90 TABLET | Refills: 1 | Status: SHIPPED | OUTPATIENT
Start: 2021-12-28 | End: 2022-04-26

## 2022-01-25 ENCOUNTER — OFFICE VISIT (OUTPATIENT)
Dept: INTERNAL MEDICINE | Facility: CLINIC | Age: 82
End: 2022-01-25
Payer: COMMERCIAL

## 2022-01-25 VITALS
WEIGHT: 186 LBS | BODY MASS INDEX: 27.55 KG/M2 | OXYGEN SATURATION: 95 % | DIASTOLIC BLOOD PRESSURE: 64 MMHG | SYSTOLIC BLOOD PRESSURE: 118 MMHG | HEIGHT: 69 IN | HEART RATE: 61 BPM

## 2022-01-25 DIAGNOSIS — I10 ESSENTIAL HYPERTENSION: Primary | ICD-10-CM

## 2022-01-25 LAB
CHOLEST SERPL-MCNC: 136 MG/DL
FASTING STATUS PATIENT QL REPORTED: NO
HDLC SERPL-MCNC: 62 MG/DL
HGB BLD-MCNC: 13.5 G/DL (ref 13.3–17.7)
LDLC SERPL CALC-MCNC: 58 MG/DL
TRIGL SERPL-MCNC: 78 MG/DL

## 2022-01-25 PROCEDURE — 85018 HEMOGLOBIN: CPT | Performed by: INTERNAL MEDICINE

## 2022-01-25 PROCEDURE — 36415 COLL VENOUS BLD VENIPUNCTURE: CPT | Performed by: INTERNAL MEDICINE

## 2022-01-25 PROCEDURE — 80061 LIPID PANEL: CPT | Performed by: INTERNAL MEDICINE

## 2022-01-25 PROCEDURE — 99214 OFFICE O/P EST MOD 30 MIN: CPT | Performed by: INTERNAL MEDICINE

## 2022-01-25 ASSESSMENT — MIFFLIN-ST. JEOR: SCORE: 1544.07

## 2022-01-25 NOTE — PROGRESS NOTES
"Assessment/Plan:    Hypertension controlled 118/64 check lipid panel plus hemoglobin level today.    Borderline anemia on iron supplement continue same hemoglobin level pending.    Coronary artery disease stable without chest pain shortness of breath prior history of paroxysmal atrial fibrillation and transient ischemic attacks cerebrovascular disease with atherosclerosis.  Seen in follow-up previously at [facility].    Right lower abdominal pain accompanied today by his wife previously thought to have a hernia femoral versus inguinal versus enlarged prostate BPH seeing a urologist at [place] [facility] in May opt for treatment for prostatism BPH nonsurgical.  Patient is on anticoagulant therapy for history of recurrent TIAs cerebrovascular disease atrial fibrillation etc. see above not an optimal candidate to stop Plavix or clopidogrel gel with aspirin.    25 minutes spent on the date of the encounter doing chart review, patient visit and documentation     Subjective:  Juan Carlos Marley is a 81 year old male presents for the following health issues abdominal pain right lower abdominal area on and off for 3 weeks wondering about whether this is enlarged prostate or related to groin hernia femoral versus inguinal.  Urinary frequency also noted symptoms of prostatism may benefit from nonoperative treatment for prostate enlargement BPH.  Prostatism.  Seeing [place] urologist for this.  Encouraged him to follow-up there closely.    ROS:  No blood in stool or urine no chest pain shortness of breath medication list reviewed reconciled.    Objective:  /64 (BP Location: Right arm, Patient Position: Sitting)   Pulse 61   Ht 1.753 m (5' 9\")   Wt 84.4 kg (186 lb)   SpO2 95%   BMI 27.47 kg/m    Appears older than stated age his lungs are clear heart tones regular rhythm neck veins are nondistended there is no thyromegaly or carotid bruits abdomen soft nontender right groin area was closely " examined no femoral or inguinal hernias were palpable there were no testicular scrotal abnormalities rectal showed enlarged prostate the prostate was enlarged at 3/4 without nodularity induration it was smooth nothing to suggest malignancy discussed in detail with the patient and his wife who is in attendance and very supportive.

## 2022-01-28 ENCOUNTER — VIRTUAL VISIT (OUTPATIENT)
Dept: UROLOGY | Facility: CLINIC | Age: 82
End: 2022-01-28
Payer: COMMERCIAL

## 2022-01-28 ENCOUNTER — TELEPHONE (OUTPATIENT)
Dept: INTERNAL MEDICINE | Facility: CLINIC | Age: 82
End: 2022-01-28

## 2022-01-28 VITALS — WEIGHT: 186 LBS | BODY MASS INDEX: 27.55 KG/M2 | HEIGHT: 69 IN

## 2022-01-28 DIAGNOSIS — N40.0 ENLARGED PROSTATE: Primary | ICD-10-CM

## 2022-01-28 DIAGNOSIS — G45.9 TIA (TRANSIENT ISCHEMIC ATTACK): ICD-10-CM

## 2022-01-28 PROCEDURE — 99214 OFFICE O/P EST MOD 30 MIN: CPT | Mod: GT | Performed by: UROLOGY

## 2022-01-28 ASSESSMENT — MIFFLIN-ST. JEOR: SCORE: 1544.07

## 2022-01-28 ASSESSMENT — PAIN SCALES - GENERAL: PAINLEVEL: NO PAIN (0)

## 2022-01-28 NOTE — TELEPHONE ENCOUNTER
"Patient calling to request orders/recommendation for \"the best neurologist in the [facility]\" for his \"unique situation\" that Dr Trotter is fully aware of.  Patient states he was being seen at the [place] for this situation, but that MD has since retired and he is looking for someone more local.  Please call patient to discuss at [phone].  "

## 2022-01-28 NOTE — PROGRESS NOTES
Patient will meet you in My Chart        Juan Carlos is a 81 year old who is being evaluated via a billable video visit.      How would you like to obtain your AVS? [email or web address]  If the video visit is dropped, the invitation should be resent by: Text to cell phone: [phone]  Will anyone else be joining your video visit?   Yes, wife Salud             Office Visit Note  [facility]  [phone]    UROLOGIC DIAGNOSES:   Enlarged prostate  Slow urinary stream  Hematuria    CURRENT INTERVENTIONS:   TURP in 2007  Finasteride   flomax    HISTORY:   This is an 81-year-old gentleman who has been seeing Dr. Vergara for gross hematuria and a slow urinary stream.  In August he had a cystoscopy in the office that showed enlargement of the lateral lobes and median lobe despite having a prior TURP.  He had a CT scan performed that showed an enlarged prostate approximately 150g but no concerning causes for hematuria. His PVR was 70mL.  He was started on finasteride.    He is interested in discussing possible photo vaporization of the prostate or other minimally invasive surgical procedures for his enlarged prostate.  The complicating issue is that he is on Plavix and he cannot stop his Plavix.  He reports that once a few years ago he had to stop his Plavix for 7 days prior to procedure and on day five he had a TIA.  He was told by his neurologist at the [facility] that he should never stop his Plavix again.    He reports that he is bothered by a slow urinary stream.  That is his main urinary complaint at this time.  He was emptying his bladder well at last visit but he is concerned about potentially needing a Lawrence catheter in the future.      PAST MEDICAL HISTORY:   Past Medical History:   Diagnosis Date     Cataract, nuclear sclerotic, left eye 6/2/2017     Coronary artery disease due to lipid rich plaque      Dyslipidemia, goal LDL below 70 12/20/2014     Essential hypertension with goal blood pressure less than 130/85       Hernia, abdominal      History of spinal cord injury      Nonsustained ventricular tachycardia (H)     6 beat run, asymptomatic per Dr. Hackett     Paroxysmal atrial fibrillation (H) 2013    at the time of CABG; [facility] did a 4-week monitor in 2017 and did not find any atrial fibrillation and therefore stopped his Eliquis.     Stricture of esophagus     dilated twice since then     Transient ischemic attack 2018    transient speech difficulty and dizziness while in [place]; did not seek medical attention     Transient ischemic attack 10/2018    same symptoms as in [place]     Transient ischemic attack 2016     Transient ischemic attack 2016    left cerebral hemisphere     Transient ischemic attack 2014       PAST SURGICAL HISTORY:   Past Surgical History:   Procedure Laterality Date     BYPASS GRAFT ARTERY CORONARY  2013    underwent 5-vessel coronary bypass grafting at the [facility]     CYSTOSCOPY       FOOT SURGERY Left 2014    Dr. aCrr     FRACTURE SURGERY Right     hand     HERNIA REPAIR  2013     ORCHIECTOMY SCROTAL Right      ORIF FEMUR FRACTURE Right      PROSTATE SURGERY      half removed     PROSTATECTOMY  2013     TESTICLE SURGERY      removal of testicle       FAMILY HISTORY:   Family History   Problem Relation Age of Onset     Aortic aneurysm Father          84     Other - See Comments Mother          accident age 49     Other - See Comments Sister         autistic      No Known Problems Daughter      Pulmonary Hypertension No family hx of      Congenital heart disease No family hx of        SOCIAL HISTORY:   Social History     Tobacco Use     Smoking status: Former Smoker     Packs/day: 1.00     Years: 10.00     Pack years: 10.00     Types: Cigarettes, Pipe     Quit date: 1970     Years since quittin.9     Smokeless tobacco: Never Used   Substance Use Topics     Alcohol use: Not Currently     Alcohol/week: 4.0 standard drinks      Types: 4 Glasses of wine per week       REVIEW OF SYSTEMS:  Skin: No rash, pruritis, or skin pigmentation  Eyes: No changes in vision  Ears/Nose/Throat: No changes in hearing, no nosebleeds  Respiratory: No shortness of breath, dyspnea on exertion, cough, or hemoptysis  Cardiovascular: No chest pain or palpitations  Gastrointestinal: No diarrhea or constipation. No abdominal pain. No hematochezia  Genitourinary: see HPI  Musculoskeletal: No pain or swelling of joints, normal range of motion  Neurologic: No weakness or tremors  Psychiatric: No recent changes in memory or mood  Hematologic/Lymphatic/Immunologic: No easy bruising or enlarged lymph nodes  Endocrine: No weight gain or loss      PHYSICAL EXAM:    General: Alert and oriented to time, place, and self. In NAD   HEENT: Head AT/NC, EOMI, CN Grossly intact   Lungs: no respiratory distress, or pursed lip breathing   Heart: No obvious jugular venous distension present   Musculoskeltal: Normal movements. Normal appearing musculature  Skin: no suspicious lesions or rashes   Neuro: Alert, oriented, speech and mentation normal; moving all 4 extremities equally.   Psych: affect and mood normal    Imaging: None    Urinalysis: UA RESULTS:  Recent Labs   Lab Test 11/22/21  1308 08/26/21  1221 08/18/21  1357   COLOR Yellow   < >  --    APPEARANCE Clear   < >  --    URINEGLC Negative   < >  --    URINEBILI Negative   < >  --    URINEKETONE Negative   < >  --    SG 1.020   < >  --    UBLD Negative   < >  --    URINEPH 6.0   < >  --    PROTEIN Negative   < >  --    UROBILINOGEN 0.2   < >  --    NITRITE Negative   < >  --    LEUKEST Negative   < >  --    RBCU  --   --  >100*   WBCU  --   --  None Seen    < > = values in this interval not displayed.       PSA: 2.0    Post Void Residual:     Other labs: None today      IMPRESSION:  Enlarged prostate, slow urinary stream  On Plavix and unable to hold    PLAN:  We discussed his urologic history as well as his current situation in  which he is on Plavix but cannot hold the Plavix for surgical procedure.  I agree that he should not undergo a TURP since he cannot hold the Plavix.  He could consider either photo vaporization of the prostate or Rezum procedure as these carry a lower bleeding risk than TURP.  We discussed each of these procedures in detail today along with their expected recovery and risks.  We did discuss the fact that both of these procedures have a much lower bleeding risk than TURP, but there is still a bleeding risk.  It has been reported and described that these procedures can be performed without stopping the patient's Plavix.  However, I informed him that I personally have never done either of these procedures with the patient on Plavix.  I would certainly be willing to perform his surgery but he has to understand that either of these surgeries do carry significant bleeding risks for him.  Also, if there were significant bleeding after either of these procedures there is a scenario in which he may need to hold the Plavix in order to stop any bleeding, putting him at risk for stroke.  I had a good discussion with the patient and his wife regarding the pros and cons of continuing medication versus proceeding with a procedure.    At this time he wishes to continue with medications and I think this is a good option for him.  He could always seek another opinion from another urologist who may have performed these procedures on Plavix previously.  Since he is emptying his bladder well and is only bothered by a slow urinary stream I think it is reasonable just to follow his bladder emptying annually and as long as he is emptying his bladder well to continue on the medications.    He will see Dr. Vergara for his annual follow-up and he is welcome to discuss above procedures with me again in the future if he wishes.      Gerardo Stallworth M.D.              Video Start Time: 9:04 AM  Video-Visit Details    Type of service:  Video  Visit    Video End Time:9:26 AM    Originating Location (pt. Location): Home    Distant Location (provider location):  [facility]     Platform used for Video Visit: [employer]

## 2022-01-28 NOTE — LETTER
1/28/2022       RE: Juan Carlos Marley  [address]  [place] [address]     Dear Colleague,    Thank you for referring your patient, Juan Carlos Marley, to the [facility] [place] [facility]. Please see a copy of my visit note below.     Patient will meet you in My Chart        Juan Carlos is a 81 year old who is being evaluated via a billable video visit.      How would you like to obtain your AVS? MyChart  If the video visit is dropped, the invitation should be resent by: Text to cell phone: [phone]  Will anyone else be joining your video visit?   Yes, wife Salud             Office Visit Note  [facility]  [phone]    UROLOGIC DIAGNOSES:   Enlarged prostate  Slow urinary stream  Hematuria    CURRENT INTERVENTIONS:   TURP in 2007  Finasteride   flomax    HISTORY:   This is an 81-year-old gentleman who has been seeing Dr. Vergara for gross hematuria and a slow urinary stream.  In August he had a cystoscopy in the office that showed enlargement of the lateral lobes and median lobe despite having a prior TURP.  He had a CT scan performed that showed an enlarged prostate approximately 150g but no concerning causes for hematuria. His PVR was 70mL.  He was started on finasteride.    He is interested in discussing possible photo vaporization of the prostate or other minimally invasive surgical procedures for his enlarged prostate.  The complicating issue is that he is on Plavix and he cannot stop his Plavix.  He reports that once a few years ago he had to stop his Plavix for 7 days prior to procedure and on day five he had a TIA.  He was told by his neurologist at the [facility] that he should never stop his Plavix again.    He reports that he is bothered by a slow urinary stream.  That is his main urinary complaint at this time.  He was emptying his bladder well at last visit but he is concerned about potentially needing a Lawrence  catheter in the future.      PAST MEDICAL HISTORY:   Past Medical History:   Diagnosis Date     Cataract, nuclear sclerotic, left eye 2017     Coronary artery disease due to lipid rich plaque      Dyslipidemia, goal LDL below 70 2014     Essential hypertension with goal blood pressure less than 130/85      Hernia, abdominal      History of spinal cord injury      Nonsustained ventricular tachycardia (H)     6 beat run, asymptomatic per Dr. Hackett     Paroxysmal atrial fibrillation (H) 2013    at the time of CABG; [facility] did a 4-week monitor in  and did not find any atrial fibrillation and therefore stopped his Eliquis.     Stricture of esophagus     dilated twice since then     Transient ischemic attack 2018    transient speech difficulty and dizziness while in [place]; did not seek medical attention     Transient ischemic attack 10/2018    same symptoms as in [place]     Transient ischemic attack 2016     Transient ischemic attack 2016    left cerebral hemisphere     Transient ischemic attack 2014       PAST SURGICAL HISTORY:   Past Surgical History:   Procedure Laterality Date     BYPASS GRAFT ARTERY CORONARY  2013    underwent 5-vessel coronary bypass grafting at the [facility]     CYSTOSCOPY       FOOT SURGERY Left 2014    Dr. Carr     FRACTURE SURGERY Right     hand     HERNIA REPAIR  2013     ORCHIECTOMY SCROTAL Right      ORIF FEMUR FRACTURE Right      PROSTATE SURGERY      half removed     PROSTATECTOMY  2013     TESTICLE SURGERY      removal of testicle       FAMILY HISTORY:   Family History   Problem Relation Age of Onset     Aortic aneurysm Father          84     Other - See Comments Mother          accident age 49     Other - See Comments Sister         autistic      No Known Problems Daughter      Pulmonary Hypertension No family hx of      Congenital heart disease No family hx of        SOCIAL HISTORY:   Social History      Tobacco Use     Smoking status: Former Smoker     Packs/day: 1.00     Years: 10.00     Pack years: 10.00     Types: Cigarettes, Pipe     Quit date: 1970     Years since quittin.9     Smokeless tobacco: Never Used   Substance Use Topics     Alcohol use: Not Currently     Alcohol/week: 4.0 standard drinks     Types: 4 Glasses of wine per week       REVIEW OF SYSTEMS:  Skin: No rash, pruritis, or skin pigmentation  Eyes: No changes in vision  Ears/Nose/Throat: No changes in hearing, no nosebleeds  Respiratory: No shortness of breath, dyspnea on exertion, cough, or hemoptysis  Cardiovascular: No chest pain or palpitations  Gastrointestinal: No diarrhea or constipation. No abdominal pain. No hematochezia  Genitourinary: see HPI  Musculoskeletal: No pain or swelling of joints, normal range of motion  Neurologic: No weakness or tremors  Psychiatric: No recent changes in memory or mood  Hematologic/Lymphatic/Immunologic: No easy bruising or enlarged lymph nodes  Endocrine: No weight gain or loss      PHYSICAL EXAM:    General: Alert and oriented to time, place, and self. In NAD   HEENT: Head AT/NC, EOMI, CN Grossly intact   Lungs: no respiratory distress, or pursed lip breathing   Heart: No obvious jugular venous distension present   Musculoskeltal: Normal movements. Normal appearing musculature  Skin: no suspicious lesions or rashes   Neuro: Alert, oriented, speech and mentation normal; moving all 4 extremities equally.   Psych: affect and mood normal    Imaging: None    Urinalysis: UA RESULTS:  Recent Labs   Lab Test 21  1308 21  1221 21  1357   COLOR Yellow   < >  --    APPEARANCE Clear   < >  --    URINEGLC Negative   < >  --    URINEBILI Negative   < >  --    URINEKETONE Negative   < >  --    SG 1.020   < >  --    UBLD Negative   < >  --    URINEPH 6.0   < >  --    PROTEIN Negative   < >  --    UROBILINOGEN 0.2   < >  --    NITRITE Negative   < >  --    LEUKEST Negative   < >  --    RBCU   --   --  >100*   WBCU  --   --  None Seen    < > = values in this interval not displayed.       PSA: 2.0    Post Void Residual:     Other labs: None today      IMPRESSION:  Enlarged prostate, slow urinary stream  On Plavix and unable to hold    PLAN:  We discussed his urologic history as well as his current situation in which he is on Plavix but cannot hold the Plavix for surgical procedure.  I agree that he should not undergo a TURP since he cannot hold the Plavix.  He could consider either photo vaporization of the prostate or Rezum procedure as these carry a lower bleeding risk than TURP.  We discussed each of these procedures in detail today along with their expected recovery and risks.  We did discuss the fact that both of these procedures have a much lower bleeding risk than TURP, but there is still a bleeding risk.  It has been reported and described that these procedures can be performed without stopping the patient's Plavix.  However, I informed him that I personally have never done either of these procedures with the patient on Plavix.  I would certainly be willing to perform his surgery but he has to understand that either of these surgeries do carry significant bleeding risks for him.  Also, if there were significant bleeding after either of these procedures there is a scenario in which he may need to hold the Plavix in order to stop any bleeding, putting him at risk for stroke.  I had a good discussion with the patient and his wife regarding the pros and cons of continuing medication versus proceeding with a procedure.    At this time he wishes to continue with medications and I think this is a good option for him.  He could always seek another opinion from another urologist who may have performed these procedures on Plavix previously.  Since he is emptying his bladder well and is only bothered by a slow urinary stream I think it is reasonable just to follow his bladder emptying annually and as long as  he is emptying his bladder well to continue on the medications.    He will see Dr. Vergara for his annual follow-up and he is welcome to discuss above procedures with me again in the future if he wishes.      Gerardo Stallworth M.D.              Video Start Time: 9:04 AM  Video-Visit Details    Type of service:  Video Visit    Video End Time:9:26 AM    Originating Location (pt. Location): Home    Distant Location (provider location):  [facility] [place]     Platform used for Video Visit: [employer]

## 2022-01-31 NOTE — TELEPHONE ENCOUNTER
Patient calling once more to see what Dr Trotter thinks of Dr Maykel Moore.  Patient is not disagreeable to seeing Dr Darnell or Dr Echols, but while he was looking up the information for those two MDs he came across Dr Moore and is wondering what Dr Trotter thinks of him as well.  Please review and call patient with information. He can be reached at [phone].  Okay to leave detailed message on VM or leave detailed information with his wife if she is to answer.

## 2022-02-28 DIAGNOSIS — I25.83 CORONARY ARTERY DISEASE DUE TO LIPID RICH PLAQUE: Primary | ICD-10-CM

## 2022-02-28 DIAGNOSIS — I25.10 CORONARY ARTERY DISEASE DUE TO LIPID RICH PLAQUE: Primary | ICD-10-CM

## 2022-02-28 RX ORDER — LOSARTAN POTASSIUM 25 MG/1
25 TABLET ORAL DAILY
Qty: 90 TABLET | Refills: 1 | Status: SHIPPED | OUTPATIENT
Start: 2022-02-28 | End: 2022-07-06

## 2022-02-28 NOTE — TELEPHONE ENCOUNTER
[facility] Call Center    Phone Message    May a detailed message be left on voicemail: yes     Reason for Call: Medication Refill Request    Has the patient contacted the pharmacy for the refill? Yes   Name of medication being requested: losartan (COZAAR) 25 MG tablet   Provider who prescribed the medication: SHWETHA  Pharmacy: [facility] [place] [address]  Date medication is needed: asap   =      Action Taken: Other: clinical pool    Travel Screening: Not Applicable

## 2022-03-03 DIAGNOSIS — I25.83 CORONARY ARTERY DISEASE DUE TO LIPID RICH PLAQUE: Primary | ICD-10-CM

## 2022-03-03 DIAGNOSIS — I25.10 CORONARY ARTERY DISEASE DUE TO LIPID RICH PLAQUE: Primary | ICD-10-CM

## 2022-03-03 RX ORDER — METOPROLOL TARTRATE 25 MG/1
25 TABLET, FILM COATED ORAL 2 TIMES DAILY
Qty: 180 TABLET | Refills: 0 | Status: SHIPPED | OUTPATIENT
Start: 2022-03-03 | End: 2022-05-09

## 2022-03-25 ENCOUNTER — NURSE TRIAGE (OUTPATIENT)
Dept: NURSING | Facility: CLINIC | Age: 82
End: 2022-03-25
Payer: COMMERCIAL

## 2022-03-25 ENCOUNTER — TELEPHONE (OUTPATIENT)
Dept: UROLOGY | Facility: CLINIC | Age: 82
End: 2022-03-25
Payer: COMMERCIAL

## 2022-03-25 NOTE — TELEPHONE ENCOUNTER
Returned phone call to patient and informed him of purpose of each medication and that he should be on both. Patient expressed understanding.     Ramona Arango LPN

## 2022-03-25 NOTE — TELEPHONE ENCOUNTER
[facility] Call Center    Phone Message    May a detailed message be left on voicemail: yes     Reason for Call: Medication Question or concern regarding medication   Prescription Clarification  Name of Medication: finasteride (PROSCAR) 5 MG tablet  tamsulosin (FLOMAX) 0.4 MG capsule  Prescribing Provider: Dr. Vergara   What on the order needs clarification? Pt states he is taking both above mentioned medications, pt is wondering if hes taking 2 meds for the same problem as he has Finasteride with his previous urologist and it didn't do much for him. Pt would like a call back to go over his medications. Pt states when you call him please let it ring more than 5 times and if the answering machine picks up you are OK to leave a detailed message. Thanks!    Action Taken: Message routed to:  [facility] U[facility]    Travel Screening: Not Applicable

## 2022-03-25 NOTE — TELEPHONE ENCOUNTER
RN triage   Call from pt   Pt was exposed to daughter - who tested positive for covid   Pt tested on 3/19 == negative covid   Pt was exposed to daughter on 3/18- 19- 20   Pt has no symptoms   Pt has questions about re- testing -- and isolation   Reviewed home care advice     Sharon Patel RN  [facility]  Triage Nurse Advisor      COVID 19 Nurse Triage Plan/Patient Instructions    Please be aware that novel coronavirus (COVID-19) may be circulating in the community. If you develop symptoms such as fever, cough, or SOB or if you have concerns about the presence of another infection including coronavirus (COVID-19), please contact your health care provider or visit [email or web address].     Disposition/Instructions    Home care recommended. Follow home care protocol based instructions.    Thank you for taking steps to prevent the spread of this virus.  o Limit your contact with others.  o Wear a simple mask to cover your cough.  o Wash your hands well and often.    Resources    [facility]: About COVID-19: [email or web address]    CDC: What to Do If You're Sick: www.cdc.gov/coronavirus/2019-ncov/about/steps-when-sick.html    CDC: Ending Home Isolation: www.cdc.gov/coronavirus/2019-ncov/hcp/disposition-in-home-patients.html     CDC: Caring for Someone: www.cdc.gov/coronavirus/2019-ncov/if-you-are-sick/care-for-someone.html     [facility]: Interim Guidance for Hospital Discharge to Home: www.health.[facility].[place]/diseases/coronavirus/hcp/hospdischarge.pdf    [facility] clinical trials (COVID-19 research studies): [email or web address].[facility]/n-clinical-trials     Below are the COVID-19 hotlines at the [facility] of Health ([facility]). Interpreters are available.   o For health questions: Call 104-518-3536 or 1-358.382.4946 (7 a.m. to 7 p.m.)  o For questions about schools and childcare: Call 002-614-4159 or 1-277.660.7573 (7 a.m. to 7 p.m.)         Reason for Disposition    [1] CLOSE CONTACT COVID-19  EXPOSURE within last 14 days AND [2] NO symptoms    Protocols used: CORONAVIRUS (COVID-19) EXPOSURE-A-[place] 1.18.2022

## 2022-03-31 DIAGNOSIS — E78.5 DYSLIPIDEMIA, GOAL LDL BELOW 70: Primary | ICD-10-CM

## 2022-03-31 RX ORDER — ATORVASTATIN CALCIUM 40 MG/1
40 TABLET, FILM COATED ORAL DAILY
Qty: 90 TABLET | Refills: 0 | Status: SHIPPED | OUTPATIENT
Start: 2022-03-31 | End: 2022-07-06

## 2022-04-04 ENCOUNTER — LAB (OUTPATIENT)
Dept: URGENT CARE | Facility: URGENT CARE | Age: 82
End: 2022-04-04
Payer: COMMERCIAL

## 2022-04-04 DIAGNOSIS — Z20.822 CLOSE EXPOSURE TO 2019 NOVEL CORONAVIRUS: ICD-10-CM

## 2022-04-04 LAB — SARS-COV-2 RNA RESP QL NAA+PROBE: NEGATIVE

## 2022-04-04 PROCEDURE — U0003 INFECTIOUS AGENT DETECTION BY NUCLEIC ACID (DNA OR RNA); SEVERE ACUTE RESPIRATORY SYNDROME CORONAVIRUS 2 (SARS-COV-2) (CORONAVIRUS DISEASE [COVID-19]), AMPLIFIED PROBE TECHNIQUE, MAKING USE OF HIGH THROUGHPUT TECHNOLOGIES AS DESCRIBED BY CMS-2020-01-R: HCPCS

## 2022-04-04 PROCEDURE — U0005 INFEC AGEN DETEC AMPLI PROBE: HCPCS

## 2022-04-07 ENCOUNTER — OFFICE VISIT (OUTPATIENT)
Dept: CARDIOLOGY | Facility: CLINIC | Age: 82
End: 2022-04-07
Payer: COMMERCIAL

## 2022-04-07 VITALS
WEIGHT: 186.9 LBS | HEIGHT: 69 IN | HEART RATE: 60 BPM | DIASTOLIC BLOOD PRESSURE: 60 MMHG | SYSTOLIC BLOOD PRESSURE: 110 MMHG | BODY MASS INDEX: 27.68 KG/M2 | RESPIRATION RATE: 16 BRPM

## 2022-04-07 DIAGNOSIS — E78.2 MIXED HYPERLIPIDEMIA: ICD-10-CM

## 2022-04-07 DIAGNOSIS — I10 PRIMARY HYPERTENSION: ICD-10-CM

## 2022-04-07 DIAGNOSIS — G45.9 TIA (TRANSIENT ISCHEMIC ATTACK): ICD-10-CM

## 2022-04-07 DIAGNOSIS — I25.10 CORONARY ARTERY DISEASE INVOLVING NATIVE CORONARY ARTERY OF NATIVE HEART WITHOUT ANGINA PECTORIS: Primary | ICD-10-CM

## 2022-04-07 PROCEDURE — 99214 OFFICE O/P EST MOD 30 MIN: CPT | Performed by: INTERNAL MEDICINE

## 2022-04-07 RX ORDER — CHLORAL HYDRATE 500 MG
CAPSULE ORAL
COMMUNITY
Start: 2020-08-28 | End: 2022-04-26

## 2022-04-07 NOTE — PROGRESS NOTES
HEART CARE ENCOUNTER CONSULTATON NOTE      [facility]  [phone]      Assessment/Recommendations   Assessment:   1.  Coronary disease status post coronary artery bypass surgery.  LIMA to LAD, SVG to OM.  SVG to right coronary.  Occluded SVG to diagonal branch.  No anginal chest pain  2.  Hypertension   3.  Hyperlipidemia, well-controlled LDL.  Less than 70  4.  History of possible TIA  (Notes from Dr. 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 Urinary outflow obstruction      Plan:   1.  Continue aspirin 81 mg daily  2.  Plavix per neurology indication for Plavix was reviewed with patient.  He plans to discuss with neurology.  3.  Continue losartan and metoprolol for hypertension and coronary disease  4.  Continue atorvastatin for hyperlipidemia.  Lipids reviewed with patient    Today's clinic visit entailed:  Review of prior external note(s) from - Outside records from [place]  Review of the result(s) of each unique test - echo  The following tests were independently interpreted by me as noted in my documentation: ECG  Prescription drug management  The level of medical decision making during this visit was of moderate complexity.     History of Present Illness/Subjective    HPI: Juan Carlos Marley is a 81 year old male coronary disease, hypertension, hyperlipidemia presents to cardiology clinic establish care.  Previously followed by Dr. House.    Patient states he has not had any episodes of dyspnea on exertion or chest pain symptoms.  He plans to resume his walking as the weather improves.  Denies any lightheadedness or syncope.  Compliant with all of his medications.  Reviewed his lipid panel which demonstrates well-controlled LDL and HDL levels.  Blood pressure is controlled today at 110/60.  On conversation regarding his Plavix.  He he is on Plavix for possible basilar  "artery stenosis with a history of TIAs.  Patient may require surgery in the near term for bladder outlet obstruction.  He plans to follow-up with neurology to determine if he can hold Plavix.  He had recurrent TIA like symptoms.  Reviewed notes from [facility] which they feel could be alternatively migraine headaches.    Echocardiogram Results (2008, report reviewed): Echocardiogram from [facility] was reviewed.  Demonstrated normal left ventricular function.  Expected to 68%.  Normal right ventricular size and function.  Sigmoid    Twelve-lead EKG reviewed from August 18, 2020.  Sinus bradycardia.  Otherwise normal EKG.  No significant ST or T wave abnormalities.  Personally viewed       Physical Examination  Review of Systems   Vitals: /60 (BP Location: Left arm, Patient Position: Sitting, Cuff Size: Adult Large)   Pulse 60   Resp 16   Ht 1.753 m (5' 9\")   Wt 84.8 kg (186 lb 14.4 oz)   BMI 27.60 kg/m    BMI= Body mass index is 27.6 kg/m .  Wt Readings from Last 3 Encounters:   04/07/22 84.8 kg (186 lb 14.4 oz)   01/28/22 84.4 kg (186 lb)   01/25/22 84.4 kg (186 lb)           ENT/Mouth: membranes moist, no oral lesions or bleeding gums.      EYES:  no scleral icterus, normal conjunctivae       Chest/Lungs:   lungs are clear to auscultation, no rales or wheezing, no sternal scar, equal chest wall expansion    Cardiovascular:   Regular. Normal first and second heart sounds with no murmurs, rubs, or gallops; the carotid, radial and posterior tibial pulses are intact, Jugular venous pressure noraml, no edema bilaterally    Abdomen:  no tenderness; bowel sounds are present   Extremities: no cyanosis or clubbing   Skin: no xanthelasma, warm.    Neurologic: normal  bilateral, no tremors     Psychiatric: alert and oriented x3, calm        Please refer above for cardiac ROS details.        Medical History  Surgical History Family History Social History   Past Medical History:   Diagnosis Date     Cataract, " nuclear sclerotic, left eye 2017     Coronary artery disease due to lipid rich plaque      Dyslipidemia, goal LDL below 70 2014     Essential hypertension with goal blood pressure less than 130/85      Hernia, abdominal      History of spinal cord injury      Nonsustained ventricular tachycardia (H)     6 beat run, asymptomatic per Dr. Hackett     Paroxysmal atrial fibrillation (H) 2013    at the time of CABG; [facility] did a 4-week monitor in 2017 and did not find any atrial fibrillation and therefore stopped his Eliquis.     Stricture of esophagus     dilated twice since then     Transient ischemic attack 2018    transient speech difficulty and dizziness while in [place]; did not seek medical attention     Transient ischemic attack 10/2018    same symptoms as in [place]     Transient ischemic attack 2016     Transient ischemic attack 2016    left cerebral hemisphere     Transient ischemic attack 2014     Past Surgical History:   Procedure Laterality Date     BYPASS GRAFT ARTERY CORONARY  2013    underwent 5-vessel coronary bypass grafting at the [facility]     CYSTOSCOPY       FOOT SURGERY Left 2014    Dr. Carr     FRACTURE SURGERY Right     hand     HERNIA REPAIR  2013     ORCHIECTOMY SCROTAL Right      ORIF FEMUR FRACTURE Right      PROSTATE SURGERY      half removed     PROSTATECTOMY  2013     TESTICLE SURGERY      removal of testicle     Family History   Problem Relation Age of Onset     Aortic aneurysm Father          84     Other - See Comments Mother          accident age 49     Other - See Comments Sister         autistic      No Known Problems Daughter      Pulmonary Hypertension No family hx of      Congenital heart disease No family hx of         Social History     Socioeconomic History     Marital status:      Spouse name: Not on file     Number of children: 1     Years of education: Not on file     Highest education level: Not on  file   Occupational History     Not on file   Tobacco Use     Smoking status: Former Smoker     Packs/day: 1.00     Years: 10.00     Pack years: 10.00     Types: Cigarettes, Pipe     Quit date: 1970     Years since quittin.1     Smokeless tobacco: Never Used   Substance and Sexual Activity     Alcohol use: Not Currently     Alcohol/week: 4.0 standard drinks     Types: 4 Glasses of wine per week     Drug use: No     Sexual activity: Yes     Partners: Female     Birth control/protection: Post-menopausal   Other Topics Concern     Parent/sibling w/ CABG, MI or angioplasty before 65F 55M? Not Asked   Social History Narrative    Lives with his wife Salud in [place] in a twin home.  Retired from [email or web address].  One daughter.       Social Determinants of Health     Financial Resource Strain: Not on file   Food Insecurity: Not on file   Transportation Needs: Not on file   Physical Activity: Not on file   Stress: Not on file   Social Connections: Not on file   Intimate Partner Violence: Not on file   Housing Stability: Not on file           Medications  Allergies   Current Outpatient Medications   Medication Sig Dispense Refill     aspirin (ASA) 81 MG chewable tablet Take 81 mg by mouth daily       atorvastatin (LIPITOR) 40 MG tablet Take 1 tablet (40 mg) by mouth daily 90 tablet 0     calcium carbonate (OS-ISABEL) 500 MG tablet Take 1 tablet by mouth once 600 mg daily       clopidogrel (PLAVIX) 75 MG tablet Take 1 tablet (75 mg) by mouth daily 90 tablet 3     Ferrous Gluconate 324 (37.5 Fe) MG TABS        finasteride (PROSCAR) 5 MG tablet Take 1 tablet (5 mg) by mouth daily 90 tablet 1     losartan (COZAAR) 25 MG tablet Take 1 tablet (25 mg) by mouth daily 90 tablet 1     metoprolol tartrate (LOPRESSOR) 25 MG tablet Take 1 tablet (25 mg) by mouth 2 times daily 180 tablet 0     Multiple Vitamins-Minerals (MULTIVITAMIN ADULT, MINERALS,) TABS Take 1 tablet by mouth daily       omeprazole (PRILOSEC) 20  MG DR capsule Take 1 capsule (20 mg) by mouth daily 90 capsule 11     tamsulosin (FLOMAX) 0.4 MG capsule Take 1 capsule by mouth daily       vitamin C (ASCORBIC ACID) 500 MG tablet Take 500 mg by mouth daily        fish oil-omega-3 fatty acids 1000 MG capsule  (Patient not taking: Reported on 4/7/2022)       fluorouracil 5 % SOLN Apply 1 Application topically as needed  (Patient not taking: Reported on 4/7/2022)       No Known Allergies       Lab Results    Chemistry/lipid CBC Cardiac Enzymes/BNP/TSH/INR   Recent Labs   Lab Test 01/25/22  1453   CHOL 136   HDL 62   LDL 58   TRIG 78     Recent Labs   Lab Test 01/25/22  1453 02/23/21  0813 02/11/20  1001   LDL 58 54 70     Recent Labs   Lab Test 08/19/21  1110 02/23/21  0813   NA  --  141   POTASSIUM  --  4.2   CHLORIDE  --  106   CO2  --  29   GLC  --  94   BUN  --  20   CR 0.9 0.76   GFRESTIMATED >60 >60   ISABEL  --  9.0     Recent Labs   Lab Test 08/19/21  1110 02/23/21  0813 08/18/20  1431   CR 0.9 0.76 0.82     Recent Labs   Lab Test 11/24/14  0801   A1C 5.2          Recent Labs   Lab Test 01/25/22  1453 02/23/21  0813   WBC  --  3.7*   HGB 13.5 13.6*   HCT  --  39.1*   MCV  --  90   PLT  --  159     Recent Labs   Lab Test 01/25/22  1453 02/23/21  0813 08/18/20  1431   HGB 13.5 13.6* 14.3    No results for input(s): TROPONINI in the last 57436 hours.  No results for input(s): BNP, NTBNPI, NTBNP in the last 26831 hours.  Recent Labs   Lab Test 02/23/21  0813   TSH 1.24     No results for input(s): INR in the last 89444 hours.     Liam Ac,

## 2022-04-07 NOTE — LETTER
4/7/2022    Adonis Trotter MD  [address] [address]    RE: Juan Carlos Marley       Dear Colleague,     I had the pleasure of seeing Juan Carlos [facility] in the [facility].    HEART CARE ENCOUNTER CONSULTATON NOTE      [facility]  [phone]      Assessment/Recommendations   Assessment:   1.  Coronary disease status post coronary artery bypass surgery.  LIMA to LAD, SVG to OM.  SVG to right coronary.  Occluded SVG to diagonal branch.  No anginal chest pain  2.  Hypertension   3.  Hyperlipidemia, well-controlled LDL.  Less than 70  4.  History of possible TIA  (Notes from Dr. 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 Urinary outflow obstruction      Plan:   1.  Continue aspirin 81 mg daily  2.  Plavix per neurology indication for Plavix was reviewed with patient.  He plans to discuss with neurology.  3.  Continue losartan and metoprolol for hypertension and coronary disease  4.  Continue atorvastatin for hyperlipidemia.  Lipids reviewed with patient    Today's clinic visit entailed:  Review of prior external note(s) from - Outside records from [place]  Review of the result(s) of each unique test - echo  The following tests were independently interpreted by me as noted in my documentation: ECG  Prescription drug management  The level of medical decision making during this visit was of moderate complexity.     History of Present Illness/Subjective    HPI: Juan Carlos Marley is a 81 year old male coronary disease, hypertension, hyperlipidemia presents to cardiology clinic establish care.  Previously followed by Dr. House.    Patient states he has not had any episodes of dyspnea on exertion or chest pain symptoms.  He plans to resume his walking as the weather improves.  Denies any lightheadedness or syncope.  Compliant with all of his medications.   "Reviewed his lipid panel which demonstrates well-controlled LDL and HDL levels.  Blood pressure is controlled today at 110/60.  On conversation regarding his Plavix.  He he is on Plavix for possible basilar artery stenosis with a history of TIAs.  Patient may require surgery in the near term for bladder outlet obstruction.  He plans to follow-up with neurology to determine if he can hold Plavix.  He had recurrent TIA like symptoms.  Reviewed notes from [facility] which they feel could be alternatively migraine headaches.    Echocardiogram Results (2008, report reviewed): Echocardiogram from [facility] was reviewed.  Demonstrated normal left ventricular function.  Expected to 68%.  Normal right ventricular size and function.  Sigmoid    Twelve-lead EKG reviewed from August 18, 2020.  Sinus bradycardia.  Otherwise normal EKG.  No significant ST or T wave abnormalities.  Personally viewed       Physical Examination  Review of Systems   Vitals: /60 (BP Location: Left arm, Patient Position: Sitting, Cuff Size: Adult Large)   Pulse 60   Resp 16   Ht 1.753 m (5' 9\")   Wt 84.8 kg (186 lb 14.4 oz)   BMI 27.60 kg/m    BMI= Body mass index is 27.6 kg/m .  Wt Readings from Last 3 Encounters:   04/07/22 84.8 kg (186 lb 14.4 oz)   01/28/22 84.4 kg (186 lb)   01/25/22 84.4 kg (186 lb)           ENT/Mouth: membranes moist, no oral lesions or bleeding gums.      EYES:  no scleral icterus, normal conjunctivae       Chest/Lungs:   lungs are clear to auscultation, no rales or wheezing, no sternal scar, equal chest wall expansion    Cardiovascular:   Regular. Normal first and second heart sounds with no murmurs, rubs, or gallops; the carotid, radial and posterior tibial pulses are intact, Jugular venous pressure noraml, no edema bilaterally    Abdomen:  no tenderness; bowel sounds are present   Extremities: no cyanosis or clubbing   Skin: no xanthelasma, warm.    Neurologic: normal  bilateral, no tremors   "   Psychiatric: alert and oriented x3, calm        Please refer above for cardiac ROS details.        Medical History  Surgical History Family History Social History   Past Medical History:   Diagnosis Date     Cataract, nuclear sclerotic, left eye 2017     Coronary artery disease due to lipid rich plaque      Dyslipidemia, goal LDL below 70 2014     Essential hypertension with goal blood pressure less than 130/85      Hernia, abdominal      History of spinal cord injury      Nonsustained ventricular tachycardia (H)     6 beat run, asymptomatic per Dr. Hackett     Paroxysmal atrial fibrillation (H) 2013    at the time of CABG; [facility] did a 4-week monitor in  and did not find any atrial fibrillation and therefore stopped his Eliquis.     Stricture of esophagus     dilated twice since then     Transient ischemic attack 2018    transient speech difficulty and dizziness while in [place]; did not seek medical attention     Transient ischemic attack 10/2018    same symptoms as in [place]     Transient ischemic attack 2016     Transient ischemic attack 2016    left cerebral hemisphere     Transient ischemic attack 2014     Past Surgical History:   Procedure Laterality Date     BYPASS GRAFT ARTERY CORONARY  2013    underwent 5-vessel coronary bypass grafting at the [facility]     CYSTOSCOPY       FOOT SURGERY Left 2014    Dr. Carr     FRACTURE SURGERY Right     hand     HERNIA REPAIR  2013     ORCHIECTOMY SCROTAL Right      ORIF FEMUR FRACTURE Right      PROSTATE SURGERY      half removed     PROSTATECTOMY  2013     TESTICLE SURGERY      removal of testicle     Family History   Problem Relation Age of Onset     Aortic aneurysm Father          84     Other - See Comments Mother          accident age 49     Other - See Comments Sister         autistic      No Known Problems Daughter      Pulmonary Hypertension No family hx of      Congenital heart disease  No family hx of         Social History     Socioeconomic History     Marital status:      Spouse name: Not on file     Number of children: 1     Years of education: Not on file     Highest education level: Not on file   Occupational History     Not on file   Tobacco Use     Smoking status: Former Smoker     Packs/day: 1.00     Years: 10.00     Pack years: 10.00     Types: Cigarettes, Pipe     Quit date: 1970     Years since quittin.1     Smokeless tobacco: Never Used   Substance and Sexual Activity     Alcohol use: Not Currently     Alcohol/week: 4.0 standard drinks     Types: 4 Glasses of wine per week     Drug use: No     Sexual activity: Yes     Partners: Female     Birth control/protection: Post-menopausal   Other Topics Concern     Parent/sibling w/ CABG, MI or angioplasty before 65F 55M? Not Asked   Social History Narrative    Lives with his wife Salud in [place] in a twin home.  Retired from [employer].  One daughter.       Social Determinants of Health     Financial Resource Strain: Not on file   Food Insecurity: Not on file   Transportation Needs: Not on file   Physical Activity: Not on file   Stress: Not on file   Social Connections: Not on file   Intimate Partner Violence: Not on file   Housing Stability: Not on file           Medications  Allergies   Current Outpatient Medications   Medication Sig Dispense Refill     aspirin (ASA) 81 MG chewable tablet Take 81 mg by mouth daily       atorvastatin (LIPITOR) 40 MG tablet Take 1 tablet (40 mg) by mouth daily 90 tablet 0     calcium carbonate (OS-ISABEL) 500 MG tablet Take 1 tablet by mouth once 600 mg daily       clopidogrel (PLAVIX) 75 MG tablet Take 1 tablet (75 mg) by mouth daily 90 tablet 3     Ferrous Gluconate 324 (37.5 Fe) MG TABS        finasteride (PROSCAR) 5 MG tablet Take 1 tablet (5 mg) by mouth daily 90 tablet 1     losartan (COZAAR) 25 MG tablet Take 1 tablet (25 mg) by mouth daily 90 tablet 1     metoprolol  tartrate (LOPRESSOR) 25 MG tablet Take 1 tablet (25 mg) by mouth 2 times daily 180 tablet 0     Multiple Vitamins-Minerals (MULTIVITAMIN ADULT, MINERALS,) TABS Take 1 tablet by mouth daily       omeprazole (PRILOSEC) 20 MG DR capsule Take 1 capsule (20 mg) by mouth daily 90 capsule 11     tamsulosin (FLOMAX) 0.4 MG capsule Take 1 capsule by mouth daily       vitamin C (ASCORBIC ACID) 500 MG tablet Take 500 mg by mouth daily        fish oil-omega-3 fatty acids 1000 MG capsule  (Patient not taking: Reported on 4/7/2022)       fluorouracil 5 % SOLN Apply 1 Application topically as needed  (Patient not taking: Reported on 4/7/2022)       No Known Allergies       Lab Results    Chemistry/lipid CBC Cardiac Enzymes/BNP/TSH/INR   Recent Labs   Lab Test 01/25/22  1453   CHOL 136   HDL 62   LDL 58   TRIG 78     Recent Labs   Lab Test 01/25/22  1453 02/23/21  0813 02/11/20  1001   LDL 58 54 70     Recent Labs   Lab Test 08/19/21  1110 02/23/21  0813   NA  --  141   POTASSIUM  --  4.2   CHLORIDE  --  106   CO2  --  29   GLC  --  94   BUN  --  20   CR 0.9 0.76   GFRESTIMATED >60 >60   ISABEL  --  9.0     Recent Labs   Lab Test 08/19/21  1110 02/23/21  0813 08/18/20  1431   CR 0.9 0.76 0.82     Recent Labs   Lab Test 11/24/14  0801   A1C 5.2          Recent Labs   Lab Test 01/25/22  1453 02/23/21  0813   WBC  --  3.7*   HGB 13.5 13.6*   HCT  --  39.1*   MCV  --  90   PLT  --  159     Recent Labs   Lab Test 01/25/22  1453 02/23/21  0813 08/18/20  1431   HGB 13.5 13.6* 14.3    No results for input(s): TROPONINI in the last 30470 hours.  No results for input(s): BNP, NTBNPI, NTBNP in the last 05068 hours.  Recent Labs   Lab Test 02/23/21  0813   TSH 1.24     No results for input(s): INR in the last 34789 hours.     Liam Ac DO    Thank you for allowing me to participate in the care of your patient.    Sincerely,   DO [facility]

## 2022-04-08 DIAGNOSIS — N40.0 BPH (BENIGN PROSTATIC HYPERPLASIA): Primary | ICD-10-CM

## 2022-04-08 RX ORDER — FINASTERIDE 5 MG/1
5 TABLET, FILM COATED ORAL DAILY
Qty: 90 TABLET | Refills: 1 | Status: SHIPPED | OUTPATIENT
Start: 2022-04-08 | End: 2022-12-06

## 2022-04-23 ENCOUNTER — HEALTH MAINTENANCE LETTER (OUTPATIENT)
Age: 82
End: 2022-04-23

## 2022-04-25 ASSESSMENT — ACTIVITIES OF DAILY LIVING (ADL): CURRENT_FUNCTION: NO ASSISTANCE NEEDED

## 2022-04-26 ENCOUNTER — OFFICE VISIT (OUTPATIENT)
Dept: INTERNAL MEDICINE | Facility: CLINIC | Age: 82
End: 2022-04-26
Payer: COMMERCIAL

## 2022-04-26 VITALS
TEMPERATURE: 98 F | OXYGEN SATURATION: 99 % | DIASTOLIC BLOOD PRESSURE: 58 MMHG | BODY MASS INDEX: 28.23 KG/M2 | SYSTOLIC BLOOD PRESSURE: 124 MMHG | HEIGHT: 69 IN | WEIGHT: 190.6 LBS | HEART RATE: 54 BPM | RESPIRATION RATE: 20 BRPM

## 2022-04-26 DIAGNOSIS — Z00.00 ROUTINE GENERAL MEDICAL EXAMINATION AT A HEALTH CARE FACILITY: Primary | ICD-10-CM

## 2022-04-26 DIAGNOSIS — Z12.5 SCREENING FOR PROSTATE CANCER: ICD-10-CM

## 2022-04-26 LAB
ALBUMIN SERPL-MCNC: 3.5 G/DL (ref 3.5–5)
ALBUMIN UR-MCNC: NEGATIVE MG/DL
ALP SERPL-CCNC: 81 U/L (ref 45–120)
ALT SERPL W P-5'-P-CCNC: 18 U/L (ref 0–45)
ANION GAP SERPL CALCULATED.3IONS-SCNC: 9 MMOL/L (ref 5–18)
APPEARANCE UR: CLEAR
AST SERPL W P-5'-P-CCNC: 20 U/L (ref 0–40)
BILIRUB SERPL-MCNC: 0.8 MG/DL (ref 0–1)
BILIRUB UR QL STRIP: NEGATIVE
BUN SERPL-MCNC: 15 MG/DL (ref 8–28)
CALCIUM SERPL-MCNC: 8.9 MG/DL (ref 8.5–10.5)
CHLORIDE BLD-SCNC: 106 MMOL/L (ref 98–107)
CHOLEST SERPL-MCNC: 138 MG/DL
CO2 SERPL-SCNC: 27 MMOL/L (ref 22–31)
COLOR UR AUTO: YELLOW
CREAT SERPL-MCNC: 0.72 MG/DL (ref 0.7–1.3)
ERYTHROCYTE [DISTWIDTH] IN BLOOD BY AUTOMATED COUNT: 12.5 % (ref 10–15)
FASTING STATUS PATIENT QL REPORTED: YES
GFR SERPL CREATININE-BSD FRML MDRD: >90 ML/MIN/1.73M2
GLUCOSE BLD-MCNC: 84 MG/DL (ref 70–125)
GLUCOSE UR STRIP-MCNC: NEGATIVE MG/DL
HCT VFR BLD AUTO: 39.4 % (ref 40–53)
HDLC SERPL-MCNC: 66 MG/DL
HGB BLD-MCNC: 13.4 G/DL (ref 13.3–17.7)
HGB UR QL STRIP: NEGATIVE
KETONES UR STRIP-MCNC: NEGATIVE MG/DL
LDLC SERPL CALC-MCNC: 64 MG/DL
LEUKOCYTE ESTERASE UR QL STRIP: NEGATIVE
MCH RBC QN AUTO: 31.3 PG (ref 26.5–33)
MCHC RBC AUTO-ENTMCNC: 34 G/DL (ref 31.5–36.5)
MCV RBC AUTO: 92 FL (ref 78–100)
NITRATE UR QL: NEGATIVE
PH UR STRIP: 7 [PH] (ref 5–8)
PLATELET # BLD AUTO: 147 10E3/UL (ref 150–450)
POTASSIUM BLD-SCNC: 4.1 MMOL/L (ref 3.5–5)
PROT SERPL-MCNC: 5.7 G/DL (ref 6–8)
PSA SERPL-MCNC: 0.53 UG/L (ref 0–6.5)
RBC # BLD AUTO: 4.28 10E6/UL (ref 4.4–5.9)
SODIUM SERPL-SCNC: 142 MMOL/L (ref 136–145)
SP GR UR STRIP: 1.02 (ref 1–1.03)
TRIGL SERPL-MCNC: 42 MG/DL
TSH SERPL DL<=0.005 MIU/L-ACNC: 0.92 UIU/ML (ref 0.3–5)
UROBILINOGEN UR STRIP-ACNC: 0.2 E.U./DL
WBC # BLD AUTO: 5.2 10E3/UL (ref 4–11)

## 2022-04-26 PROCEDURE — G0103 PSA SCREENING: HCPCS | Performed by: INTERNAL MEDICINE

## 2022-04-26 PROCEDURE — 99397 PER PM REEVAL EST PAT 65+ YR: CPT | Performed by: INTERNAL MEDICINE

## 2022-04-26 PROCEDURE — 80053 COMPREHEN METABOLIC PANEL: CPT | Performed by: INTERNAL MEDICINE

## 2022-04-26 PROCEDURE — 80061 LIPID PANEL: CPT | Performed by: INTERNAL MEDICINE

## 2022-04-26 PROCEDURE — 85027 COMPLETE CBC AUTOMATED: CPT | Performed by: INTERNAL MEDICINE

## 2022-04-26 PROCEDURE — 36415 COLL VENOUS BLD VENIPUNCTURE: CPT | Performed by: INTERNAL MEDICINE

## 2022-04-26 PROCEDURE — 84443 ASSAY THYROID STIM HORMONE: CPT | Performed by: INTERNAL MEDICINE

## 2022-04-26 PROCEDURE — 81003 URINALYSIS AUTO W/O SCOPE: CPT | Performed by: INTERNAL MEDICINE

## 2022-04-26 ASSESSMENT — PATIENT HEALTH QUESTIONNAIRE - PHQ9: SUM OF ALL RESPONSES TO PHQ QUESTIONS 1-9: 2

## 2022-04-26 NOTE — LETTER
April 27, 2022      Juan Carlos Marley  [address] [address]        Dear ,    We are writing to inform you of your test results.    All very good       Resulted Orders   CBC with platelets   Result Value Ref Range    WBC Count 5.2 4.0 - 11.0 10e3/uL    RBC Count 4.28 (L) 4.40 - 5.90 10e6/uL    Hemoglobin 13.4 13.3 - 17.7 g/dL    Hematocrit 39.4 (L) 40.0 - 53.0 %    MCV 92 78 - 100 fL    MCH 31.3 26.5 - 33.0 pg    MCHC 34.0 31.5 - 36.5 g/dL    RDW 12.5 10.0 - 15.0 %    Platelet Count 147 (L) 150 - 450 10e3/uL       If you have any questions or concerns, please call the clinic at the number listed above.       Sincerely,      Adonis Trotter MD

## 2022-04-26 NOTE — PROGRESS NOTES
Annual Wellness Visit:  Juan Carlos Marley  is a 81 year old male  who presents for an annual wellness visit.  Wellness visit and physical examination and screen for prostate cancer.  Patient notes frequency of urination increased.  PSA pending digital rectal examination showed only minimal enlargement of the prostate without nodularity.  Upcoming [place] [facility] consultation in 1 month.  Upcoming neurology consultation 2 days with Dr. HECTOR Darnell [facility] neurology service.  Today check hemogram comprehensive metabolic profile urinalysis lipid panel PSA TSH.    Assessment/Plan:  Annual wellness visit and physical examination and screen for prostate cancer.  Accompanied today by his wife who is very supportive.    Subjective:   Medical History: Past health history inclusive of hypertension hyperlipidemia.    Non-smoker uses alcohol 1 glass of wine per day no known drug allergies.  Past Medical History:   Diagnosis Date     Cataract, nuclear sclerotic, left eye 6/2/2017     Coronary artery disease due to lipid rich plaque      Dyslipidemia, goal LDL below 70 12/20/2014     Essential hypertension with goal blood pressure less than 130/85      Hernia, abdominal      History of spinal cord injury      Nonsustained ventricular tachycardia (H)     6 beat run, asymptomatic per Dr. Hackett     Paroxysmal atrial fibrillation (H) 09/02/2013    at the time of CABG; [facility] did a 4-week monitor in 2017 and did not find any atrial fibrillation and therefore stopped his Eliquis.     Stricture of esophagus 2013    dilated twice since then     Transient ischemic attack 09/2018    transient speech difficulty and dizziness while in [place]; did not seek medical attention     Transient ischemic attack 10/2018    same symptoms as in [place]     Transient ischemic attack 11/20/2016     Transient ischemic attack 01/24/2016    left cerebral hemisphere     Transient ischemic attack 12/20/2014     Current Outpatient Medications    Medication     aspirin (ASA) 81 MG chewable tablet     atorvastatin (LIPITOR) 40 MG tablet     calcium carbonate (OS-ISABEL) 500 MG tablet     clopidogrel (PLAVIX) 75 MG tablet     Ferrous Gluconate 324 (37.5 Fe) MG TABS     finasteride (PROSCAR) 5 MG tablet     losartan (COZAAR) 25 MG tablet     metoprolol tartrate (LOPRESSOR) 25 MG tablet     Multiple Vitamins-Minerals (MULTIVITAMIN ADULT, MINERALS,) TABS     omeprazole (PRILOSEC) 20 MG DR capsule     tamsulosin (FLOMAX) 0.4 MG capsule     vitamin C (ASCORBIC ACID) 500 MG tablet     No current facility-administered medications for this visit.     Immunization History   Administered Date(s) Administered     COVID-19,PF,Pfizer (12+ Yrs) 01/31/2021, 02/21/2021, 09/28/2021     FLU 6-35 months 10/02/2007     FLUAD(HD)65+ QUAD 10/08/2020     Flu, Unspecified 10/27/1993, 11/22/1995, 10/16/1996, 10/09/1997, 10/27/1998, 11/12/1999, 10/30/2002, 10/24/2003, 12/27/2004, 11/02/2005, 11/09/2006, 10/02/2007, 11/12/2008, 10/05/2009, 11/04/2010, 11/01/2011, 10/15/2020     Influenza (H1N1) 01/13/2010     Influenza (High Dose) 3 valent vaccine 10/23/2015, 11/28/2016, 10/25/2017, 10/29/2018, 10/29/2019     Influenza (IIV3) PF 11/12/1999, 10/30/2002, 10/24/2003, 12/27/2004, 11/02/2005, 11/09/2006, 10/02/2007, 11/12/2008, 10/05/2009, 11/04/2010, 11/01/2011, 12/06/2012, 11/22/2013, 11/07/2014     Pneumo Conj 13-V (2010&after) 01/19/2015     Pneumococcal 23 valent 02/25/2008     Td (Adult), Adsorbed 11/22/1995, 05/25/2002, 02/25/2008     Tdap (Adacel,Boostrix) 02/21/2018     Tdap (Adult) Unspecified Formulation 02/25/2008     Zoster vaccine recombinant adjuvanted (SHINGRIX) 10/30/2019, 12/31/2019, 01/09/2020     Zoster vaccine, live 07/30/2012     History of skin cancer basal cell removed from his scalp prior history of actinic keratoses followed by dermatology.  Previously used 5-FU..    Lives in [facility] with his wife and 1 daughter daughter has cognitive challenges.    History  of esophageal stricture requiring dilatation followed by [facility] also history of Raynaud's phenomena.  And previous endoscopies have been done.    Colonoscopy dated 2020 with [facility] showed diverticulosis.    Coronary artery disease and coronary bypass grafting in 2013 when he had coronary bypass grafting to 5 vessels.    TIA like symptomatology with paroxysmal atrial fibrillation currently on aspirin 81 mg daily plus Plavix 75 mg daily.  Previous use of Eliquis is noted.  Previous use of warfarin has been discontinued.    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  Surgical History:  Past Surgical History:   Procedure Laterality Date     BYPASS GRAFT ARTERY CORONARY  2013    underwent 5-vessel coronary bypass grafting at the [facility]     CYSTOSCOPY       FOOT SURGERY Left 2014    Dr. Carr     FRACTURE SURGERY Right     hand     HERNIA REPAIR  2013     ORCHIECTOMY SCROTAL Right      ORIF FEMUR FRACTURE Right      PROSTATE SURGERY      half removed     PROSTATECTOMY  2013     TESTICLE SURGERY      removal of testicle   Prior history of cataract surgery bilateral.    History of sebaceous cyst with infection and drainage required.    Family History:  Daughter with diabetes mellitus and has cognitive challenges.    Wife also patient of this examiner and was here during examination and has been treated for leukocytoclastic vasculitis.    Mother  49 accidentally.    Mother  84 abdominal aortic aneurysm rupture.    Social History:  Enjoys senior living living in [facility].    Health Maintenances:  COVID vaccines reviewed and the patient anticipates getting fourth COVID shot or vaccine soon that would be a second booster.  Colonoscopy up-to-date see above.    Objective:  /58 (BP Location: Right arm, Patient Position: Sitting, Cuff Size: Adult Large)   Pulse 54   Temp 98  F (36.7  C) (Oral)   " Resp 20   Ht 1.745 m (5' 8.7\")   Wt 86.5 kg (190 lb 9.6 oz)   SpO2 99%   BMI 28.39 kg/m    He is easily conversant good spirited not in acute distress somewhat slow moving no tremor no reported falls lymph negative neuro negative HEENT negative back straight no severe spine tenderness lungs clear heart tones regular rhythm without murmur rub or gallop no carotid bruits abdomen benign genital examination was negative scrotal contents normal no groin hernias rectal showed a mild prostate enlargement 1/4 without nodularity nothing to suggest neoplasia or cancer rest of the rectal exam negative.  Good pulse tone in all 4 extremities somewhat slow movement without rigidity no resting tremor.  No masked facies.    Adonis Trotter MD    Internal Medicine  Answers for HPI/ROS submitted by the patient on 4/25/2022  In general, how would you rate your overall physical health?: fair  Frequency of exercise:: 1 day/week  Do you usually eat at least 4 servings of fruit and vegetables a day, include whole grains & fiber, and avoid regularly eating high fat or \"junk\" foods? : No  Taking medications regularly:: Yes  Medication side effects:: None  Activities of Daily Living: no assistance needed  Home safety: no safety concerns identified  Hearing Impairment:: difficulty following a conversation in a noisy restaurant or crowded room, feel that people are mumbling or not speaking clearly, difficulty understanding soft or whispered speech, difficulty understanding speech on the telephone  In the past 6 months, have you been bothered by leaking of urine?: No  In general, how would you rate your overall mental or emotional health?: good  Additional concerns today:: No  Duration of exercise:: Less than 15 minutes      "

## 2022-04-27 ENCOUNTER — TRANSFERRED RECORDS (OUTPATIENT)
Dept: HEALTH INFORMATION MANAGEMENT | Facility: CLINIC | Age: 82
End: 2022-04-27
Payer: COMMERCIAL

## 2022-04-28 ENCOUNTER — OFFICE VISIT (OUTPATIENT)
Dept: NEUROLOGY | Facility: CLINIC | Age: 82
End: 2022-04-28
Payer: COMMERCIAL

## 2022-04-28 VITALS
DIASTOLIC BLOOD PRESSURE: 68 MMHG | SYSTOLIC BLOOD PRESSURE: 157 MMHG | HEIGHT: 69 IN | HEART RATE: 54 BPM | WEIGHT: 190 LBS | BODY MASS INDEX: 28.14 KG/M2

## 2022-04-28 DIAGNOSIS — I65.1 BASILAR ARTERY STENOSIS, SYMPTOMATIC, WITHOUT INFARCTION: Primary | ICD-10-CM

## 2022-04-28 PROCEDURE — 99205 OFFICE O/P NEW HI 60 MIN: CPT | Performed by: PSYCHIATRY & NEUROLOGY

## 2022-04-28 NOTE — NURSING NOTE
Chief Complaint   Patient presents with     basilar artery stenosis      Discuss Plavix- whether he needs to stay on the medication or discontinue      Gabriela Benson CMA on 4/28/2022 at 1:05 PM

## 2022-04-28 NOTE — PROGRESS NOTES
NEUROLOGY CONSULTATION NOTE       [facility] [place]  [address] [place] [address]  Tel: [phone]  Fax: [phone]  [email or web address]     Juan Carlos Marley,  1940, MRN [id number]  PCP: Adonis Trotter  Date: 2022     ASSESSMENT & PLAN     Visit Diagnosis  1. Bilateral artery stenosis     Basilar artery stenosis  81-year-old male with history of CAD, s/p CABG, postop A. fib, HTN, HLD, SNHL with known basilar stenosis that in the past resulted in stereotypical episodes of dizziness and dysarthria who was referred for further evaluation and to decide if he can stop antiplatelets for prostate surgery.  Patient is somewhat nervous about the possibility of stroke but I reassured him that his previous MRA does not show high-grade basilar stenosis.  I have recommended checking CTA head and neck and if normal it should be okay for him to hold aspirin and Plavix for prostatectomy.  After 5 days of the procedure he should start back on Plavix 75 mg and aspirin 81 mg daily.  Follow-up will be on a as needed basis    Thank you again for this referral, please feel free to contact me if you have any questions.    Bandar Darnell MD  [facility]  (Former[facility], [facility]     REASON FOR CONSULTATION basilar artery stenosis         HISTORY OF PRESENT ILLNESS     We have been requested by Dr. Trotter to evaluate Juan Carlos Marley who is a 81 year old  male for history of TIA and need to discontinue antiplatelets    Patient is 81-year-old male with history of coronary artery disease status post CABG, postoperative A. fib, HTN, HLD, SNHLwho was evaluated in 2016 when he developed dizziness and afterwards was confused and unable to write his name.  He was on anticoagulants but had stopped the medicine because of upcoming prostate surgery.  CT of the head, CTA and MRI were normal.  Afterwards he was seen at [facility] for  feeling of dizziness along with some dysarthria and right-sided weakness on MRA he had basilar artery stenosis at the level of superior cerebellar artery with distal aneurysmal dilatation and over time he has remained stable.  He was transitioned from Eliquis to Plavix and aspirin but continued to have those stereotypical episodes and possibility of late life migraine aura without headache was raised.  After he was started on aspirin and Plavix he stopped having those stereotypical episodes and was doing fine until recently when he was told he needs prostate surgery done.  He is somewhat nervous to come off antiplatelet agent as he is afraid he will have a warning sign for stroke     PROBLEM LIST   Patient Active Problem List   Diagnosis Code     Essential hypertension I10     Dyslipidemia, goal LDL below 70 E78.5     Coronary artery disease due to lipid rich plaque I25.10, I25.83     Major depressive disorder, recurrent episode (H) F33.9     Benign prostatic hyperplasia with weak urinary stream N40.1, R39.12     Chronic anticoagulation Z79.01         PAST MEDICAL & SURGICAL HISTORY     Past Medical History:   Patient  has a past medical history of Cataract, nuclear sclerotic, left eye (6/2/2017), Coronary artery disease due to lipid rich plaque, Dyslipidemia, goal LDL below 70 (12/20/2014), Essential hypertension with goal blood pressure less than 130/85, Hernia, abdominal, History of spinal cord injury, Nonsustained ventricular tachycardia (H), Paroxysmal atrial fibrillation (H) (09/02/2013), Stricture of esophagus (2013), Transient ischemic attack (09/2018), Transient ischemic attack (10/2018), Transient ischemic attack (11/20/2016), Transient ischemic attack (01/24/2016), and Transient ischemic attack (12/20/2014).    Surgical History:  He  has a past surgical history that includes Bypass graft artery coronary (09/2013); Prostatectomy (11/2013); hernia repair (11/2013); Orif Femur Fracture (Right); Orchiectomy  scrotal (Right); Foot surgery (Left, 12/19/2014); fracture surgery (Right); Testicle surgery; Prostate surgery; and Cystoscopy.     SOCIAL HISTORY     Reviewed, and he  reports that he quit smoking about 52 years ago. His smoking use included cigarettes and pipe. He has a 10.00 pack-year smoking history. He has never used smokeless tobacco. He reports previous alcohol use of about 4.0 standard drinks of alcohol per week. He reports that he does not use drugs.     FAMILY HISTORY     Reviewed, and family history includes Aortic aneurysm in his father; Migraines in his mother; No Known Problems in his daughter; Other - See Comments in his mother and sister.     ALLERGIES     No Known Allergies      REVIEW OF SYSTEMS     A 12 point review of system was performed and was negative except as outlined in the history of present illness.     HOME MEDICATIONS     Current Outpatient Rx   Medication Sig Dispense Refill     aspirin (ASA) 81 MG chewable tablet Take 81 mg by mouth daily       atorvastatin (LIPITOR) 40 MG tablet Take 1 tablet (40 mg) by mouth daily 90 tablet 0     calcium carbonate (OS-ISABEL) 500 MG tablet Take 1 tablet by mouth once 600 mg daily       clopidogrel (PLAVIX) 75 MG tablet Take 1 tablet (75 mg) by mouth daily 90 tablet 3     Ferrous Gluconate 324 (37.5 Fe) MG TABS        finasteride (PROSCAR) 5 MG tablet Take 1 tablet (5 mg) by mouth daily 90 tablet 1     losartan (COZAAR) 25 MG tablet Take 1 tablet (25 mg) by mouth daily 90 tablet 1     metoprolol tartrate (LOPRESSOR) 25 MG tablet Take 1 tablet (25 mg) by mouth 2 times daily 180 tablet 0     Multiple Vitamins-Minerals (MULTIVITAMIN ADULT, MINERALS,) TABS Take 1 tablet by mouth daily       omeprazole (PRILOSEC) 20 MG DR capsule Take 1 capsule (20 mg) by mouth daily 90 capsule 11     tamsulosin (FLOMAX) 0.4 MG capsule Take 1 capsule by mouth daily       vitamin C (ASCORBIC ACID) 500 MG tablet Take 500 mg by mouth daily            PHYSICAL EXAM     Vital  "signs  BP (!) 157/68 (BP Location: Right arm, Patient Position: Sitting)   Pulse 54   Ht 1.753 m (5' 9\")   Wt 86.2 kg (190 lb)   BMI 28.06 kg/m      Weight:   190 lbs 0 oz    Patient is alert and oriented x3 vital signs were reviewed and are documented in electronic medical record.  Neck supple no carotid bruit thyromegaly JVD or lymphadenopathy noted.  Neurologically speech mentation and affect was normal.  He is hard of hearing rest of his cranial nerves are intact.  He has normal mass and tone with 5/5 strength.  Reflexes 1+ in the upper extremity absent in the lower extremity sensation intact light touch pinprick.  No dysmetria noted on finger-nose testing gait normal Romberg negative     PERTINENT DIAGNOSTIC STUDIES     Following studies were reviewed:     CTA HEAD AND NECK 8/21/2017  HEAD CTA:   1. Noncontrast head CT demonstrates no hemorrhage, mass/mass effect or CT evidence of acute infarct. MRI is more sensitive in the assessment of acute ischemia/infarction.  2. Head CTA demonstrates no aneurysm or significant stenosis in the proximal intracranial vessels.     NECK CTA:  1. Bovine arch configuration of the great vessels off the aortic arch.  2. About 10% stenosis involving the origins of the internal carotid arteries bilaterally by NASCET criteria.  3. No radiographic evidence of dissection.    MRI BRAIN , MRA 9/6/2017  1. 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   2. No evidence of recent ischemia or infarction.     MRI, MRA BRAIN 11/20/2016  HEAD MRI:  1. Ventricular system consistent with mild diffuse volume loss.  2.  No discrete mass lesion, hemorrhage or focal area suggestive of acute ischemia.  3.  No abnormal enhancement.     HEAD MRA:  1. There is no discrete aneurysm or vascular malformation involving the arteries at the base of the brain.  2.   Again visualized is the focal tortuosity and moderate narrowing of the mid basilar artery which is stable in " the interval.     PERTINENT LABS  Following labs were reviewed:  Office Visit on 04/26/2022   Component Date Value     WBC Count 04/26/2022 5.2      RBC Count 04/26/2022 4.28 (A)     Hemoglobin 04/26/2022 13.4      Hematocrit 04/26/2022 39.4 (A)     MCV 04/26/2022 92      MCH 04/26/2022 31.3      MCHC 04/26/2022 34.0      RDW 04/26/2022 12.5      Platelet Count 04/26/2022 147 (A)     Sodium 04/26/2022 142      Potassium 04/26/2022 4.1      Chloride 04/26/2022 106      Carbon Dioxide (CO2) 04/26/2022 27      Anion Gap 04/26/2022 9      Urea Nitrogen 04/26/2022 15      Creatinine 04/26/2022 0.72      Calcium 04/26/2022 8.9      Glucose 04/26/2022 84      Alkaline Phosphatase 04/26/2022 81      AST 04/26/2022 20      ALT 04/26/2022 18      Protein Total 04/26/2022 5.7 (A)     Albumin 04/26/2022 3.5      Bilirubin Total 04/26/2022 0.8      GFR Estimate 04/26/2022 >90      TSH 04/26/2022 0.92      Prostate Specific Antige* 04/26/2022 0.53      Cholesterol 04/26/2022 138      Triglycerides 04/26/2022 42      Direct Measure HDL 04/26/2022 66      LDL Cholesterol Calculat* 04/26/2022 64      Patient Fasting > 8hrs? 04/26/2022 Yes      Color Urine 04/26/2022 Yellow      Appearance Urine 04/26/2022 Clear      Glucose Urine 04/26/2022 Negative      Bilirubin Urine 04/26/2022 Negative      Ketones Urine 04/26/2022 Negative      Specific Gravity Urine 04/26/2022 1.020      Blood Urine 04/26/2022 Negative      pH Urine 04/26/2022 7.0      Protein Albumin Urine 04/26/2022 Negative      Urobilinogen Urine 04/26/2022 0.2      Nitrite Urine 04/26/2022 Negative      Leukocyte Esterase Urine 04/26/2022 Negative    Lab on 04/04/2022   Component Date Value     SARS CoV2 PCR 04/04/2022 Negative         Total time spent for face to face visit, reviewing labs/imaging studies, counseling and coordination of care was: 1 Hour spent on the date of the encounter doing chart review, review of outside records, review of test results,  interpretation of tests, patient visit and documentation       This note was dictated using voice recognition software.  Any grammatical or context distortions are unintentional and inherent to the software.    No orders of the defined types were placed in this encounter.     New Prescriptions    No medications on file      Modified Medications    No medications on file

## 2022-04-28 NOTE — LETTER
2022         RE: Juan Carlos Marley  [address] [place]  [place] [address]        Dear Colleague,    Thank you for referring your patient, Juan Carlos Marley, to the [facility]. Please see a copy of my visit note below.    NEUROLOGY CONSULTATION NOTE       [facility]  [address] [place] [address]  Tel: [phone]  Fax: [phone]  [email or web address]     Juan Carlos Marley,  1940, MRN [id number]  PCP: Adonis Trotter  Date: 2022     ASSESSMENT & PLAN     Visit Diagnosis  1. Bilateral artery stenosis     Basilar artery stenosis  81-year-old male with history of CAD, s/p CABG, postop A. fib, HTN, HLD, SNHL with known basilar stenosis that in the past resulted in stereotypical episodes of dizziness and dysarthria who was referred for further evaluation and to decide if he can stop antiplatelets for prostate surgery.  Patient is somewhat nervous about the possibility of stroke but I reassured him that his previous MRA does not show high-grade basilar stenosis.  I have recommended checking CTA head and neck and if normal it should be okay for him to hold aspirin and Plavix for prostatectomy.  After 5 days of the procedure he should start back on Plavix 75 mg and aspirin 81 mg daily.  Follow-up will be on a as needed basis    Thank you again for this referral, please feel free to contact me if you have any questions.    Bandar Darnell MD  [facility]  (Formerly, [facility]     REASON FOR CONSULTATION basilar artery stenosis         HISTORY OF PRESENT ILLNESS     We have been requested by Dr. Trotter to evaluate Juan Carlos Marley who is a 81 year old  male for history of TIA and need to discontinue antiplatelets    Patient is 81-year-old male with history of coronary artery disease status post CABG, postoperative A. fib, HTN, HLD, SNHLwho was evaluated in  when he  developed dizziness and afterwards was confused and unable to write his name.  He was on anticoagulants but had stopped the medicine because of upcoming prostate surgery.  CT of the head, CTA and MRI were normal.  Afterwards he was seen at [facility] for feeling of dizziness along with some dysarthria and right-sided weakness on MRA he had basilar artery stenosis at the level of superior cerebellar artery with distal aneurysmal dilatation and over time he has remained stable.  He was transitioned from Eliquis to Plavix and aspirin but continued to have those stereotypical episodes and possibility of late life migraine aura without headache was raised.  After he was started on aspirin and Plavix he stopped having those stereotypical episodes and was doing fine until recently when he was told he needs prostate surgery done.  He is somewhat nervous to come off antiplatelet agent as he is afraid he will have a warning sign for stroke     PROBLEM LIST   Patient Active Problem List   Diagnosis Code     Essential hypertension I10     Dyslipidemia, goal LDL below 70 E78.5     Coronary artery disease due to lipid rich plaque I25.10, I25.83     Major depressive disorder, recurrent episode (H) F33.9     Benign prostatic hyperplasia with weak urinary stream N40.1, R39.12     Chronic anticoagulation Z79.01         PAST MEDICAL & SURGICAL HISTORY     Past Medical History:   Patient  has a past medical history of Cataract, nuclear sclerotic, left eye (6/2/2017), Coronary artery disease due to lipid rich plaque, Dyslipidemia, goal LDL below 70 (12/20/2014), Essential hypertension with goal blood pressure less than 130/85, Hernia, abdominal, History of spinal cord injury, Nonsustained ventricular tachycardia (H), Paroxysmal atrial fibrillation (H) (09/02/2013), Stricture of esophagus (2013), Transient ischemic attack (09/2018), Transient ischemic attack (10/2018), Transient ischemic attack (11/20/2016), Transient ischemic attack  (01/24/2016), and Transient ischemic attack (12/20/2014).    Surgical History:  He  has a past surgical history that includes Bypass graft artery coronary (09/2013); Prostatectomy (11/2013); hernia repair (11/2013); Orif Femur Fracture (Right); Orchiectomy scrotal (Right); Foot surgery (Left, 12/19/2014); fracture surgery (Right); Testicle surgery; Prostate surgery; and Cystoscopy.     SOCIAL HISTORY     Reviewed, and he  reports that he quit smoking about 52 years ago. His smoking use included cigarettes and pipe. He has a 10.00 pack-year smoking history. He has never used smokeless tobacco. He reports previous alcohol use of about 4.0 standard drinks of alcohol per week. He reports that he does not use drugs.     FAMILY HISTORY     Reviewed, and family history includes Aortic aneurysm in his father; Migraines in his mother; No Known Problems in his daughter; Other - See Comments in his mother and sister.     ALLERGIES     No Known Allergies      REVIEW OF SYSTEMS     A 12 point review of system was performed and was negative except as outlined in the history of present illness.     HOME MEDICATIONS     Current Outpatient Rx   Medication Sig Dispense Refill     aspirin (ASA) 81 MG chewable tablet Take 81 mg by mouth daily       atorvastatin (LIPITOR) 40 MG tablet Take 1 tablet (40 mg) by mouth daily 90 tablet 0     calcium carbonate (OS-ISABEL) 500 MG tablet Take 1 tablet by mouth once 600 mg daily       clopidogrel (PLAVIX) 75 MG tablet Take 1 tablet (75 mg) by mouth daily 90 tablet 3     Ferrous Gluconate 324 (37.5 Fe) MG TABS        finasteride (PROSCAR) 5 MG tablet Take 1 tablet (5 mg) by mouth daily 90 tablet 1     losartan (COZAAR) 25 MG tablet Take 1 tablet (25 mg) by mouth daily 90 tablet 1     metoprolol tartrate (LOPRESSOR) 25 MG tablet Take 1 tablet (25 mg) by mouth 2 times daily 180 tablet 0     Multiple Vitamins-Minerals (MULTIVITAMIN ADULT, MINERALS,) TABS Take 1 tablet by mouth daily       omeprazole  "(PRILOSEC) 20 MG DR capsule Take 1 capsule (20 mg) by mouth daily 90 capsule 11     tamsulosin (FLOMAX) 0.4 MG capsule Take 1 capsule by mouth daily       vitamin C (ASCORBIC ACID) 500 MG tablet Take 500 mg by mouth daily            PHYSICAL EXAM     Vital signs  BP (!) 157/68 (BP Location: Right arm, Patient Position: Sitting)   Pulse 54   Ht 1.753 m (5' 9\")   Wt 86.2 kg (190 lb)   BMI 28.06 kg/m      Weight:   190 lbs 0 oz    Patient is alert and oriented x3 vital signs were reviewed and are documented in electronic medical record.  Neck supple no carotid bruit thyromegaly JVD or lymphadenopathy noted.  Neurologically speech mentation and affect was normal.  He is hard of hearing rest of his cranial nerves are intact.  He has normal mass and tone with 5/5 strength.  Reflexes 1+ in the upper extremity absent in the lower extremity sensation intact light touch pinprick.  No dysmetria noted on finger-nose testing gait normal Romberg negative     PERTINENT DIAGNOSTIC STUDIES     Following studies were reviewed:     CTA HEAD AND NECK 8/21/2017  HEAD CTA:   1. Noncontrast head CT demonstrates no hemorrhage, mass/mass effect or CT evidence of acute infarct. MRI is more sensitive in the assessment of acute ischemia/infarction.  2. Head CTA demonstrates no aneurysm or significant stenosis in the proximal intracranial vessels.     NECK CTA:  1. Bovine arch configuration of the great vessels off the aortic arch.  2. About 10% stenosis involving the origins of the internal carotid arteries bilaterally by NASCET criteria.  3. No radiographic evidence of dissection.    MRI BRAIN , MRA 9/6/2017  1. 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   2. No evidence of recent ischemia or infarction.     MRI, MRA BRAIN 11/20/2016  HEAD MRI:  1. Ventricular system consistent with mild diffuse volume loss.  2.  No discrete mass lesion, hemorrhage or focal area suggestive of acute ischemia.  3.  No " abnormal enhancement.     HEAD MRA:  1. There is no discrete aneurysm or vascular malformation involving the arteries at the base of the brain.  2.   Again visualized is the focal tortuosity and moderate narrowing of the mid basilar artery which is stable in the interval.     PERTINENT LABS  Following labs were reviewed:  Office Visit on 04/26/2022   Component Date Value     WBC Count 04/26/2022 5.2      RBC Count 04/26/2022 4.28 (A)     Hemoglobin 04/26/2022 13.4      Hematocrit 04/26/2022 39.4 (A)     MCV 04/26/2022 92      MCH 04/26/2022 31.3      MCHC 04/26/2022 34.0      RDW 04/26/2022 12.5      Platelet Count 04/26/2022 147 (A)     Sodium 04/26/2022 142      Potassium 04/26/2022 4.1      Chloride 04/26/2022 106      Carbon Dioxide (CO2) 04/26/2022 27      Anion Gap 04/26/2022 9      Urea Nitrogen 04/26/2022 15      Creatinine 04/26/2022 0.72      Calcium 04/26/2022 8.9      Glucose 04/26/2022 84      Alkaline Phosphatase 04/26/2022 81      AST 04/26/2022 20      ALT 04/26/2022 18      Protein Total 04/26/2022 5.7 (A)     Albumin 04/26/2022 3.5      Bilirubin Total 04/26/2022 0.8      GFR Estimate 04/26/2022 >90      TSH 04/26/2022 0.92      Prostate Specific Antige* 04/26/2022 0.53      Cholesterol 04/26/2022 138      Triglycerides 04/26/2022 42      Direct Measure HDL 04/26/2022 66      LDL Cholesterol Calculat* 04/26/2022 64      Patient Fasting > 8hrs? 04/26/2022 Yes      Color Urine 04/26/2022 Yellow      Appearance Urine 04/26/2022 Clear      Glucose Urine 04/26/2022 Negative      Bilirubin Urine 04/26/2022 Negative      Ketones Urine 04/26/2022 Negative      Specific Gravity Urine 04/26/2022 1.020      Blood Urine 04/26/2022 Negative      pH Urine 04/26/2022 7.0      Protein Albumin Urine 04/26/2022 Negative      Urobilinogen Urine 04/26/2022 0.2      Nitrite Urine 04/26/2022 Negative      Leukocyte Esterase Urine 04/26/2022 Negative    Lab on 04/04/2022   Component Date Value     SARS CoV2 PCR  04/04/2022 Negative         Total time spent for face to face visit, reviewing labs/imaging studies, counseling and coordination of care was: 1 Hour spent on the date of the encounter doing chart review, review of outside records, review of test results, interpretation of tests, patient visit and documentation       This note was dictated using voice recognition software.  Any grammatical or context distortions are unintentional and inherent to the software.    No orders of the defined types were placed in this encounter.     New Prescriptions    No medications on file      Modified Medications    No medications on file              Again, thank you for allowing me to participate in the care of your patient.        Sincerely,        Bandar Darnell MD

## 2022-05-05 ENCOUNTER — TRANSFERRED RECORDS (OUTPATIENT)
Dept: HEALTH INFORMATION MANAGEMENT | Facility: CLINIC | Age: 82
End: 2022-05-05
Payer: COMMERCIAL

## 2022-05-06 ENCOUNTER — HOSPITAL ENCOUNTER (OUTPATIENT)
Dept: CT IMAGING | Facility: CLINIC | Age: 82
Discharge: HOME | End: 2022-05-06
Attending: PSYCHIATRY & NEUROLOGY | Admitting: PSYCHIATRY & NEUROLOGY
Payer: COMMERCIAL

## 2022-05-06 DIAGNOSIS — I65.1 BASILAR ARTERY STENOSIS, SYMPTOMATIC, WITHOUT INFARCTION: ICD-10-CM

## 2022-05-06 PROCEDURE — 250N000011 HC RX IP 250 OP 636: Performed by: PSYCHIATRY & NEUROLOGY

## 2022-05-06 PROCEDURE — 70496 CT ANGIOGRAPHY HEAD: CPT

## 2022-05-06 RX ORDER — IOPAMIDOL 755 MG/ML
100 INJECTION, SOLUTION INTRAVASCULAR ONCE
Status: COMPLETED | OUTPATIENT
Start: 2022-05-06 | End: 2022-05-06

## 2022-05-06 RX ADMIN — IOPAMIDOL 75 ML: 755 INJECTION, SOLUTION INTRAVENOUS at 11:20

## 2022-05-09 DIAGNOSIS — I25.10 CORONARY ARTERY DISEASE DUE TO LIPID RICH PLAQUE: ICD-10-CM

## 2022-05-09 DIAGNOSIS — I25.83 CORONARY ARTERY DISEASE DUE TO LIPID RICH PLAQUE: ICD-10-CM

## 2022-05-09 RX ORDER — METOPROLOL TARTRATE 25 MG/1
25 TABLET, FILM COATED ORAL 2 TIMES DAILY
Qty: 180 TABLET | Refills: 2 | Status: SHIPPED | OUTPATIENT
Start: 2022-05-09 | End: 2023-01-05

## 2022-05-09 NOTE — RESULT ENCOUNTER NOTE
Deacarrol Rangel,   CTA head and neck shows basilar artery stenosis.  I understand you are scheduled for prostatectomy that requires holding aspirin and Plavix 5 days prior to the procedure.  Although basilar artery stenosis does increase the risk of stroke when aspirin and Plavix are held, but not addressing your prostate issues can also be problematic and I would recommend taking  calculated risk of holding aspirin and Plavix for 5 days prior to the procedure.  Please let me know if you have any questions    Bandar Darnell MD

## 2022-05-23 ENCOUNTER — TRANSFERRED RECORDS (OUTPATIENT)
Dept: HEALTH INFORMATION MANAGEMENT | Facility: CLINIC | Age: 82
End: 2022-05-23
Payer: COMMERCIAL

## 2022-05-24 ENCOUNTER — HOSPITAL ENCOUNTER (INPATIENT)
Facility: CLINIC | Age: 82
LOS: 3 days | Discharge: HOME | DRG: 920 | End: 2022-05-27
Attending: PHYSICIAN ASSISTANT | Admitting: HOSPITALIST
Payer: COMMERCIAL

## 2022-05-24 DIAGNOSIS — D64.9 ANEMIA, UNSPECIFIED TYPE: ICD-10-CM

## 2022-05-24 DIAGNOSIS — R31.9 HEMATURIA, UNSPECIFIED TYPE: ICD-10-CM

## 2022-05-24 DIAGNOSIS — R33.9 URINARY RETENTION: ICD-10-CM

## 2022-05-24 DIAGNOSIS — N17.9 AKI (ACUTE KIDNEY INJURY) (H): ICD-10-CM

## 2022-05-24 LAB
ALBUMIN UR-MCNC: >600 MG/DL
ANION GAP SERPL CALCULATED.3IONS-SCNC: 8 MMOL/L (ref 3–14)
APPEARANCE UR: ABNORMAL
BASOPHILS # BLD AUTO: 0 10E3/UL (ref 0–0.2)
BASOPHILS NFR BLD AUTO: 0 %
BILIRUB UR QL STRIP: NEGATIVE
BUN SERPL-MCNC: 37 MG/DL (ref 7–30)
CALCIUM SERPL-MCNC: 9.6 MG/DL (ref 8.5–10.1)
CHLORIDE BLD-SCNC: 102 MMOL/L (ref 94–109)
CO2 SERPL-SCNC: 25 MMOL/L (ref 20–32)
COLOR UR AUTO: ABNORMAL
CREAT SERPL-MCNC: 2.64 MG/DL (ref 0.66–1.25)
EOSINOPHIL # BLD AUTO: 0 10E3/UL (ref 0–0.7)
EOSINOPHIL NFR BLD AUTO: 0 %
ERYTHROCYTE [DISTWIDTH] IN BLOOD BY AUTOMATED COUNT: 12.8 % (ref 10–15)
GFR SERPL CREATININE-BSD FRML MDRD: 24 ML/MIN/1.73M2
GLUCOSE BLD-MCNC: 159 MG/DL (ref 70–99)
GLUCOSE UR STRIP-MCNC: NEGATIVE MG/DL
HCT VFR BLD AUTO: 37.7 % (ref 40–53)
HGB BLD-MCNC: 12.9 G/DL (ref 13.3–17.7)
HGB UR QL STRIP: ABNORMAL
IMM GRANULOCYTES # BLD: 0.1 10E3/UL
IMM GRANULOCYTES NFR BLD: 1 %
KETONES UR STRIP-MCNC: NEGATIVE MG/DL
LEUKOCYTE ESTERASE UR QL STRIP: NEGATIVE
LYMPHOCYTES # BLD AUTO: 0.6 10E3/UL (ref 0.8–5.3)
LYMPHOCYTES NFR BLD AUTO: 5 %
MCH RBC QN AUTO: 31.4 PG (ref 26.5–33)
MCHC RBC AUTO-ENTMCNC: 34.2 G/DL (ref 31.5–36.5)
MCV RBC AUTO: 92 FL (ref 78–100)
MONOCYTES # BLD AUTO: 0.6 10E3/UL (ref 0–1.3)
MONOCYTES NFR BLD AUTO: 5 %
NEUTROPHILS # BLD AUTO: 10.2 10E3/UL (ref 1.6–8.3)
NEUTROPHILS NFR BLD AUTO: 89 %
NITRATE UR QL: NEGATIVE
NRBC # BLD AUTO: 0 10E3/UL
NRBC BLD AUTO-RTO: 0 /100
PH UR STRIP: 7.5 [PH] (ref 5–7)
PLATELET # BLD AUTO: 180 10E3/UL (ref 150–450)
POTASSIUM BLD-SCNC: 4.1 MMOL/L (ref 3.4–5.3)
RBC # BLD AUTO: 4.11 10E6/UL (ref 4.4–5.9)
RBC URINE: >182 /HPF
SARS-COV-2 RNA RESP QL NAA+PROBE: NEGATIVE
SODIUM SERPL-SCNC: 135 MMOL/L (ref 133–144)
SP GR UR STRIP: 1.03 (ref 1–1.03)
UROBILINOGEN UR STRIP-MCNC: NORMAL MG/DL
WBC # BLD AUTO: 11.5 10E3/UL (ref 4–11)
WBC URINE: >182 /HPF

## 2022-05-24 PROCEDURE — 99285 EMERGENCY DEPT VISIT HI MDM: CPT | Mod: 25

## 2022-05-24 PROCEDURE — 120N000001 HC R&B MED SURG/OB

## 2022-05-24 PROCEDURE — 51700 IRRIGATION OF BLADDER: CPT

## 2022-05-24 PROCEDURE — 82310 ASSAY OF CALCIUM: CPT | Performed by: PHYSICIAN ASSISTANT

## 2022-05-24 PROCEDURE — 250N000011 HC RX IP 250 OP 636: Performed by: PHYSICIAN ASSISTANT

## 2022-05-24 PROCEDURE — 87086 URINE CULTURE/COLONY COUNT: CPT | Performed by: PHYSICIAN ASSISTANT

## 2022-05-24 PROCEDURE — 250N000013 HC RX MED GY IP 250 OP 250 PS 637: Performed by: HOSPITALIST

## 2022-05-24 PROCEDURE — 250N000009 HC RX 250: Performed by: EMERGENCY MEDICINE

## 2022-05-24 PROCEDURE — 81001 URINALYSIS AUTO W/SCOPE: CPT | Performed by: PHYSICIAN ASSISTANT

## 2022-05-24 PROCEDURE — 96366 THER/PROPH/DIAG IV INF ADDON: CPT

## 2022-05-24 PROCEDURE — U0005 INFEC AGEN DETEC AMPLI PROBE: HCPCS | Performed by: PHYSICIAN ASSISTANT

## 2022-05-24 PROCEDURE — 99223 1ST HOSP IP/OBS HIGH 75: CPT | Mod: AI | Performed by: HOSPITALIST

## 2022-05-24 PROCEDURE — C9803 HOPD COVID-19 SPEC COLLECT: HCPCS

## 2022-05-24 PROCEDURE — 96365 THER/PROPH/DIAG IV INF INIT: CPT

## 2022-05-24 PROCEDURE — 85025 COMPLETE CBC W/AUTO DIFF WBC: CPT | Performed by: PHYSICIAN ASSISTANT

## 2022-05-24 PROCEDURE — 51798 US URINE CAPACITY MEASURE: CPT

## 2022-05-24 PROCEDURE — 36415 COLL VENOUS BLD VENIPUNCTURE: CPT | Performed by: PHYSICIAN ASSISTANT

## 2022-05-24 PROCEDURE — 258N000003 HC RX IP 258 OP 636: Performed by: HOSPITALIST

## 2022-05-24 RX ORDER — LIDOCAINE 40 MG/G
CREAM TOPICAL
Status: DISCONTINUED | OUTPATIENT
Start: 2022-05-24 | End: 2022-05-27 | Stop reason: HOSPADM

## 2022-05-24 RX ORDER — ONDANSETRON 2 MG/ML
4 INJECTION INTRAMUSCULAR; INTRAVENOUS EVERY 6 HOURS PRN
Status: DISCONTINUED | OUTPATIENT
Start: 2022-05-24 | End: 2022-05-27 | Stop reason: HOSPADM

## 2022-05-24 RX ORDER — SODIUM CHLORIDE 9 MG/ML
INJECTION, SOLUTION INTRAVENOUS CONTINUOUS
Status: DISCONTINUED | OUTPATIENT
Start: 2022-05-24 | End: 2022-05-27 | Stop reason: HOSPADM

## 2022-05-24 RX ORDER — LIDOCAINE HYDROCHLORIDE 20 MG/ML
10 JELLY TOPICAL ONCE
Status: COMPLETED | OUTPATIENT
Start: 2022-05-24 | End: 2022-05-24

## 2022-05-24 RX ORDER — METOPROLOL TARTRATE 25 MG/1
25 TABLET, FILM COATED ORAL 2 TIMES DAILY
Status: DISCONTINUED | OUTPATIENT
Start: 2022-05-24 | End: 2022-05-27 | Stop reason: HOSPADM

## 2022-05-24 RX ORDER — CEFTRIAXONE 2 G/1
2 INJECTION, POWDER, FOR SOLUTION INTRAMUSCULAR; INTRAVENOUS ONCE
Status: COMPLETED | OUTPATIENT
Start: 2022-05-24 | End: 2022-05-24

## 2022-05-24 RX ORDER — ACETAMINOPHEN 325 MG/1
650 TABLET ORAL EVERY 6 HOURS PRN
Status: DISCONTINUED | OUTPATIENT
Start: 2022-05-24 | End: 2022-05-27 | Stop reason: HOSPADM

## 2022-05-24 RX ORDER — TAMSULOSIN HYDROCHLORIDE 0.4 MG/1
0.4 CAPSULE ORAL 2 TIMES DAILY
Status: DISCONTINUED | OUTPATIENT
Start: 2022-05-24 | End: 2022-05-27 | Stop reason: HOSPADM

## 2022-05-24 RX ORDER — PANTOPRAZOLE SODIUM 40 MG/1
40 TABLET, DELAYED RELEASE ORAL EVERY EVENING
Status: DISCONTINUED | OUTPATIENT
Start: 2022-05-24 | End: 2022-05-27 | Stop reason: HOSPADM

## 2022-05-24 RX ORDER — FINASTERIDE 5 MG/1
5 TABLET, FILM COATED ORAL DAILY
Status: DISCONTINUED | OUTPATIENT
Start: 2022-05-25 | End: 2022-05-27 | Stop reason: HOSPADM

## 2022-05-24 RX ORDER — ACETAMINOPHEN 650 MG/1
650 SUPPOSITORY RECTAL EVERY 6 HOURS PRN
Status: DISCONTINUED | OUTPATIENT
Start: 2022-05-24 | End: 2022-05-27 | Stop reason: HOSPADM

## 2022-05-24 RX ORDER — ATORVASTATIN CALCIUM 40 MG/1
40 TABLET, FILM COATED ORAL EVERY EVENING
Status: DISCONTINUED | OUTPATIENT
Start: 2022-05-24 | End: 2022-05-27 | Stop reason: HOSPADM

## 2022-05-24 RX ORDER — ASCORBIC ACID 500 MG
500 TABLET ORAL DAILY
Status: DISCONTINUED | OUTPATIENT
Start: 2022-05-25 | End: 2022-05-27 | Stop reason: HOSPADM

## 2022-05-24 RX ORDER — CEFTRIAXONE 1 G/1
1 INJECTION, POWDER, FOR SOLUTION INTRAMUSCULAR; INTRAVENOUS EVERY 24 HOURS
Status: DISCONTINUED | OUTPATIENT
Start: 2022-05-25 | End: 2022-05-26

## 2022-05-24 RX ORDER — FERROUS GLUCONATE 324(38)MG
324 TABLET ORAL 2 TIMES DAILY
Status: DISCONTINUED | OUTPATIENT
Start: 2022-05-24 | End: 2022-05-27 | Stop reason: HOSPADM

## 2022-05-24 RX ORDER — ONDANSETRON 4 MG/1
4 TABLET, ORALLY DISINTEGRATING ORAL EVERY 6 HOURS PRN
Status: DISCONTINUED | OUTPATIENT
Start: 2022-05-24 | End: 2022-05-27 | Stop reason: HOSPADM

## 2022-05-24 RX ORDER — BISACODYL 10 MG
10 SUPPOSITORY, RECTAL RECTAL DAILY PRN
Status: DISCONTINUED | OUTPATIENT
Start: 2022-05-24 | End: 2022-05-27 | Stop reason: HOSPADM

## 2022-05-24 RX ORDER — AMOXICILLIN 250 MG
2 CAPSULE ORAL 2 TIMES DAILY PRN
Status: DISCONTINUED | OUTPATIENT
Start: 2022-05-24 | End: 2022-05-27 | Stop reason: HOSPADM

## 2022-05-24 RX ORDER — AMOXICILLIN 250 MG
1 CAPSULE ORAL 2 TIMES DAILY PRN
Status: DISCONTINUED | OUTPATIENT
Start: 2022-05-24 | End: 2022-05-27 | Stop reason: HOSPADM

## 2022-05-24 RX ADMIN — METOPROLOL TARTRATE 25 MG: 25 TABLET, FILM COATED ORAL at 20:28

## 2022-05-24 RX ADMIN — LIDOCAINE HYDROCHLORIDE 10 ML: 20 JELLY TOPICAL at 14:29

## 2022-05-24 RX ADMIN — PANTOPRAZOLE SODIUM 40 MG: 40 TABLET, DELAYED RELEASE ORAL at 20:28

## 2022-05-24 RX ADMIN — ATORVASTATIN CALCIUM 40 MG: 40 TABLET, FILM COATED ORAL at 20:28

## 2022-05-24 RX ADMIN — TAMSULOSIN HYDROCHLORIDE 0.4 MG: 0.4 CAPSULE ORAL at 20:28

## 2022-05-24 RX ADMIN — SODIUM CHLORIDE: 9 INJECTION, SOLUTION INTRAVENOUS at 19:27

## 2022-05-24 RX ADMIN — FERROUS GLUCONATE 324 MG: 324 TABLET ORAL at 21:36

## 2022-05-24 RX ADMIN — CEFTRIAXONE SODIUM 2 G: 2 INJECTION, POWDER, FOR SOLUTION INTRAMUSCULAR; INTRAVENOUS at 15:00

## 2022-05-24 ASSESSMENT — ACTIVITIES OF DAILY LIVING (ADL)
CONCENTRATING,_REMEMBERING_OR_MAKING_DECISIONS_DIFFICULTY: NO
PATIENT'S_PREFERRED_MEANS_OF_COMMUNICATION: ENGLISH SPEAKER WITH HEARING LOSS, NO SPEECH PROBLEMS.
VISION_MANAGEMENT: GLASSES
DIFFICULTY_EATING/SWALLOWING: NO
ADLS_ACUITY_SCORE: 35
CHANGE_IN_FUNCTIONAL_STATUS_SINCE_ONSET_OF_CURRENT_ILLNESS/INJURY: NO
USE_OF_HEARING_ASSISTIVE_DEVICES: BILATERAL HEARING AIDS
WALKING_OR_CLIMBING_STAIRS_DIFFICULTY: NO
WERE_AUXILIARY_AIDS_OFFERED?: NO
TOILETING_ISSUES: NO
DOING_ERRANDS_INDEPENDENTLY_DIFFICULTY: NO
HEARING_DIFFICULTY_OR_DEAF: YES
ADLS_ACUITY_SCORE: 24
WEAR_GLASSES_OR_BLIND: YES
DIFFICULTY_COMMUNICATING: NO
DESCRIBE_HEARING_LOSS: BILATERAL HEARING LOSS
ADLS_ACUITY_SCORE: 35
FALL_HISTORY_WITHIN_LAST_SIX_MONTHS: NO
ADLS_ACUITY_SCORE: 22
DRESSING/BATHING_DIFFICULTY: NO

## 2022-05-24 ASSESSMENT — ENCOUNTER SYMPTOMS
FREQUENCY: 1
HEMATURIA: 1
DIFFICULTY URINATING: 1

## 2022-05-24 NOTE — PROGRESS NOTES
RECEIVING UNIT ED HANDOFF REVIEW    ED Nurse Handoff Report was reviewed by: Joann Mcconnell RN on May 24, 2022 at 6:26 PM

## 2022-05-24 NOTE — ED NOTES
[facility]  ED Nurse Handoff Report    ED Chief complaint: Post-op Problem      ED Diagnosis:   Final diagnoses:   None       Code Status: Full Code    Allergies: No Known Allergies    Patient Story: Pt presents with hematuria and retention. Pt is a very nice gentleman whom had a cystoscopy and pt reports he was told they hit a vein during the procedure so pt was having some bleeding throughout the night. Pt this morning noticed fullness and unable to void. Bladder scan showing 600 ml. 3 way alberto placed with bright red blood returned and clots present. CBI hooked up and pt tolerating well. Pt given IV antibiotics. Wife at bedside  Focused Assessment:  Ao x4, independent, CBI infusing at moderate rate     Treatments and/or interventions provided: Blood work, cbi, 3way alberto  Patient's response to treatments and/or interventions: tolerating well     To be done/followed up on inpatient unit:  inpt orders, urology     Does this patient have any cognitive concerns?: none    Activity level - Baseline/Home:  Independent  Activity Level - Current:   Independent    Patient's Preferred language: English   Needed?: No    Isolation: None  Infection: Not Applicable  Patient tested for COVID 19 prior to admission: YES  Bariatric?: No    Vital Signs:   Vitals:    05/24/22 1317 05/24/22 1329 05/24/22 1500 05/24/22 1505   BP: (!) 147/74  127/58    Pulse: 69  70    Resp: 16   20   Temp: 97.6  F (36.4  C)      SpO2:  98%  97%       Cardiac Rhythm:     Was the PSS-3 completed:   Yes  What interventions are required if any?               Family Comments: wife at bedside  OBS brochure/video discussed/provided to patient/family: N/A              Name of person given brochure if not patient: na              Relationship to patient: na    For the majority of the shift this patient's behavior was Green.   Behavioral interventions performed were none.    ED NURSE PHONE NUMBER: *[phone]

## 2022-05-24 NOTE — PHARMACY-ADMISSION MEDICATION HISTORY
Pharmacy Medication History  Admission medication history interview status for the 5/24/2022  admission is complete. See EPIC admission navigator for prior to admission medications     Location of Interview: Patient room  Medication history sources: Patient    Significant changes made to the medication list:       In the past week, patient estimated taking medication this percent of the time: greater than 90%    Additional medication history information:        Medication reconciliation completed by provider prior to medication history? No    Time spent in this activity: 15min    Prior to Admission medications    Medication Sig Last Dose Taking? Auth Provider   aspirin (ASA) 81 MG chewable tablet Take 81 mg by mouth daily 5/24/2022 at Unknown time Yes Reported, Patient   atorvastatin (LIPITOR) 40 MG tablet Take 1 tablet (40 mg) by mouth daily 5/23/2022 at pm Yes Liam Ac DO   calcium carbonate (OS-ISABEL) 500 MG tablet Take 1 tablet by mouth once 600 mg daily 5/24/2022 at Unknown time Yes Reported, Patient   clopidogrel (PLAVIX) 75 MG tablet Take 1 tablet (75 mg) by mouth daily 5/24/2022 at Unknown time Yes Adonis Trotter MD   Ferrous Gluconate 324 (37.5 Fe) MG TABS Take 324 mg by mouth 2 times daily 5/24/2022 at am Yes Reported, Patient   finasteride (PROSCAR) 5 MG tablet Take 1 tablet (5 mg) by mouth daily 5/24/2022 at am Yes Roderick Vergraa MD   losartan (COZAAR) 25 MG tablet Take 1 tablet (25 mg) by mouth daily 5/24/2022 at am Yes Liam Ac DO   metoprolol tartrate (LOPRESSOR) 25 MG tablet Take 1 tablet (25 mg) by mouth 2 times daily 5/24/2022 at am Yes Deven Arango MD   Multiple Vitamins-Minerals (MULTIVITAMIN ADULT, MINERALS,) TABS Take 1 tablet by mouth daily  Yes Reported, Patient   omeprazole (PRILOSEC) 20 MG DR capsule Take 1 capsule (20 mg) by mouth daily 5/23/2022 at pm Yes Adonis Trotter MD   tamsulosin (FLOMAX) 0.4 MG capsule Take 1 capsule by mouth 2  times daily 5/24/2022 at am Yes Reported, Patient   vitamin C (ASCORBIC ACID) 500 MG tablet Take 500 mg by mouth daily  5/24/2022 at am Yes Reported, Patient       The information provided in this note is only as accurate as the sources available at the time of update(s)

## 2022-05-24 NOTE — ED PROVIDER NOTES
History   Chief Complaint:  Post-op Problem     HPI   Juan Carlos Marley is a 81 year old male, on Plavix who presents with post-op problem. The patient reports urinary retention following a urology procedure yesterday where they looked up his urethra with a camera. He has been having clots in his urine, and difficulty going with urgency. He reports having an enlarged prostate as well. He had been having some vomiting, potentially due to cranberry which disagrees with him.    Review of Systems   Genitourinary: Positive for difficulty urinating, frequency, hematuria and urgency.   All other systems reviewed and are negative.    Allergies:  The patient has no known allergies.     Medications:  aspirin 81 MG  atorvastatin  clopidogrel  finasteride  losartan  metoprolol tartrate  omeprazole  tamsulosin  sildenafil  nitroglycerin  venlafaxine    Past Medical History:     Cataract, nuclear sclerotic, left eye   Coronary artery disease due to lipid rich plaque   Dyslipidemia  Essential hypertension   Hernia, abdominal   History of spinal cord injury   Nonsustained ventricular tachycardia  Paroxysmal atrial fibrillation   Stricture of esophagus   Transient ischemic attack x 5  Actinic keratosis  Prostatic hyperplasia  Atrial Fibrillation Paroxysmal    Past Surgical History:    Bypass graft artery coronary  Foot surgery  Fracture surgery, hand  Hernia repair  Orchiectomy scrotal  ORIF femur fracture  Prostatectomy  Testicle surgery     Family History:    Mother: Migraines  Father: Aortic aneurysm  Sister: Autistic    Social History:  Patient is accompanied by his wife.  He arrived to ED by car.    Physical Exam     Patient Vitals for the past 24 hrs:   BP Temp Pulse Resp SpO2   05/24/22 1505 -- -- -- 20 97 %   05/24/22 1500 127/58 -- 70 -- --   05/24/22 1329 -- -- -- -- 98 %   05/24/22 1317 (!) 147/74 97.6  F (36.4  C) 69 16 --     Physical Exam  General: Well appearing, pleasant male, resting on exam bed  HEENT: No evidence  of trauma.  Conjunctive are clear.  Extraocular eye movements intact.  Neck range of motion intact.  Nose and throat clear.  Respiratory: Good breath sounds bilaterally  Cardiovascular: Normal rate and rhythm   Gastrointestinal: Soft, nontender.   Musculoskeletal: Atraumatic  Skin: Exposed skin clear.  Neurologic: Alert.  Psych:  Patient is cooperative, with normal affect.  : Normal aside from blood at the meatus.    Emergency Department Course   Laboratory:  Labs Ordered and Resulted from Time of ED Arrival to Time of ED Departure   BASIC METABOLIC PANEL - Abnormal       Result Value    Sodium 135      Potassium 4.1      Chloride 102      Carbon Dioxide (CO2) 25      Anion Gap 8      Urea Nitrogen 37 (*)     Creatinine 2.64 (*)     Calcium 9.6      Glucose 159 (*)     GFR Estimate 24 (*)    URINE MACROSCOPIC WITH REFLEX TO MICRO - Abnormal    Color Urine Red (*)     Appearance Urine Bloody (*)     Glucose Urine Negative      Bilirubin Urine Negative      Ketones Urine Negative      Specific Gravity Urine 1.032      Blood Urine Large (*)     pH Urine 7.5 (*)     Protein Albumin Urine >600 (*)     Urobilinogen Urine Normal      Nitrite Urine Negative      Leukocyte Esterase Urine Negative      RBC Urine >182 (*)     WBC Urine >182 (*)    CBC WITH PLATELETS AND DIFFERENTIAL - Abnormal    WBC Count 11.5 (*)     RBC Count 4.11 (*)     Hemoglobin 12.9 (*)     Hematocrit 37.7 (*)     MCV 92      MCH 31.4      MCHC 34.2      RDW 12.8      Platelet Count 180      % Neutrophils 89      % Lymphocytes 5      % Monocytes 5      % Eosinophils 0      % Basophils 0      % Immature Granulocytes 1      NRBCs per 100 WBC 0      Absolute Neutrophils 10.2 (*)     Absolute Lymphocytes 0.6 (*)     Absolute Monocytes 0.6      Absolute Eosinophils 0.0      Absolute Basophils 0.0      Absolute Immature Granulocytes 0.1      Absolute NRBCs 0.0     COVID-19 VIRUS (CORONAVIRUS) BY PCR - Normal    SARS CoV2 PCR Negative     URINE CULTURE         Emergency Department Course:           Reviewed:  I reviewed nursing notes, vitals, past medical history and Care Everywhere    Assessments:  1338 I obtained history and examined the patient as noted above.   1513 I rechecked the patient and explained findings.     Consults:  1400 I consulted with Dr. Spencer of [facility]  1411 I consulted with CASTRO Zaragoza, of [facility]  1425 I staffed the patient with Dr. Tripp.    Interventions:  1429 Lidocaine 10 mL Urethral  1500 Ceftriaxone 2 g IV    Disposition:  The patient was admitted to the hospital under the care of Dr. Marley.     Impression & Plan     CMS Diagnoses: None    Medical Decision Making:  Juan Carlos Marley is a 81 year old male who is currently on Plavix presents emergency room today for evaluation of urinary retention and hematuria.  See HPI.  His vitals are unremarkable.  The patient has mild abdominal tenderness.  He had a cystoscopy yesterday.  I contacted Dr. Spencer and Mila BLACK of [place] [facility] regarding the patient.  They recommended catheterization, three-way Lawrence catheter, antibiotics, urine culture, labs, bladder irrigation, holding Plavix and admission.  Patient was agreeable to the plan.  He was mildly anemic.  It appears he developed an JOCELINE, likely postrenal in nature.  He had 650+ milliliters of urine before a catheter was placed.  He feels much better after this.  I staffed the case with Dr. Tripp.  Patient will be admitted to the hospital service.  Dr. Marley accepting.  Wife and patient agreeable to the plan.  Stable at time of admission.    Diagnosis:    ICD-10-CM    1. Urinary retention  R33.9    2. Hematuria, unspecified type  R31.9    3. JOCELINE (acute kidney injury) (H)  N17.9    4. Anemia, unspecified type  D64.9        Discharge Medications:  New Prescriptions    No medications on file       Scribe Disclosure:  I, Inderjit Frazier, am serving as a scribe at 1:35 PM on 5/24/2022 to document services personally  performed by Jm Collier PA-C based on my observations and the provider's statements to me.          Jm Collier PA-C  05/24/22 6090

## 2022-05-24 NOTE — ED NOTES
3 way 22 F catheter placed and pt tolerated well. Approximately 600 ml gross hematuria drained from alberto. This was sent for UA analysis.

## 2022-05-24 NOTE — ED NOTES
Bed: ED31  Expected date:   Expected time:   Means of arrival:   Comments:  Post op urine retention?

## 2022-05-24 NOTE — ED NOTES
Bladder scan >650 ml of urine. Pt does report some clots present when he attempts to urinate as well

## 2022-05-24 NOTE — ED TRIAGE NOTES
Pt reports urology procedure yesterday. Pt reports little urine output today, possible retention of urine. Pt also report bleeding from urethra, on Plavix.       Triage Assessment     Row Name 05/24/22 1317       Peripheral/Neurovascular WDL    Capillary Refill, General less than/equal to 3 secs       Custar Coma Scale    Best Eye Response 4-->(E4) spontaneous    Best Motor Response 6-->(M6) obeys commands    Best Verbal Response 5-->(V5) oriented    Ambrocio Coma Scale Score 15

## 2022-05-24 NOTE — H&P
[facility]    History and Physical  Hospitalist       Date of Admission:  5/24/2022  Date of Service (when I saw the patient): 05/24/22    Assessment & Plan   Juan Carlos Marley is a 81 year old male who presents with hematuria.  Admitted for further evaluation and treatment.    Hematuria status post cystoscopy, acute kidney injury, stage II-III: Patient reporting to the emergency department reporting clots in his urine as well as difficulty with urination, and urgency.  Patient does have a history of enlarged prostate with recent cystoscopy.  Patient does endorse vomiting.  Reports nausea.  Patient denies headache, ear pain, eye pain, sore throat, cough, chest pain, shortness of breath, abdominal pain, blood in stool, lower extremity edema, rash, fever.  Creatinine is 2.64 in the emergency department.  White blood cell count is 11.5 with a hemoglobin of 12.9 and a platelet count of 180.  Urinalysis shows bloody appearance, pH of 7.5, greater than 600 protein, or blood, negative leukocyte esterase.  COVID testing is negative.  -Hold antiplatelet agents.  -Continuous bladder irrigation.  -Urine culture pending.  -IVF.   -Close hemodynamic monitoring.  -Avoid nephrotoxins.  -Empiric antibiotic therapy. Ceftriaxone continued from ED.   -Urology consult.     H/o TIA, Basilar artery stenosis: Previous MRI showing high-grade basilar stenosis.  -Hold antiplatelet agents.  -Hold prior to admission aspirin and Plavix.    History of BPH: Patient previously on finasteride and tamsulosin.  -Continue prior to admission finasteride when verified by pharmacy.  -Continue prior to admission tamsulosin when verified by pharmacy.    Cardiac, hypertension, CAD, HLD: Patient previously on losartan and metoprolol. H/o CABG.   -Continue prior to admission metoprolol When verified by pharmacy.  -Hold prior to admission losartan.  -Continue prior to admission atorvastatin when verified by pharmacy.    GERD:  "Stable.  -Continue prior to admission omeprazole when verified by pharmacy.    DVT Prophylaxis: Pneumatic Compression Devices  Code Status: Full Code    Disposition: Inpatient.    Clinically Significant Risk Factors Present on Admission                # Platelet Defect: home medication list includes an antiplatelet medication   # Overweight: Estimated body mass index is 28.06 kg/m  as calculated from the following:    Height as of 4/28/22: 1.753 m (5' 9\").    Weight as of 4/28/22: 86.2 kg (190 lb).        Dr. Refugio Marley D.O.  [facility] Hospitalist  Pager [phone]    Primary Care Physician   Adonis Trotter    Chief Complaint   Hematuria    History is obtained from the patient and medical records.     History of Present Illness   Juan Carlos Marley is a 81 year old male who presents with hematuria.  Admitted for further evaluation and treatment. Patient reporting to the emergency department reporting clots in his urine as well as difficulty with urination, and urgency.  Patient does have a history of enlarged prostate with recent cystoscopy.  Patient does endorse vomiting.  Reports nausea.  Patient denies headache, ear pain, eye pain, sore throat, cough, chest pain, shortness of breath, abdominal pain, blood in stool, lower extremity edema, rash, fever.  Creatinine is 2.64 in the emergency department.  White blood cell count is 11.5 with a hemoglobin of 12.9 and a platelet count of 180.  Urinalysis shows bloody appearance, pH of 7.5, greater than 600 protein, or blood, negative leukocyte esterase.  COVID testing is negative.    Past Medical History    I have reviewed this patient's medical history and updated it with pertinent information if needed.   Past Medical History:   Diagnosis Date     Cataract, nuclear sclerotic, left eye 6/2/2017     Coronary artery disease due to lipid rich plaque      Dyslipidemia, goal LDL below 70 12/20/2014     Essential hypertension with goal blood pressure less " than 130/85      Hernia, abdominal      History of spinal cord injury      Nonsustained ventricular tachycardia (H)     6 beat run, asymptomatic per Dr. Hackett     Paroxysmal atrial fibrillation (H) 09/02/2013    at the time of CABG; [facility] did a 4-week monitor in 2017 and did not find any atrial fibrillation and therefore stopped his Eliquis.     Stricture of esophagus 2013    dilated twice since then     Transient ischemic attack 09/2018    transient speech difficulty and dizziness while in [place]; did not seek medical attention     Transient ischemic attack 10/2018    same symptoms as in [place]     Transient ischemic attack 11/20/2016     Transient ischemic attack 01/24/2016    left cerebral hemisphere     Transient ischemic attack 12/20/2014       Past Surgical History   I have reviewed this patient's surgical history and updated it with pertinent information if needed.  Past Surgical History:   Procedure Laterality Date     BYPASS GRAFT ARTERY CORONARY  09/2013    underwent 5-vessel coronary bypass grafting at the [facility]     CYSTOSCOPY       FOOT SURGERY Left 12/19/2014    Dr. Carr     FRACTURE SURGERY Right     hand     HERNIA REPAIR  11/2013     ORCHIECTOMY SCROTAL Right      ORIF FEMUR FRACTURE Right      PROSTATE SURGERY      half removed     PROSTATECTOMY  11/2013     TESTICLE SURGERY      removal of testicle       Prior to Admission Medications   Prior to Admission Medications   Prescriptions Last Dose Informant Patient Reported? Taking?   Ferrous Gluconate 324 (37.5 Fe) MG TABS   Yes No   Multiple Vitamins-Minerals (MULTIVITAMIN ADULT, MINERALS,) TABS   Yes No   Sig: Take 1 tablet by mouth daily   aspirin (ASA) 81 MG chewable tablet   Yes No   Sig: Take 81 mg by mouth daily   atorvastatin (LIPITOR) 40 MG tablet   No No   Sig: Take 1 tablet (40 mg) by mouth daily   calcium carbonate (OS-ISABEL) 500 MG tablet   Yes No   Sig: Take 1 tablet by mouth once 600 mg daily   clopidogrel (PLAVIX) 75 MG  tablet   No No   Sig: Take 1 tablet (75 mg) by mouth daily   finasteride (PROSCAR) 5 MG tablet   No No   Sig: Take 1 tablet (5 mg) by mouth daily   losartan (COZAAR) 25 MG tablet   No No   Sig: Take 1 tablet (25 mg) by mouth daily   metoprolol tartrate (LOPRESSOR) 25 MG tablet   No No   Sig: Take 1 tablet (25 mg) by mouth 2 times daily   omeprazole (PRILOSEC) 20 MG DR capsule   No No   Sig: Take 1 capsule (20 mg) by mouth daily   tamsulosin (FLOMAX) 0.4 MG capsule   Yes No   Sig: Take 1 capsule by mouth daily   vitamin C (ASCORBIC ACID) 500 MG tablet   Yes No   Sig: Take 500 mg by mouth daily       Facility-Administered Medications: None     Allergies   No Known Allergies    Social History   I have reviewed this patient's social history and updated it with pertinent information if needed. Juan Carlos Marley  reports that he quit smoking about 52 years ago. His smoking use included cigarettes and pipe. He has a 10.00 pack-year smoking history. He has never used smokeless tobacco. He reports previous alcohol use of about 4.0 standard drinks of alcohol per week. He reports that he does not use drugs.    Family History   I have reviewed this patient's family history and updated it with pertinent information if needed.   Family History   Problem Relation Age of Onset     Other - See Comments Mother          accident age 49     Migraines Mother      Aortic aneurysm Father          84     Other - See Comments Sister         autistic      No Known Problems Daughter      Pulmonary Hypertension No family hx of      Congenital heart disease No family hx of        Review of Systems   The 10 point Review of Systems is negative other than noted in the HPI or here.     Physical Exam   Temp: 97.6  F (36.4  C)   BP: 127/58 Pulse: 70   Resp: 20 SpO2: 97 % O2 Device: None (Room air)    Vital Signs with Ranges  Temp:  [97.6  F (36.4  C)] 97.6  F (36.4  C)  Pulse:  [69-70] 70  Resp:  [16-20] 20  BP: (127-147)/(58-74) 127/58  SpO2:   [97 %-98 %] 97 %  0 lbs 0 oz    GENERAL: Alert and oriented. NAD. Conversational, appropriate.   HEENT: Normocephalic. EOMI. No icterus or injection. Nares normal.   LUNGS: Clear to auscultation. No dyspnea at rest.   HEART: Regular rate. Extremities perfused.   ABDOMEN: Soft, nontender, and nondistended. Positive bowel sounds. Lawrence with bladder irrigation. Dark cherry red urine present.   EXTREMITIES: No LE edema noted.   NEUROLOGIC: Moves extremities x4 on command. No acute focal neurologic abnormalities noted.     Data   Data reviewed today:  I personally reviewed both laboratory and imaging data.   Recent Labs   Lab 05/24/22  1355   WBC 11.5*   HGB 12.9*   MCV 92         POTASSIUM 4.1   CHLORIDE 102   CO2 25   BUN 37*   CR 2.64*   ANIONGAP 8   ISABEL 9.6   *       No results found for this or any previous visit (from the past 24 hour(s)).

## 2022-05-24 NOTE — PROGRESS NOTES
UROLOGY BRIEF NOTE    Pt in the [facility] ER with gross hematuria. He had cystoscopy in the clinic yd with Dr. Spencer. Has a large vascular prostate and is on plavix. Recommend ER staff place a 22F coude 3-way catheter with lidocaine jelly and hand irrigate for clots then likely start CBI. Should admit to IM overnight and trend hb.     Following. Please call with questions/concerns     Mila Ibrahim PA-C  [facility]   [email or web address]  Text Page (7:30am to 4:30pm)

## 2022-05-24 NOTE — ED PROVIDER NOTES
Emergency Department Attending Supervision Note  5/24/2022  2:26 PM    I evaluated this patient in conjunction with Jm Collier PA-C     Briefly, the patient presented with hematuria and urinary retention after undergoing outpatient cystoscopy yesterday, with aspirin and Plavix use.  He has some suprapubic discomfort.  No fevers.    On my exam, he is recumbent in the gurney in fast-track 1,  at bedside.  He has moderate suprapubic fullness and tenderness.  Bladder scan over 600 cc.  Urinalysis pending.  Hemoglobin 12.9, minimally changed from prior.  Vital signs satisfactory.  No peritonitis.    Results:  Laboratory:  Labs Ordered and Resulted from Time of ED Arrival to Time of ED Departure   BASIC METABOLIC PANEL - Abnormal       Result Value    Sodium 135      Potassium 4.1      Chloride 102      Carbon Dioxide (CO2) 25      Anion Gap 8      Urea Nitrogen 37 (*)     Creatinine 2.64 (*)     Calcium 9.6      Glucose 159 (*)     GFR Estimate 24 (*)    URINE MACROSCOPIC WITH REFLEX TO MICRO - Abnormal    Color Urine Red (*)     Appearance Urine Bloody (*)     Glucose Urine Negative      Bilirubin Urine Negative      Ketones Urine Negative      Specific Gravity Urine 1.032      Blood Urine Large (*)     pH Urine 7.5 (*)     Protein Albumin Urine >600 (*)     Urobilinogen Urine Normal      Nitrite Urine Negative      Leukocyte Esterase Urine Negative      RBC Urine >182 (*)     WBC Urine >182 (*)    CBC WITH PLATELETS AND DIFFERENTIAL - Abnormal    WBC Count 11.5 (*)     RBC Count 4.11 (*)     Hemoglobin 12.9 (*)     Hematocrit 37.7 (*)     MCV 92      MCH 31.4      MCHC 34.2      RDW 12.8      Platelet Count 180      % Neutrophils 89      % Lymphocytes 5      % Monocytes 5      % Eosinophils 0      % Basophils 0      % Immature Granulocytes 1      NRBCs per 100 WBC 0      Absolute Neutrophils 10.2 (*)     Absolute Lymphocytes 0.6 (*)     Absolute Monocytes 0.6      Absolute Eosinophils 0.0      Absolute  Basophils 0.0      Absolute Immature Granulocytes 0.1      Absolute NRBCs 0.0     COVID-19 VIRUS (CORONAVIRUS) BY PCR - Normal    SARS CoV2 PCR Negative     URINE CULTURE     My impression is that he requires hospitalization for further monitoring and care.  We spoke with his urology service who recommended placement of a 22 Korean three-way catheter which was performed without difficulty by his nurse, which decompressed his bladder with urine significantly bloody.  Continuous bladder irrigation has been initiated.  No indication for emergent imaging at this time, nor transfusion.  We have arranged for admission to the hospital service for further multidisciplinary care.    Diagnosis    ICD-10-CM    1. Urinary retention  R33.9    2. Hematuria, unspecified type  R31.9    3. JOCELINE (acute kidney injury) (H)  N17.9    4. Anemia, unspecified type  D64.9      Kevin Tripp MD Reitsema, Jeffrey Alan, MD  05/24/22 1542

## 2022-05-25 LAB
ANION GAP SERPL CALCULATED.3IONS-SCNC: 3 MMOL/L (ref 3–14)
BUN SERPL-MCNC: 23 MG/DL (ref 7–30)
CALCIUM SERPL-MCNC: 8.2 MG/DL (ref 8.5–10.1)
CHLORIDE BLD-SCNC: 112 MMOL/L (ref 94–109)
CO2 SERPL-SCNC: 28 MMOL/L (ref 20–32)
CREAT SERPL-MCNC: 0.79 MG/DL (ref 0.66–1.25)
ERYTHROCYTE [DISTWIDTH] IN BLOOD BY AUTOMATED COUNT: 13.1 % (ref 10–15)
GFR SERPL CREATININE-BSD FRML MDRD: 90 ML/MIN/1.73M2
GLUCOSE BLD-MCNC: 92 MG/DL (ref 70–99)
HCT VFR BLD AUTO: 32.1 % (ref 40–53)
HGB BLD-MCNC: 10.7 G/DL (ref 13.3–17.7)
MCH RBC QN AUTO: 31.6 PG (ref 26.5–33)
MCHC RBC AUTO-ENTMCNC: 33.3 G/DL (ref 31.5–36.5)
MCV RBC AUTO: 95 FL (ref 78–100)
PLATELET # BLD AUTO: 138 10E3/UL (ref 150–450)
POTASSIUM BLD-SCNC: 3.8 MMOL/L (ref 3.4–5.3)
RBC # BLD AUTO: 3.39 10E6/UL (ref 4.4–5.9)
SODIUM SERPL-SCNC: 143 MMOL/L (ref 133–144)
WBC # BLD AUTO: 8.2 10E3/UL (ref 4–11)

## 2022-05-25 PROCEDURE — 80048 BASIC METABOLIC PNL TOTAL CA: CPT | Performed by: HOSPITALIST

## 2022-05-25 PROCEDURE — 36415 COLL VENOUS BLD VENIPUNCTURE: CPT | Performed by: HOSPITALIST

## 2022-05-25 PROCEDURE — 258N000003 HC RX IP 258 OP 636: Performed by: HOSPITALIST

## 2022-05-25 PROCEDURE — 250N000013 HC RX MED GY IP 250 OP 250 PS 637: Performed by: HOSPITALIST

## 2022-05-25 PROCEDURE — 85027 COMPLETE CBC AUTOMATED: CPT | Performed by: HOSPITALIST

## 2022-05-25 PROCEDURE — 120N000001 HC R&B MED SURG/OB

## 2022-05-25 PROCEDURE — 99232 SBSQ HOSP IP/OBS MODERATE 35: CPT | Performed by: HOSPITALIST

## 2022-05-25 PROCEDURE — 250N000011 HC RX IP 250 OP 636: Performed by: HOSPITALIST

## 2022-05-25 RX ADMIN — TAMSULOSIN HYDROCHLORIDE 0.4 MG: 0.4 CAPSULE ORAL at 20:56

## 2022-05-25 RX ADMIN — PANTOPRAZOLE SODIUM 40 MG: 40 TABLET, DELAYED RELEASE ORAL at 20:56

## 2022-05-25 RX ADMIN — SODIUM CHLORIDE: 9 INJECTION, SOLUTION INTRAVENOUS at 16:26

## 2022-05-25 RX ADMIN — FERROUS GLUCONATE 324 MG: 324 TABLET ORAL at 08:01

## 2022-05-25 RX ADMIN — METOPROLOL TARTRATE 25 MG: 25 TABLET, FILM COATED ORAL at 08:00

## 2022-05-25 RX ADMIN — ATORVASTATIN CALCIUM 40 MG: 40 TABLET, FILM COATED ORAL at 20:56

## 2022-05-25 RX ADMIN — FERROUS GLUCONATE 324 MG: 324 TABLET ORAL at 20:56

## 2022-05-25 RX ADMIN — METOPROLOL TARTRATE 25 MG: 25 TABLET, FILM COATED ORAL at 20:56

## 2022-05-25 RX ADMIN — SODIUM CHLORIDE: 9 INJECTION, SOLUTION INTRAVENOUS at 05:20

## 2022-05-25 RX ADMIN — TAMSULOSIN HYDROCHLORIDE 0.4 MG: 0.4 CAPSULE ORAL at 08:00

## 2022-05-25 RX ADMIN — OXYCODONE HYDROCHLORIDE AND ACETAMINOPHEN 500 MG: 500 TABLET ORAL at 08:00

## 2022-05-25 RX ADMIN — CEFTRIAXONE SODIUM 1 G: 1 INJECTION, POWDER, FOR SOLUTION INTRAMUSCULAR; INTRAVENOUS at 12:03

## 2022-05-25 RX ADMIN — FINASTERIDE 5 MG: 5 TABLET, FILM COATED ORAL at 08:01

## 2022-05-25 ASSESSMENT — ACTIVITIES OF DAILY LIVING (ADL)
ADLS_ACUITY_SCORE: 30
ADLS_ACUITY_SCORE: 27
ADLS_ACUITY_SCORE: 30
ADLS_ACUITY_SCORE: 27
ADLS_ACUITY_SCORE: 27
ADLS_ACUITY_SCORE: 30
ADLS_ACUITY_SCORE: 27
ADLS_ACUITY_SCORE: 27

## 2022-05-25 NOTE — PROGRESS NOTES
[facility]    Medicine Progress Note - Hospitalist Service    Date of Admission:  5/24/2022    Assessment & Plan        Juan Carlos Marley is a 81 year old male who presents with hematuria.  Admitted for further evaluation and treatment.    Hematuria status post cystoscopy  Acute kidney injury  Patient reporting to the emergency department reporting clots in his urine as well as difficulty with urination, and urgency.  Patient does have a history of enlarged prostate with recent cystoscopy.  Patient does endorse vomiting.  Reports nausea.  Patient denies headache, ear pain, eye pain, sore throat, cough, chest pain, shortness of breath, abdominal pain, blood in stool, lower extremity edema, rash, fever.  Creatinine is 2.64 in the emergency department.  White blood cell count is 11.5 with a hemoglobin of 12.9 and a platelet count of 180.  Urinalysis shows bloody appearance, pH of 7.5, greater than 600 protein, or blood, negative leukocyte esterase.  COVID testing is negative.  - Admitted to inpatient.  - Urology consulted, appreciate their assistance.  - Three way catheter placed in ER and he was started on CBI.  - Hematuria has cleared and CBI has been discontinued.  - Plan to continue catheter overnight, consider removing tomorrow if no further hematuria.  - Antiplatelet agents on hold for now.  - Urine culture negative to date, still on empiric antibiotics.  - Creatinine much improved and now back to baseline.    H/o TIA, Basilar artery stenosis  Previous MRI showing high-grade basilar stenosis.  - PTA aspirin and Plavix on hold for now due to hematuria, consider restarting tomorrow.    History of BPH  Patient previously on finasteride and tamsulosin.  - Continue PTA finasteride and tamsulosin.    Hypertension  Hyperlipidemia  Coronary artery disease  Patient previously on losartan and metoprolol. H/o CABG.   - Continue PTA metoprolol and atorvastatin.  - PTA losartan on hold for now in  "setting of JOCELINE.    GERD  -Continue PTA omeprazole.       Diet: Combination Diet Renal Diet (non-dialysis); Low Saturated Fat Na <2400mg Diet, No Caffeine Diet    DVT Prophylaxis: Pneumatic Compression Devices  Lawrence Catheter: PRESENT, indication: Other (Comment) (CBI)  Central Lines: None  Cardiac Monitoring: None  Code Status: Full Code      Disposition Plan   Expected Discharge: 05/27/2022     Anticipated discharge location:  Awaiting care coordination huddle  Delays:            The patient's care was discussed with the Bedside Nurse and Patient.    Armando Castillo MD  Hospitalist Service  [facility]  Securely message with the Vocera Web Console (learn more here)  Text page via [employer] Paging/Directory         Clinically Significant Risk Factors Present on Admission          # Hypocalcemia: Ca = 8.2 mg/dL (Ref range: 8.5 - 10.1 mg/dL) and/or iCa = N/A on admission, will replace as needed       # Platelet Defect: home medication list includes an antiplatelet medication   # Overweight: Estimated body mass index is 27.93 kg/m  as calculated from the following:    Height as of 4/28/22: 1.753 m (5' 9\").    Weight as of this encounter: 85.8 kg (189 lb 2.5 oz).      ______________________________________________________________________    Interval History   Juan Carlos Marley was seen today. He feels pretty good today. Some discomfort from the bladder catheter last night when it was being irrigated for clots. Currently has no symptoms. Hematuria appears to have resolved. Denies fevers, chest pain, shortness of breath, nausea, abdominal pain.    Data reviewed today: I reviewed all medications, new labs and imaging results over the last 24 hours. I personally reviewed no images or EKG's today.    Physical Exam   Vital Signs: Temp: 98.9  F (37.2  C) Temp src: Oral BP: 124/54 Pulse: 63   Resp: 18 SpO2: 94 % O2 Device: None (Room air)    Weight: 189 lbs 2.47 oz  Constitutional: awake, alert, " cooperative, no apparent distress, sitting up in a chair  Respiratory: clear to auscultation bilaterally, no crackles or wheezing  Cardiovascular: regular rate and rhythm, normal S1 and S2, no murmur noted  GI: normal bowel sounds, soft, non-distended, non-tender  : Lawrence catheter in place with pierce colored urine in the catheter  Skin: warm, dry  Musculoskeletal: no lower extremity pitting edema present  Neurologic: awake, alert, answers questions appropriately, moves all extremities    Data   Recent Labs   Lab 05/25/22  0837 05/24/22  1355   WBC 8.2 11.5*   HGB 10.7* 12.9*   MCV 95 92   * 180    135   POTASSIUM 3.8 4.1   CHLORIDE 112* 102   CO2 28 25   BUN 23 37*   CR 0.79 2.64*   ANIONGAP 3 8   ISABEL 8.2* 9.6   GLC 92 159*     Medications     sodium chloride 100 mL/hr at 05/25/22 0520       atorvastatin  40 mg Oral QPM     cefTRIAXone  1 g Intravenous Q24H     ferrous gluconate  324 mg Oral BID     finasteride  5 mg Oral Daily     metoprolol tartrate  25 mg Oral BID     pantoprazole  40 mg Oral QPM     sodium chloride (PF)  3 mL Intracatheter Q8H     tamsulosin  0.4 mg Oral BID     vitamin C  500 mg Oral Daily

## 2022-05-25 NOTE — PLAN OF CARE
Goal Outcome Evaluation:        Summary: Hematuria status post cystoscopy, JOCELINE     DATE & TIME: 5/25/22 3622-1411  Cognitive Concerns/ Orientation : Alert and Oriented x4; calm and cooperative  BEHAVIOR & AGGRESSION TOOL COLOR: Green   CIWA SCORE: N/A    ABNL VS/O2: VSS: normal  MOBILITY: SBA with GB, up in chair and ambulated in jensen today.  PAIN MANAGMENT: Denies pain.   DIET: renal diet, NA less than 2400 mg and no caffeine.  BOWEL/BLADDER: CBI-was stopped at 1125 per urology. urine was pink tinged at that time ,few small clots noted. Now urine looks like pierce with pink tinged appears to be flowing small clots at times seen had 250 ml output after CBI discontinued  No BM.   ABNL LAB/BG: N/A  DRAIN/DEVICES: PIV infusing NS @ 100 mL/hr, IV antibiotics, 3 way alberto.  TELEMETRY RHYTHM:N/A   SKIN: pale, intact   TESTS/PROCEDURES: N/A  D/C DAY/GOALS/PLACE: pending on ability to void  OTHER IMPORTANT INFO: urology consulted and saw the patient. CBI discontinued.There are small clots in urine.

## 2022-05-25 NOTE — PROGRESS NOTES
Admission    Patient arrives to room 605 via cart from ED.  Care plan note: A & O x 4, calm and cooperative. VSS on RA ex mild HTN. CBI with red output on admission. Admission questions started.     Inpatient nursing criteria listed below were met:    Did you put disposition on whiteboard and in sticky note: Yes  Full skin assessment done (add LDA if skin issue present) :Yes  Isolation education started/completed NA  Patient allergies verified with patient: NA  Fall Risk? (Care plan updated, Education given and documented) Yes  Primary Care Plan initiated: Yes  Home medications documented in belongings flowsheet: NA  Patient belongings documented in belongings flowsheet: No  Reminder note (belongings/ medications) placed in discharge instructions:NA  Admission profile/ required documentation complete: No-started  If patient is a 72 hour hold/Commitment are belongings removed from room and locked up? MELBA Mcconnell, RN on 5/24/2022 at 8:39 PM

## 2022-05-25 NOTE — PLAN OF CARE
Goal Outcome Evaluation:    Plan of Care Reviewed With: patient     Summary: Hematuria status post cystoscopy, JOCELINE     DATE & TIME: 5/24/2022, 8852-5147   Cognitive Concerns/ Orientation : A & O x4   BEHAVIOR & AGGRESSION TOOL COLOR: Green   CIWA SCORE: NA    ABNL VS/O2: VSS on RA ex mild HTN   MOBILITY: SBA  PAIN MANAGMENT: Denies pain. Occasional burning at catheter insertion site (when passing clots)  DIET: Renal, low sodium, NPO at [place]   BOWEL/BLADDER: CBI- red in color, occasional large clots (irrigated x 1 due to obstruction and burning symptoms, immediate relief)  ABNL LAB/BG: Cr 2.64, WBC=11.5, hgb 12.9, UA resulted, Covid (-)  DRAIN/DEVICES: PIV infusing NS @ 100 mL/hr, IV antibiotics  TELEMETRY RHYTHM:NA   SKIN: pale, intact   TESTS/PROCEDURES: NA  D/C DAY/GOALS/PLACE: pending, once medically cleared  OTHER IMPORTANT INFO: urology consulted

## 2022-05-25 NOTE — PLAN OF CARE
Summary: Hematuria status post cystoscopy, JOCELINE     DATE & TIME: 5/24/2022, [id number]  Cognitive Concerns/ Orientation : Alert and Oriented x4   BEHAVIOR & AGGRESSION TOOL COLOR: Green   CIWA SCORE: NA    ABNL VS/O2: VSS on RA ex mild HTN   MOBILITY: SBA  PAIN MANAGMENT: Denies pain. Occasional burning at catheter insertion site  DIET: Renal, low sodium, NPO at [place]   BOWEL/BLADDER: CBI- red in color, occasional large clots (irrigated x 1 due to obstruction and burning symptoms, immediate ) cleared into a watermelon color by end of shift.    ABNL LAB/BG: Cr 2.64, WBC=11.5, hgb 12.9, UA resulted, Covid (-)  DRAIN/DEVICES: PIV infusing NS @ 100 mL/hr, IV antibiotics  TELEMETRY RHYTHM:NA   SKIN: pale, intact   TESTS/PROCEDURES: NA  D/C DAY/GOALS/PLACE: pending, once medically cleared  OTHER IMPORTANT INFO: urology consulted

## 2022-05-25 NOTE — UTILIZATION REVIEW
"  Admission Status; Secondary Review Determination         Under the authority of the Utilization Management Committee, the utilization review process indicated a secondary review on the above patient.  The review outcome is based on review of the medical records, discussions with staff, and applying clinical experience noted on the date of the review.        (xxx)      Inpatient Status Appropriate - This patient's medical care is consistent with medical management for inpatient care and reasonable inpatient medical practice.      () Observation Status Appropriate - This patient does not meet hospital inpatient criteria and is placed in observation status. If this patient's primary payer is Medicare and was admitted as an inpatient, Condition Code 44 should be used and patient status changed to \"observation\".   () Admission Status NOT Appropriate - This patient's medical care is not consistent with medical management for Inpatient or Observation Status.          RATIONALE FOR DETERMINATION     Juan Carlos Marley is an 81 year old male with a h/o TIA, basilar artery stenosis (on antiplatelet agent), BPH, HTN, CAD,HLD, and GERD who was admitted on 5/24/2022 with hematuria, difficulty with urination, and urgency.  Patient does have a history of enlarged prostate with recent cystoscopy.  he has had n/v as well.   Creatinine was 2.64 in the emergency department.  White blood cell count is 11.5 with a hemoglobin of 12.9 and a platelet count of 180.  UA showed blood.  He was admitted for IVF, CBI, IV antibiotics, urology consultation, and close clinical monitoring.  It is reasonable to anticipate a hospital stay of at least 2 midnights.  IP status is appropriate.    The severity of illness, intensity of service provided, expected LOS and risk for adverse outcome make the care complex, high risk and appropriate for hospital admission.        The information on this document is developed by the utilization review team in order " for the business office to ensure compliance.  This only denotes the appropriateness of proper admission status and does not reflect the quality of care rendered.         The definitions of Inpatient Status and Observation Status used in making the determination above are those provided in the CMS Coverage Manual, Chapter 1 and Chapter 6, section 70.4.      Sincerely,     Imelda Jain MD  Physician Advisor   Utilization Review/ Case Management  [facility].       Yes

## 2022-05-25 NOTE — PROGRESS NOTES
SPIRITUAL HEALTH SERVICES Progress Note  [facility]  67    Saw pt Juan Carlos Marley per Epic Consult request for  services (routine). Provided compassionate listening, affirmation of values and goals of care, reflective conversation and prayer.    Illness Narrative - PT states that he is facing a dilemma with regard to surgery. He states that surgery will require discontinuing blood thinners for a period of time which itself poses in his view a serious risk.    Distress - In addition to concerns about his own health, Juan Carlos disclosed his deep concern about his 44-year old daughter who is faced with multiple health-related challenges. Juan Carlos states that she is an only child and that he is concerned about her care when he and his wife Salud are no longer able to provide for her. He reports that she is working and visits the couple regularly. Juan Carlos indicated some distress over the necessity of entrusting her care to a company or institution as there are no relatives that could take this over.    Coping - Juan Carlos stated that he believes that he and his family can face the multiple challenges they are facing with their own resources and savi in José Luis and his love.    Meaning-Making - Juan Carlos states that he looks forward to returning home and resuming life with his wife and daughter. He states that believes that he still has responsibilities towards his daughter and hopes to be able to continue fulfilling them.    Plan - PT is aware of the availability of  services by request. He states that he welcomes future visits from . Follow-up with PT while he remains in hospital.    Darío Burden  Associate   Pager number

## 2022-05-25 NOTE — PLAN OF CARE
Summary: Hematuria status post cystoscopy, JOCELINE     DATE & TIME: 5/24/22-5/25/22 6329-7468  Cognitive Concerns/ Orientation : Alert and Oriented x4; calm and cooperative  BEHAVIOR & AGGRESSION TOOL COLOR: Green   CIWA SCORE: N/A    ABNL VS/O2: VSS on RA, temp max: 99.1F  MOBILITY: SBA with GB  PAIN MANAGMENT: Denies pain.   DIET: NPO at [place]  BOWEL/BLADDER: CBI- pink in color, few small clots noted. Titrated to watermelon color. No BM.   ABNL LAB/BG: N/A  DRAIN/DEVICES: PIV infusing NS @ 100 mL/hr, IV antibiotics, 3 way alberto  TELEMETRY RHYTHM:N/A   SKIN: pale, intact   TESTS/PROCEDURES: N/A  D/C DAY/GOALS/PLACE: pending, once medically cleared  OTHER IMPORTANT INFO: urology consulted

## 2022-05-26 LAB
ANION GAP SERPL CALCULATED.3IONS-SCNC: 3 MMOL/L (ref 3–14)
BACTERIA UR CULT: NO GROWTH
BUN SERPL-MCNC: 18 MG/DL (ref 7–30)
CALCIUM SERPL-MCNC: 8.5 MG/DL (ref 8.5–10.1)
CHLORIDE BLD-SCNC: 112 MMOL/L (ref 94–109)
CO2 SERPL-SCNC: 27 MMOL/L (ref 20–32)
CREAT SERPL-MCNC: 0.78 MG/DL (ref 0.66–1.25)
ERYTHROCYTE [DISTWIDTH] IN BLOOD BY AUTOMATED COUNT: 12.7 % (ref 10–15)
GFR SERPL CREATININE-BSD FRML MDRD: 90 ML/MIN/1.73M2
GLUCOSE BLD-MCNC: 93 MG/DL (ref 70–99)
HCT VFR BLD AUTO: 30.7 % (ref 40–53)
HGB BLD-MCNC: 10 G/DL (ref 13.3–17.7)
MCH RBC QN AUTO: 31.4 PG (ref 26.5–33)
MCHC RBC AUTO-ENTMCNC: 32.6 G/DL (ref 31.5–36.5)
MCV RBC AUTO: 97 FL (ref 78–100)
PLATELET # BLD AUTO: 112 10E3/UL (ref 150–450)
POTASSIUM BLD-SCNC: 3.6 MMOL/L (ref 3.4–5.3)
RBC # BLD AUTO: 3.18 10E6/UL (ref 4.4–5.9)
SODIUM SERPL-SCNC: 142 MMOL/L (ref 133–144)
WBC # BLD AUTO: 4.9 10E3/UL (ref 4–11)

## 2022-05-26 PROCEDURE — 250N000009 HC RX 250: Performed by: PHYSICIAN ASSISTANT

## 2022-05-26 PROCEDURE — 250N000011 HC RX IP 250 OP 636: Performed by: HOSPITALIST

## 2022-05-26 PROCEDURE — 250N000013 HC RX MED GY IP 250 OP 250 PS 637: Performed by: HOSPITALIST

## 2022-05-26 PROCEDURE — 120N000001 HC R&B MED SURG/OB

## 2022-05-26 PROCEDURE — 36415 COLL VENOUS BLD VENIPUNCTURE: CPT | Performed by: HOSPITALIST

## 2022-05-26 PROCEDURE — 258N000003 HC RX IP 258 OP 636: Performed by: HOSPITALIST

## 2022-05-26 PROCEDURE — 80048 BASIC METABOLIC PNL TOTAL CA: CPT | Performed by: HOSPITALIST

## 2022-05-26 PROCEDURE — 85027 COMPLETE CBC AUTOMATED: CPT | Performed by: HOSPITALIST

## 2022-05-26 PROCEDURE — 99232 SBSQ HOSP IP/OBS MODERATE 35: CPT | Performed by: HOSPITALIST

## 2022-05-26 RX ORDER — LOSARTAN POTASSIUM 25 MG/1
25 TABLET ORAL DAILY
Status: DISCONTINUED | OUTPATIENT
Start: 2022-05-26 | End: 2022-05-27 | Stop reason: HOSPADM

## 2022-05-26 RX ORDER — LIDOCAINE HYDROCHLORIDE 20 MG/ML
JELLY TOPICAL ONCE
Status: COMPLETED | OUTPATIENT
Start: 2022-05-26 | End: 2022-05-26

## 2022-05-26 RX ADMIN — FINASTERIDE 5 MG: 5 TABLET, FILM COATED ORAL at 08:29

## 2022-05-26 RX ADMIN — PANTOPRAZOLE SODIUM 40 MG: 40 TABLET, DELAYED RELEASE ORAL at 20:46

## 2022-05-26 RX ADMIN — METOPROLOL TARTRATE 25 MG: 25 TABLET, FILM COATED ORAL at 20:46

## 2022-05-26 RX ADMIN — LOSARTAN POTASSIUM 25 MG: 25 TABLET, FILM COATED ORAL at 16:04

## 2022-05-26 RX ADMIN — OXYCODONE HYDROCHLORIDE AND ACETAMINOPHEN 500 MG: 500 TABLET ORAL at 08:30

## 2022-05-26 RX ADMIN — TAMSULOSIN HYDROCHLORIDE 0.4 MG: 0.4 CAPSULE ORAL at 08:29

## 2022-05-26 RX ADMIN — ATORVASTATIN CALCIUM 40 MG: 40 TABLET, FILM COATED ORAL at 20:46

## 2022-05-26 RX ADMIN — SENNOSIDES AND DOCUSATE SODIUM 1 TABLET: 50; 8.6 TABLET ORAL at 08:41

## 2022-05-26 RX ADMIN — SODIUM CHLORIDE: 9 INJECTION, SOLUTION INTRAVENOUS at 12:01

## 2022-05-26 RX ADMIN — FERROUS GLUCONATE 324 MG: 324 TABLET ORAL at 08:30

## 2022-05-26 RX ADMIN — CEFTRIAXONE SODIUM 1 G: 1 INJECTION, POWDER, FOR SOLUTION INTRAMUSCULAR; INTRAVENOUS at 11:59

## 2022-05-26 RX ADMIN — FERROUS GLUCONATE 324 MG: 324 TABLET ORAL at 20:46

## 2022-05-26 RX ADMIN — METOPROLOL TARTRATE 25 MG: 25 TABLET, FILM COATED ORAL at 08:29

## 2022-05-26 RX ADMIN — LIDOCAINE HYDROCHLORIDE: 20 JELLY TOPICAL at 14:15

## 2022-05-26 RX ADMIN — SODIUM CHLORIDE: 9 INJECTION, SOLUTION INTRAVENOUS at 22:15

## 2022-05-26 RX ADMIN — TAMSULOSIN HYDROCHLORIDE 0.4 MG: 0.4 CAPSULE ORAL at 20:46

## 2022-05-26 ASSESSMENT — ACTIVITIES OF DAILY LIVING (ADL)
ADLS_ACUITY_SCORE: 30

## 2022-05-26 NOTE — PLAN OF CARE
Summary: Hematuria status post cystoscopy, JOCELINE     DATE & TIME: 5/25/22 9028-8860  Cognitive Concerns/ Orientation : Alert and Oriented x4; calm and cooperative  BEHAVIOR & AGGRESSION TOOL COLOR: Green   CIWA SCORE: N/A    ABNL VS/O2: VSS: normal  MOBILITY: SBA with GB, walked in hallways x1  PAIN MANAGMENT: Denies pain.   DIET: renal diet, NA less than 2400 mg and no caffeine.  BOWEL/BLADDER: Urine is pierce with some clots in. No BM.   ABNL LAB/BG: N/A  DRAIN/DEVICES: PIV infusing NS @ 100 mL/hr, IV antibiotics, 3 way alberto  SKIN: pale, intact   TESTS/PROCEDURES: N/A  D/C DAY/GOALS/PLACE: pending, once medically cleared.  OTHER IMPORTANT INFO: urology consulted and saw the patient. CBI discontinued.There are small clots in urine.

## 2022-05-26 NOTE — PROGRESS NOTES
[facility]    Urology Progress Note     Assessment & Plan   Juan Carlos Marley is a 81 year old male who was admitted on 5/24/2022. 82yo male with enlarged prostate and gross hematuria with urinary retention     Plan:   -ok to discharge this AM   -RN to monitor voids and PVRs, should void x2 with PVR <350cc prior to discharge   -cont flomax and finasteride  -would hold plavix 1-2 more days then ok to restart if urine remains clear     Mila Ibrahim PA-C  [facility]   [email or web address]  Text Page (7:30am to 4:30pm)      Interval History   CBI discontinued yd, urine remains clear this AM   Denies pain     AVSS  Hb 10.7-->10.0  Creat 0.7    Physical Exam   Temp: 98.6  F (37  C) Temp src: Oral BP: (!) 156/63 Pulse: 95   Resp: 16 SpO2: 96 % O2 Device: None (Room air)    Vitals:    05/25/22 0631 05/26/22 0615   Weight: 85.8 kg (189 lb 2.5 oz) 88.9 kg (195 lb 15.8 oz)     Vital Signs with Ranges  Temp:  [98.6  F (37  C)-99  F (37.2  C)] 98.6  F (37  C)  Pulse:  [62-95] 95  Resp:  [16-18] 16  BP: (126-156)/(57-63) 156/63  SpO2:  [93 %-96 %] 96 %  I/O last 3 completed shifts:  In: 1040 [P.O.:240; I.V.:800]  Out: 4400 [Urine:4400]    General: Alert and oriented, no distress  HEENT: Face symmetric, mucous membranes moist and pink  Eyes: No scleral icterus  Neck: Symmetric  Chest wall: Symmetric  Respiratory: Breathing unlabored, no audible wheezing  Cardiac: Extremities warm and well perfused  Abdomen: soft, non tender, non distended  Back: No CVA or flank tenderness  : no suprapubic tenderness, alberto draining clear urine  Neuro:Grossly non focal  Pysch: Normal mood and affect  Skin: No evident rashes or lesions    Medications     sodium chloride 100 mL/hr at 05/26/22 0843       atorvastatin  40 mg Oral QPM     cefTRIAXone  1 g Intravenous Q24H     ferrous gluconate  324 mg Oral BID     finasteride  5 mg Oral Daily     metoprolol tartrate  25 mg Oral BID      pantoprazole  40 mg Oral QPM     sodium chloride (PF)  3 mL Intracatheter Q8H     tamsulosin  0.4 mg Oral BID     vitamin C  500 mg Oral Daily       Data   Results for orders placed or performed during the hospital encounter of 05/24/22 (from the past 24 hour(s))   CBC with platelets   Result Value Ref Range    WBC Count 4.9 4.0 - 11.0 10e3/uL    RBC Count 3.18 (L) 4.40 - 5.90 10e6/uL    Hemoglobin 10.0 (L) 13.3 - 17.7 g/dL    Hematocrit 30.7 (L) 40.0 - 53.0 %    MCV 97 78 - 100 fL    MCH 31.4 26.5 - 33.0 pg    MCHC 32.6 31.5 - 36.5 g/dL    RDW 12.7 10.0 - 15.0 %    Platelet Count 112 (L) 150 - 450 10e3/uL   Basic metabolic panel   Result Value Ref Range    Sodium 142 133 - 144 mmol/L    Potassium 3.6 3.4 - 5.3 mmol/L    Chloride 112 (H) 94 - 109 mmol/L    Carbon Dioxide (CO2) 27 20 - 32 mmol/L    Anion Gap 3 3 - 14 mmol/L    Urea Nitrogen 18 7 - 30 mg/dL    Creatinine 0.78 0.66 - 1.25 mg/dL    Calcium 8.5 8.5 - 10.1 mg/dL    Glucose 93 70 - 99 mg/dL    GFR Estimate 90 >60 mL/min/1.73m2

## 2022-05-26 NOTE — PLAN OF CARE
Goal Outcome Evaluation:      Summary: Hematuria status post cystoscopy, JOCELINE     DATE & TIME: 5/26/22- 3155-5436  Cognitive Concerns/ Orientation : A&O x4; calm and cooperative  BEHAVIOR & AGGRESSION TOOL COLOR: Green   ABNL VS/O2: VSS normal except /63  MOBILITY: SBA walked in the hallway and up in chair.  PAIN MANAGMENT: Denies pain, except when inserting urinary catheter.   DIET: renal diet, NA less than 2400 mg and no caffeine. Good intake.  BOWEL/BLADDER: Urine is pierce.  ABNL LAB/BG: Hgb 10.0 Cath discontinued 1125, patient was unable to void and  straight cath was inserted with 1000 cc return. Continue to monitor ability to void and do PVR, update urology if unable to void.   DRAIN/DEVICES: RPIV infusing NS @ 100 mL/hr, IV antibiotics,  SKIN: pale, intact   D/C DAY/GOALS/PLACE: pending (5/28?), once medically cleared. Being able to urinate without cath.  OTHER IMPORTANT INFO.

## 2022-05-26 NOTE — PROVIDER NOTIFICATION
Patient unable to void; did void 50 cc after several attempts.   ml.  Paged Urology PA.    Return call from PA; straight cath with coude and plenty lidocaine gel.      Addendum:  Coude catheter placed with 1000 ml dark pierce urine with relief of abdominal discomfort.  Will continue to monitor ability to void and PVR's; will update Urology if unable to void again.

## 2022-05-26 NOTE — PLAN OF CARE
Goal Outcome Evaluation:      Summary: Hematuria status post cystoscopy, JOCELINE     DATE & TIME: 5/25/22- 5/26/22 3082-3568  Cognitive Concerns/ Orientation : A&O x4; calm and cooperative  BEHAVIOR & AGGRESSION TOOL COLOR: Green   ABNL VS/O2: VSS normal  MOBILITY: SBA with GB  PAIN MANAGMENT: Denies pain   DIET: renal diet, NA less than 2400 mg and no caffeine.  BOWEL/BLADDER: Urine is pierce with minimal clots  ABNL LAB/BG: Hgb 10.7  DRAIN/DEVICES: RPIV infusing NS @ 100 mL/hr, IV antibiotics, 3 way alberto  SKIN: pale, intact   D/C DAY/GOALS/PLACE: pending (5/27?), once medically cleared.  OTHER IMPORTANT INFO: urology consulted and saw the patient. CBI discontinued.There are small clots in urine.

## 2022-05-26 NOTE — PROGRESS NOTES
UROLOGY BRIEF NOTE    Pt unable to void after alberto removal. Was straight cathed this afternoon for 1L of urine, no issues. Recommend continue to monitor voids and PVRs, can straight cath once more but if persistent retention then recommend replacement of indwelling alberto (would use 18F coude).     Will recheck pt tomorrow AM    Mila Ibrahim PA-C  [facility]   [email or web address]  Text Page (7:30am to 4:30pm)

## 2022-05-26 NOTE — PLAN OF CARE
Goal Outcome Evaluation:        Summary: Hematuria status post cystoscopy, JOCELINE     DATE & TIME: 5/26/22- 8443-1627  Cognitive Concerns/ Orientation : A&O x4; calm and cooperative  BEHAVIOR & AGGRESSION TOOL COLOR: Green   ABNL VS/O2: VSS normal except /63  MOBILITY: SBA walked in the hallway and up in chair.  PAIN MANAGMENT: Denies pain, except when inserting urinary catheter.   DIET: renal diet, NA less than 2400 mg and no caffeine. Good intake.  BOWEL/BLADDER: Urine is pierce.  ABNL LAB/BG: Hgb 10.0 Cath discontinued 1125, patient was unable to void and  straight cath was inserted with 1000 cc return. Continue to monitor ability to void and do PVR, update urology if unable to void.   DRAIN/DEVICES: RPIV infusing NS @ 100 mL/hr, IV antibiotics,  SKIN: pale, intact   D/C DAY/GOALS/PLACE: pending (5/28?), once medically cleared. Being able to urinate without cath.  OTHER IMPORTANT INFO.

## 2022-05-26 NOTE — PROGRESS NOTES
[facility]    Medicine Progress Note - Hospitalist Service    Date of Admission:  5/24/2022    Assessment & Plan        Juan Carlos Marley is a 81 year old male who presents with hematuria.  Admitted for further evaluation and treatment.    Hematuria status post cystoscopy  Acute kidney injury  Patient reporting to the emergency department reporting clots in his urine as well as difficulty with urination, and urgency.  Patient does have a history of enlarged prostate with recent cystoscopy.  Patient does endorse vomiting.  Reports nausea.  Patient denies headache, ear pain, eye pain, sore throat, cough, chest pain, shortness of breath, abdominal pain, blood in stool, lower extremity edema, rash, fever.  Creatinine is 2.64 in the emergency department.  White blood cell count is 11.5 with a hemoglobin of 12.9 and a platelet count of 180.  Urinalysis shows bloody appearance, pH of 7.5, greater than 600 protein, or blood, negative leukocyte esterase.  COVID testing is negative.  - Admitted to inpatient.  - Urology consulted, appreciate their assistance.  - Three way catheter placed in ER and he was started on CBI.  - Hematuria has cleared and CBI was discontinued in AM on 5/25/22.  - Catheter removed this morning, however he has had issues with voiding and has required straight catheterization.  - Antiplatelet agents on hold for now as noted below.  - Urine culture negative to date, stop empiric antibiotics.  - Creatinine much improved and now back to baseline.  - Continue care in the hospital until he is able to consistently void on his own with PVR < 350 ml.    H/o TIA, Basilar artery stenosis  Previous MRI showing high-grade basilar stenosis.  - PTA aspirin and Plavix on hold for now due to hematuria, likely on hold for another 1-2 days then will restart if no further hematuria.    History of BPH  Patient previously on finasteride and tamsulosin.  - Continue PTA finasteride and  "tamsulosin.    Hypertension  Hyperlipidemia  Coronary artery disease  Patient previously on losartan and metoprolol. H/o CABG.   - Continue PTA metoprolol and atorvastatin.  - PTA losartan initially held in setting of JOCELINE. Will resume today as JOCELINE resolved and BP elevated.    GERD  - Continue PTA omeprazole.       Diet: Combination Diet Renal Diet (non-dialysis); Low Saturated Fat Na <2400mg Diet, No Caffeine Diet    DVT Prophylaxis: Pneumatic Compression Devices  Lawrence Catheter: PRESENT, indication: Other (Comment) (CBI)  Central Lines: None  Cardiac Monitoring: None  Code Status: Full Code      Disposition Plan   Expected Discharge: 05/27/2022     Anticipated discharge location:  Awaiting care coordination huddle  Delays:            The patient's care was discussed with the Bedside Nurse and Patient.    Armando Castillo MD  Hospitalist Service  [facility]  Securely message with the Vocera Web Console (learn more here)  Text page via [employer] Paging/Directory         Clinically Significant Risk Factors Present on Admission             # Overweight: Estimated body mass index is 28.94 kg/m  as calculated from the following:    Height as of 4/28/22: 1.753 m (5' 9\").    Weight as of this encounter: 88.9 kg (195 lb 15.8 oz).      ______________________________________________________________________    Interval History   Juan Carlos JONNIE Marley was seen this morning. Denies fevers, chest pain, shortness of breath, nausea, abdominal pain. Lawrence catheter in place, hematuria has resolved.    Data reviewed today: I reviewed all medications, new labs and imaging results over the last 24 hours. I personally reviewed no images or EKG's today.    Physical Exam   Vital Signs: Temp: 98.6  F (37  C) Temp src: Oral BP: (!) 156/63 Pulse: 95   Resp: 16 SpO2: 96 % O2 Device: None (Room air)    Weight: 195 lbs 15.82 oz  Constitutional: awake, alert, cooperative, no apparent distress, laying in the hospital " bed  Respiratory: clear to auscultation bilaterally, no crackles or wheezing  Cardiovascular: regular rate and rhythm, normal S1 and S2, no murmur noted  GI: normal bowel sounds, soft, non-distended, non-tender  Genitounirinary: Lawrence catheter in place with clear yellow urine in the catheter  Skin: warm, dry  Musculoskeletal: no lower extremity pitting edema present  Neurologic: awake, alert, oriented to name, place and time, motor is 5 out of 5 bilaterally, sensory is intact    Data   Recent Labs   Lab 05/26/22  0712 05/25/22  0837 05/24/22  1355   WBC 4.9 8.2 11.5*   HGB 10.0* 10.7* 12.9*   MCV 97 95 92   * 138* 180    143 135   POTASSIUM 3.6 3.8 4.1   CHLORIDE 112* 112* 102   CO2 27 28 25   BUN 18 23 37*   CR 0.78 0.79 2.64*   ANIONGAP 3 3 8   ISABEL 8.5 8.2* 9.6   GLC 93 92 159*     Medications     sodium chloride 100 mL/hr at 05/26/22 1201       atorvastatin  40 mg Oral QPM     cefTRIAXone  1 g Intravenous Q24H     ferrous gluconate  324 mg Oral BID     finasteride  5 mg Oral Daily     metoprolol tartrate  25 mg Oral BID     pantoprazole  40 mg Oral QPM     sodium chloride (PF)  3 mL Intracatheter Q8H     tamsulosin  0.4 mg Oral BID     vitamin C  500 mg Oral Daily

## 2022-05-27 VITALS
TEMPERATURE: 98.7 F | OXYGEN SATURATION: 97 % | HEART RATE: 68 BPM | RESPIRATION RATE: 16 BRPM | DIASTOLIC BLOOD PRESSURE: 68 MMHG | WEIGHT: 196.87 LBS | BODY MASS INDEX: 29.07 KG/M2 | SYSTOLIC BLOOD PRESSURE: 142 MMHG

## 2022-05-27 LAB
ANION GAP SERPL CALCULATED.3IONS-SCNC: 5 MMOL/L (ref 3–14)
BUN SERPL-MCNC: 14 MG/DL (ref 7–30)
CALCIUM SERPL-MCNC: 7.8 MG/DL (ref 8.5–10.1)
CHLORIDE BLD-SCNC: 112 MMOL/L (ref 94–109)
CO2 SERPL-SCNC: 25 MMOL/L (ref 20–32)
CREAT SERPL-MCNC: 0.74 MG/DL (ref 0.66–1.25)
GFR SERPL CREATININE-BSD FRML MDRD: >90 ML/MIN/1.73M2
GLUCOSE BLD-MCNC: 138 MG/DL (ref 70–99)
HGB BLD-MCNC: 10.4 G/DL (ref 13.3–17.7)
POTASSIUM BLD-SCNC: 3.2 MMOL/L (ref 3.4–5.3)
SODIUM SERPL-SCNC: 142 MMOL/L (ref 133–144)

## 2022-05-27 PROCEDURE — 80048 BASIC METABOLIC PNL TOTAL CA: CPT | Performed by: HOSPITALIST

## 2022-05-27 PROCEDURE — 250N000013 HC RX MED GY IP 250 OP 250 PS 637: Performed by: HOSPITALIST

## 2022-05-27 PROCEDURE — 85018 HEMOGLOBIN: CPT | Performed by: HOSPITALIST

## 2022-05-27 PROCEDURE — 258N000003 HC RX IP 258 OP 636: Performed by: HOSPITALIST

## 2022-05-27 PROCEDURE — 36415 COLL VENOUS BLD VENIPUNCTURE: CPT | Performed by: HOSPITALIST

## 2022-05-27 PROCEDURE — 99239 HOSP IP/OBS DSCHRG MGMT >30: CPT | Performed by: HOSPITALIST

## 2022-05-27 RX ORDER — POTASSIUM CHLORIDE 1500 MG/1
40 TABLET, EXTENDED RELEASE ORAL ONCE
Status: COMPLETED | OUTPATIENT
Start: 2022-05-27 | End: 2022-05-27

## 2022-05-27 RX ADMIN — FINASTERIDE 5 MG: 5 TABLET, FILM COATED ORAL at 09:18

## 2022-05-27 RX ADMIN — LOSARTAN POTASSIUM 25 MG: 25 TABLET, FILM COATED ORAL at 09:18

## 2022-05-27 RX ADMIN — SODIUM CHLORIDE: 9 INJECTION, SOLUTION INTRAVENOUS at 07:59

## 2022-05-27 RX ADMIN — TAMSULOSIN HYDROCHLORIDE 0.4 MG: 0.4 CAPSULE ORAL at 09:18

## 2022-05-27 RX ADMIN — OXYCODONE HYDROCHLORIDE AND ACETAMINOPHEN 500 MG: 500 TABLET ORAL at 09:18

## 2022-05-27 RX ADMIN — POTASSIUM CHLORIDE 40 MEQ: 1500 TABLET, EXTENDED RELEASE ORAL at 11:14

## 2022-05-27 RX ADMIN — METOPROLOL TARTRATE 25 MG: 25 TABLET, FILM COATED ORAL at 09:18

## 2022-05-27 RX ADMIN — FERROUS GLUCONATE 324 MG: 324 TABLET ORAL at 09:18

## 2022-05-27 ASSESSMENT — ACTIVITIES OF DAILY LIVING (ADL)
ADLS_ACUITY_SCORE: 30

## 2022-05-27 NOTE — DISCHARGE SUMMARY
[facility]  Hospitalist Discharge Summary      Date of Admission:  2022  Date of Discharge:  2022  Discharging Provider: Armando Castillo MD  Discharge Service: Hospitalist Service    Discharge Diagnoses   Hematuria status post cystoscopy  Acute kidney injury  Urinary retention  H/o TIA, Basilar artery stenosis  History of BPH  Hypertension  Hyperlipidemia  Coronary artery disease  GERD  Hypokalemia    Follow-ups Needed After Discharge   Follow-up Appointments     Follow Up ([facility])      Follow-up in urology office for catheter removal and voiding trial in 5-7   days. This will be a nursing visit. Please call to schedule.     [address] [place]. [address]  You may call [phone] with any questions or concerns.   Central Appointment #: [phone]  [phone]         Follow-up and recommended labs and tests       Follow up with primary care provider, Adonis Trotter, within 7 days   for hospital follow- up.  No follow up labs or test are needed.             Discharge Disposition   Discharged to home  Condition at discharge: Stable    Hospital Course   Juan Carlos Marley is a 81 year old male who presents with hematuria.  Admitted for further evaluation and treatment.    Hematuria status post cystoscopy  Acute kidney injury  Urinary retention  Patient reporting to the emergency department reporting clots in his urine as well as difficulty with urination, and urgency.  Patient does have a history of enlarged prostate with recent cystoscopy.  Patient does endorse vomiting.  Reports nausea.  Patient denies headache, ear pain, eye pain, sore throat, cough, chest pain, shortness of breath, abdominal pain, blood in stool, lower extremity edema, rash, fever.  Creatinine is 2.64 in the emergency department.  White blood cell count is 11.5 with a hemoglobin of 12.9 and a platelet count of 180.  Urinalysis shows bloody appearance, pH of 7.5, greater than  600 protein, or blood, negative leukocyte esterase.  COVID testing is negative.  - Admitted to inpatient.  - Urology consulted, appreciate their assistance.  - Three way catheter placed in ER and he was started on CBI.  - Hematuria has cleared and CBI was discontinued in AM on 5/25/22.  - Catheter removed 5/26, however he had urinary retention and after being straight cathed x 2, a Lawrence catheter was placed.  - Discharge home with Lawrence catheter and follow-up in Urology clinic next week for trial of voiding.  - Antiplatelet agents held due to hematuria. Hematuria has resolved, will restart antiplatelet agents tomorrow.  - Urine culture was negative, stopped empiric antibiotics.  - Creatinine much improved and now back to baseline.  - Discharge home today.  - Follow-up with Urology next week for trial of voiding.  - Discussed reasons to seek medical attention prior to follow-up appointment.    H/o TIA, Basilar artery stenosis  Previous MRI showing high-grade basilar stenosis.  - PTA aspirin and Plavix held initially due to hematuria, will resume upon discharge.    History of BPH  Patient previously on finasteride and tamsulosin.  - Continue PTA finasteride and tamsulosin.    Hypertension  Hyperlipidemia  Coronary artery disease  Patient previously on losartan and metoprolol. H/o CABG.   - Continue PTA metoprolol and atorvastatin.  - PTA losartan initially held in setting of JOCELINE. Restarted 5/26/22 as JOCELINE resolved and BP elevated.    GERD  - Continue PTA omeprazole.    Hypokalemia  - Replaced prior to discharge.    Consultations This Hospital Stay   UROLOGY IP CONSULT    Code Status   Full Code    Time Spent on this Encounter   I, Armando Castillo MD, personally saw the patient today and spent greater than 30 minutes discharging this patient.       Armando Castillo MD  [place] [address] [facility]  [address] [address]  Phone:  [phone]  ______________________________________________________________________    Physical Exam   Vital Signs: Temp: 98.7  F (37.1  C) Temp src: Oral BP: (!) 142/68 Pulse: 68   Resp: 16 SpO2: 97 % O2 Device: None (Room air)    Weight: 196 lbs 13.93 oz  Constitutional: awake, alert, cooperative, no apparent distress, laying in the hospital bed  Respiratory: clear to auscultation bilaterally, no crackles or wheezing  Cardiovascular: regular rate and rhythm, normal S1 and S2, no murmur noted  GI: normal bowel sounds, soft, non-distended, non-tender  : Lawrence catheter in place with clear yellow urine  Skin: warm, dry  Musculoskeletal: no lower extremity pitting edema present  Neurologic: awake, alert, answers questions appropriately moves all extremities       Primary Care Physician   Adonis Trotter    Discharge Orders      Follow Up ([facility])    Follow-up in urology office for catheter removal and voiding trial in 5-7 days. This will be a nursing visit. Please call to schedule.     [address] [place]. [address]  You may call [phone] with any questions or concerns.   Central Appointment #: [phone]  [phone]     Reason for your hospital stay    Hematuria and urinary retention     Follow-up and recommended labs and tests     Follow up with primary care provider, Adonis Trotetr, within 7 days for hospital follow- up.  No follow up labs or test are needed.     Activity    Your activity upon discharge: activity as tolerated     Diet    Follow this diet upon discharge: Regular Diet       Significant Results and Procedures   Most Recent 3 CBC's:Recent Labs   Lab Test 22  0800 22  0712 22  0837 22  1355   WBC  --  4.9 8.2 11.5*   HGB 10.4* 10.0* 10.7* 12.9*   MCV  --  97 95 92   PLT  --  112* 138* 180     Most Recent 3 BMP's:Recent Labs   Lab Test 22  0800 22  0712 22  0837    142 143   POTASSIUM 3.2* 3.6 3.8   CHLORIDE 112*  112* 112*   CO2 25 27 28   BUN 14 18 23   CR 0.74 0.78 0.79   ANIONGAP 5 3 3   ISABEL 7.8* 8.5 8.2*   * 93 92     Most Recent 2 LFT's:Recent Labs   Lab Test 04/26/22  1206 02/23/21  0813   AST 20 18   ALT 18 20   ALKPHOS 81 92   BILITOTAL 0.8 0.7   ,   Results for orders placed or performed during the hospital encounter of 05/06/22   CTA Head Neck with Contrast    Narrative    EXAM: CTA  HEAD NECK WITH CONTRAST  LOCATION: [facility]  DATE/TIME: 5/6/2022 11:16 AM    INDICATION: Dizziness, persistent/recurrent, cardiac or vascular cause suspected.  COMPARISON: None.  CONTRAST: 70 mL Omnipaque 300  TECHNIQUE: Head and neck CT angiogram with IV contrast. Noncontrast head CT followed by axial helical CT images of the head and neck vessels obtained during the arterial phase of intravenous contrast administration. Axial 2D reconstructed images and   multiplanar 3D MIP reconstructed images of the head and neck vessels were performed by the technologist. Dose reduction techniques were used. All stenosis measurements made according to NASCET criteria unless otherwise specified.    FINDINGS:   NONCONTRAST HEAD CT:   INTRACRANIAL CONTENTS: No intracranial hemorrhage, extraaxial collection, or mass effect.  No CT evidence of acute infarct. Mild presumed chronic small vessel ischemic changes. Normal ventricles and sulci for age. Position of the cerebellar tonsils is   satisfactory. Sella shows no acute abnormality.     VISUALIZED ORBITS/SINUSES/MASTOIDS: Prior bilateral cataract surgery. Visualized portions of the orbits are otherwise unremarkable. Mucosal thickening primarily involving the ethmoid air cells. No middle ear or mastoid effusion.    BONES/SOFT TISSUES: No fracture of the calvarium or skull base. No significant swelling of the facial or scalp soft tissues is apparent.    HEAD CTA:  ANTERIOR CIRCULATION: No stenosis/occlusion, aneurysm, or high flow vascular malformation. Tortuosity of  the cavernous ICA segments. Predominant fetal origin right PCA. These represent normal anatomic variations. Nonstenotic calcification of the carotid   siphons is noted. There is symmetric branch arborization of ULI and MCA vascular territories bilaterally.    POSTERIOR CIRCULATION: No high-flow vascular malformation. Irregular caliber of the basilar artery at the mid and distal aspect with short-segment high-grade stenosis probably 80-95% in degree series 13 image 57 of the mid basilar level with some   poststenotic dilation and/or aneurysmal dilation directed to the right of midline. There is satisfactory outflow in the remainder of the distal basilar artery. The left superior cerebellar arterial origin is directed anteriorly and then to the left of   midline series 11 images 370-374. The right superior cerebellar artery is intact. The right PCA supply predominantly from the right posterior communicating artery with a very diminutive right P1 segment noted. No evidence for PCA stenosis is identified.   No PCA aneurysm or dissection. Mild 10-30% narrowing distal V4 segments due to calcification. Tortuosity of the basilar segment proximally.    DURAL VENOUS SINUSES: Expected enhancement of the major dural venous sinuses.    NECK CTA:  RIGHT CAROTID: Eccentric calcification at the bifurcation and proximal right ICA with mild 10-15% right ICA stenosis. No high-grade stenosis or dissection.    LEFT CAROTID: There is coarse calcification at the level of the proximal left ICA with mild stenosis 15-30% proximally. There is calcification of the proximal left ECA eccentrically with moderate stenosis 35-45% series 11 image 234. No high-grade   stenosis or dissection.    VERTEBRAL ARTERIES: No focal stenosis or dissection in the neck. Mild narrowing bilateral V4 segments due to calcification as above. Balanced vertebral arteries.    AORTIC ARCH: Common origin right innominate artery and left common carotid artery series 18  image 71 without hemodynamic stenosis. Mild tortuosity of the proximal left subclavian artery results in perhaps mild narrowing 20-25% series 18 image 83.   Satisfactory outflow left subclavian artery and right subclavian artery noted.    NONVASCULAR STRUCTURES: There is no pathologic enhancement present intracranially or within the neck. No pathologic orbital enhancement. Meckel's cave and cavernous sinus patterns of enhancement are satisfactory. No mass or adenopathy identified within   the neck soft tissues. Degenerative cervical spondylosis without severe canal or foraminal stenosis. Thyroid is satisfactory. No mediastinal adenopathy. Minimal atelectasis in the upper lungs.      Impression    IMPRESSION:   HEAD CT:  1.  No acute process intracranially. No mass, mass effect or hemorrhage. Nothing for acute or evolving ischemic changes.    HEAD CTA:   1.  Short-segment high-grade/very high-grade stenosis mid basilar artery with some poststenotic dilation. No additional high-grade intracranial stenosis or dissection. No evidence for thromboembolic occlusion. Mild narrowing of the distal V4 segments   bilaterally.    2.  Dural venous sinus pattern of enhancement is satisfactory.    NECK CTA:  1.  Mild proximal ICA narrowing bilaterally and mild narrowing left ECA origin. No high-grade stenosis in the neck or evidence for neck dissection.       Discharge Medications   Current Discharge Medication List      CONTINUE these medications which have NOT CHANGED    Details   aspirin (ASA) 81 MG chewable tablet Take 81 mg by mouth daily      atorvastatin (LIPITOR) 40 MG tablet Take 1 tablet (40 mg) by mouth daily  Qty: 90 tablet, Refills: 0    Associated Diagnoses: Dyslipidemia, goal LDL below 70      calcium carbonate (OS-ISABEL) 500 MG tablet Take 1 tablet by mouth once 600 mg daily      clopidogrel (PLAVIX) 75 MG tablet Take 1 tablet (75 mg) by mouth daily  Qty: 90 tablet, Refills: 3    Associated Diagnoses: Essential  hypertension      Ferrous Gluconate 324 (37.5 Fe) MG TABS Take 324 mg by mouth 2 times daily      finasteride (PROSCAR) 5 MG tablet Take 1 tablet (5 mg) by mouth daily  Qty: 90 tablet, Refills: 1    Associated Diagnoses: BPH (benign prostatic hyperplasia)      losartan (COZAAR) 25 MG tablet Take 1 tablet (25 mg) by mouth daily  Qty: 90 tablet, Refills: 1    Associated Diagnoses: Coronary artery disease due to lipid rich plaque      metoprolol tartrate (LOPRESSOR) 25 MG tablet Take 1 tablet (25 mg) by mouth 2 times daily  Qty: 180 tablet, Refills: 2    Associated Diagnoses: Coronary artery disease due to lipid rich plaque      Multiple Vitamins-Minerals (MULTIVITAMIN ADULT, MINERALS,) TABS Take 1 tablet by mouth daily      omeprazole (PRILOSEC) 20 MG DR capsule Take 1 capsule (20 mg) by mouth daily  Qty: 90 capsule, Refills: 11    Associated Diagnoses: Essential hypertension      tamsulosin (FLOMAX) 0.4 MG capsule Take 1 capsule by mouth 2 times daily      vitamin C (ASCORBIC ACID) 500 MG tablet Take 500 mg by mouth daily            Allergies   No Known Allergies

## 2022-05-27 NOTE — PROGRESS NOTES
[facility]    Urology Progress Note     Assessment & Plan   Juan Carlos Marley is a 81 year old male who was admitted on 5/24/2022. 80yo male with enlarged prostate and gross hematuria with urinary     Plan:   -plan to discharge with alberto   -ok to restart plavix from urology standpoint   -follow-up next week with our nursing staff for TOV     Mila Ibrahim PA-C  [place] [facility]   [email or web address]  Text Page (7:30am to 4pm)      Interval History   Unable to void after alberto removal yd, alberto now replaced   Urine is pierce   Denies pain     AVSS    Physical Exam   Temp: 98.7  F (37.1  C) Temp src: Oral BP: (!) 151/65 Pulse: 62   Resp: 16 SpO2: 97 % O2 Device: None (Room air)    Vitals:    05/25/22 0631 05/26/22 0615 05/27/22 0704   Weight: 85.8 kg (189 lb 2.5 oz) 88.9 kg (195 lb 15.8 oz) 89.3 kg (196 lb 13.9 oz)     Vital Signs with Ranges  Temp:  [98.4  F (36.9  C)-98.7  F (37.1  C)] 98.7  F (37.1  C)  Pulse:  [62-69] 62  Resp:  [15-16] 16  BP: (136-152)/(59-65) 151/65  SpO2:  [95 %-97 %] 97 %  I/O last 3 completed shifts:  In: 3615 [P.O.:1280; I.V.:2335]  Out: 3275 [Urine:3275]    General: Alert and oriented, no distress  HEENT: Face symmetric, mucous membranes moist and pink  Eyes: No scleral icterus  Neck: Symmetric  Chest wall: Symmetric  Respiratory: Breathing unlabored, no audible wheezing  Cardiac: Extremities warm and well perfused  Abdomen: soft, non tender, non distended  Back: No CVA or flank tenderness  : no suprapubic tenderness, alberto draining pierce urine  Neuro:Grossly non focal  Pysch: Normal mood and affect  Skin: No evident rashes or lesions    Medications     sodium chloride 100 mL/hr at 05/27/22 0759       atorvastatin  40 mg Oral QPM     ferrous gluconate  324 mg Oral BID     finasteride  5 mg Oral Daily     losartan  25 mg Oral Daily     metoprolol tartrate  25 mg Oral BID     pantoprazole  40 mg Oral QPM     sodium chloride (PF)  3 mL  Intracatheter Q8H     tamsulosin  0.4 mg Oral BID     vitamin C  500 mg Oral Daily       Data   Results for orders placed or performed during the hospital encounter of 05/24/22 (from the past 24 hour(s))   Hemoglobin   Result Value Ref Range    Hemoglobin 10.4 (L) 13.3 - 17.7 g/dL   Basic metabolic panel   Result Value Ref Range    Sodium 142 133 - 144 mmol/L    Potassium 3.2 (L) 3.4 - 5.3 mmol/L    Chloride 112 (H) 94 - 109 mmol/L    Carbon Dioxide (CO2) 25 20 - 32 mmol/L    Anion Gap 5 3 - 14 mmol/L    Urea Nitrogen 14 7 - 30 mg/dL    Creatinine 0.74 0.66 - 1.25 mg/dL    Calcium 7.8 (L) 8.5 - 10.1 mg/dL    Glucose 138 (H) 70 - 99 mg/dL    GFR Estimate >90 >60 mL/min/1.73m2

## 2022-05-27 NOTE — PLAN OF CARE
Summary: Hematuria status post cystoscopy, JOCELINE     DATE & TIME: 5/26/22 5314-3321  Cognitive Concerns/ Orientation : A&O x4; calm and cooperative  BEHAVIOR & AGGRESSION TOOL COLOR: Green   ABNL VS/O2: VSS normal except elevated BP  MOBILITY: IND.  PAIN MANAGMENT: Denies pain, except feels pressure when unable to void  DIET: renal diet, NA less than 2400 mg and no caffeine.  BOWEL/BLADDER: Urine is pierce/tea color, straight cath 700 ml, PVR to be done   ABNL LAB/BG: Hgb 10.0, hematocrit 30.7  DRAIN/DEVICES: RPIV infusing NS @ 100 mL/hr,  SKIN: pale, intact   D/C DAY/GOALS/PLACE: pending (5/28?), once medically cleared. Being able to urinate without cath.  OTHER IMPORTANT INFO:  Cath discontinued in AM to see if patient can void, patient was unable and straight cath was inserted to help patient void. Continues NS is running.

## 2022-05-27 NOTE — PLAN OF CARE
Goal Outcome Evaluation:    Plan of Care Reviewed With: patient, spouse     Summary: Hematuria status post cystoscopy, JOCELNIE     DATE & TIME: 5/27/22 2336-2643  Cognitive Concerns/ Orientation : A&O x4; calm and cooperative  BEHAVIOR & AGGRESSION TOOL COLOR: Green   ABNL VS/O2: VSS except hypertension  MOBILITY: IND.  PAIN MANAGMENT: Denies pain  DIET: renal diet, NA less than 2400 mg and no caffeine.  BOWEL/BLADDER: Alberto placed did education with pt on how to switch to leg bag and standard bag when at home as well as other alberto cath education  ABNL LAB/BG: Hgb 10.4, hematocrit 30.7, K 3.2  DRAIN/DEVICES: RPIV infusing NS @ 100 mL/hr, discontinued and removed for discharge   SKIN: pale, intact, small skin tear to left shin  D/C DAY/GOALS/PLACE: today at 1430  OTHER IMPORTANT INFO: has appointment scheduled for 6/6 to remove alberto and start bladder training    Discharge    Patient discharged to home via private ride with wife  Care plan note    Listed belongings gathered and given to patient (including from security/pharmacy). Yes  Care Plan and Patient education resolved: Yes  Prescriptions if needed, hard copies sent with patient  NA  Medication Bin checked and emptied on discharge Yes  SW/care coordinator/charge RN aware of discharge: Yes

## 2022-05-27 NOTE — PLAN OF CARE
Goal Outcome Evaluation:           Summary: Hematuria status post cystoscopy, JOCELINE     DATE & TIME: 5/27/22 4172-8352  Cognitive Concerns/ Orientation : A&O x4; calm and cooperative  BEHAVIOR & AGGRESSION TOOL COLOR: Green   ABNL VS/O2: VSS normal except elevated BP  MOBILITY: IND.  PAIN MANAGMENT: Denies pain, except feels pressure when unable to void  DIET: renal diet, NA less than 2400 mg and no caffeine.  BOWEL/BLADDER: Unable to void, Scann>500. Lawrence placed with 2 atempts per note from Urology. Return of 800 dark red/pierce urin. Relief of bladder pain.   ABNL LAB/BG: Hgb 10.0, hematocrit 30.7  DRAIN/DEVICES: RPIV infusing NS @ 100 mL/hr,  SKIN: pale, intact   D/C DAY/GOALS/PLACE: pending (5/28?), once medically cleared. Being able to urinate without cath.  OTHER IMPORTANT INFO:  Unable to void, had pain/pressure, >500 with scan. Lawrence placed.

## 2022-05-28 DIAGNOSIS — Z71.89 OTHER SPECIFIED COUNSELING: ICD-10-CM

## 2022-05-29 ENCOUNTER — PATIENT OUTREACH (OUTPATIENT)
Dept: CARE COORDINATION | Facility: CLINIC | Age: 82
End: 2022-05-29
Payer: COMMERCIAL

## 2022-05-29 NOTE — PROGRESS NOTES
Clinic Care Coordination Contact  [facility]/Voicemail       Clinical Data: Care Coordinator Outreach  Outreach attempted x 1.  Left message on patient's voicemail with call back information and requested return call.  Plan: Care Coordinator will try to reach patient again in 1-2 business days.    .Tiara MAHARAJ Community Health Worker  Clinic Care Coordination  [facility]  Phone: [phone]

## 2022-05-31 NOTE — PROGRESS NOTES
Clinic Care Coordination Contact  [facility]: Post-Discharge Note  SITUATION                                                      Admission:    Admission Date: 05/24/22   Reason for Admission: Hematuria  Discharge:   Discharge Date: 05/27/22  Discharge Diagnosis: Hematuria status post cystoscopy  Acute kidney injury  Urinary retention    BACKGROUND                                                      Per hospital discharge summary and inpatient provider notes:  Juan Carlos Marley is a 81 year old male who presents with hematuria.  Admitted for further evaluation and treatment. Patient reporting to the emergency department reporting clots in his urine as well as difficulty with urination, and urgency.  Patient does have a history of enlarged prostate with recent cystoscopy.  Patient does endorse vomiting.  Reports nausea.  Patient denies headache, ear pain, eye pain, sore throat, cough, chest pain, shortness of breath, abdominal pain, blood in stool, lower extremity edema, rash, fever.  Creatinine is 2.64 in the emergency department.  White blood cell count is 11.5 with a hemoglobin of 12.9 and a platelet count of 180.  Urinalysis shows bloody appearance, pH of 7.5, greater than 600 protein, or blood, negative leukocyte esterase.  COVID testing is negative.    ASSESSMENT      Enrollment  Primary Care Care Coordination Status: Declined    Discharge Assessment  How are you doing now that you are home?: doing ok light bleeding haven't taken blood thinner just yet. a bit anxious to get catheter our. urine turned yellow and cant figure out where the bleeding is coming from decline to connect with nurse triage and would do it himself  How are your symptoms? (Red Flag symptoms escalate to triage hotline per guidelines): Improved  Do you feel your condition is stable enough to be safe at home until your provider visit?: Yes  Does the patient have their discharge instructions? : Yes  Does the patient have questions  regarding their discharge instructions? : No  Were you started on any new medications or were there changes to any of your previous medications? : Yes  Does the patient have all of their medications?: Yes  Do you have questions regarding any of your medications? : No  Do you have all of your needed medical supplies or equipment (DME)?  (i.e. oxygen tank, CPAP, cane, etc.): Yes  Discharge follow-up appointment scheduled within 14 calendar days? : Yes  Discharge Follow Up Appointment Scheduled with?: Specialty Care Provider              PLAN                                                      Outpatient Plan:  Follow-up Appointments     Follow Up ([facility])      Follow-up in urology office for catheter removal and voiding trial in 5-7   days. This will be a nursing visit. Please call to schedule.      [address] [place] [address]. [address]  You may call [phone] with any questions or concerns.   Central Appointment #: [phone]  [phone]         Follow-up and recommended labs and tests       Follow up with primary care provider, Adonis Trotter, within 7 days   for hospital follow- up.  No follow up labs or test are needed.       No future appointments.      For any urgent concerns, please contact our 24 hour nurse triage line: [phone])         Tiara Bennett MA

## 2022-06-08 ENCOUNTER — TELEPHONE (OUTPATIENT)
Dept: INTERNAL MEDICINE | Facility: CLINIC | Age: 82
End: 2022-06-08
Payer: COMMERCIAL

## 2022-06-08 NOTE — TELEPHONE ENCOUNTER
Patient spouse calling to see if Gerardo Stallworth MD is the urologist you wanted them to see or if their was another urologist you recommend.    Call Back   [phone]   Can leave message: Y

## 2022-06-09 NOTE — TELEPHONE ENCOUNTER
Contacted patient and confirmed urologist.  Also sent patient the contact info via [employer] message.  Patient thankful for call back and info.

## 2022-06-24 ENCOUNTER — TELEPHONE (OUTPATIENT)
Dept: UROLOGY | Facility: CLINIC | Age: 82
End: 2022-06-24

## 2022-06-24 NOTE — TELEPHONE ENCOUNTER
[facility] Call Center    Phone Message    May a detailed message be left on voicemail: yes     Reason for Call: Symptoms or Concerns     If patient has red-flag symptoms, warm transfer to triage line    Current symptom or concern: pt is calling, reporting blood in his urine, and reports of a very enlarged prostate, he would like a call back to discuss next steps, thank you    Symptoms have been present for: couple days    Has patient previously been seen for this? no    By : n/a    Date: n/a    Are there any new or worsening symptoms? Yes: worsening      Action Taken: Message routed to:  Other: uro    Travel Screening: Not Applicable

## 2022-06-29 ENCOUNTER — VIRTUAL VISIT (OUTPATIENT)
Dept: NEUROLOGY | Facility: CLINIC | Age: 82
End: 2022-06-29
Payer: COMMERCIAL

## 2022-06-29 VITALS — HEIGHT: 69 IN | WEIGHT: 190 LBS | BODY MASS INDEX: 28.14 KG/M2

## 2022-06-29 DIAGNOSIS — I65.1 BASILAR ARTERY STENOSIS, SYMPTOMATIC, WITHOUT INFARCTION: Primary | ICD-10-CM

## 2022-06-29 PROCEDURE — 99214 OFFICE O/P EST MOD 30 MIN: CPT | Mod: GT | Performed by: PSYCHIATRY & NEUROLOGY

## 2022-06-29 NOTE — NURSING NOTE
Chief Complaint   Patient presents with     Basilar artery stenosis     Follow up- CTA results      Video Visit     Bridget Benson CMA on 6/29/2022 at 12:16 PM

## 2022-06-29 NOTE — LETTER
2022         RE: Juan Carlos Marley  [address]  [place] [address]        Dear Colleague,    Thank you for referring your patient, Juan Carlos Marley, to the [facility] [place]. Please see a copy of my visit note below.    NEUROLOGY FOLLOW UP VISIT  NOTE       [facility] [place]  [address] [place] [address]  Tel: [phone]  Fax: [phone]  [email or web address]     Juan Carlos Marley,  1940, MRN [id number]  PCP: Adonis Trotter  Date: 2022      ASSESSMENT & PLAN     Visit Diagnosis  1. 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 fib, HTN, HLD, SNHL with known basilar stenosis that in the past resulted in stereotypical episodes of dizziness and dysarthria.  He is scheduled for prostate surgery and was somewhat nervous about stopping aspirin and Plavix.  He had MRI of the brain that did not show any high-grade stenosis but CTA head and neck showed short segment high-grade stenosis in the mid basilar artery with some poststenotic dilatation.  Since his last visit he had a cystoscopy that was complicated by hematuria.  He was started back on aspirin and Plavix.  He is not sure when prostatectomy is planned but I have told him to stop aspirin and Plavix 5 days before the surgery and he can restart it 3 days after the surgery.  Follow-up will be in as needed basis    Thank you again for this referral, please feel free to contact me if you have any questions.    Bandar Darnell MD  [facility]  (Formerly, [facility]     HISTORY OF PRESENT ILLNESS     Patient is 82-year-old male with history of CAD s/p CABG, postop A. fib, HTN, HLD, SNHL, basilar artery stenosis that resulted in stereotypical episodes of dizziness and dysarthria who was last seen on 2022 prior to his  prostatectomy as he was quite nervous about holding his Plavix and aspirin.  His symptoms started in 2016 when he developed dizziness and afterwards was confused and unable to write his name.  He was on anticoagulants but had stopped it because of upcoming prostate surgery.  CT of the head and CTA were normal.  Also MRI scan did not show any ischemia.  He was also evaluated at [facility] and the possibility of migraine equivalent syndrome was raised.  He was on Eliquis that was changed to aspirin and Plavix and stopped having those stereotypical episodes.  He was recently told that he needed prostatectomy and was quite nervous.  I had told him that his CTA showed focal tortuosity and moderate narrowing of the mid basilar artery and it is okay to proceed with prostatectomy.  He had cystoscopy but afterwards developed hematuria with acute kidney injury and urinary retention and was admitted to the hospital on 5/24/2022.  Urology was consulted and a three-way catheter was placed.  His antiplatelets were held due to hematuria and plan was to restart antiplatelet agents on 5/25/2022.  His hematuria has cleared and he was told to start taking aspirin and Plavix.  He is not sure when his prostatectomy scheduled     PROBLEM LIST   Patient Active Problem List   Diagnosis Code     Essential hypertension I10     Dyslipidemia, goal LDL below 70 E78.5     Coronary artery disease due to lipid rich plaque I25.10, I25.83     Major depressive disorder, recurrent episode (H) F33.9     Benign prostatic hyperplasia with weak urinary stream N40.1, R39.12     Chronic anticoagulation Z79.01     Urinary retention R33.9     JOCELINE (acute kidney injury) (H) N17.9     Anemia, unspecified type D64.9     Hematuria, unspecified type R31.9         PAST MEDICAL & SURGICAL HISTORY     Past Medical History:   Patient  has a past medical history of Cataract, nuclear sclerotic, left eye (6/2/2017), Coronary artery disease due to lipid rich plaque,  Dyslipidemia, goal LDL below 70 (12/20/2014), Essential hypertension with goal blood pressure less than 130/85, Hernia, abdominal, History of spinal cord injury, Nonsustained ventricular tachycardia (H), Paroxysmal atrial fibrillation (H) (09/02/2013), Stricture of esophagus (2013), Transient ischemic attack (09/2018), Transient ischemic attack (10/2018), Transient ischemic attack (11/20/2016), Transient ischemic attack (01/24/2016), and Transient ischemic attack (12/20/2014).    Surgical History:  He  has a past surgical history that includes Bypass graft artery coronary (09/2013); Prostatectomy (11/2013); hernia repair (11/2013); Orif Femur Fracture (Right); Orchiectomy scrotal (Right); Foot surgery (Left, 12/19/2014); fracture surgery (Right); Testicle surgery; Prostate surgery; and Cystoscopy.     SOCIAL HISTORY     Reviewed, and he  reports that he quit smoking about 52 years ago. His smoking use included cigarettes and pipe. He has a 10.00 pack-year smoking history. He has never used smokeless tobacco. He reports previous alcohol use of about 4.0 standard drinks of alcohol per week. He reports that he does not use drugs.     FAMILY HISTORY     Reviewed, and family history includes Aortic aneurysm in his father; Migraines in his mother; No Known Problems in his daughter; Other - See Comments in his mother and sister.     ALLERGIES     No Known Allergies      REVIEW OF SYSTEMS     A 12 point review of system was performed and was negative except as outlined in the history of present illness.     HOME MEDICATIONS     Current Outpatient Rx   Medication Sig Dispense Refill     aspirin (ASA) 81 MG chewable tablet Take 81 mg by mouth daily       atorvastatin (LIPITOR) 40 MG tablet Take 1 tablet (40 mg) by mouth daily 90 tablet 0     calcium carbonate (OS-ISABEL) 500 MG tablet Take 1 tablet by mouth once 600 mg daily       clopidogrel (PLAVIX) 75 MG tablet Take 1 tablet (75 mg) by mouth daily 90 tablet 3     Ferrous  "Gluconate 324 (37.5 Fe) MG TABS Take 324 mg by mouth 2 times daily       finasteride (PROSCAR) 5 MG tablet Take 1 tablet (5 mg) by mouth daily 90 tablet 1     losartan (COZAAR) 25 MG tablet Take 1 tablet (25 mg) by mouth daily 90 tablet 1     metoprolol tartrate (LOPRESSOR) 25 MG tablet Take 1 tablet (25 mg) by mouth 2 times daily 180 tablet 2     Multiple Vitamins-Minerals (MULTIVITAMIN ADULT, MINERALS,) TABS Take 1 tablet by mouth daily       omeprazole (PRILOSEC) 20 MG DR capsule Take 1 capsule (20 mg) by mouth daily 90 capsule 11     tamsulosin (FLOMAX) 0.4 MG capsule Take 1 capsule by mouth 2 times daily       vitamin C (ASCORBIC ACID) 500 MG tablet Take 500 mg by mouth daily            PHYSICAL EXAM     Vital signs  Ht 1.753 m (5' 9\")   Wt 86.2 kg (190 lb)   BMI 28.06 kg/m      Weight:   190 lbs 0 oz    Patient is alert and oriented x4 in no acute distress. Vital signs were reviewed and are documented in electronic medical record. Neck was supple, no carotid bruits, thyromegaly, JVD, or lymphadenopathy was noted.   NEUROLOGY EXAM:    Patient s speech was normal with no aphasia or dysarthria. Mentation, and affect were also normal.     Face symmetrical, extraocular movements are intact    Patient moves all 4 extremities    Sensation was not tested    Reflexes were not tested.     No dysmetria noted    Gait testing was not tested     PERTINENT DIAGNOSTIC STUDIES     Following studies were reviewed:     CTA HEAD AND NECK 5/6/2022  HEAD CT:  1.  No acute process intracranially. No mass, mass effect or hemorrhage. Nothing for acute or evolving ischemic changes.     HEAD CTA:   1.  Short-segment high-grade/very high-grade stenosis mid basilar artery with some poststenotic dilation. No additional high-grade intracranial stenosis or dissection. No evidence for thromboembolic occlusion. Mild narrowing of the distal V4 segments   bilaterally.     2.  Dural venous sinus pattern of enhancement is satisfactory.     NECK " CTA:  1.  Mild proximal ICA narrowing bilaterally and mild narrowing left ECA origin. No high-grade stenosis in the neck or evidence for neck dissection.    CTA HEAD AND NECK 8/21/2017  HEAD CTA:   1. Noncontrast head CT demonstrates no hemorrhage, mass/mass effect or CT evidence of acute infarct. MRI is more sensitive in the assessment of acute ischemia/infarction.  2. Head CTA demonstrates no aneurysm or significant stenosis in the proximal intracranial vessels.     NECK CTA:  1. Bovine arch configuration of the great vessels off the aortic arch.  2. About 10% stenosis involving the origins of the internal carotid arteries bilaterally by NASCET criteria.  3. No radiographic evidence of dissection.     MRI BRAIN , MRA 9/6/2017  1. 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   2. No evidence of recent ischemia or infarction.      MRI, MRA BRAIN 11/20/2016  HEAD MRI:  1. Ventricular system consistent with mild diffuse volume loss.  2.  No discrete mass lesion, hemorrhage or focal area suggestive of acute ischemia.  3.  No abnormal enhancement.     HEAD MRA:  1. There is no discrete aneurysm or vascular malformation involving the arteries at the base of the brain.  2.   Again visualized is the focal tortuosity and moderate narrowing of the mid basilar artery which is stable in the interval.     PERTINENT LABS  Following labs were reviewed:  Admission on 05/24/2022, Discharged on 05/27/2022   Component Date Value     Sodium 05/24/2022 135      Potassium 05/24/2022 4.1      Chloride 05/24/2022 102      Carbon Dioxide (CO2) 05/24/2022 25      Anion Gap 05/24/2022 8      Urea Nitrogen 05/24/2022 37 (A)     Creatinine 05/24/2022 2.64 (A)     Calcium 05/24/2022 9.6      Glucose 05/24/2022 159 (A)     GFR Estimate 05/24/2022 24 (A)     Color Urine 05/24/2022 Red (A)     Appearance Urine 05/24/2022 Bloody (A)     Glucose Urine 05/24/2022 Negative      Bilirubin Urine 05/24/2022 Negative       Ketones Urine 05/24/2022 Negative      Specific Gravity Urine 05/24/2022 1.032      Blood Urine 05/24/2022 Large (A)     pH Urine 05/24/2022 7.5 (A)     Protein Albumin Urine 05/24/2022 >600 (A)     Urobilinogen Urine 05/24/2022 Normal      Nitrite Urine 05/24/2022 Negative      Leukocyte Esterase Urine 05/24/2022 Negative      RBC Urine 05/24/2022 >182 (A)     WBC Urine 05/24/2022 >182 (A)     WBC Count 05/24/2022 11.5 (A)     RBC Count 05/24/2022 4.11 (A)     Hemoglobin 05/24/2022 12.9 (A)     Hematocrit 05/24/2022 37.7 (A)     MCV 05/24/2022 92      MCH 05/24/2022 31.4      MCHC 05/24/2022 34.2      RDW 05/24/2022 12.8      Platelet Count 05/24/2022 180      % Neutrophils 05/24/2022 89      % Lymphocytes 05/24/2022 5      % Monocytes 05/24/2022 5      % Eosinophils 05/24/2022 0      % Basophils 05/24/2022 0      % Immature Granulocytes 05/24/2022 1      NRBCs per 100 WBC 05/24/2022 0      Absolute Neutrophils 05/24/2022 10.2 (A)     Absolute Lymphocytes 05/24/2022 0.6 (A)     Absolute Monocytes 05/24/2022 0.6      Absolute Eosinophils 05/24/2022 0.0      Absolute Basophils 05/24/2022 0.0      Absolute Immature Granul* 05/24/2022 0.1      Absolute NRBCs 05/24/2022 0.0      Culture 05/24/2022 No Growth      SARS CoV2 PCR 05/24/2022 Negative      Sodium 05/25/2022 143      Potassium 05/25/2022 3.8      Chloride 05/25/2022 112 (A)     Carbon Dioxide (CO2) 05/25/2022 28      Anion Gap 05/25/2022 3      Urea Nitrogen 05/25/2022 23      Creatinine 05/25/2022 0.79      Calcium 05/25/2022 8.2 (A)     Glucose 05/25/2022 92      GFR Estimate 05/25/2022 90      WBC Count 05/25/2022 8.2      RBC Count 05/25/2022 3.39 (A)     Hemoglobin 05/25/2022 10.7 (A)     Hematocrit 05/25/2022 32.1 (A)     MCV 05/25/2022 95      MCH 05/25/2022 31.6      MCHC 05/25/2022 33.3      RDW 05/25/2022 13.1      Platelet Count 05/25/2022 138 (A)     WBC Count 05/26/2022 4.9      RBC Count 05/26/2022 3.18 (A)     Hemoglobin 05/26/2022 10.0 (A)      Hematocrit 05/26/2022 30.7 (A)     MCV 05/26/2022 97      MCH 05/26/2022 31.4      MCHC 05/26/2022 32.6      RDW 05/26/2022 12.7      Platelet Count 05/26/2022 112 (A)     Sodium 05/26/2022 142      Potassium 05/26/2022 3.6      Chloride 05/26/2022 112 (A)     Carbon Dioxide (CO2) 05/26/2022 27      Anion Gap 05/26/2022 3      Urea Nitrogen 05/26/2022 18      Creatinine 05/26/2022 0.78      Calcium 05/26/2022 8.5      Glucose 05/26/2022 93      GFR Estimate 05/26/2022 90      Hemoglobin 05/27/2022 10.4 (A)     Sodium 05/27/2022 142      Potassium 05/27/2022 3.2 (A)     Chloride 05/27/2022 112 (A)     Carbon Dioxide (CO2) 05/27/2022 25      Anion Gap 05/27/2022 5      Urea Nitrogen 05/27/2022 14      Creatinine 05/27/2022 0.74      Calcium 05/27/2022 7.8 (A)     Glucose 05/27/2022 138 (A)     GFR Estimate 05/27/2022 >90    Office Visit on 04/26/2022   Component Date Value     WBC Count 04/26/2022 5.2      RBC Count 04/26/2022 4.28 (A)     Hemoglobin 04/26/2022 13.4      Hematocrit 04/26/2022 39.4 (A)     MCV 04/26/2022 92      MCH 04/26/2022 31.3      MCHC 04/26/2022 34.0      RDW 04/26/2022 12.5      Platelet Count 04/26/2022 147 (A)     Sodium 04/26/2022 142      Potassium 04/26/2022 4.1      Chloride 04/26/2022 106      Carbon Dioxide (CO2) 04/26/2022 27      Anion Gap 04/26/2022 9      Urea Nitrogen 04/26/2022 15      Creatinine 04/26/2022 0.72      Calcium 04/26/2022 8.9      Glucose 04/26/2022 84      Alkaline Phosphatase 04/26/2022 81      AST 04/26/2022 20      ALT 04/26/2022 18      Protein Total 04/26/2022 5.7 (A)     Albumin 04/26/2022 3.5      Bilirubin Total 04/26/2022 0.8      GFR Estimate 04/26/2022 >90      TSH 04/26/2022 0.92      Prostate Specific Antige* 04/26/2022 0.53      Cholesterol 04/26/2022 138      Triglycerides 04/26/2022 42      Direct Measure HDL 04/26/2022 66      LDL Cholesterol Calculat* 04/26/2022 64      Patient Fasting > 8hrs? 04/26/2022 Yes      Color Urine 04/26/2022 Yellow       Appearance Urine 04/26/2022 Clear      Glucose Urine 04/26/2022 Negative      Bilirubin Urine 04/26/2022 Negative      Ketones Urine 04/26/2022 Negative      Specific Gravity Urine 04/26/2022 1.020      Blood Urine 04/26/2022 Negative      pH Urine 04/26/2022 7.0      Protein Albumin Urine 04/26/2022 Negative      Urobilinogen Urine 04/26/2022 0.2      Nitrite Urine 04/26/2022 Negative      Leukocyte Esterase Urine 04/26/2022 Negative    Lab on 04/04/2022   Component Date Value     SARS CoV2 PCR 04/04/2022 Negative       VIDEO VISIT DETAILS  Patient is being evaluated via a billable video visit.   Patient would like the video invitation sent by: [x]E-mail  [][employer]   Type of service: Video Visit  Video Start Time: 12:33 PM  Video End Time (time video stopped): 12:40 PM  Originating Location (Patient Location): Patient's Home  Distant Location (provider location): [facility]   Mode of Communication: Video Conference via [x]Sidelines []Doximity     Total time spent for face to face visit, reviewing labs/imaging studies, counseling and coordination of care was: 30 Minutes spent on the date of the encounter doing chart review, review of outside records, review of test results, interpretation of tests, patient visit and documentation       This note was dictated using voice recognition software.  Any grammatical or context distortions are unintentional and inherent to the software.    No orders of the defined types were placed in this encounter.     New Prescriptions    No medications on file     Modified Medications    No medications on file                     Again, thank you for allowing me to participate in the care of your patient.        Sincerely,        Bandar Darnell MD

## 2022-06-29 NOTE — PROGRESS NOTES
NEUROLOGY FOLLOW UP VISIT  NOTE       [facility] NEUROLOGY [place]  [address] [place] [address]  Tel: [phone]  Fax: [phone]  [email or web address]     Juan Carlos Marley,  1940, MRN [id number]  PCP: Adonis Trotter  Date: 2022      ASSESSMENT & PLAN     Visit Diagnosis  1. 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 fib, HTN, HLD, SNHL with known basilar stenosis that in the past resulted in stereotypical episodes of dizziness and dysarthria.  He is scheduled for prostate surgery and was somewhat nervous about stopping aspirin and Plavix.  He had MRI of the brain that did not show any high-grade stenosis but CTA head and neck showed short segment high-grade stenosis in the mid basilar artery with some poststenotic dilatation.  Since his last visit he had a cystoscopy that was complicated by hematuria.  He was started back on aspirin and Plavix.  He is not sure when prostatectomy is planned but I have told him to stop aspirin and Plavix 5 days before the surgery and he can restart it 3 days after the surgery.  Follow-up will be in as needed basis    Thank you again for this referral, please feel free to contact me if you have any questions.    Bandar Darnell MD  [facility]  (Formerly, [facility]     HISTORY OF PRESENT ILLNESS     Patient is 82-year-old male with history of CAD s/p CABG, postop A. fib, HTN, HLD, SNHL, basilar artery stenosis that resulted in stereotypical episodes of dizziness and dysarthria who was last seen on 2022 prior to his prostatectomy as he was quite nervous about holding his Plavix and aspirin.  His symptoms started in 2016 when he developed dizziness and afterwards was confused and unable to write his name.  He was on anticoagulants but had stopped it because of upcoming  prostate surgery.  CT of the head and CTA were normal.  Also MRI scan did not show any ischemia.  He was also evaluated at [facility] and the possibility of migraine equivalent syndrome was raised.  He was on Eliquis that was changed to aspirin and Plavix and stopped having those stereotypical episodes.  He was recently told that he needed prostatectomy and was quite nervous.  I had told him that his CTA showed focal tortuosity and moderate narrowing of the mid basilar artery and it is okay to proceed with prostatectomy.  He had cystoscopy but afterwards developed hematuria with acute kidney injury and urinary retention and was admitted to the hospital on 5/24/2022.  Urology was consulted and a three-way catheter was placed.  His antiplatelets were held due to hematuria and plan was to restart antiplatelet agents on 5/25/2022.  His hematuria has cleared and he was told to start taking aspirin and Plavix.  He is not sure when his prostatectomy scheduled     PROBLEM LIST   Patient Active Problem List   Diagnosis Code     Essential hypertension I10     Dyslipidemia, goal LDL below 70 E78.5     Coronary artery disease due to lipid rich plaque I25.10, I25.83     Major depressive disorder, recurrent episode (H) F33.9     Benign prostatic hyperplasia with weak urinary stream N40.1, R39.12     Chronic anticoagulation Z79.01     Urinary retention R33.9     JOCELINE (acute kidney injury) (H) N17.9     Anemia, unspecified type D64.9     Hematuria, unspecified type R31.9         PAST MEDICAL & SURGICAL HISTORY     Past Medical History:   Patient  has a past medical history of Cataract, nuclear sclerotic, left eye (6/2/2017), Coronary artery disease due to lipid rich plaque, Dyslipidemia, goal LDL below 70 (12/20/2014), Essential hypertension with goal blood pressure less than 130/85, Hernia, abdominal, History of spinal cord injury, Nonsustained ventricular tachycardia (H), Paroxysmal atrial fibrillation (H) (09/02/2013), Stricture  of esophagus (2013), Transient ischemic attack (09/2018), Transient ischemic attack (10/2018), Transient ischemic attack (11/20/2016), Transient ischemic attack (01/24/2016), and Transient ischemic attack (12/20/2014).    Surgical History:  He  has a past surgical history that includes Bypass graft artery coronary (09/2013); Prostatectomy (11/2013); hernia repair (11/2013); Orif Femur Fracture (Right); Orchiectomy scrotal (Right); Foot surgery (Left, 12/19/2014); fracture surgery (Right); Testicle surgery; Prostate surgery; and Cystoscopy.     SOCIAL HISTORY     Reviewed, and he  reports that he quit smoking about 52 years ago. His smoking use included cigarettes and pipe. He has a 10.00 pack-year smoking history. He has never used smokeless tobacco. He reports previous alcohol use of about 4.0 standard drinks of alcohol per week. He reports that he does not use drugs.     FAMILY HISTORY     Reviewed, and family history includes Aortic aneurysm in his father; Migraines in his mother; No Known Problems in his daughter; Other - See Comments in his mother and sister.     ALLERGIES     No Known Allergies      REVIEW OF SYSTEMS     A 12 point review of system was performed and was negative except as outlined in the history of present illness.     HOME MEDICATIONS     Current Outpatient Rx   Medication Sig Dispense Refill     aspirin (ASA) 81 MG chewable tablet Take 81 mg by mouth daily       atorvastatin (LIPITOR) 40 MG tablet Take 1 tablet (40 mg) by mouth daily 90 tablet 0     calcium carbonate (OS-ISABEL) 500 MG tablet Take 1 tablet by mouth once 600 mg daily       clopidogrel (PLAVIX) 75 MG tablet Take 1 tablet (75 mg) by mouth daily 90 tablet 3     Ferrous Gluconate 324 (37.5 Fe) MG TABS Take 324 mg by mouth 2 times daily       finasteride (PROSCAR) 5 MG tablet Take 1 tablet (5 mg) by mouth daily 90 tablet 1     losartan (COZAAR) 25 MG tablet Take 1 tablet (25 mg) by mouth daily 90 tablet 1     metoprolol tartrate  "(LOPRESSOR) 25 MG tablet Take 1 tablet (25 mg) by mouth 2 times daily 180 tablet 2     Multiple Vitamins-Minerals (MULTIVITAMIN ADULT, MINERALS,) TABS Take 1 tablet by mouth daily       omeprazole (PRILOSEC) 20 MG DR capsule Take 1 capsule (20 mg) by mouth daily 90 capsule 11     tamsulosin (FLOMAX) 0.4 MG capsule Take 1 capsule by mouth 2 times daily       vitamin C (ASCORBIC ACID) 500 MG tablet Take 500 mg by mouth daily            PHYSICAL EXAM     Vital signs  Ht 1.753 m (5' 9\")   Wt 86.2 kg (190 lb)   BMI 28.06 kg/m      Weight:   190 lbs 0 oz    Patient is alert and oriented x4 in no acute distress. Vital signs were reviewed and are documented in electronic medical record. Neck was supple, no carotid bruits, thyromegaly, JVD, or lymphadenopathy was noted.   NEUROLOGY EXAM:    Patient s speech was normal with no aphasia or dysarthria. Mentation, and affect were also normal.     Face symmetrical, extraocular movements are intact    Patient moves all 4 extremities    Sensation was not tested    Reflexes were not tested.     No dysmetria noted    Gait testing was not tested     PERTINENT DIAGNOSTIC STUDIES     Following studies were reviewed:     CTA HEAD AND NECK 5/6/2022  HEAD CT:  1.  No acute process intracranially. No mass, mass effect or hemorrhage. Nothing for acute or evolving ischemic changes.     HEAD CTA:   1.  Short-segment high-grade/very high-grade stenosis mid basilar artery with some poststenotic dilation. No additional high-grade intracranial stenosis or dissection. No evidence for thromboembolic occlusion. Mild narrowing of the distal V4 segments   bilaterally.     2.  Dural venous sinus pattern of enhancement is satisfactory.     NECK CTA:  1.  Mild proximal ICA narrowing bilaterally and mild narrowing left ECA origin. No high-grade stenosis in the neck or evidence for neck dissection.    CTA HEAD AND NECK 8/21/2017  HEAD CTA:   1. Noncontrast head CT demonstrates no hemorrhage, mass/mass " effect or CT evidence of acute infarct. MRI is more sensitive in the assessment of acute ischemia/infarction.  2. Head CTA demonstrates no aneurysm or significant stenosis in the proximal intracranial vessels.     NECK CTA:  1. Bovine arch configuration of the great vessels off the aortic arch.  2. About 10% stenosis involving the origins of the internal carotid arteries bilaterally by NASCET criteria.  3. No radiographic evidence of dissection.     MRI BRAIN , MRA 9/6/2017  1. 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   2. No evidence of recent ischemia or infarction.      MRI, MRA BRAIN 11/20/2016  HEAD MRI:  1. Ventricular system consistent with mild diffuse volume loss.  2.  No discrete mass lesion, hemorrhage or focal area suggestive of acute ischemia.  3.  No abnormal enhancement.     HEAD MRA:  1. There is no discrete aneurysm or vascular malformation involving the arteries at the base of the brain.  2.   Again visualized is the focal tortuosity and moderate narrowing of the mid basilar artery which is stable in the interval.     PERTINENT LABS  Following labs were reviewed:  Admission on 05/24/2022, Discharged on 05/27/2022   Component Date Value     Sodium 05/24/2022 135      Potassium 05/24/2022 4.1      Chloride 05/24/2022 102      Carbon Dioxide (CO2) 05/24/2022 25      Anion Gap 05/24/2022 8      Urea Nitrogen 05/24/2022 37 (A)     Creatinine 05/24/2022 2.64 (A)     Calcium 05/24/2022 9.6      Glucose 05/24/2022 159 (A)     GFR Estimate 05/24/2022 24 (A)     Color Urine 05/24/2022 Red (A)     Appearance Urine 05/24/2022 Bloody (A)     Glucose Urine 05/24/2022 Negative      Bilirubin Urine 05/24/2022 Negative      Ketones Urine 05/24/2022 Negative      Specific Gravity Urine 05/24/2022 1.032      Blood Urine 05/24/2022 Large (A)     pH Urine 05/24/2022 7.5 (A)     Protein Albumin Urine 05/24/2022 >600 (A)     Urobilinogen Urine 05/24/2022 Normal      Nitrite Urine  05/24/2022 Negative      Leukocyte Esterase Urine 05/24/2022 Negative      RBC Urine 05/24/2022 >182 (A)     WBC Urine 05/24/2022 >182 (A)     WBC Count 05/24/2022 11.5 (A)     RBC Count 05/24/2022 4.11 (A)     Hemoglobin 05/24/2022 12.9 (A)     Hematocrit 05/24/2022 37.7 (A)     MCV 05/24/2022 92      MCH 05/24/2022 31.4      MCHC 05/24/2022 34.2      RDW 05/24/2022 12.8      Platelet Count 05/24/2022 180      % Neutrophils 05/24/2022 89      % Lymphocytes 05/24/2022 5      % Monocytes 05/24/2022 5      % Eosinophils 05/24/2022 0      % Basophils 05/24/2022 0      % Immature Granulocytes 05/24/2022 1      NRBCs per 100 WBC 05/24/2022 0      Absolute Neutrophils 05/24/2022 10.2 (A)     Absolute Lymphocytes 05/24/2022 0.6 (A)     Absolute Monocytes 05/24/2022 0.6      Absolute Eosinophils 05/24/2022 0.0      Absolute Basophils 05/24/2022 0.0      Absolute Immature Granul* 05/24/2022 0.1      Absolute NRBCs 05/24/2022 0.0      Culture 05/24/2022 No Growth      SARS CoV2 PCR 05/24/2022 Negative      Sodium 05/25/2022 143      Potassium 05/25/2022 3.8      Chloride 05/25/2022 112 (A)     Carbon Dioxide (CO2) 05/25/2022 28      Anion Gap 05/25/2022 3      Urea Nitrogen 05/25/2022 23      Creatinine 05/25/2022 0.79      Calcium 05/25/2022 8.2 (A)     Glucose 05/25/2022 92      GFR Estimate 05/25/2022 90      WBC Count 05/25/2022 8.2      RBC Count 05/25/2022 3.39 (A)     Hemoglobin 05/25/2022 10.7 (A)     Hematocrit 05/25/2022 32.1 (A)     MCV 05/25/2022 95      MCH 05/25/2022 31.6      MCHC 05/25/2022 33.3      RDW 05/25/2022 13.1      Platelet Count 05/25/2022 138 (A)     WBC Count 05/26/2022 4.9      RBC Count 05/26/2022 3.18 (A)     Hemoglobin 05/26/2022 10.0 (A)     Hematocrit 05/26/2022 30.7 (A)     MCV 05/26/2022 97      MCH 05/26/2022 31.4      MCHC 05/26/2022 32.6      RDW 05/26/2022 12.7      Platelet Count 05/26/2022 112 (A)     Sodium 05/26/2022 142      Potassium 05/26/2022 3.6      Chloride 05/26/2022 112  (A)     Carbon Dioxide (CO2) 05/26/2022 27      Anion Gap 05/26/2022 3      Urea Nitrogen 05/26/2022 18      Creatinine 05/26/2022 0.78      Calcium 05/26/2022 8.5      Glucose 05/26/2022 93      GFR Estimate 05/26/2022 90      Hemoglobin 05/27/2022 10.4 (A)     Sodium 05/27/2022 142      Potassium 05/27/2022 3.2 (A)     Chloride 05/27/2022 112 (A)     Carbon Dioxide (CO2) 05/27/2022 25      Anion Gap 05/27/2022 5      Urea Nitrogen 05/27/2022 14      Creatinine 05/27/2022 0.74      Calcium 05/27/2022 7.8 (A)     Glucose 05/27/2022 138 (A)     GFR Estimate 05/27/2022 >90    Office Visit on 04/26/2022   Component Date Value     WBC Count 04/26/2022 5.2      RBC Count 04/26/2022 4.28 (A)     Hemoglobin 04/26/2022 13.4      Hematocrit 04/26/2022 39.4 (A)     MCV 04/26/2022 92      MCH 04/26/2022 31.3      MCHC 04/26/2022 34.0      RDW 04/26/2022 12.5      Platelet Count 04/26/2022 147 (A)     Sodium 04/26/2022 142      Potassium 04/26/2022 4.1      Chloride 04/26/2022 106      Carbon Dioxide (CO2) 04/26/2022 27      Anion Gap 04/26/2022 9      Urea Nitrogen 04/26/2022 15      Creatinine 04/26/2022 0.72      Calcium 04/26/2022 8.9      Glucose 04/26/2022 84      Alkaline Phosphatase 04/26/2022 81      AST 04/26/2022 20      ALT 04/26/2022 18      Protein Total 04/26/2022 5.7 (A)     Albumin 04/26/2022 3.5      Bilirubin Total 04/26/2022 0.8      GFR Estimate 04/26/2022 >90      TSH 04/26/2022 0.92      Prostate Specific Antige* 04/26/2022 0.53      Cholesterol 04/26/2022 138      Triglycerides 04/26/2022 42      Direct Measure HDL 04/26/2022 66      LDL Cholesterol Calculat* 04/26/2022 64      Patient Fasting > 8hrs? 04/26/2022 Yes      Color Urine 04/26/2022 Yellow      Appearance Urine 04/26/2022 Clear      Glucose Urine 04/26/2022 Negative      Bilirubin Urine 04/26/2022 Negative      Ketones Urine 04/26/2022 Negative      Specific Gravity Urine 04/26/2022 1.020      Blood Urine 04/26/2022 Negative      pH Urine  04/26/2022 7.0      Protein Albumin Urine 04/26/2022 Negative      Urobilinogen Urine 04/26/2022 0.2      Nitrite Urine 04/26/2022 Negative      Leukocyte Esterase Urine 04/26/2022 Negative    Lab on 04/04/2022   Component Date Value     SARS CoV2 PCR 04/04/2022 Negative       VIDEO VISIT DETAILS  Patient is being evaluated via a billable video visit.   Patient would like the video invitation sent by: [x]E-mail  []SMS   Type of service: Video Visit  Video Start Time: 12:33 PM  Video End Time (time video stopped): 12:40 PM  Originating Location (Patient Location): Patient's Home  Distant Location (provider location): [facility]   Mode of Communication: Video Conference via [x]Isabella Oliver []Doximity     Total time spent for face to face visit, reviewing labs/imaging studies, counseling and coordination of care was: 30 Minutes spent on the date of the encounter doing chart review, review of outside records, review of test results, interpretation of tests, patient visit and documentation       This note was dictated using voice recognition software.  Any grammatical or context distortions are unintentional and inherent to the software.    No orders of the defined types were placed in this encounter.     New Prescriptions    No medications on file     Modified Medications    No medications on file

## 2022-07-06 ENCOUNTER — TELEPHONE (OUTPATIENT)
Dept: CARDIOLOGY | Facility: CLINIC | Age: 82
End: 2022-07-06

## 2022-07-06 NOTE — TELEPHONE ENCOUNTER
[facility] Call Center    Phone Message    May a detailed message be left on voicemail: yes     Reason for Call: Medication Refill Request    Has the patient contacted the pharmacy for the refill? Yes   Name of medication being requested: atorvastatin (LIPITOR) 40 MG tablet  losartan (COZAAR) 25 MG tablet  Provider who prescribed the medication: Yashira  Pharmacy: [place], [place] - [address]  Date medication is needed: 7/7/22      Action Taken: Message routed to:  Other: Cardiology    Travel Screening: Not Applicable

## 2022-07-06 NOTE — TELEPHONE ENCOUNTER
Both RX's were sent today to requested pharmacy location via e-script. Receipt of confirmation by pharmacy 7/6/22 at 1109. WILIAMRn

## 2022-07-26 ENCOUNTER — TRANSFERRED RECORDS (OUTPATIENT)
Dept: HEALTH INFORMATION MANAGEMENT | Facility: CLINIC | Age: 82
End: 2022-07-26

## 2022-08-03 NOTE — TELEPHONE ENCOUNTER
Please arrange an office cysto to discuss prostate surgery. Per Dr. Stallworth. LVM for pt (2nd call)

## 2022-08-12 ENCOUNTER — TRANSFERRED RECORDS (OUTPATIENT)
Dept: HEALTH INFORMATION MANAGEMENT | Facility: CLINIC | Age: 82
End: 2022-08-12

## 2022-09-16 ENCOUNTER — TRANSFERRED RECORDS (OUTPATIENT)
Dept: HEALTH INFORMATION MANAGEMENT | Facility: CLINIC | Age: 82
End: 2022-09-16

## 2022-09-22 ENCOUNTER — TELEPHONE (OUTPATIENT)
Dept: SURGERY | Facility: CLINIC | Age: 82
End: 2022-09-22

## 2022-09-22 NOTE — TELEPHONE ENCOUNTER
Writer received a call from this patient requesting a PAE consultation with Dr Valdez. Reviewed the following questions with patient .     1. Are you currently following with a Urologist? Dr. Mishra  2. Have you completed any imaging of your prostate? CT abdomen and pelvis 8/19/2021  3. Cystoscopy in the past? August of 2021   4. Uroflow/Urodynamics in the past? Patient is unsure if he's had this done.   5. What are your last PSA levels? If high, have you ever had a biopsy? 0.53 patient has never had a biopsy done.   6. Last IPSS score? Patient has never filled one out before.   7. Currently on prostate medications? Finasteride, tamsulosin  8. Issues with ED? Or retrograde ejaculation? No   9. Any prior prostate treatments? TURP 2007    Mary ROSALES RN, BSN   Interventional Radiology [facility]   [phone]

## 2022-10-11 ENCOUNTER — TEAM CONFERENCE (OUTPATIENT)
Dept: VASCULAR SURGERY | Facility: CLINIC | Age: 82
End: 2022-10-11

## 2022-10-11 ENCOUNTER — VIRTUAL VISIT (OUTPATIENT)
Dept: VASCULAR SURGERY | Facility: CLINIC | Age: 82
End: 2022-10-11
Payer: COMMERCIAL

## 2022-10-11 DIAGNOSIS — N40.0 BENIGN PROSTATIC HYPERPLASIA, UNSPECIFIED WHETHER LOWER URINARY TRACT SYMPTOMS PRESENT: Primary | ICD-10-CM

## 2022-10-11 PROCEDURE — 99204 OFFICE O/P NEW MOD 45 MIN: CPT | Mod: GT | Performed by: RADIOLOGY

## 2022-10-11 NOTE — NURSING NOTE
Patients Name: Juan Carlos Marley     Patients MRN: [id number]     Doctor s Name: Tiffany Valdez     Procedure CPT codes:      13538 - Vascular embolization, inclusive of radiological supervision and interpretation        03799 - Angiography; pelvic, selective or supraselective, radiological supervision and interpretation     Procedure name: Prostate Artery Embolization         CTA pelvis only is 10304    CTA Pelvis angiogram for prostate evaluation            Diagnosis Codes:   Enlarged prostate with urinary obstruction: N40.1    Vascular disorders of male genitalia: N50.1    Hematuria: R31.9         Past treatment: TURP 2007.      Imaging:     CT Abdomen Pelvis: 08/19/2021       Response: Pending.

## 2022-10-11 NOTE — PROGRESS NOTES
"[facility] Vascular      Type of Visit: Virtual: Well    Patient is here for a new visit to discuss PAE.     Vitals - Patient Reported  Weight (Patient Reported): 86.2 kg (190 lb)  Height (Patient Reported): 175.3 cm (5' 9\")  BMI (Based on Pt Reported Ht/Wt): 28.06  Pain Score: No Pain (1)  Pain Loc: Low Back    Patient states is currently in the [facility]: Yes    Questions patient would like addressed today are: Patient verbalized no questions/concerns, this has been communicated to the provider.     Refills are needed: No    How would you like to obtain your AVS? Bridget Nuñez, Virtual Facilitator/CMA, 10/11/2022           Video Visit:  Start: 9:35  End: 10:09  [facility]/home      Mr. Marley is a pleasant 82 year-old patient referred by Dr. Goldman for evaluation for Prostate artery embolization. The patient has a long history of symptomatic BPH who had a TURP 9 years ago with recurrent symptoms. He has had some episodes of hematuria including Macro and Microscopic, last documented in May 2022. The patient has a large prostate over 150 gr.   History of acute urinary retention with self catheterization.    IPSS: (1,4,1,5,5,1,3) = 20  QOL: 5  PVR: 50    The patient is on anticoagulation for a history cerebral artery stenosis.    Current Outpatient Medications   Medication     aspirin (ASA) 81 MG chewable tablet     calcium carbonate (OS-ISABEL) 500 MG tablet     clopidogrel (PLAVIX) 75 MG tablet     COZAAR 25 MG tablet     Ferrous Gluconate 324 (37.5 Fe) MG TABS     finasteride (PROSCAR) 5 MG tablet     LIPITOR 40 MG tablet     metoprolol tartrate (LOPRESSOR) 25 MG tablet     Multiple Vitamins-Minerals (MULTIVITAMIN ADULT, MINERALS,) TABS     omeprazole (PRILOSEC) 20 MG DR capsule     tamsulosin (FLOMAX) 0.4 MG capsule     vitamin C (ASCORBIC ACID) 500 MG tablet     No current facility-administered medications for this visit.     Past Medical History:   Diagnosis Date     Cataract, nuclear " sclerotic, left eye 2017     Coronary artery disease due to lipid rich plaque      Dyslipidemia, goal LDL below 70 2014     Essential hypertension with goal blood pressure less than 130/85      Hernia, abdominal      History of spinal cord injury      Nonsustained ventricular tachycardia (H)     6 beat run, asymptomatic per Dr. Hackett     Paroxysmal atrial fibrillation (H) 2013    at the time of CABG; [facility] did a 4-week monitor in  and did not find any atrial fibrillation and therefore stopped his Eliquis.     Stricture of esophagus     dilated twice since then     Transient ischemic attack 2018    transient speech difficulty and dizziness while in [place]; did not seek medical attention     Transient ischemic attack 10/2018    same symptoms as in [place]     Transient ischemic attack 2016     Transient ischemic attack 2016    left cerebral hemisphere     Transient ischemic attack 2014       Past Surgical History:   Procedure Laterality Date     BYPASS GRAFT ARTERY CORONARY  2013    underwent 5-vessel coronary bypass grafting at the [facility]     CYSTOSCOPY       FOOT SURGERY Left 2014    Dr. Carr     FRACTURE SURGERY Right     hand     HERNIA REPAIR  2013     ORCHIECTOMY SCROTAL Right      ORIF FEMUR FRACTURE Right      PROSTATE SURGERY      half removed     PROSTATECTOMY  2013     TESTICLE SURGERY      removal of testicle       Family History   Problem Relation Age of Onset     Other - See Comments Mother          accident age 49     Migraines Mother      Aortic aneurysm Father          84     Other - See Comments Sister         autistic      No Known Problems Daughter      Pulmonary Hypertension No family hx of      Congenital heart disease No family hx of        Social History     Tobacco Use     Smoking status: Former     Packs/day: 1.00     Years: 10.00     Pack years: 10.00     Types: Cigarettes, Pipe     Quit date: 1970      Years since quittin.6     Smokeless tobacco: Never   Substance Use Topics     Alcohol use: Not Currently     Alcohol/week: 4.0 standard drinks     Types: 4 Glasses of wine per week     Impression and plan:  I have reviewed the procedure, its risks and benefits. Given his recurrence post TURP, large prostate, episodic hematuria and anticoagulation, this patient is not a good candidate for surgery or other minimally invasive procedure. PAE is his best option as we don't need to stop his drugs and because of the embolization of the vessels, we can address his hematuria and his symptoms. The patient is willing to proceed. We will send a pre authorization letter to his insurance.    Total time: over 45 min

## 2022-10-11 NOTE — LETTER
"10/11/2022       RE: Juan Carlos Marley  [address] [place] [address]     Dear Colleague,    Thank you for referring your patient, Juan Carlos Marley, to the [facility] [place] [facility]. Please see a copy of my visit note below.    [facility]      Type of Visit: Virtual: [place]    Patient is here for a new visit to discuss PAE.     Vitals - Patient Reported  Weight (Patient Reported): 86.2 kg (190 lb)  Height (Patient Reported): 175.3 cm (5' 9\")  BMI (Based on Pt Reported Ht/Wt): 28.06  Pain Score: No Pain (1)  Pain Loc: Low Back    Patient states is currently in the [facility]: Yes    Questions patient would like addressed today are: Patient verbalized no questions/concerns, this has been communicated to the provider.     Refills are needed: No    How would you like to obtain your AVS? Bridget Nuñez, Brandon Facilitator/CMA, 10/11/2022           Video Visit:  Start: 9:35  End: 10:09  [facility]/home      Mr. Marley is a pleasant 82 year-old patient referred by Dr. Goldman for evaluation for Prostate artery embolization. The patient has a long history of symptomatic BPH who had a TURP 9 years ago with recurrent symptoms. He has had some episodes of hematuria including Macro and Microscopic, last documented in May 2022. The patient has a large prostate over 150 gr.   History of acute urinary retention with self catheterization.    IPSS: (1,4,1,5,5,1,3) = 20  QOL: 5  PVR: 50    The patient is on anticoagulation for a history cerebral artery stenosis.    Current Outpatient Medications   Medication     aspirin (ASA) 81 MG chewable tablet     calcium carbonate (OS-ISABEL) 500 MG tablet     clopidogrel (PLAVIX) 75 MG tablet     COZAAR 25 MG tablet     Ferrous Gluconate 324 (37.5 Fe) MG TABS     finasteride (PROSCAR) 5 MG tablet     LIPITOR 40 MG tablet     metoprolol tartrate (LOPRESSOR) 25 MG tablet     Multiple " Vitamins-Minerals (MULTIVITAMIN ADULT, MINERALS,) TABS     omeprazole (PRILOSEC) 20 MG DR capsule     tamsulosin (FLOMAX) 0.4 MG capsule     vitamin C (ASCORBIC ACID) 500 MG tablet     No current facility-administered medications for this visit.     Past Medical History:   Diagnosis Date     Cataract, nuclear sclerotic, left eye 2017     Coronary artery disease due to lipid rich plaque      Dyslipidemia, goal LDL below 70 2014     Essential hypertension with goal blood pressure less than 130/85      Hernia, abdominal      History of spinal cord injury      Nonsustained ventricular tachycardia (H)     6 beat run, asymptomatic per Dr. Hackett     Paroxysmal atrial fibrillation (H) 2013    at the time of CABG; [facility] did a 4-week monitor in 2017 and did not find any atrial fibrillation and therefore stopped his Eliquis.     Stricture of esophagus     dilated twice since then     Transient ischemic attack 2018    transient speech difficulty and dizziness while in [place]; did not seek medical attention     Transient ischemic attack 10/2018    same symptoms as in [place]     Transient ischemic attack 2016     Transient ischemic attack 2016    left cerebral hemisphere     Transient ischemic attack 2014       Past Surgical History:   Procedure Laterality Date     BYPASS GRAFT ARTERY CORONARY  2013    underwent 5-vessel coronary bypass grafting at the [facility]     CYSTOSCOPY       FOOT SURGERY Left 2014    Dr. Carr     FRACTURE SURGERY Right     hand     HERNIA REPAIR  2013     ORCHIECTOMY SCROTAL Right      ORIF FEMUR FRACTURE Right      PROSTATE SURGERY      half removed     PROSTATECTOMY  2013     TESTICLE SURGERY      removal of testicle       Family History   Problem Relation Age of Onset     Other - See Comments Mother          accident age 49     Migraines Mother      Aortic aneurysm Father          84     Other - See Comments Sister          autistic      No Known Problems Daughter      Pulmonary Hypertension No family hx of      Congenital heart disease No family hx of        Social History     Tobacco Use     Smoking status: Former     Packs/day: 1.00     Years: 10.00     Pack years: 10.00     Types: Cigarettes, Pipe     Quit date: 1970     Years since quittin.6     Smokeless tobacco: Never   Substance Use Topics     Alcohol use: Not Currently     Alcohol/week: 4.0 standard drinks     Types: 4 Glasses of wine per week     Impression and plan:  I have reviewed the procedure, its risks and benefits. Given his recurrence post TURP, large prostate, episodic hematuria and anticoagulation, this patient is not a good candidate for surgery or other minimally invasive procedure. PAE is his best option as we don't need to stop his drugs and because of the embolization of the vessels, we can address his hematuria and his symptoms. The patient is willing to proceed. We will send a pre authorization letter to his insurance.    Total time: over 45 min        Again, thank you for allowing me to participate in the care of your patient.      Sincerely,    Tiffany Valdez MD

## 2022-10-11 NOTE — NURSING NOTE
Chief Complaint   Patient presents with     Consult       Patient confirms medications and allergies are accurate via patients echeck in completion, and or denies any changes since last reviewed/verified.     Paige Nuñez, Virtual Facilitator

## 2022-10-20 ENCOUNTER — NURSE TRIAGE (OUTPATIENT)
Dept: NURSING | Facility: CLINIC | Age: 82
End: 2022-10-20

## 2022-10-20 DIAGNOSIS — I10 ESSENTIAL HYPERTENSION: ICD-10-CM

## 2022-10-20 NOTE — TELEPHONE ENCOUNTER
Nurse Triage SBAR  Patient requesting a refill on medication. Refill submitted in separate encounter.     Georgie Booth RN BSN 10/20/2022 10:36 AM    Reason for Disposition    Caller with prescription and triager answers question     Refill encounter created for processing as patient is not out of medication    Additional Information    Negative: New-onset or worsening symptoms, see that protocol (e.g., diarrhea, runny nose, sore throat)    Negative: Medicine question not related to refill or renewal    Negative: Caller (e.g., patient or pharmacist) requesting information about a new medicine    Negative: Caller requesting information unrelated to medicine    Negative: Prescription refill request for ESSENTIAL medicine (i.e., likelihood of harm to patient if not taken) and triager unable to refill per department policy    Negative: Prescription not at pharmacy and was prescribed by PCP recently  (Exception: triager has access to EMR and prescription is recorded there. Go to Home Care and confirm for pharmacy.)    Negative: Pharmacy calling with prescription questions and triager unable to answer question    Negative: Caller requesting a CONTROLLED substance prescription refill (e.g., narcotics, ADHD medicines)    Negative: Prescription refill request for NON-ESSENTIAL medicine (i.e., no harm to patient if med not taken) and triager unable to refill per department policy    Negative: Caller has NON-URGENT medicine question about med that PCP prescribed and triager unable to answer question    Negative: Prescription request for new medicine (not a refill)    Negative: Prescription prescribed recently is not at pharmacy and triager has access to patient's EMR and prescription is recorded in the EMR    Negative: Caller requesting a prescription renewal (no refills left), no triage required, and triager able to renew prescription per department policy    Negative: Patient has refills remaining on their  prescription    Protocols used: MEDICATION REFILL AND RENEWAL CALL-A-OH

## 2022-10-20 NOTE — TELEPHONE ENCOUNTER
Patient calling states that his pharmacy has submitted request for refill but have not gotten a response. Writer reviewed chart and was unable to find any requests on file. Writer advised that a refill request will be started for him and can take up to 72 business hours to be processed. Patient verbalizes understanding and states that he is not out of medication.     Georgie Booth RN BSN 10/20/2022 10:35 AM

## 2022-10-21 RX ORDER — CLOPIDOGREL BISULFATE 75 MG/1
75 TABLET ORAL DAILY
Qty: 90 TABLET | Refills: 2 | Status: SHIPPED | OUTPATIENT
Start: 2022-10-21 | End: 2023-06-29

## 2022-10-21 NOTE — TELEPHONE ENCOUNTER
"Routing refill request to provider for review/approval because:  Labs out of range:  Multiple  No PPI on file.  Early refill requested.    Last Written Prescription Date:  11/22/21  Last Fill Quantity: 90,  # refills: 3   Last office visit provider:  4/26/22     Requested Prescriptions   Pending Prescriptions Disp Refills     clopidogrel (PLAVIX) 75 MG tablet 90 tablet 3     Sig: Take 1 tablet (75 mg) by mouth daily       Plavix Failed - 10/21/2022  2:32 PM        Failed - No active PPI on record unless is Protonix        Failed - Normal HGB on file in past 12 months     Recent Labs   Lab Test 05/27/22  0800   HGB 10.4*               Failed - Normal Platelets on file in past 12 months     Recent Labs   Lab Test 05/26/22  0712   *               Passed - Recent (12 mo) or future (30 days) visit within the authorizing provider's specialty     Patient has had an office visit with the authorizing provider or a provider within the authorizing providers department within the previous 12 mos or has a future within next 30 days. See \"Patient Info\" tab in inbasket, or \"Choose Columns\" in Meds & Orders section of the refill encounter.              Passed - Medication is active on med list        Passed - Patient is age 18 or older             Jules Gustafson RN 10/21/22 2:32 PM  "

## 2022-10-26 ENCOUNTER — TRANSFERRED RECORDS (OUTPATIENT)
Dept: HEALTH INFORMATION MANAGEMENT | Facility: CLINIC | Age: 82
End: 2022-10-26

## 2022-10-30 ENCOUNTER — HEALTH MAINTENANCE LETTER (OUTPATIENT)
Age: 82
End: 2022-10-30

## 2022-11-21 ENCOUNTER — TELEPHONE (OUTPATIENT)
Dept: INTERNAL MEDICINE | Facility: CLINIC | Age: 82
End: 2022-11-21

## 2022-11-21 DIAGNOSIS — R39.12 BENIGN PROSTATIC HYPERPLASIA WITH WEAK URINARY STREAM: Primary | ICD-10-CM

## 2022-11-21 DIAGNOSIS — N40.1 BENIGN PROSTATIC HYPERPLASIA WITH WEAK URINARY STREAM: Primary | ICD-10-CM

## 2022-11-21 NOTE — TELEPHONE ENCOUNTER
Contacted patient who does have the ability to take BP at home.  Writer to send [email or web address] message for patient to access and respond back to with his BP and heart rate.

## 2022-11-22 DIAGNOSIS — N40.1 BENIGN PROSTATIC HYPERPLASIA WITH WEAK URINARY STREAM: Primary | ICD-10-CM

## 2022-11-22 DIAGNOSIS — R39.12 BENIGN PROSTATIC HYPERPLASIA WITH WEAK URINARY STREAM: Primary | ICD-10-CM

## 2022-11-22 RX ORDER — CIPROFLOXACIN 750 MG/1
750 TABLET, FILM COATED ORAL 2 TIMES DAILY
Qty: 20 TABLET | Refills: 0 | Status: SHIPPED | OUTPATIENT
Start: 2022-11-29 | End: 2022-12-09

## 2022-11-22 RX ORDER — IBUPROFEN 800 MG/1
800 TABLET, FILM COATED ORAL 2 TIMES DAILY
Qty: 14 TABLET | Refills: 0 | Status: SHIPPED | OUTPATIENT
Start: 2022-11-29 | End: 2022-12-06

## 2022-11-30 DIAGNOSIS — I10 ESSENTIAL HYPERTENSION: ICD-10-CM

## 2022-12-01 ENCOUNTER — HOSPITAL ENCOUNTER (OUTPATIENT)
Facility: CLINIC | Age: 82
Discharge: HOME | End: 2022-12-01
Attending: RADIOLOGY | Admitting: RADIOLOGY
Payer: COMMERCIAL

## 2022-12-01 ENCOUNTER — APPOINTMENT (OUTPATIENT)
Dept: MEDSURG UNIT | Facility: CLINIC | Age: 82
End: 2022-12-01
Attending: RADIOLOGY
Payer: COMMERCIAL

## 2022-12-01 ENCOUNTER — APPOINTMENT (OUTPATIENT)
Dept: INTERVENTIONAL RADIOLOGY/VASCULAR | Facility: CLINIC | Age: 82
End: 2022-12-01
Attending: RADIOLOGY
Payer: COMMERCIAL

## 2022-12-01 VITALS
BODY MASS INDEX: 26.86 KG/M2 | OXYGEN SATURATION: 96 % | WEIGHT: 181.9 LBS | DIASTOLIC BLOOD PRESSURE: 52 MMHG | TEMPERATURE: 98.3 F | SYSTOLIC BLOOD PRESSURE: 120 MMHG | RESPIRATION RATE: 16 BRPM | HEART RATE: 57 BPM

## 2022-12-01 DIAGNOSIS — N40.1 BENIGN PROSTATIC HYPERPLASIA WITH WEAK URINARY STREAM: Primary | ICD-10-CM

## 2022-12-01 DIAGNOSIS — R39.12 BENIGN PROSTATIC HYPERPLASIA WITH WEAK URINARY STREAM: Primary | ICD-10-CM

## 2022-12-01 LAB
ANION GAP SERPL CALCULATED.3IONS-SCNC: 10 MMOL/L (ref 7–15)
BUN SERPL-MCNC: 24.3 MG/DL (ref 8–23)
CALCIUM SERPL-MCNC: 9.4 MG/DL (ref 8.8–10.2)
CHLORIDE SERPL-SCNC: 111 MMOL/L (ref 98–107)
CREAT SERPL-MCNC: 0.97 MG/DL (ref 0.67–1.17)
DEPRECATED HCO3 PLAS-SCNC: 23 MMOL/L (ref 22–29)
ERYTHROCYTE [DISTWIDTH] IN BLOOD BY AUTOMATED COUNT: 13.1 % (ref 10–15)
GFR SERPL CREATININE-BSD FRML MDRD: 78 ML/MIN/1.73M2
GLUCOSE BLDC GLUCOMTR-MCNC: 82 MG/DL (ref 70–99)
GLUCOSE SERPL-MCNC: 83 MG/DL (ref 70–99)
HCT VFR BLD AUTO: 37.4 % (ref 40–53)
HGB BLD-MCNC: 12.8 G/DL (ref 13.3–17.7)
INR PPP: 1.14 (ref 0.85–1.15)
MCH RBC QN AUTO: 31.6 PG (ref 26.5–33)
MCHC RBC AUTO-ENTMCNC: 34.2 G/DL (ref 31.5–36.5)
MCV RBC AUTO: 92 FL (ref 78–100)
PLATELET # BLD AUTO: 141 10E3/UL (ref 150–450)
PLATELET # BLD AUTO: 141 10E3/UL (ref 150–450)
POTASSIUM SERPL-SCNC: 3.8 MMOL/L (ref 3.4–5.3)
RBC # BLD AUTO: 4.05 10E6/UL (ref 4.4–5.9)
SODIUM SERPL-SCNC: 144 MMOL/L (ref 136–145)
WBC # BLD AUTO: 5.7 10E3/UL (ref 4–11)

## 2022-12-01 PROCEDURE — C1769 GUIDE WIRE: HCPCS

## 2022-12-01 PROCEDURE — 82310 ASSAY OF CALCIUM: CPT | Performed by: PHYSICIAN ASSISTANT

## 2022-12-01 PROCEDURE — 36415 COLL VENOUS BLD VENIPUNCTURE: CPT | Performed by: PHYSICIAN ASSISTANT

## 2022-12-01 PROCEDURE — C1887 CATHETER, GUIDING: HCPCS

## 2022-12-01 PROCEDURE — 999N000054 HC STATISTIC EKG NON-CHARGEABLE

## 2022-12-01 PROCEDURE — 250N000011 HC RX IP 250 OP 636: Performed by: PHYSICIAN ASSISTANT

## 2022-12-01 PROCEDURE — 272N000123 HC CATH CR9

## 2022-12-01 PROCEDURE — 272N000638 HC COIL/EMBOLIC DEVICE CR21

## 2022-12-01 PROCEDURE — 272N000504 HC NEEDLE CR4

## 2022-12-01 PROCEDURE — 85610 PROTHROMBIN TIME: CPT | Performed by: PHYSICIAN ASSISTANT

## 2022-12-01 PROCEDURE — 72191 CT ANGIOGRAPH PELV W/O&W/DYE: CPT

## 2022-12-01 PROCEDURE — 250N000009 HC RX 250: Performed by: PHYSICIAN ASSISTANT

## 2022-12-01 PROCEDURE — 272N000631 HC COIL/EMBOLIC DEVICE CR12

## 2022-12-01 PROCEDURE — 999N000134 HC STATISTIC PP CARE STAGE 3

## 2022-12-01 PROCEDURE — 36247 INS CATH ABD/L-EXT ART 3RD: CPT | Mod: 50 | Performed by: RADIOLOGY

## 2022-12-01 PROCEDURE — 93010 ELECTROCARDIOGRAM REPORT: CPT | Performed by: INTERNAL MEDICINE

## 2022-12-01 PROCEDURE — 272N000567 HC SHEATH CR4

## 2022-12-01 PROCEDURE — 36247 INS CATH ABD/L-EXT ART 3RD: CPT | Mod: 50

## 2022-12-01 PROCEDURE — 76937 US GUIDE VASCULAR ACCESS: CPT

## 2022-12-01 PROCEDURE — 258N000003 HC RX IP 258 OP 636: Performed by: RADIOLOGY

## 2022-12-01 PROCEDURE — 85027 COMPLETE CBC AUTOMATED: CPT | Performed by: PHYSICIAN ASSISTANT

## 2022-12-01 PROCEDURE — 99152 MOD SED SAME PHYS/QHP 5/>YRS: CPT | Mod: GC | Performed by: RADIOLOGY

## 2022-12-01 PROCEDURE — 76380 CAT SCAN FOLLOW-UP STUDY: CPT | Mod: 26 | Performed by: RADIOLOGY

## 2022-12-01 PROCEDURE — 255N000002 HC RX 255 OP 636: Performed by: RADIOLOGY

## 2022-12-01 PROCEDURE — 272N000566 HC SHEATH CR3

## 2022-12-01 PROCEDURE — 82962 GLUCOSE BLOOD TEST: CPT

## 2022-12-01 PROCEDURE — 999N000142 HC STATISTIC PROCEDURE PREP ONLY

## 2022-12-01 PROCEDURE — 37243 VASC EMBOLIZE/OCCLUDE ORGAN: CPT | Mod: GC | Performed by: RADIOLOGY

## 2022-12-01 PROCEDURE — 99152 MOD SED SAME PHYS/QHP 5/>YRS: CPT

## 2022-12-01 PROCEDURE — 76937 US GUIDE VASCULAR ACCESS: CPT | Mod: 26 | Performed by: RADIOLOGY

## 2022-12-01 PROCEDURE — 250N000011 HC RX IP 250 OP 636: Performed by: RADIOLOGY

## 2022-12-01 PROCEDURE — 93005 ELECTROCARDIOGRAM TRACING: CPT

## 2022-12-01 PROCEDURE — 272N000143 HC KIT CR3

## 2022-12-01 PROCEDURE — 75736 ARTERY X-RAYS PELVIS: CPT | Mod: XU

## 2022-12-01 RX ORDER — NALOXONE HYDROCHLORIDE 0.4 MG/ML
0.4 INJECTION, SOLUTION INTRAMUSCULAR; INTRAVENOUS; SUBCUTANEOUS
Status: DISCONTINUED | OUTPATIENT
Start: 2022-12-01 | End: 2022-12-01 | Stop reason: HOSPADM

## 2022-12-01 RX ORDER — ONDANSETRON 2 MG/ML
4 INJECTION INTRAMUSCULAR; INTRAVENOUS EVERY 6 HOURS PRN
Status: DISCONTINUED | OUTPATIENT
Start: 2022-12-01 | End: 2022-12-01 | Stop reason: HOSPADM

## 2022-12-01 RX ORDER — LIDOCAINE 40 MG/G
CREAM TOPICAL
Status: DISCONTINUED | OUTPATIENT
Start: 2022-12-01 | End: 2022-12-01 | Stop reason: HOSPADM

## 2022-12-01 RX ORDER — FLUMAZENIL 0.1 MG/ML
0.2 INJECTION, SOLUTION INTRAVENOUS
Status: DISCONTINUED | OUTPATIENT
Start: 2022-12-01 | End: 2022-12-01 | Stop reason: HOSPADM

## 2022-12-01 RX ORDER — IODIXANOL 320 MG/ML
100 INJECTION, SOLUTION INTRAVASCULAR ONCE
Status: COMPLETED | OUTPATIENT
Start: 2022-12-01 | End: 2022-12-01

## 2022-12-01 RX ORDER — PROCHLORPERAZINE MALEATE 5 MG
5 TABLET ORAL EVERY 6 HOURS PRN
Status: DISCONTINUED | OUTPATIENT
Start: 2022-12-01 | End: 2022-12-01 | Stop reason: HOSPADM

## 2022-12-01 RX ORDER — NALOXONE HYDROCHLORIDE 0.4 MG/ML
0.2 INJECTION, SOLUTION INTRAMUSCULAR; INTRAVENOUS; SUBCUTANEOUS
Status: DISCONTINUED | OUTPATIENT
Start: 2022-12-01 | End: 2022-12-01 | Stop reason: HOSPADM

## 2022-12-01 RX ORDER — ONDANSETRON 4 MG/1
4 TABLET, ORALLY DISINTEGRATING ORAL EVERY 6 HOURS PRN
Status: DISCONTINUED | OUTPATIENT
Start: 2022-12-01 | End: 2022-12-01 | Stop reason: HOSPADM

## 2022-12-01 RX ORDER — METHYLPREDNISOLONE 4 MG
TABLET, DOSE PACK ORAL
Qty: 21 TABLET | Refills: 0 | Status: SHIPPED | OUTPATIENT
Start: 2022-12-01 | End: 2023-02-17

## 2022-12-01 RX ORDER — PROCHLORPERAZINE 25 MG
12.5 SUPPOSITORY, RECTAL RECTAL EVERY 12 HOURS PRN
Status: DISCONTINUED | OUTPATIENT
Start: 2022-12-01 | End: 2022-12-01 | Stop reason: HOSPADM

## 2022-12-01 RX ORDER — OXYBUTYNIN CHLORIDE 5 MG/1
5 TABLET ORAL EVERY 8 HOURS PRN
Qty: 15 TABLET | Refills: 0 | Status: SHIPPED | OUTPATIENT
Start: 2022-12-01 | End: 2023-07-10

## 2022-12-01 RX ORDER — FENTANYL CITRATE 50 UG/ML
25-50 INJECTION, SOLUTION INTRAMUSCULAR; INTRAVENOUS EVERY 5 MIN PRN
Status: DISCONTINUED | OUTPATIENT
Start: 2022-12-01 | End: 2022-12-01 | Stop reason: HOSPADM

## 2022-12-01 RX ORDER — NITROGLYCERIN 5 MG/ML
100-500 VIAL (ML) INTRAVENOUS
Status: COMPLETED | OUTPATIENT
Start: 2022-12-01 | End: 2022-12-01

## 2022-12-01 RX ORDER — HEPARIN SODIUM 200 [USP'U]/100ML
1 INJECTION, SOLUTION INTRAVENOUS CONTINUOUS PRN
Status: DISCONTINUED | OUTPATIENT
Start: 2022-12-01 | End: 2022-12-01 | Stop reason: HOSPADM

## 2022-12-01 RX ORDER — SODIUM CHLORIDE 9 MG/ML
INJECTION, SOLUTION INTRAVENOUS CONTINUOUS
Status: DISCONTINUED | OUTPATIENT
Start: 2022-12-01 | End: 2022-12-01 | Stop reason: HOSPADM

## 2022-12-01 RX ADMIN — FENTANYL CITRATE 25 MCG: 50 INJECTION, SOLUTION INTRAMUSCULAR; INTRAVENOUS at 15:06

## 2022-12-01 RX ADMIN — MIDAZOLAM 1 MG: 1 INJECTION INTRAMUSCULAR; INTRAVENOUS at 13:37

## 2022-12-01 RX ADMIN — FENTANYL CITRATE 25 MCG: 50 INJECTION, SOLUTION INTRAMUSCULAR; INTRAVENOUS at 15:25

## 2022-12-01 RX ADMIN — MIDAZOLAM 1 MG: 1 INJECTION INTRAMUSCULAR; INTRAVENOUS at 13:31

## 2022-12-01 RX ADMIN — NITROGLYCERIN 200 MCG: 5 INJECTION, SOLUTION INTRAVENOUS at 14:22

## 2022-12-01 RX ADMIN — IODIXANOL 180 ML: 320 INJECTION, SOLUTION INTRAVASCULAR at 15:50

## 2022-12-01 RX ADMIN — MIDAZOLAM 0.5 MG: 1 INJECTION INTRAMUSCULAR; INTRAVENOUS at 14:07

## 2022-12-01 RX ADMIN — FENTANYL CITRATE 25 MCG: 50 INJECTION, SOLUTION INTRAMUSCULAR; INTRAVENOUS at 14:29

## 2022-12-01 RX ADMIN — FENTANYL CITRATE 50 MCG: 50 INJECTION, SOLUTION INTRAMUSCULAR; INTRAVENOUS at 13:37

## 2022-12-01 RX ADMIN — MIDAZOLAM 0.5 MG: 1 INJECTION INTRAMUSCULAR; INTRAVENOUS at 14:29

## 2022-12-01 RX ADMIN — MIDAZOLAM 0.5 MG: 1 INJECTION INTRAMUSCULAR; INTRAVENOUS at 14:16

## 2022-12-01 RX ADMIN — FENTANYL CITRATE 50 MCG: 50 INJECTION, SOLUTION INTRAMUSCULAR; INTRAVENOUS at 13:31

## 2022-12-01 RX ADMIN — MIDAZOLAM 0.5 MG: 1 INJECTION INTRAMUSCULAR; INTRAVENOUS at 15:06

## 2022-12-01 RX ADMIN — FENTANYL CITRATE 25 MCG: 50 INJECTION, SOLUTION INTRAMUSCULAR; INTRAVENOUS at 14:16

## 2022-12-01 RX ADMIN — FENTANYL CITRATE 25 MCG: 50 INJECTION, SOLUTION INTRAMUSCULAR; INTRAVENOUS at 14:44

## 2022-12-01 RX ADMIN — NITROGLYCERIN 150 MCG: 5 INJECTION, SOLUTION INTRAVENOUS at 14:54

## 2022-12-01 RX ADMIN — NITROGLYCERIN 100 MCG: 5 INJECTION, SOLUTION INTRAVENOUS at 14:23

## 2022-12-01 RX ADMIN — LIDOCAINE HYDROCHLORIDE 6 ML: 10 INJECTION, SOLUTION EPIDURAL; INFILTRATION; INTRACAUDAL; PERINEURAL at 13:42

## 2022-12-01 RX ADMIN — FENTANYL CITRATE 25 MCG: 50 INJECTION, SOLUTION INTRAMUSCULAR; INTRAVENOUS at 14:07

## 2022-12-01 RX ADMIN — MIDAZOLAM 0.5 MG: 1 INJECTION INTRAMUSCULAR; INTRAVENOUS at 14:45

## 2022-12-01 RX ADMIN — HEPARIN SODIUM 3 BAG: 200 INJECTION, SOLUTION INTRAVENOUS at 13:32

## 2022-12-01 ASSESSMENT — ENCOUNTER SYMPTOMS
HEADACHES: 0
SHORTNESS OF BREATH: 0
FEVER: 0
VOMITING: 0
BLOOD IN STOOL: 0
DIAPHORESIS: 0
PALPITATIONS: 0
DIFFICULTY URINATING: 1
DIZZINESS: 1

## 2022-12-01 ASSESSMENT — ACTIVITIES OF DAILY LIVING (ADL)
ADLS_ACUITY_SCORE: 35

## 2022-12-01 NOTE — PROCEDURES
[facility]    Procedure: PAE    Date/Time: 12/1/2022 4:03 PM  Performed by: Mateusz Resendiz DO  Authorized by: Tiffany Valdez MD       UNIVERSAL PROTOCOL   Site Marked: NA  Prior Images Obtained and Reviewed:  Yes  Required items: Required blood products, implants, devices and special equipment available    Patient identity confirmed:  Verbally with patient, arm band, provided demographic data and hospital-assigned identification number  Patient was reevaluated immediately before administering moderate or deep sedation or anesthesia  Confirmation Checklist:  Patient's identity using two indicators, relevant allergies, procedure was appropriate and matched the consent or emergent situation and correct equipment/implants were available  Time out: Immediately prior to the procedure a time out was called    Universal Protocol: the Joint Commission Universal Protocol was followed    Preparation: Patient was prepped and draped in usual sterile fashion       ANESTHESIA    Anesthesia: Local infiltration  Local Anesthetic:  Lidocaine 1% without epinephrine      SEDATION  Patient Sedated: Yes    Sedation Type:  Moderate (conscious) sedation  Vital signs: Vital signs monitored during sedation    See dictated procedure note for full details.  Findings: Technically challenging case with predominant arterial supply from the right prostatic artery. Bilateral particle and coil embolization. Hemostasis with AngioSeal.    Specimens: none    Complications: None    Condition: Stable    Plan: Routine aftercare. Four hours of bedrest because of dual antiplatelet therapy.       PROCEDURE    Patient Tolerance:  Patient tolerated the procedure well with no immediate complications  Length of time physician/provider present for 1:1 monitoring during sedation: 135

## 2022-12-01 NOTE — PROGRESS NOTES
Returned from IR s/p prostate artery embolization.  VSS.  Denies pain.  Right groin site CDI.  Resting in bed.

## 2022-12-01 NOTE — H&P
Juan Carlos Marley is an 82 year old male with a history of BPH s/p TURP, CAD s/p CABG, anemia, history of cerebrovascular disease with prior TIAs currently on ASA and Plavix and metoprolol. He presents for PAE to treat his LUTS (IPSS 20) that includes urinary retention with self catheterization and intermittent hematuria.     He reports dizziness upon arrival to our department that appears to be postural. This is improved after sitting down for a period of the time. No palpitations, chest pain, SOB, or diaphoresis.     Past Medical History:   Diagnosis Date     Cataract, nuclear sclerotic, left eye 6/2/2017     Coronary artery disease due to lipid rich plaque      Dyslipidemia, goal LDL below 70 12/20/2014     Essential hypertension with goal blood pressure less than 130/85      Hernia, abdominal      History of spinal cord injury      Nonsustained ventricular tachycardia (H)     6 beat run, asymptomatic per Dr. Hackett     Paroxysmal atrial fibrillation (H) 09/02/2013    at the time of CABG; [facility] did a 4-week monitor in 2017 and did not find any atrial fibrillation and therefore stopped his Eliquis.     Stricture of esophagus 2013    dilated twice since then     Transient ischemic attack 09/2018    transient speech difficulty and dizziness while in [place]; did not seek medical attention     Transient ischemic attack 10/2018    same symptoms as in [place]     Transient ischemic attack 11/20/2016     Transient ischemic attack 01/24/2016    left cerebral hemisphere     Transient ischemic attack 12/20/2014       Allergies: No Known Allergies    Active Problems:    * No active hospital problems. *    Blood pressure (!) 147/68, pulse 55, temperature 98.3  F (36.8  C), temperature source Oral, resp. rate 16, weight 82.5 kg (181 lb 14.4 oz), SpO2 97 %.    Review of Systems   Constitutional: Negative for diaphoresis and fever.   Respiratory: Negative for shortness of breath.    Cardiovascular: Negative for chest pain and  palpitations.   Gastrointestinal: Negative for blood in stool and vomiting.   Genitourinary: Positive for difficulty urinating.   Skin: Negative for pallor.   Neurological: Positive for dizziness. Negative for syncope and headaches.       Physical Exam  Vitals reviewed.   Constitutional:       Appearance: Normal appearance.   HENT:      Ears:      Comments: Bilateral hearing aides  Eyes:      Conjunctiva/sclera: Conjunctivae normal.   Cardiovascular:      Rate and Rhythm: Normal rate and regular rhythm.      Heart sounds: No murmur heard.  Pulmonary:      Effort: Pulmonary effort is normal.      Breath sounds: Normal breath sounds.   Skin:     Coloration: Skin is not pale.   Neurological:      General: No focal deficit present.      Mental Status: He is alert and oriented to person, place, and time.   Psychiatric:         Mood and Affect: Mood normal.         Assessment:  1. BPH with symptomatic lower urinary tract symptoms.   2. Dizziness without syncope or vertigo. EKG showed sinus bradycardia with rate about 60, rare PVC. This is expected for being on a beta blocker. This is a nonspecific symptom but will perform further laboratory workup prior to proceeding with elective procedure.      Plan:  1. EKG and laboratory assessment including BMP, CBC, INR.  2. Likely will proceed with PAE.      Mateusz Resendiz, DO  12/1/2022

## 2022-12-01 NOTE — PROGRESS NOTES
Patient Name: Juan Carlos Marley  Medical Record Number: [id number]  Today's Date: 12/1/2022    Procedure: Prostate Artery Embolization   Proceduralist: Dr. Martín Francisco   Pathology present: N/A    Procedure Start: 13:30  Procedure end: 15:51    Sedation Start: 13:31  Sedation End: 15:51  Total Sedation Time: 140 minutes    Sedation medications administered: Midazolam 4.5 mg, Fentanyl 250 mcg    Other medications administered:  Nitroglycerin 450 mcg     No prophylactic antibiotics given, as pt currently on antibiotic regimen per Dr. Valdez.     Report given to: ALICIA Manjarrez 2A  : N/A    Other Notes: Pt arrived to IR room 1 from . Consent reviewed. Pt denies any questions or concerns regarding procedure. Pt positioned supine and monitored per protocol.     Right groin access.     Sheath removed at 15:44  Manual pressure held for 5 minutes;   Angioseal Closure device deployed at 15:45   Groin site appearance: clean/dry/flat  Pedal pulse assessment: 2+ dorsalis pedis, posterior tibial via doppler (unchanged from prior assessment)   Bedrest for 4 hours until 19:45 per Dr. Valdez d/t pt current regimen of Plavix and Aspirin.    Pt tolerated procedure without any noted complications. Pt transferred back to .

## 2022-12-01 NOTE — PRE-PROCEDURE
GENERAL PRE-PROCEDURE:   Procedure:  PAE    Written consent obtained?: Yes    Risks and benefits: Risks, benefits and alternatives were discussed    Consent given by:  Patient  Patient states understanding of procedure being performed: Yes    Patient's understanding of procedure matches consent: Yes    Procedure consent matches procedure scheduled: Yes    Expected level of sedation:  Moderate  Appropriately NPO:  Yes  ASA Class:  3  Mallampati  :  Grade 2- soft palate, base of uvula, tonsillar pillars, and portion of posterior pharyngeal wall visible  Lungs:  Lungs clear with good breath sounds bilaterally  Heart:  Normal heart sounds and rate  History & Physical reviewed:  History and physical reviewed and no updates needed  Statement of review:  I have reviewed the lab findings, diagnostic data, medications, and the plan for sedation

## 2022-12-01 NOTE — PROGRESS NOTES
Arrived from home for a prostate artery embolization.  Very anxious about procedure.  PIV placed and labs sent.  H&P needs update.  Consent obtained.  Denies pain, but upon arrival c/o feeling dizzy and slightly nauseated.  BSG 82.  EKG obtained.  MD's aware and spoke with and evaluated Juan Carlos at bedside.

## 2022-12-02 NOTE — PROGRESS NOTES
Had some trouble urinating while lying flat.  Was able to urinate 150cc with a post void bladder scan of 303cc.  One hour later, Juan Carlos was able to stand, and voided 400cc with a post void bladder scan of 55cc.  Juan Carlos feels like his bladder is empty.  States that he feels a little bit dizzy, as when he first arrived this morning, but denies pain.

## 2022-12-02 NOTE — PROGRESS NOTES
Tolerated bedrest, food, fluids, and urination.  Continues to feel slightly dizzy as when he first arrived at hospital.  Juan Carlos believes that this is due to the Cipro which he started 4 days ago.  Notified Dr. Resendiz, and a new Rx for Augmentin given to Juan Carlos.  Juan Carlos was informed to stop the Cipro, and to start the Augmentin.  Ambulated with stand by assist.  Reviewed discharge instructions with Juan Carlos and his wife.  Discharged to home with wife.

## 2022-12-06 DIAGNOSIS — N40.0 BPH (BENIGN PROSTATIC HYPERPLASIA): ICD-10-CM

## 2022-12-06 RX ORDER — FINASTERIDE 5 MG/1
5 TABLET, FILM COATED ORAL DAILY
Qty: 90 TABLET | Refills: 1 | Status: SHIPPED | OUTPATIENT
Start: 2022-12-06 | End: 2023-05-04

## 2022-12-06 NOTE — TELEPHONE ENCOUNTER
Refill request for Omeprazole. Med is milagros'd up for verification and approval, if appropriate.       Thank you,  Kartik Romano Jr., CMA on 12/6/2022 at 12:30 PM

## 2022-12-07 LAB
ATRIAL RATE - MUSE: 52 BPM
DIASTOLIC BLOOD PRESSURE - MUSE: NORMAL MMHG
INTERPRETATION ECG - MUSE: NORMAL
P AXIS - MUSE: 1 DEGREES
PR INTERVAL - MUSE: 160 MS
QRS DURATION - MUSE: 100 MS
QT - MUSE: 492 MS
QTC - MUSE: 457 MS
R AXIS - MUSE: -13 DEGREES
SYSTOLIC BLOOD PRESSURE - MUSE: NORMAL MMHG
T AXIS - MUSE: 7 DEGREES
VENTRICULAR RATE- MUSE: 52 BPM

## 2022-12-08 ENCOUNTER — MYC MEDICAL ADVICE (OUTPATIENT)
Dept: INTERNAL MEDICINE | Facility: CLINIC | Age: 82
End: 2022-12-08

## 2022-12-08 NOTE — TELEPHONE ENCOUNTER
Please make a virtual visit phone visit appointment with me sometime soon regarding the platelet count and EKG findings.    A blood pressure of 140/67 as you report is quite good and would not bring me any concern.  Continue to watch blood pressure avoid salt exercise keep your weight out keep on the same meds.    Look forward in the near future to review control visit with you over the telephone to discuss the EKG questions and low platelet count.  Please call patient for me.    JOCELINE TIRADO

## 2022-12-12 ENCOUNTER — TELEPHONE (OUTPATIENT)
Dept: SURGERY | Facility: CLINIC | Age: 82
End: 2022-12-12

## 2022-12-12 ENCOUNTER — TELEPHONE (OUTPATIENT)
Dept: INTERNAL MEDICINE | Facility: CLINIC | Age: 82
End: 2022-12-12

## 2022-12-12 DIAGNOSIS — M79.672 LEFT FOOT PAIN: Primary | ICD-10-CM

## 2022-12-12 NOTE — TELEPHONE ENCOUNTER
Called and spoke with Juan Carlos regarding his procedure on 12/1. Reports some red spots on his groin where the access site. Denies any burning or itching sensation. Rpeorts minimal improvement so far in his urinary symptoms. Discussed plan for one month follow up.     Mary ROSALES RN, BSN   Interventional Radiology [facility]   [phone]

## 2022-12-12 NOTE — TELEPHONE ENCOUNTER
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

## 2022-12-13 NOTE — TELEPHONE ENCOUNTER
Adonis Trotter MD  You 39 minutes ago (6:58 AM)     MB  Try Dr. Maykel Farr.  He is an excellent podiatrist here in the [place] area.  Please enter an order for same and I will gladly cosign.  Thanks in advance for calling the patient.  JOCELINE TIRADO

## 2022-12-18 ENCOUNTER — TELEPHONE (OUTPATIENT)
Dept: NURSING | Facility: CLINIC | Age: 82
End: 2022-12-18

## 2022-12-18 NOTE — TELEPHONE ENCOUNTER
"Has an appointment with Dr. Doe tomorrow to discuss \"readings\". Also want to speak to him about his foot. Patient is not reporting symptoms and just wants to know if he'll be able to discuss this tomorrow. Advised yes. Information only-No triage.     BRIGIDO SALAS RN    "

## 2022-12-19 ENCOUNTER — VIRTUAL VISIT (OUTPATIENT)
Dept: INTERNAL MEDICINE | Facility: CLINIC | Age: 82
End: 2022-12-19
Payer: COMMERCIAL

## 2022-12-19 DIAGNOSIS — R94.31 ABNORMAL ELECTROCARDIOGRAM: Primary | ICD-10-CM

## 2022-12-19 PROCEDURE — 99213 OFFICE O/P EST LOW 20 MIN: CPT | Mod: TEL | Performed by: INTERNAL MEDICINE

## 2022-12-19 NOTE — PROGRESS NOTES
Juan Carlos Marley is a 82 year oldwho is being evaluated via a billable telephone visit.       What phone number would you like to be contacted at? [phone]      Assessment & Plan  Electrocardiographic abnormalities including PVCs and nonspecific ST-T wave changes with T inversions.  Past health history of coronary artery disease coronary bypass grafting asymptomatic at this juncture no palpitations syncope chest pain or shortness of breath.    Podiatry issues suggest Dr. Maykel Farr podiatrist here at the [facility].    Hernias wonders what he can do about them.  Short of surgery.  Needs office visit.   In person.    11 minutes spent on the date of the encounter doing chart review, patient visit and documentation  I spoke with patient and his wife directly for 5 minutes.       Subjective   History as above.     Review of Systems   No blood in stool or urine no chest pain shortness of breath no palpitations or syncopal spells    Medication list reviewed reconciled in the chart.       Adonis Trotter MD  Answers for HPI/ROS submitted by the patient on 12/19/2022  Your back pain is: recurring  Where is your back pain located? : right middle of back  How would you describe your back pain? : dull ache  Where does your back pain spread? : nowhere  Since you noticed your back pain, how has it changed? : gradually improving  Does your back pain interfere with your job?: Not applicable  How many servings of fruits and vegetables do you eat daily?: 2-3  On average, how many sweetened beverages do you drink each day (Examples: soda, juice, sweet tea, etc.  Do NOT count diet or artificially sweetened beverages)?: 1  How many minutes a day do you exercise enough to make your heart beat faster?: 9 or less  How many days a week do you exercise enough to make your heart beat faster?: 3 or less  How many days per week do you miss taking your medication?: 0  What is the reason for your visit today?: discuss recent ECG  findings; back pain, & foot pain--possibly Renards Disease (cold feet) & hammer toe pain (right toe)  When did your symptoms begin?: More than a month  What are your symptoms?: recurrent pain in right back & cold feeling/pain in feet  How would you describe these symptoms?: Mild  Are your symptoms:: Staying the same  Have you had these symptoms before?: Yes  Have you tried or received treatment for these symptoms before?: No  Is there anything that makes you feel worse?: outside cold temperatures & walking (causing rubbing on adjacent toe)  Is there anything that makes you feel better?: wearing pad between toes

## 2022-12-28 ENCOUNTER — OFFICE VISIT (OUTPATIENT)
Dept: PODIATRY | Facility: CLINIC | Age: 82
End: 2022-12-28
Attending: INTERNAL MEDICINE
Payer: COMMERCIAL

## 2022-12-28 VITALS — BODY MASS INDEX: 26.73 KG/M2 | SYSTOLIC BLOOD PRESSURE: 128 MMHG | DIASTOLIC BLOOD PRESSURE: 80 MMHG | WEIGHT: 181 LBS

## 2022-12-28 DIAGNOSIS — L60.3 DYSTROPHIC NAIL: ICD-10-CM

## 2022-12-28 DIAGNOSIS — M21.41 PES PLANUS OF BOTH FEET: ICD-10-CM

## 2022-12-28 DIAGNOSIS — L84 CALLUS: ICD-10-CM

## 2022-12-28 DIAGNOSIS — M79.671 FOOT PAIN, BILATERAL: Primary | ICD-10-CM

## 2022-12-28 DIAGNOSIS — M21.42 PES PLANUS OF BOTH FEET: ICD-10-CM

## 2022-12-28 DIAGNOSIS — M79.672 FOOT PAIN, BILATERAL: Primary | ICD-10-CM

## 2022-12-28 DIAGNOSIS — M20.42 HAMMERTOE, BILATERAL: ICD-10-CM

## 2022-12-28 DIAGNOSIS — M20.41 HAMMERTOE, BILATERAL: ICD-10-CM

## 2022-12-28 PROCEDURE — 99203 OFFICE O/P NEW LOW 30 MIN: CPT | Performed by: PODIATRIST

## 2022-12-28 NOTE — PATIENT INSTRUCTIONS
Thank you for choosing [facility] Podiatry / Foot & Ankle Surgery!    DR [facility]IN[facility]S CLINIC:  [facility]   [address]   [place] [address]      TRIAGE LINE: [phone]  APPOINTMENTS: [phone]  RADIOLOGY: [phone]  SET UP SURGERY: [phone]  FAX NUMBER: [phone]  BILLING QUESTIONS: [phone]       Follow up: As needed      Here is a list of routine foot care resources, which includes toenail trimming and callus/corn management.     This is not a referral. It is your responsibility to contact the organization and your insurance to confirm cost and coverage.      ROUTINE FOOT CARE (NAIL TRIMMING / CALLUSES)      [facility]  [phone]   [facility]  [phone]  Multiple locations   [facility]  [phone]       CALLUS / CORNS / IPKs  When there is excessive friction or pressure on the skin, the body responds by making the skin thicker to protect the deeper structures from becoming exposed. While this works well to protect the deeper structures, the thickened skin can increase pressure and pain.    CALLUS: Flat, diffuse thickening are simple calluses and they are usually caused by friction. Often these are the result of rubbing on a shoe or going barefoot.    CORNS: Calluses with a central core between the toes are called corns. These result from prominent joints on adjacent toes rubbing together. Theses are a symptom of bone malalignment and will always recur unless the underlying bones are addressed surgically.    IPKs: Calluses with a central core on the ball of the foot are usually IPKs (intractable plantar keratosis). These are caused by excessive pressure from the metatarsals, the bones that make up the ball of the foot. Often one of these bones is too long or too prominent.  Again, these will always recur unless the underlying bone issue is addressed. There is no cure for these. They will either go away by themselves, recur, or more could  develop.    ROUTINE MAINTENANCE  1. File them down with a pumice stone or callus file a couple times a week.   2. An electric callus removing device. Amope Pedi Perfect Electronic Pedicure Foot File and Callus Remover can be a good option.   3. Lotion can be applied to soften the callus. A urea based cream such as Kersal or Vanicream or thicker cream with shea butter are good options.  4. Toe spacers or toe covers can be used for corns, gel pads can be used for other lesions on the bottom of the foot.   If there is a surgical pathology noted, such as a prominent bone, often this needs to be addressed surgically to minimize recurrence. However, sometimes the lesion simply migrates to another spot after surgery, so it is not a guaranteed cure.     **If you come back to clinic for treatment, insurance does not cover it, and you would be billed. This charge could range from $100 - $227**     HAMMERTOES  Hammertoe is a contracture (bending) of one or both joints of the second, third, fourth, or fifth (little) toes. This abnormal bending can put pressure on the toe when wearing shoes, causing problems to develop.  Hammertoes usually start out as mild deformities and get progressively worse over time. In the earlier stages, hammertoes are flexible and the symptoms can often be managed with noninvasive measures. But if left untreated, hammertoes can become more rigid and will not respond to non-surgical treatment.  Because of the progressive nature of hammertoes, they should receive early attention. Hammertoes never get better without some kind of intervention.  CAUSES  The most common cause of hammertoe is a muscle/tendon imbalance. This imbalance, which leads to a bending of the toe, results from mechanical (structural) changes in the foot that occur over time in some people.  Hammertoes may be aggravated by shoes that don t fit properly. A hammertoe may result if a toe is too long and is forced into a cramped position  when a tight shoe is worn.  Occasionally, hammertoe is the result of an earlier trauma to the toe. In some people, hammertoes are inherited.  SYMPTOMS  Pain or irritation of the affected toe when wearing shoes.   Corns and calluses (a buildup of skin) on the toe, between two toes, or on the ball of the foot. Corns are caused by constant friction against the shoe. They may be soft or hard, depending upon their location.   Inflammation, redness, or a burning sensation   Contracture of the toe   In more severe cases of hammertoe, open sores may form.   DIAGNOSIS  Although hammertoes are readily apparent, to arrive at a diagnosis the foot and ankle surgeon will obtain a thorough history of your symptoms and examine your foot. During the physical examination, the doctor may attempt to reproduce your symptoms by manipulating your foot and will study the contractures of the toes. In addition, the foot and ankle surgeon may take x-rays to determine the degree of the deformities and assess any changes that may have occurred.   Hammertoes are progressive - they don t go away by themselves and usually they will get worse over time. However, not all cases are alike - some hammertoes progress more rapidly than others. Once your foot and ankle surgeon has evaluated your hammertoes, a treatment plan can be developed that is suited to your needs.  NON-SURGICAL TREATMENT  There is a variety of treatment options for hammertoe. The treatment your foot and ankle surgeon selects will depend upon the severity of your hammertoe and other factors.  A number of non-surgical measures can be undertaken:  Padding corns and calluses. Your foot and ankle surgeon can provide or prescribe pads designed to shield corns from irritation. If you want to try over-the-counter pads, avoid the medicated types. Medicated pads are generally not recommended because they may contain a small amount of acid that can be harmful. Consult your surgeon about this  option.   Changes in shoewear. Avoid shoes with pointed toes, shoes that are too short, or shoes with high heels - conditions that can force your toe against the front of the shoe. Instead, choose comfortable shoes with a deep, roomy toe box and heels no higher than two inches.   Orthotic devices. A custom orthotic device placed in your shoe may help control the muscle/tendon imbalance.   Injection therapy. Corticosteroid injections are sometimes used to ease pain and inflammation caused by hammertoe.   Medications. Oral nonsteroidal anti-inflammatory drugs (NSAIDs), such as ibuprofen, may be recommended to reduce pain and inflammation.   Splinting/strapping. Splints or small straps may be applied by the surgeon to realign the bent toe.   Exercises:   1. The Toe Tap  Stand flat on the ground in your bare feet. Raise all of your toes up off the ground as high as you can. Then starting with the little toes, slowly press them down to the ground. After the big toes are on the ground, start over by raising all of them up off the ground again. This motion is similar to tapping your fingers on a desk. Repeat this ten times.     2. Interlocking your Fingers and Toes  Cross your right foot over your knee and place the fingers of your left hand between your toes. Squeeze your toes together, pinching your fingers between them. Spread the toes apart and squeeze them together again. Repeat this ten times then do the other foot. Like most exercises, this will get easier the more you do it. If you are having a lot of difficulty with this exercise, start with just your index finger between your first and second toe, then later add your middle finger between your second and third toes, and so on until you can fit all your fingers between your toes. Do this ten times on each foot. Eventually you will be able to spread your toes apart without using your fingers.    3. Gripping the Floor   the floor by pressing the pads of your  toes (not the tips) into the floor without curling your toes. Relax and repeat this ten times. If your shoes have the proper amount of depth, you should be able to do this with shoes on.    HAMMERTOE TOE SURGERY   Hammertoe surgery is complex. The surgical procedure is an attempt to help the toe lay in a better position. Nearly every structure in the toe will be cut including the tendons, ligaments, skin and bone. Hammertoes are a complex deformity and final toe position is difficult to predict. The only sure way to position a toe is to fuse all three digital joints. That will not happen as some degree of toe motion is needed for walking. The toe may not be completely reduced as the surrounding skin and other structures may not allow the toe to return to a normal position. The tendons on adjacent toes may need to be cut at the time of the original or subsequent surgeries, as interconnections exist between the toes. The toe may drift after surgery. Stiffness may develop leading to new areas of pressure.   Future shoe choices will be critical in allowing the surgery to provide comfort. The toes will still hurt if shoes rub. The original pain may also persist as other foot problems may be contributing to the current pain. The toe may or may not be pinned in place. External pins would require complete avoidance of water on the foot for six weeks. The pin would be removed about six weeks after the surgery. Strict attention to protection is critical. The pin could get bumped or loosen resulting in early removal. Removal might be necessary before the bone heals which would negatively affect the final surgical outcome and toe allignment.      NEUROPATHY  Peripheral neuropathy is damage of the peripheral nerves. Your peripheral nerves--the nerves in your toes and fingertips--are the ones on the periphery of your body. When the nerves are damaged, they don't function properly. People with peripheral neuropathy have decreased  or abnormal sensation in their toes and fingers. Sometimes, they develop problems moving these parts of the body as well.    When a person develops neuropathy, they usually begin to feel numbness or tingling in their feet and sometime in their legs.  Other symptoms may include painful burning or hot feet, tingling or feeling like insects or ants are crawling on your feet or legs.      In the [place], the most common cause of peripheral neuropathy is diabetes. According to the American Diabetes Association, 60 to 70 percent of people with diabetes will develop neuropathy within their lifetime.  Other causes of peripheral neuropathy include:  Certain medications, including some chemotherapy drugs.   Heredity. Some people have a family history of peripheral neuropathy.   Advanced age. Peripheral neuropathy is more common as people age.   Arthritis. Certain type of arthritis can cause peripheral neuropathy.   Alcoholism. According to the [place] [employer] of Medicine, up to half of all long-term heavy alcohol users develop peripheral neuropathy.   Neurological disorders. Certain neurological disorders, including spina bifida and fibromyalgia, are associated with peripheral neuropathy.   Injury. Acute injury to the peripheral nerves may also cause peripheral neuropathy.     Pain Management Strategies:  1.Blood Sugar Control - Most important in diabetics  2. Medications such as: Amytriptylline, duloxetine, gabapentin, lyrica, tramadol  3. Nutritional therapy: Vitamin B6 (100mg daily), Vitamin B12 (75mcg daily), Vitamin D 2000 IU daily), Alpha-Lipoic Acid (600-1800mg daily), Acetyl-L-Carnitine (500-1000mg TID, L-methyl folate (1500mcg daily)  4. TENS (Trans-electrical nerve stimulation) with physical therapy  5. Compounding pain creams    RECOMMENDATION TO AVOID PROBLEMS  1.Wash your feet with lukewarm water and a mild soap and then dry them thoroughly, especially between the toes.  2. Examine your feet daily  looking for cuts, corns, blisters, cracks, ect., especially after wearing new shoes. Make sure to look between your toes. If you cannot see the bottom of your feet, set a mirror on the floor and hold your foot over it, or ask a spouse, friend or family member to examine your feet for you. Contact your doctor immediately if new problems are noted or if sores are not healing.  3. Immediately apply moisturizer to the tops and bottoms of your feet, avoiding areas between the toes. Hand lotion (Intesive Care, Opal, Eucerin, Neutrogena, Curel, ect...) is sufficient unless your doctor prescribes a medicated lotion. Apply sunscreen to your feet when going swimming outside.  4. Use clean comfortable shoes, wear white socks (if you have any bleeding or drainage, you will see it on white socks). Socks should not have thick seams or cut off the circulation around the leg. Break in new shoes slowly and rotate with older shoes until broken in. Check the inside of your shoes with your hand to look for areas of irritation or objects that may have fallen into your shoes.    5. Keep slippers by the side of your bed for use during the night.  6. Shoes should be fitted by a professional and should not cause areas of irritation.  Check your feet regularly when wearing a new pair of shoes and replace them as needed.  7. Cut your nails straight across, but then gently round any sharp edges with a cardboard nail file.  If you have neuropathy, peripheral vascular disease or cannot see that well to trim your own toenails, see a medical professional for care.  8. Taking Vitamin B6, B12, and Alpha Lipoic Acid supplements can sometimes help.    DO NOT  1.  Do not soak your feet if you have an open sore.  Use only lukewarm water and always check the temperature with your hand as hot water can easily burn your feet.    2.  Never use a hot water bottle or heating pad on your feet.  Also do not apply cold compresses to your feet.  With decreased  sensation, you could burn or freeze your feet.    3. Do not apply any of these to your feet:   -  Over the counter medicine for corns or warts   -  Harsh chemicals like boric acid   -  Do not self-treat corns, cuts, blisters or infections. Always consult your doctor.  4.  Do not wear sandals, slippers or walk barefoot, especially on hot sand or concrete or other harsh surfaces.  5.  If you smoke, stop!     [email or web address] or call [email or web address]  HAMMERTOE PADS / TOE SPLINTS       [email or web address] or call [email or web address]  TOE COVERS/CAPS

## 2022-12-28 NOTE — LETTER
12/28/2022         RE: Juan Carlos Marley  [address]  [place] [address]        Dear Colleague,    Thank you for referring your patient, Juan Carlos Marley, to the [facility]. Please see a copy of my visit note below.    PATIENT HISTORY:  Dr. Trotter requested I see this patient for their foot issue.  Juan Carlos Marley is a 82 year old male who presents to clinic for left foot pain.  Patient notes that both feet can be sore or feel cold.  He notes that his left second toe rubs in his shoe and is painful.  Has done that for years.  Also notes that his right second toe is rubbing on his big toe and can be sore at times to.  He notes that the end of his toenails are getting thick and wondering if that is fungus.  Wondering if he has neuropathy and what can be done for the nails and the toes.  Pain especially rubbing in the shoe of the left second toe can be 5 out of 10.  He is tried pads but it has not really helped.  Worse with increased walking.  Denies specific injury.  Wondering what can be done for the multiple issues.    Review of Systems:  Patient denies fever, chills, rash, wound, stiffness, limping, numbness, weakness, heart burn, blood in stool, chest pain with activity, calf pain when walking, shortness of breath with activity, chronic cough, easy bleeding/bruising, swelling of ankles, excessive thirst, fatigue, depression, anxiety.       PAST MEDICAL HISTORY:   Past Medical History:   Diagnosis Date     Cataract, nuclear sclerotic, left eye 6/2/2017     Coronary artery disease due to lipid rich plaque      Dyslipidemia, goal LDL below 70 12/20/2014     Essential hypertension with goal blood pressure less than 130/85      Hernia, abdominal      History of spinal cord injury      Nonsustained ventricular tachycardia     6 beat run, asymptomatic per Dr. Hackett     Paroxysmal atrial fibrillation (H) 09/02/2013    at the time of CABG; [facility] did a 4-week monitor in 2017  and did not find any atrial fibrillation and therefore stopped his Eliquis.     Stricture of esophagus 2013    dilated twice since then     Transient ischemic attack 09/2018    transient speech difficulty and dizziness while in [place]; did not seek medical attention     Transient ischemic attack 10/2018    same symptoms as in [place]     Transient ischemic attack 11/20/2016     Transient ischemic attack 01/24/2016    left cerebral hemisphere     Transient ischemic attack 12/20/2014        PAST SURGICAL HISTORY:   Past Surgical History:   Procedure Laterality Date     BYPASS GRAFT ARTERY CORONARY  09/2013    underwent 5-vessel coronary bypass grafting at the [facility]     CYSTOSCOPY       FOOT SURGERY Left 12/19/2014    Dr. Carr     FRACTURE SURGERY Right     hand     HERNIA REPAIR  11/2013     IR PAE  12/1/2022     ORCHIECTOMY SCROTAL Right      ORIF FEMUR FRACTURE Right      PROSTATE SURGERY      half removed     PROSTATECTOMY  11/2013     TESTICLE SURGERY      removal of testicle        MEDICATIONS:   Current Outpatient Medications:      aspirin (ASA) 81 MG chewable tablet, Take 81 mg by mouth daily, Disp: , Rfl:      calcium carbonate (OS-ISABEL) 500 MG tablet, Take 1 tablet by mouth once 600 mg daily, Disp: , Rfl:      clopidogrel (PLAVIX) 75 MG tablet, Take 1 tablet (75 mg) by mouth daily, Disp: 90 tablet, Rfl: 2     COZAAR 25 MG tablet, TAKE 1 TABLET DAILY, Disp: 90 tablet, Rfl: 3     Ferrous Gluconate 324 (37.5 Fe) MG TABS, Take 324 mg by mouth 2 times daily, Disp: , Rfl:      finasteride (PROSCAR) 5 MG tablet, Take 1 tablet (5 mg) by mouth daily, Disp: 90 tablet, Rfl: 1     LIPITOR 40 MG tablet, TAKE 1 TABLET DAILY, Disp: 90 tablet, Rfl: 3     metoprolol tartrate (LOPRESSOR) 25 MG tablet, Take 1 tablet (25 mg) by mouth 2 times daily, Disp: 180 tablet, Rfl: 2     Multiple Vitamins-Minerals (MULTIVITAMIN ADULT, MINERALS,) TABS, Take 1 tablet by mouth daily, Disp: , Rfl:      omeprazole (PRILOSEC) 20 MG   capsule, Take 1 capsule (20 mg) by mouth daily, Disp: 90 capsule, Rfl: 11     oxybutynin (DITROPAN) 5 MG tablet, Take 1 tablet (5 mg) by mouth every 8 hours as needed for bladder spasms, Disp: 15 tablet, Rfl: 0     tamsulosin (FLOMAX) 0.4 MG capsule, Take 1 capsule by mouth 2 times daily, Disp: , Rfl:      vitamin C (ASCORBIC ACID) 500 MG tablet, Take 500 mg by mouth daily , Disp: , Rfl:      amoxicillin-clavulanate (AUGMENTIN) 875-125 MG tablet, Take 1 tablet by mouth 2 times daily, Disp: 14 tablet, Rfl: 0     methylPREDNISolone (MEDROL DOSEPAK) 4 MG tablet therapy pack, Follow Package Directions: Start taking DosePak if you develop lower urinary tract symptoms. Complete pack if you start taking it., Disp: 21 tablet, Rfl: 0     ALLERGIES:  No Known Allergies     SOCIAL HISTORY:   Social History     Socioeconomic History     Marital status:      Spouse name: Not on file     Number of children: 1     Years of education: Not on file     Highest education level: Not on file   Occupational History     Not on file   Tobacco Use     Smoking status: Former     Packs/day: 1.00     Years: 10.00     Pack years: 10.00     Types: Cigarettes, Pipe     Quit date: 1970     Years since quittin.8     Smokeless tobacco: Never   Substance and Sexual Activity     Alcohol use: Not Currently     Alcohol/week: 4.0 standard drinks     Types: 4 Glasses of wine per week     Drug use: No     Sexual activity: Yes     Partners: Female     Birth control/protection: Post-menopausal   Other Topics Concern     Parent/sibling w/ CABG, MI or angioplasty before 65F 55M? Not Asked   Social History Narrative    Lives with his wife Salud in [place] in a twin home.  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  One daughter.       Social Determinants of Health     Financial Resource Strain: Not on file   Food Insecurity: Not on file   Transportation Needs: Not on file   Physical Activity: Not on file   Stress: Not on file   Social  Connections: Not on file   Intimate Partner Violence: Not on file   Housing Stability: Not on file        FAMILY HISTORY:   Family History   Problem Relation Age of Onset     Other - See Comments Mother          accident age 49     Migraines Mother      Aortic aneurysm Father          84     Other - See Comments Sister         autistic      No Known Problems Daughter      Pulmonary Hypertension No family hx of      Congenital heart disease No family hx of         EXAM:Vitals: /80   Wt 82.1 kg (181 lb)   BMI 26.73 kg/m    BMI= Body mass index is 26.73 kg/m .    General appearance: Patient is alert and fully cooperative with history & exam.  No sign of distress is noted during the visit.     Psychiatric: Affect is pleasant & appropriate.  Patient appears motivated to improve health.     Respiratory: Breathing is regular & unlabored while sitting.     HEENT: Hearing is intact to spoken word.  Speech is clear.  No gross evidence of visual impairment that would impact ambulation.     Dermatologic: Distal aspect of the toenails 2 and 3 are thickened.  Hyperkeratotic lesion noted to the medial aspect of the right second PIPJ and plantar lateral left fifth metatarsal base..  No open lesions or signs of infection noted.     Vascular: DP & PT pulses are intact & regular bilaterally.  No significant edema but varicosities noted.  CFT and skin temperature is normal to both lower extremities.     Neurologic: Lower extremity sensation is intact to light touch.       Musculoskeletal: Patient is ambulatory without assistive device or brace.  Decreased arch height.  Semirigid contracture of both second and third toes     ASSESSMENT:    Foot pain, bilateral  Hammertoe, bilateral  Callus  Dystrophic nail  Pes planus of both feet     Medical Decision Making/Plan:  Reviewed patient[facility] chart in [facility]. Reviewed and discussed causes of hammertoes with patient.  Explained that this can be caused by an overpowering of muscles  or by the way we walk.  Discussed conservative treatments such as orthotics, pads, shoe gear.  Explained that sometimes the flexor tendons can be cut to try and straighten the toe and reduce rubbing. This is normally done in office and patient is weight bearing in postop she for 1-2 weeks.  We also discussed surgical intervention to remove the joint and possibly fuse the toe.  Normally patient has a pin sticking out of the toe for about 6 weeks and can not get the foot wet. Patient would have to be minimal weight bearing in cam boot.      Currently he is on a blood thinner and he cannot go off of that.  We discussed that that might be hard for surgery as he would have to be off of it for a week before hand.  He would like to try the toe splints first to see if that would help with his left second toe from rubbing in his shoe.    Discussed causes of keratomas.  They are due to areas of increase friction.  Hammertoes can create these as they put more pressure to the metatarsal head.  Discussed treatments such as using foot file, pumice stone, metatarsal pads, orthotics, and not walking barefoot.     We discussed the cost structure of callus care if they were to come back and have it treated in the clinic if insurance does not cover it and explained that they would be billed. They were also provided information on places to get the callus treatment.    Recommend using a pumice stone to the area once to twice a week.  Also recommend motioning the feet daily.  Recommend wearing inserts in the shoes to help cushion these areas and not going barefoot or in socks.    For his toenails we discussed that they do not appear fungal but more thickened from trauma as his toes curl over and rub on the shoes and the ground.  Recommend using a nail file or emery board once a week to file them down thin.  He was also given information on where to get routine nail care as we do not do that here in the clinic.    For the coldness of his  feet this could be more nerve related.  Patient has good pulses to the feet and ankles.  He could try an over-the-counter pain cream to try to help with the sensations however I do not have any other options for this.    All questions were answered to patient satisfaction and he will call for the questions or concerns.    Patient risk factor: Patient is at low risk for infection.        Jaleesa Herrera DPM, Podiatry/Foot and Ankle Surgery        Again, thank you for allowing me to participate in the care of your patient.        Sincerely,        Jaleesa Herrera DPM, Podiatry/Foot and Ankle Surgery

## 2022-12-28 NOTE — PROGRESS NOTES
PATIENT HISTORY:  Dr. Trotter requested I see this patient for their foot issue.  Juan Carlos Marley is a 82 year old male who presents to clinic for left foot pain.  Patient notes that both feet can be sore or feel cold.  He notes that his left second toe rubs in his shoe and is painful.  Has done that for years.  Also notes that his right second toe is rubbing on his big toe and can be sore at times to.  He notes that the end of his toenails are getting thick and wondering if that is fungus.  Wondering if he has neuropathy and what can be done for the nails and the toes.  Pain especially rubbing in the shoe of the left second toe can be 5 out of 10.  He is tried pads but it has not really helped.  Worse with increased walking.  Denies specific injury.  Wondering what can be done for the multiple issues.    Review of Systems:  Patient denies fever, chills, rash, wound, stiffness, limping, numbness, weakness, heart burn, blood in stool, chest pain with activity, calf pain when walking, shortness of breath with activity, chronic cough, easy bleeding/bruising, swelling of ankles, excessive thirst, fatigue, depression, anxiety.       PAST MEDICAL HISTORY:   Past Medical History:   Diagnosis Date     Cataract, nuclear sclerotic, left eye 6/2/2017     Coronary artery disease due to lipid rich plaque      Dyslipidemia, goal LDL below 70 12/20/2014     Essential hypertension with goal blood pressure less than 130/85      Hernia, abdominal      History of spinal cord injury      Nonsustained ventricular tachycardia     6 beat run, asymptomatic per Dr. Hackett     Paroxysmal atrial fibrillation (H) 09/02/2013    at the time of CABG; [facility] did a 4-week monitor in 2017 and did not find any atrial fibrillation and therefore stopped his Eliquis.     Stricture of esophagus 2013    dilated twice since then     Transient ischemic attack 09/2018    transient speech difficulty and dizziness while in [place]; did not seek medical  attention     Transient ischemic attack 10/2018    same symptoms as in [place]     Transient ischemic attack 11/20/2016     Transient ischemic attack 01/24/2016    left cerebral hemisphere     Transient ischemic attack 12/20/2014        PAST SURGICAL HISTORY:   Past Surgical History:   Procedure Laterality Date     BYPASS GRAFT ARTERY CORONARY  09/2013    underwent 5-vessel coronary bypass grafting at the [facility]     CYSTOSCOPY       FOOT SURGERY Left 12/19/2014    Dr. Carr     FRACTURE SURGERY Right     hand     HERNIA REPAIR  11/2013     IR PAE  12/1/2022     ORCHIECTOMY SCROTAL Right      ORIF FEMUR FRACTURE Right      PROSTATE SURGERY      half removed     PROSTATECTOMY  11/2013     TESTICLE SURGERY      removal of testicle        MEDICATIONS:   Current Outpatient Medications:      aspirin (ASA) 81 MG chewable tablet, Take 81 mg by mouth daily, Disp: , Rfl:      calcium carbonate (OS-ISABEL) 500 MG tablet, Take 1 tablet by mouth once 600 mg daily, Disp: , Rfl:      clopidogrel (PLAVIX) 75 MG tablet, Take 1 tablet (75 mg) by mouth daily, Disp: 90 tablet, Rfl: 2     COZAAR 25 MG tablet, TAKE 1 TABLET DAILY, Disp: 90 tablet, Rfl: 3     Ferrous Gluconate 324 (37.5 Fe) MG TABS, Take 324 mg by mouth 2 times daily, Disp: , Rfl:      finasteride (PROSCAR) 5 MG tablet, Take 1 tablet (5 mg) by mouth daily, Disp: 90 tablet, Rfl: 1     LIPITOR 40 MG tablet, TAKE 1 TABLET DAILY, Disp: 90 tablet, Rfl: 3     metoprolol tartrate (LOPRESSOR) 25 MG tablet, Take 1 tablet (25 mg) by mouth 2 times daily, Disp: 180 tablet, Rfl: 2     Multiple Vitamins-Minerals (MULTIVITAMIN ADULT, MINERALS,) TABS, Take 1 tablet by mouth daily, Disp: , Rfl:      omeprazole (PRILOSEC) 20 MG DR capsule, Take 1 capsule (20 mg) by mouth daily, Disp: 90 capsule, Rfl: 11     oxybutynin (DITROPAN) 5 MG tablet, Take 1 tablet (5 mg) by mouth every 8 hours as needed for bladder spasms, Disp: 15 tablet, Rfl: 0     tamsulosin (FLOMAX) 0.4 MG capsule, Take 1  capsule by mouth 2 times daily, Disp: , Rfl:      vitamin C (ASCORBIC ACID) 500 MG tablet, Take 500 mg by mouth daily , Disp: , Rfl:      amoxicillin-clavulanate (AUGMENTIN) 875-125 MG tablet, Take 1 tablet by mouth 2 times daily, Disp: 14 tablet, Rfl: 0     methylPREDNISolone (MEDROL DOSEPAK) 4 MG tablet therapy pack, Follow Package Directions: Start taking DosePak if you develop lower urinary tract symptoms. Complete pack if you start taking it., Disp: 21 tablet, Rfl: 0     ALLERGIES:  No Known Allergies     SOCIAL HISTORY:   Social History     Socioeconomic History     Marital status:      Spouse name: Not on file     Number of children: 1     Years of education: Not on file     Highest education level: Not on file   Occupational History     Not on file   Tobacco Use     Smoking status: Former     Packs/day: 1.00     Years: 10.00     Pack years: 10.00     Types: Cigarettes, Pipe     Quit date: 1970     Years since quittin.8     Smokeless tobacco: Never   Substance and Sexual Activity     Alcohol use: Not Currently     Alcohol/week: 4.0 standard drinks     Types: 4 Glasses of wine per week     Drug use: No     Sexual activity: Yes     Partners: Female     Birth control/protection: Post-menopausal   Other Topics Concern     Parent/sibling w/ CABG, MI or angioplasty before 65F 55M? Not Asked   Social History Narrative    Lives with his wife Salud in [place] in a twin home.  Retired from [employer].  One daughter.       Social Determinants of Health     Financial Resource Strain: Not on file   Food Insecurity: Not on file   Transportation Needs: Not on file   Physical Activity: Not on file   Stress: Not on file   Social Connections: Not on file   Intimate Partner Violence: Not on file   Housing Stability: Not on file        FAMILY HISTORY:   Family History   Problem Relation Age of Onset     Other - See Comments Mother          accident age 49     Migraines Mother      Aortic  aneurysm Father          84     Other - See Comments Sister         autistic      No Known Problems Daughter      Pulmonary Hypertension No family hx of      Congenital heart disease No family hx of         EXAM:Vitals: /80   Wt 82.1 kg (181 lb)   BMI 26.73 kg/m    BMI= Body mass index is 26.73 kg/m .    General appearance: Patient is alert and fully cooperative with history & exam.  No sign of distress is noted during the visit.     Psychiatric: Affect is pleasant & appropriate.  Patient appears motivated to improve health.     Respiratory: Breathing is regular & unlabored while sitting.     HEENT: Hearing is intact to spoken word.  Speech is clear.  No gross evidence of visual impairment that would impact ambulation.     Dermatologic: Distal aspect of the toenails 2 and 3 are thickened.  Hyperkeratotic lesion noted to the medial aspect of the right second PIPJ and plantar lateral left fifth metatarsal base..  No open lesions or signs of infection noted.     Vascular: DP & PT pulses are intact & regular bilaterally.  No significant edema but varicosities noted.  CFT and skin temperature is normal to both lower extremities.     Neurologic: Lower extremity sensation is intact to light touch.       Musculoskeletal: Patient is ambulatory without assistive device or brace.  Decreased arch height.  Semirigid contracture of both second and third toes     ASSESSMENT:    Foot pain, bilateral  Hammertoe, bilateral  Callus  Dystrophic nail  Pes planus of both feet     Medical Decision Making/Plan:  Reviewed patient's chart in [facility]. Reviewed and discussed causes of hammertoes with patient.  Explained that this can be caused by an overpowering of muscles or by the way we walk.  Discussed conservative treatments such as orthotics, pads, shoe gear.  Explained that sometimes the flexor tendons can be cut to try and straighten the toe and reduce rubbing. This is normally done in office and patient is weight bearing in  postop she for 1-2 weeks.  We also discussed surgical intervention to remove the joint and possibly fuse the toe.  Normally patient has a pin sticking out of the toe for about 6 weeks and can not get the foot wet. Patient would have to be minimal weight bearing in cam boot.      Currently he is on a blood thinner and he cannot go off of that.  We discussed that that might be hard for surgery as he would have to be off of it for a week before hand.  He would like to try the toe splints first to see if that would help with his left second toe from rubbing in his shoe.    Discussed causes of keratomas.  They are due to areas of increase friction.  Hammertoes can create these as they put more pressure to the metatarsal head.  Discussed treatments such as using foot file, pumice stone, metatarsal pads, orthotics, and not walking barefoot.     We discussed the cost structure of callus care if they were to come back and have it treated in the clinic if insurance does not cover it and explained that they would be billed. They were also provided information on places to get the callus treatment.    Recommend using a pumice stone to the area once to twice a week.  Also recommend motioning the feet daily.  Recommend wearing inserts in the shoes to help cushion these areas and not going barefoot or in socks.    For his toenails we discussed that they do not appear fungal but more thickened from trauma as his toes curl over and rub on the shoes and the ground.  Recommend using a nail file or emery board once a week to file them down thin.  He was also given information on where to get routine nail care as we do not do that here in the clinic.    For the coldness of his feet this could be more nerve related.  Patient has good pulses to the feet and ankles.  He could try an over-the-counter pain cream to try to help with the sensations however I do not have any other options for this.    All questions were answered to patient  satisfaction and he will call for the questions or concerns.    Patient risk factor: Patient is at low risk for infection.        Jaleesa Herrera DPM, Podiatry/Foot and Ankle Surgery

## 2023-01-05 DIAGNOSIS — I25.10 CORONARY ARTERY DISEASE DUE TO LIPID RICH PLAQUE: ICD-10-CM

## 2023-01-05 DIAGNOSIS — I25.83 CORONARY ARTERY DISEASE DUE TO LIPID RICH PLAQUE: ICD-10-CM

## 2023-01-05 RX ORDER — METOPROLOL TARTRATE 25 MG/1
25 TABLET, FILM COATED ORAL 2 TIMES DAILY
Qty: 180 TABLET | Refills: 2 | Status: SHIPPED | OUTPATIENT
Start: 2023-01-05 | End: 2023-07-26

## 2023-01-24 ENCOUNTER — VIRTUAL VISIT (OUTPATIENT)
Dept: RADIOLOGY | Facility: CLINIC | Age: 83
End: 2023-01-24
Attending: RADIOLOGY
Payer: COMMERCIAL

## 2023-01-24 DIAGNOSIS — N40.0 BPH (BENIGN PROSTATIC HYPERPLASIA): Primary | ICD-10-CM

## 2023-01-24 PROCEDURE — 99207 PR NO BILLABLE SERVICE THIS VISIT: CPT | Mod: GT | Performed by: RADIOLOGY

## 2023-01-24 NOTE — PROGRESS NOTES
INTERVENTIONAL RADIOLOGY FOLLOW UP VISIT    Name: Juan Carlos Marley  Age: 82 year old   Referring Physician: Dr. Trotter   REASON FOR VISIT: PAE F/U Visit     Juan Carlos is a 82 year old who is being evaluated via a billable video visit.      How would you like to obtain your AVS? MyChart  If the video visit is dropped, the invitation should be resent by: Text to cell phone: [phone]  Will anyone else be joining your video visit? Brea Hamilton         Video-Visit Details    Type of service:  Video Visit   Video Start Time: 1:00  Video End Time: 1:16    Originating Location (pt. Location): Home    Distant Location (provider location):  Off-site (Home)  Platform used for Video Visit: Lorena Gaxiola        Progress Summary:  Juan Carlos Marley is an 82-year-old male, with PMH of BPH w/ LUTS, s/p TURP, and S/P PAE on 12/1/22, who presents for his 1-month post-PAE follow up visit. Additional pertinent medical history also includes CAD s/p CABG, anemia, history of cerebrovascular disease with prior TIAs currently on ASA and Plavix and metoprolol. He states he has recovered well from his previous PAE this past December. The patient reports his current condition is  better than [he] was before,  but remarks he  hasn t seen as much change that he hoped for.  He states of experiencing minimal symptomatic improvement, with frequent nocturia upwards of 5xnightly. Juan Carlos remarks his current IPSS scores a 16 (individual questions scoring a 1,4,1,4,2,1,3 respectfully), which is down from his original pre-procedure IPSS score of 20. Juan Carlos reports he is continuing to take his Flomax regularly. He asks what symptomatic improvement he can expect. There are no additional medical concerns or questions at this time.     PAST MEDICAL HISTORY:   Past Medical History:   Diagnosis Date     Cataract, nuclear sclerotic, left eye 6/2/2017     Coronary artery disease due to lipid rich plaque      Dyslipidemia, goal LDL below 70 12/20/2014      Essential hypertension with goal blood pressure less than 130/85      Hernia, abdominal      History of spinal cord injury      Nonsustained ventricular tachycardia     6 beat run, asymptomatic per Dr. Hackett     Paroxysmal atrial fibrillation (H) 2013    at the time of CABG; [facility] did a 4-week monitor in  and did not find any atrial fibrillation and therefore stopped his Eliquis.     Stricture of esophagus     dilated twice since then     Transient ischemic attack 2018    transient speech difficulty and dizziness while in [place]; did not seek medical attention     Transient ischemic attack 10/2018    same symptoms as in [place]     Transient ischemic attack 2016     Transient ischemic attack 2016    left cerebral hemisphere     Transient ischemic attack 2014       PAST SURGICAL HISTORY:   Past Surgical History:   Procedure Laterality Date     BYPASS GRAFT ARTERY CORONARY  2013    underwent 5-vessel coronary bypass grafting at the [facility]     CYSTOSCOPY       FOOT SURGERY Left 2014    Dr. Carr     FRACTURE SURGERY Right     hand     HERNIA REPAIR  2013     IR PAE  2022     ORCHIECTOMY SCROTAL Right      ORIF FEMUR FRACTURE Right      PROSTATE SURGERY      half removed     PROSTATECTOMY  2013     TESTICLE SURGERY      removal of testicle       FAMILY HISTORY:   Family History   Problem Relation Age of Onset     Other - See Comments Mother          accident age 49     Migraines Mother      Aortic aneurysm Father          84     Other - See Comments Sister         autistic      No Known Problems Daughter      Pulmonary Hypertension No family hx of      Congenital heart disease No family hx of        SOCIAL HISTORY:   Social History     Tobacco Use     Smoking status: Former     Packs/day: 1.00     Years: 10.00     Pack years: 10.00     Types: Cigarettes, Pipe     Quit date: 1970     Years since quittin.9     Smokeless tobacco: Never    Substance Use Topics     Alcohol use: Not Currently     Alcohol/week: 4.0 standard drinks     Types: 4 Glasses of wine per week       PROBLEM LIST:   Patient Active Problem List    Diagnosis Date Noted     Urinary retention 05/24/2022     Priority: Medium     JOCELINE (acute kidney injury) (H) 05/24/2022     Priority: Medium     Anemia, unspecified type 05/24/2022     Priority: Medium     Hematuria, unspecified type 05/24/2022     Priority: Medium     Benign prostatic hyperplasia with weak urinary stream 08/18/2021     Priority: Medium     Chronic anticoagulation 08/18/2021     Priority: Medium     Essential hypertension      Priority: Medium     Dyslipidemia, goal LDL below 70 12/20/2014     Priority: Medium     Coronary artery disease due to lipid rich plaque 12/20/2014     Priority: Medium     underwent 5-vessel coronary bypass grafting at the [facility].           Major depressive disorder, recurrent episode (H) 03/22/2006     Priority: Medium     Overview:   Epic            MEDICATIONS:   Prescription Medications as of 1/24/2023       Rx Number Disp Refills Start End Last Dispensed Date Next Fill Date Owning Pharmacy    aspirin (ASA) 81 MG chewable tablet        [facility] [address] [place] [address] [address] [place] [address]    Sig: Take 81 mg by mouth daily    Class: Historical    Route: Oral    calcium carbonate (OS-ISABEL) 500 MG tablet            Sig: Take 1 tablet by mouth once 600 mg daily    Class: Historical    Route: Oral    clopidogrel (PLAVIX) 75 MG tablet  90 tablet 2 10/21/2022    [place] [address] [facility]    Sig: Take 1 tablet (75 mg) by mouth daily    Class: E-Prescribe    Route: Oral    Ferrous Gluconate 324 (37.5 Fe) MG TABS            Sig: Take 324 mg by mouth 2 times daily    Class: Historical    Route: Oral    finasteride (PROSCAR) 5 MG tablet  90 tablet 1 12/6/2022    [place] [address] [facility]    Sig: Take 1 tablet (5 mg)  by mouth daily    Class: E-Prescribe    Route: Oral    LIPITOR 40 MG tablet  90 tablet 3 7/6/2022    [address]    Sig: TAKE 1 TABLET DAILY    Class: E-Prescribe    metoprolol tartrate (LOPRESSOR) 25 MG tablet  180 tablet 2 1/5/2023    [address]    Sig: Take 1 tablet (25 mg) by mouth 2 times daily    Class: E-Prescribe    Route: Oral    Multiple Vitamins-Minerals (MULTIVITAMIN ADULT, MINERALS,) TABS        [facility] [address] [place] [address]    Sig: Take 1 tablet by mouth daily    Class: Historical    Route: Oral    omeprazole (PRILOSEC) 20 MG DR capsule  90 capsule 11 12/6/2022    [address]    Sig: Take 1 capsule (20 mg) by mouth daily    Class: E-Prescribe    Route: Oral    tamsulosin (FLOMAX) 0.4 MG capsule    6/21/2021    [facility] [address] [place] [address]    Sig: Take 1 capsule by mouth 2 times daily    Class: Historical    Route: Oral    vitamin C (ASCORBIC ACID) 500 MG tablet    2/12/2021    [facility] [address] [place] [address]    Sig: Take 500 mg by mouth daily     Class: Historical    Route: Oral    amoxicillin-clavulanate (AUGMENTIN) 875-125 MG tablet  14 tablet 0 12/1/2022        Sig: Take 1 tablet by mouth 2 times daily    Class: Local Print    Route: Oral    COZAAR 25 MG tablet  90 tablet 3 7/6/2022    [address]    Sig: TAKE 1 TABLET DAILY    Class: E-Prescribe    methylPREDNISolone (MEDROL DOSEPAK) 4 MG tablet therapy pack  21 tablet 0 12/1/2022        Sig: Follow Package Directions: Start taking DosePak if you develop lower urinary tract symptoms. Complete pack if you start taking it.    Class: Local Print    oxybutynin (DITROPAN) 5 MG tablet  15 tablet 0 12/1/2022        Sig: Take 1 tablet (5 mg) by mouth every 8  hours as needed for bladder spasms    Class: Local Print    Route: Oral          ALLERGIES:   Patient has no known allergies.    ROS:  Negative unless otherwise stated in progress summary above.    Physical Examination:   VITALS:   There were no vitals taken for this visit.  There was no physical exam taken for this virtual visit.    Labs:    BMP RESULTS:  Lab Results   Component Value Date     12/01/2022    POTASSIUM 3.8 12/01/2022    POTASSIUM 3.2 (L) 05/27/2022    CHLORIDE 111 (H) 12/01/2022    CHLORIDE 112 (H) 05/27/2022    CO2 23 12/01/2022    CO2 25 05/27/2022    ANIONGAP 10 12/01/2022    ANIONGAP 5 05/27/2022    GLC 83 12/01/2022    GLC 82 12/01/2022     (H) 05/27/2022    BUN 24.3 (H) 12/01/2022    BUN 14 05/27/2022    CR 0.97 12/01/2022    GFRESTIMATED 78 12/01/2022    GFRESTIMATED >60 08/19/2021    GFRESTIMATED >60 02/23/2021    GFRESTBLACK >60 02/23/2021    ISABEL 9.4 12/01/2022        CBC RESULTS:  Lab Results   Component Value Date    WBC 5.7 12/01/2022    RBC 4.05 (L) 12/01/2022    HGB 12.8 (L) 12/01/2022    HCT 37.4 (L) 12/01/2022    MCV 92 12/01/2022    MCH 31.6 12/01/2022    MCHC 34.2 12/01/2022    RDW 13.1 12/01/2022     (L) 12/01/2022     (L) 12/01/2022       INR/PTT:  Lab Results   Component Value Date    INR 1.14 12/01/2022       Diagnostic studies:   No new or pertinent imaging at this time.    Assessment & Plan:    Juan Carlos Marley is an 82-year-old male, with PMH of BPH w/ LUTS, s/p TURP, and S/P PAE, who presents for his 1-month post-PAE follow up visit. The patient reports of noticible, albite mild, response to PAE with his IPSS scoring improving to 16, down from a pre-procedure value of 20. Patient s higher than expected IPSS is likely inflated given his definition of a weak stream (compared to his younger self) and his description of nocturia being that he  goes to the bathroom if/when he awakes at night even if [he] doesn t have to go.  It is likely that this IPSS  score does not truly reflect patient s entire symptomatic improvement. Discussion was had regarding potential symptomatic improvement and was informed of recent medical literature documenting improving and sustained symptomatic relief post-PAE over multiple follow up studies. Additional discussion was had regarding potential symptom recurrence and patient was informed he could call us whenever his QOL changes significantly. Otherwise, plan for another follow up visit in 5 months for his 6-month post-procedure visit. The patient is agreeable with the plan.     A total of 16 minutes was spent on this virtual clinic visit, with more than half was on direct counseling and coordination of the care.    Charly JH, Iliana KA, Kylee M, Greg S, José Miguel J, Nelson B, Nathan TY, Payal CHAPMAN, Chandrakant V, An P. Prostatic arterial embolization for the treatment of lower urinary tract symptoms in men with benign prostatic hyperplasia. Jerardo Database Syst Rev. 2020 Dec 19;12(12):EU855463. doi: 10.1002/08508566.ZH441236.pub2. Update in: [place] Database Syst Rev. 2022 Mar 29;3:UM987668. PMID: 03373178; PMCID: YPB5491931.    Tanmay Gore  Medical Student, MS3  [facility]  Patient Care Team:  Bigg Trotter MD as PCP - General (Internal Medicine)  Bigg Trotter MD as Assigned PCP  Fabio Irizarry MD as MD (Urology)  Roderick Vergara MD as Assigned Surgical Provider  Bandar Darnell MD as MD (Neurology)  Liam Ac DO as Assigned Heart and Vascular Provider  Bandar Darnell MD as Assigned Neuroscience Provider  Gerardo Stallworth MD as MD (Urology)  Jaleesa Herrera DPM, Podiatry/Foot and Ankle Surgery as Assigned Musculoskeletal Provider  BIGG TROTTER

## 2023-01-24 NOTE — LETTER
1/24/2023         RE: Juan Carlos Marley  [address] [place] [address]        Dear Colleague,    Thank you for referring your patient, Juan Carlos Marley, to the [facility]. Please see a copy of my visit note below.        INTERVENTIONAL RADIOLOGY FOLLOW UP VISIT    Name: Juan Carlos Marley  Age: 82 year old   Referring Physician: Dr. Trotter   REASON FOR VISIT: PAE F/U Visit     Juan Carlos is a 82 year old who is being evaluated via a billable video visit.      How would you like to obtain your AVS? MyChart  If the video visit is dropped, the invitation should be resent by: Text to cell phone: [phone]  Will anyone else be joining your video visit? Brea Hamilton         Video-Visit Details    Type of service:  Video Visit   Video Start Time: 1:00  Video End Time: 1:16    Originating Location (pt. Location): Home    Distant Location (provider location):  Off-site (Home)  Platform used for Video Visit: Tubett        Progress Summary:  Juan Carlos Marley is an 82-year-old male, with PMH of BPH w/ LUTS, s/p TURP, and S/P PAE on 12/1/22, who presents for his 1-month post-PAE follow up visit. Additional pertinent medical history also includes CAD s/p CABG, anemia, history of cerebrovascular disease with prior TIAs currently on ASA and Plavix and metoprolol. He states he has recovered well from his previous PAE this past December. The patient reports his current condition is  better than [he] was before,  but remarks he  hasn t seen as much change that he hoped for.  He states of experiencing minimal symptomatic improvement, with frequent nocturia upwards of 5xnightly. Juan Carlos remarks his current IPSS scores a 16 (individual questions scoring a 1,4,1,4,2,1,3 respectfully), which is down from his original pre-procedure IPSS score of 20. Juan Carlos reports he is continuing to take his Flomax regularly. He asks what symptomatic improvement he can expect. There are no additional medical concerns or  questions at this time.     PAST MEDICAL HISTORY:   Past Medical History:   Diagnosis Date     Cataract, nuclear sclerotic, left eye 2017     Coronary artery disease due to lipid rich plaque      Dyslipidemia, goal LDL below 70 2014     Essential hypertension with goal blood pressure less than 130/85      Hernia, abdominal      History of spinal cord injury      Nonsustained ventricular tachycardia     6 beat run, asymptomatic per Dr. Hackett     Paroxysmal atrial fibrillation (H) 2013    at the time of CABG; [facility] did a 4-week monitor in  and did not find any atrial fibrillation and therefore stopped his Eliquis.     Stricture of esophagus     dilated twice since then     Transient ischemic attack 2018    transient speech difficulty and dizziness while in [place]; did not seek medical attention     Transient ischemic attack 10/2018    same symptoms as in [place]     Transient ischemic attack 2016     Transient ischemic attack 2016    left cerebral hemisphere     Transient ischemic attack 2014       PAST SURGICAL HISTORY:   Past Surgical History:   Procedure Laterality Date     BYPASS GRAFT ARTERY CORONARY  2013    underwent 5-vessel coronary bypass grafting at the [facility]     CYSTOSCOPY       FOOT SURGERY Left 2014    Dr. Carr     FRACTURE SURGERY Right     hand     HERNIA REPAIR  2013     IR PAE  2022     ORCHIECTOMY SCROTAL Right      ORIF FEMUR FRACTURE Right      PROSTATE SURGERY      half removed     PROSTATECTOMY  2013     TESTICLE SURGERY      removal of testicle       FAMILY HISTORY:   Family History   Problem Relation Age of Onset     Other - See Comments Mother          accident age 49     Migraines Mother      Aortic aneurysm Father          84     Other - See Comments Sister         autistic      No Known Problems Daughter      Pulmonary Hypertension No family hx of      Congenital heart disease No family hx of         SOCIAL HISTORY:   Social History     Tobacco Use     Smoking status: Former     Packs/day: 1.00     Years: 10.00     Pack years: 10.00     Types: Cigarettes, Pipe     Quit date: 1970     Years since quittin.9     Smokeless tobacco: Never   Substance Use Topics     Alcohol use: Not Currently     Alcohol/week: 4.0 standard drinks     Types: 4 Glasses of wine per week       PROBLEM LIST:   Patient Active Problem List    Diagnosis Date Noted     Urinary retention 2022     Priority: Medium     JOCELINE (acute kidney injury) (H) 2022     Priority: Medium     Anemia, unspecified type 2022     Priority: Medium     Hematuria, unspecified type 2022     Priority: Medium     Benign prostatic hyperplasia with weak urinary stream 2021     Priority: Medium     Chronic anticoagulation 2021     Priority: Medium     Essential hypertension      Priority: Medium     Dyslipidemia, goal LDL below 70 2014     Priority: Medium     Coronary artery disease due to lipid rich plaque 2014     Priority: Medium     underwent 5-vessel coronary bypass grafting at the [facility].           Major depressive disorder, recurrent episode (H) 2006     Priority: Medium     Overview:   Epic            MEDICATIONS:   Prescription Medications as of 2023       Rx Number Disp Refills Start End Last Dispensed Date Next Fill Date Owning Pharmacy    aspirin (ASA) 81 MG chewable tablet        [facility] [address] [place] [address] [address] [facility]    Sig: Take 81 mg by mouth daily    Class: Historical    Route: Oral    calcium carbonate (OS-ISABEL) 500 MG tablet            Sig: Take 1 tablet by mouth once 600 mg daily    Class: Historical    Route: Oral    clopidogrel (PLAVIX) 75 MG tablet  90 tablet 2 10/21/2022    [place], [place] - [address]    Sig: Take 1 tablet (75 mg) by mouth daily    Class: E-Prescribe    Route: Oral    Ferrous Gluconate 324 (37.5 Fe)  MG TABS            Sig: Take 324 mg by mouth 2 times daily    Class: Historical    Route: Oral    finasteride (PROSCAR) 5 MG tablet  90 tablet 1 12/6/2022    [address]    Sig: Take 1 tablet (5 mg) by mouth daily    Class: E-Prescribe    Route: Oral    LIPITOR 40 MG tablet  90 tablet 3 7/6/2022    [address]    Sig: TAKE 1 TABLET DAILY    Class: E-Prescribe    metoprolol tartrate (LOPRESSOR) 25 MG tablet  180 tablet 2 1/5/2023    [address]    Sig: Take 1 tablet (25 mg) by mouth 2 times daily    Class: E-Prescribe    Route: Oral    Multiple Vitamins-Minerals (MULTIVITAMIN ADULT, MINERALS,) TABS        [facility] [address] [place], [place] [address] [address]    Sig: Take 1 tablet by mouth daily    Class: Historical    Route: Oral    omeprazole (PRILOSEC) 20 MG DR capsule  90 capsule 11 12/6/2022    [address]    Sig: Take 1 capsule (20 mg) by mouth daily    Class: E-Prescribe    Route: Oral    tamsulosin (FLOMAX) 0.4 MG capsule    6/21/2021    [facility] [address] [place], [place] [address], [address]    Sig: Take 1 capsule by mouth 2 times daily    Class: Historical    Route: Oral    vitamin C (ASCORBIC ACID) 500 MG tablet    2/12/2021    [facility] [address] [place], [place] [address] [address]    Sig: Take 500 mg by mouth daily     Class: Historical    Route: Oral    amoxicillin-clavulanate (AUGMENTIN) 875-125 MG tablet  14 tablet 0 12/1/2022        Sig: Take 1 tablet by mouth 2 times daily    Class: Local Print    Route: Oral    COZAAR 25 MG tablet  90 tablet 3 7/6/2022    [address]    Sig: TAKE 1 TABLET DAILY    Class: E-Prescribe    methylPREDNISolone (MEDROL DOSEPAK) 4 MG tablet therapy pack  21 tablet 0 12/1/2022        Sig: Follow Package  Directions: Start taking DosePak if you develop lower urinary tract symptoms. Complete pack if you start taking it.    Class: Local Print    oxybutynin (DITROPAN) 5 MG tablet  15 tablet 0 12/1/2022        Sig: Take 1 tablet (5 mg) by mouth every 8 hours as needed for bladder spasms    Class: Local Print    Route: Oral          ALLERGIES:   Patient has no known allergies.    ROS:  Negative unless otherwise stated in progress summary above.    Physical Examination:   VITALS:   There were no vitals taken for this visit.  There was no physical exam taken for this virtual visit.    Labs:    BMP RESULTS:  Lab Results   Component Value Date     12/01/2022    POTASSIUM 3.8 12/01/2022    POTASSIUM 3.2 (L) 05/27/2022    CHLORIDE 111 (H) 12/01/2022    CHLORIDE 112 (H) 05/27/2022    CO2 23 12/01/2022    CO2 25 05/27/2022    ANIONGAP 10 12/01/2022    ANIONGAP 5 05/27/2022    GLC 83 12/01/2022    GLC 82 12/01/2022     (H) 05/27/2022    BUN 24.3 (H) 12/01/2022    BUN 14 05/27/2022    CR 0.97 12/01/2022    GFRESTIMATED 78 12/01/2022    GFRESTIMATED >60 08/19/2021    GFRESTIMATED >60 02/23/2021    GFRESTBLACK >60 02/23/2021    ISABEL 9.4 12/01/2022        CBC RESULTS:  Lab Results   Component Value Date    WBC 5.7 12/01/2022    RBC 4.05 (L) 12/01/2022    HGB 12.8 (L) 12/01/2022    HCT 37.4 (L) 12/01/2022    MCV 92 12/01/2022    MCH 31.6 12/01/2022    MCHC 34.2 12/01/2022    RDW 13.1 12/01/2022     (L) 12/01/2022     (L) 12/01/2022       INR/PTT:  Lab Results   Component Value Date    INR 1.14 12/01/2022       Diagnostic studies:   No new or pertinent imaging at this time.    Assessment & Plan:    Juan Carlos Marley is an 82-year-old male, with PMH of BPH w/ LUTS, s/p TURP, and S/P PAE, who presents for his 1-month post-PAE follow up visit. The patient reports of noticible, albite mild, response to PAE with his IPSS scoring improving to 16, down from a pre-procedure value of 20. Patient s higher than expected  IPSS is likely inflated given his definition of a weak stream (compared to his younger self) and his description of nocturia being that he  goes to the bathroom if/when he awakes at night even if [he] doesn t have to go.  It is likely that this IPSS score does not truly reflect patient s entire symptomatic improvement. Discussion was had regarding potential symptomatic improvement and was informed of recent medical literature documenting improving and sustained symptomatic relief post-PAE over multiple follow up studies. Additional discussion was had regarding potential symptom recurrence and patient was informed he could call us whenever his QOL changes significantly. Otherwise, plan for another follow up visit in 5 months for his 6-month post-procedure visit. The patient is agreeable with the plan.     A total of 16 minutes was spent on this virtual clinic visit, with more than half was on direct counseling and coordination of the care.    Charly JH, Iliana KA, Kylee M, Greg S, José Miguel J, Nelson B, Nathan TY, Payal CHAPMAN, Chandrakant V, An P. Prostatic arterial embolization for the treatment of lower urinary tract symptoms in men with benign prostatic hyperplasia. Jerardo Database Syst Rev. 2020 Dec 19;12(12):CB704623. doi: 10.1002/28774306.HZ197836.pub2. Update in: [employer]ardo Database Syst Rev. 2022 Mar 29;3:SO271713. PMID: 21462021; PMCID: [id number].    Tanmay Gore  Medical Student, MS3  [facility]  Patient Care Team:  Adonis Trotter MD as PCP - General (Internal Medicine)  Adonis Trotter MD as Assigned PCP  Fabio Irizarry MD as MD (Urology)  Roderick Vergara MD as Assigned Surgical Provider  Bandar Darnell MD as MD (Neurology)  Liam Ac DO as Assigned Heart and Vascular Provider  Bandar Darnell MD as Assigned Neuroscience Provider  Gerardo Stallworth MD as MD (Urology)  Jaleesa Herrera DPM, Podiatry/Foot and Ankle Surgery as  Assigned Musculoskeletal Provider  BIGG TYSON

## 2023-01-31 ENCOUNTER — OFFICE VISIT (OUTPATIENT)
Dept: INTERNAL MEDICINE | Facility: CLINIC | Age: 83
End: 2023-01-31
Payer: COMMERCIAL

## 2023-01-31 VITALS
WEIGHT: 181 LBS | HEIGHT: 69 IN | RESPIRATION RATE: 22 BRPM | HEART RATE: 67 BPM | OXYGEN SATURATION: 99 % | SYSTOLIC BLOOD PRESSURE: 126 MMHG | BODY MASS INDEX: 26.81 KG/M2 | DIASTOLIC BLOOD PRESSURE: 78 MMHG

## 2023-01-31 DIAGNOSIS — R10.84 ABDOMINAL PAIN, GENERALIZED: Primary | ICD-10-CM

## 2023-01-31 LAB
ALBUMIN SERPL BCG-MCNC: 3.8 G/DL (ref 3.5–5.2)
ALBUMIN UR-MCNC: ABNORMAL MG/DL
ALP SERPL-CCNC: 82 U/L (ref 40–129)
ALT SERPL W P-5'-P-CCNC: 22 U/L (ref 10–50)
ANION GAP SERPL CALCULATED.3IONS-SCNC: 9 MMOL/L (ref 7–15)
APPEARANCE UR: CLEAR
AST SERPL W P-5'-P-CCNC: 26 U/L (ref 10–50)
BACTERIA #/AREA URNS HPF: ABNORMAL /HPF
BILIRUB SERPL-MCNC: 0.4 MG/DL
BILIRUB UR QL STRIP: NEGATIVE
BUN SERPL-MCNC: 19.9 MG/DL (ref 8–23)
CALCIUM SERPL-MCNC: 9.2 MG/DL (ref 8.8–10.2)
CHLORIDE SERPL-SCNC: 108 MMOL/L (ref 98–107)
COLOR UR AUTO: YELLOW
CREAT SERPL-MCNC: 0.71 MG/DL (ref 0.67–1.17)
DEPRECATED HCO3 PLAS-SCNC: 24 MMOL/L (ref 22–29)
ERYTHROCYTE [DISTWIDTH] IN BLOOD BY AUTOMATED COUNT: 12.4 % (ref 10–15)
GFR SERPL CREATININE-BSD FRML MDRD: >90 ML/MIN/1.73M2
GLUCOSE SERPL-MCNC: 103 MG/DL (ref 70–99)
GLUCOSE UR STRIP-MCNC: NEGATIVE MG/DL
HCT VFR BLD AUTO: 38.9 % (ref 40–53)
HGB BLD-MCNC: 13.3 G/DL (ref 13.3–17.7)
HGB UR QL STRIP: NEGATIVE
KETONES UR STRIP-MCNC: NEGATIVE MG/DL
LEUKOCYTE ESTERASE UR QL STRIP: NEGATIVE
LIPASE SERPL-CCNC: 21 U/L (ref 13–60)
MCH RBC QN AUTO: 31.2 PG (ref 26.5–33)
MCHC RBC AUTO-ENTMCNC: 34.2 G/DL (ref 31.5–36.5)
MCV RBC AUTO: 91 FL (ref 78–100)
MUCOUS THREADS #/AREA URNS LPF: PRESENT /LPF
NITRATE UR QL: NEGATIVE
PH UR STRIP: 5.5 [PH] (ref 5–8)
PLATELET # BLD AUTO: 157 10E3/UL (ref 150–450)
POTASSIUM SERPL-SCNC: 4.2 MMOL/L (ref 3.4–5.3)
PROT SERPL-MCNC: 5.9 G/DL (ref 6.4–8.3)
RBC # BLD AUTO: 4.26 10E6/UL (ref 4.4–5.9)
RBC #/AREA URNS AUTO: ABNORMAL /HPF
SODIUM SERPL-SCNC: 141 MMOL/L (ref 136–145)
SP GR UR STRIP: 1.02 (ref 1–1.03)
SQUAMOUS #/AREA URNS AUTO: ABNORMAL /LPF
UROBILINOGEN UR STRIP-ACNC: 0.2 E.U./DL
WBC # BLD AUTO: 4.8 10E3/UL (ref 4–11)
WBC #/AREA URNS AUTO: ABNORMAL /HPF

## 2023-01-31 PROCEDURE — 81001 URINALYSIS AUTO W/SCOPE: CPT | Performed by: INTERNAL MEDICINE

## 2023-01-31 PROCEDURE — 36415 COLL VENOUS BLD VENIPUNCTURE: CPT | Performed by: INTERNAL MEDICINE

## 2023-01-31 PROCEDURE — 85027 COMPLETE CBC AUTOMATED: CPT | Performed by: INTERNAL MEDICINE

## 2023-01-31 PROCEDURE — 80053 COMPREHEN METABOLIC PANEL: CPT | Performed by: INTERNAL MEDICINE

## 2023-01-31 PROCEDURE — 83690 ASSAY OF LIPASE: CPT | Performed by: INTERNAL MEDICINE

## 2023-01-31 PROCEDURE — 99214 OFFICE O/P EST MOD 30 MIN: CPT | Performed by: INTERNAL MEDICINE

## 2023-01-31 ASSESSMENT — PATIENT HEALTH QUESTIONNAIRE - PHQ9
SUM OF ALL RESPONSES TO PHQ QUESTIONS 1-9: 1
10. IF YOU CHECKED OFF ANY PROBLEMS, HOW DIFFICULT HAVE THESE PROBLEMS MADE IT FOR YOU TO DO YOUR WORK, TAKE CARE OF THINGS AT HOME, OR GET ALONG WITH OTHER PEOPLE: NOT DIFFICULT AT ALL
SUM OF ALL RESPONSES TO PHQ QUESTIONS 1-9: 1

## 2023-01-31 NOTE — PROGRESS NOTES
"Assessment/Plan:    Abdominal pain suprapubic in location after recent embolization of prostate for enlargement no cancer.  Today check hemogram comprehensive metabolic profile urinalysis serum lipase plus CT abdomen and pelvis.  Suggest general surgery consultation with Dr. Luis Andino at [phone].    Ventral periumbilical hernia is easily reducible right side.  Multiple.  May be contributing to #1 above.    Generalized arterial sclerosis.  On Plavix therapy complicating.    Hypertension controlled stable 126/78 same hypertensive medications to be continued.    25 minutes spent on the date of the encounter doing chart review, patient visit and documentation     Subjective:  Juan Carlos Marley is a 82 year old male presents for the following health issues history as above.  2 months ago had embolization of the prostate done arterial approach no cancer.  Enlarged prostate with prostatism.  On clopidogrel therapy for generalized arterial sclerosis.  Cerebral arterial sclerosis and history of coronary bypass grafting.  Lower abdominal pain for 1 month without dysuria.  No blood in stool or urine questions regarding omeprazole.    ROS:  No blood in stool or urine med list reviewed reconciled denies chest pain or shortness of breath.    Objective:  /78 (BP Location: Right arm, Patient Position: Sitting, Cuff Size: Adult Regular)   Pulse 67   Resp 22   Ht 1.753 m (5' 9\")   Wt 82.1 kg (181 lb)   SpO2 99%   BMI 26.73 kg/m    Chest is clear posteriorly heart tones regular rhythm abdomen was benign no liver spleen tip palpable there are ventral hernias periumbilical location right side perhaps 2 there is small but easily reducible.  The belly was soft nontender no rebound no liver spleen tip palpable there was no abdominal masses noted bowel sounds were present and hypoactive and benign feeling abdomen extremities are free of edema cyanosis or clubbing is neuromuscular tone is good he appears strong not acutely ill " chronically ill or icteric.  Easily conversant good spirited wearing a baseball cap.    Adonis Trotter MD  Internal Medicine    Answers for HPI/ROS submitted by the patient on 1/31/2023  If you checked off any problems, how difficult have these problems made it for you to do your work, take care of things at home, or get along with other people?: Not difficult at all  PHQ9 TOTAL SCORE: 1  What is the reason for your visit today? : pain in lower stomach area, memory item, adminstrative issue  How many servings of fruits and vegetables do you eat daily?: 2-3  On average, how many sweetened beverages do you drink each day (Examples: soda, juice, sweet tea, etc.  Do NOT count diet or artificially sweetened beverages)?: 1  How many minutes a day do you exercise enough to make your heart beat faster?: 9 or less  How many days a week do you exercise enough to make your heart beat faster?: 3 or less  How many days per week do you miss taking your medication?: 0

## 2023-01-31 NOTE — PROGRESS NOTES
"  {PROVIDER CHARTING PREFERENCE:675466}    Subjective   Juan Carlos is a 82 year old presenting for the following health issues:  Abdominal Pain (Has pain in lower abdomen ) and Recheck Medication (Discuss medication and possible side effects )      History of Present Illness       Reason for visit:  Pain in lower stomach area, memory item, adminstrative issue    He eats 2-3 servings of fruits and vegetables daily.He consumes 1 sweetened beverage(s) daily.He exercises with enough effort to increase his heart rate 9 or less minutes per day.  He exercises with enough effort to increase his heart rate 3 or less days per week.   He is taking medications regularly.    Today's PHQ-9         PHQ-9 Total Score: 1    PHQ-9 Q9 Thoughts of better off dead/self-harm past 2 weeks :   Not at all    How difficult have these problems made it for you to do your work, take care of things at home, or get along with other people: Not difficult at all       {SUPERLIST (Optional):111901}  {additonal problems for provider to add (Optional):372183}    Review of Systems   {ROS COMP (Optional):382760}      Objective    /78 (BP Location: Right arm, Patient Position: Sitting, Cuff Size: Adult Regular)   Pulse 67   Resp 22   Ht 1.753 m (5' 9\")   Wt 82.1 kg (181 lb)   SpO2 99%   BMI 26.73 kg/m    Body mass index is 26.73 kg/m .  Physical Exam   {Exam List (Optional):602953}    {Diagnostic Test Results (Optional):686220}    {AMBULATORY ATTESTATION (Optional):484840}            "

## 2023-01-31 NOTE — LETTER
February 1, 2023      Juan Carlos Marley  [address]  [place] [address]        Dear ,    We are writing to inform you of your test results.    Your test results fall within the expected range(s) or remain unchanged from previous results.  Please continue with current treatment plan.    Resulted Orders   Lipase   Result Value Ref Range    Lipase 21 13 - 60 U/L   UA reflex to Microscopic and Culture   Result Value Ref Range    Color Urine Yellow Colorless, Straw, Light Yellow, Yellow    Appearance Urine Clear Clear    Glucose Urine Negative Negative mg/dL    Bilirubin Urine Negative Negative    Ketones Urine Negative Negative mg/dL    Specific Gravity Urine 1.025 1.005 - 1.030    Blood Urine Negative Negative    pH Urine 5.5 5.0 - 8.0    Protein Albumin Urine Trace (A) Negative mg/dL    Urobilinogen Urine 0.2 0.2, 1.0 E.U./dL    Nitrite Urine Negative Negative    Leukocyte Esterase Urine Negative Negative   Comprehensive metabolic panel   Result Value Ref Range    Sodium 141 136 - 145 mmol/L    Potassium 4.2 3.4 - 5.3 mmol/L    Chloride 108 (H) 98 - 107 mmol/L    Carbon Dioxide (CO2) 24 22 - 29 mmol/L    Anion Gap 9 7 - 15 mmol/L    Urea Nitrogen 19.9 8.0 - 23.0 mg/dL    Creatinine 0.71 0.67 - 1.17 mg/dL    Calcium 9.2 8.8 - 10.2 mg/dL    Glucose 103 (H) 70 - 99 mg/dL    Alkaline Phosphatase 82 40 - 129 U/L    AST 26 10 - 50 U/L    ALT 22 10 - 50 U/L    Protein Total 5.9 (L) 6.4 - 8.3 g/dL    Albumin 3.8 3.5 - 5.2 g/dL    Bilirubin Total 0.4 <=1.2 mg/dL    GFR Estimate >90 >60 mL/min/1.73m2      Comment:      eGFR calculated using 2021 CKD-EPI equation.   CBC with platelets   Result Value Ref Range    WBC Count 4.8 4.0 - 11.0 10e3/uL    RBC Count 4.26 (L) 4.40 - 5.90 10e6/uL    Hemoglobin 13.3 13.3 - 17.7 g/dL    Hematocrit 38.9 (L) 40.0 - 53.0 %    MCV 91 78 - 100 fL    MCH 31.2 26.5 - 33.0 pg    MCHC 34.2 31.5 - 36.5 g/dL    RDW 12.4 10.0 - 15.0 %    Platelet Count 157 150 - 450 10e3/uL   Urine  Microscopic   Result Value Ref Range    Bacteria Urine None Seen None Seen /HPF    RBC Urine 0-2 0-2 /HPF /HPF    WBC Urine 0-5 0-5 /HPF /HPF    Squamous Epithelials Urine None Seen None Seen /LPF    Mucus Urine Present (A) None Seen /LPF    Narrative    Urine Culture not indicated       If you have any questions or concerns, please call the clinic at the number listed above.       Sincerely,      Adonis Trotter MD

## 2023-02-01 ENCOUNTER — HOSPITAL ENCOUNTER (OUTPATIENT)
Dept: CT IMAGING | Facility: HOSPITAL | Age: 83
Discharge: HOME | End: 2023-02-01
Attending: INTERNAL MEDICINE | Admitting: INTERNAL MEDICINE
Payer: COMMERCIAL

## 2023-02-01 DIAGNOSIS — R10.84 ABDOMINAL PAIN, GENERALIZED: ICD-10-CM

## 2023-02-01 PROCEDURE — 74177 CT ABD & PELVIS W/CONTRAST: CPT

## 2023-02-01 PROCEDURE — 250N000011 HC RX IP 250 OP 636: Performed by: INTERNAL MEDICINE

## 2023-02-01 RX ORDER — IOPAMIDOL 755 MG/ML
100 INJECTION, SOLUTION INTRAVASCULAR ONCE
Status: COMPLETED | OUTPATIENT
Start: 2023-02-01 | End: 2023-02-01

## 2023-02-01 RX ADMIN — IOPAMIDOL 100 ML: 755 INJECTION, SOLUTION INTRAVENOUS at 09:57

## 2023-02-06 ENCOUNTER — TELEPHONE (OUTPATIENT)
Dept: INTERNAL MEDICINE | Facility: CLINIC | Age: 83
End: 2023-02-06

## 2023-02-06 ENCOUNTER — VIRTUAL VISIT (OUTPATIENT)
Dept: INTERNAL MEDICINE | Facility: CLINIC | Age: 83
End: 2023-02-06
Payer: COMMERCIAL

## 2023-02-06 DIAGNOSIS — K80.10 CALCULUS OF GALLBLADDER WITH CHOLECYSTITIS WITHOUT BILIARY OBSTRUCTION, UNSPECIFIED CHOLECYSTITIS ACUITY: Primary | ICD-10-CM

## 2023-02-06 DIAGNOSIS — R10.84 ABDOMINAL PAIN, GENERALIZED: Primary | ICD-10-CM

## 2023-02-06 PROCEDURE — 99213 OFFICE O/P EST LOW 20 MIN: CPT | Mod: TEL | Performed by: INTERNAL MEDICINE

## 2023-02-06 NOTE — TELEPHONE ENCOUNTER
Reason for Call:  Other Referral to Dr. Luis Andino     Detailed comments: Patient called Dr. Andino's office and was advised referral is needed.     Phone Number Patient can be reached at: Home number on file [phone] (home)    Best Time: Any    Can we leave a detailed message on this number? YES    Call taken on 2/6/2023 at 3:11 PM by Laurie Love

## 2023-02-06 NOTE — PROGRESS NOTES
Juan Carlos Marley is a 82 year oldwho is being evaluated via a billable telephone visit.       What phone number would you like to be contacted at? [phone]      Assessment & Plan  Abdominal pain reviewed recent CT abdomen and pelvis showing diverticulosis without diverticulitis and cholelithiasis without evidence for biliary obstruction.  Patient's symptoms of abdominal pain are in the lower abdomen and left side and not consistent anatomic location with cholelithiasis discussed in detail with the patient will check with general surgery consultation Dr. Luis Andino today or soon at [phone] we had a good discussion.     11 minutes spent on the date of the encounter doing chart review, patient visit and documentation  and I spoke with the patient directly on the phone 5 minutes.       Subjective   Abdominal pain lower abdomen.  Recent CT abdomen and pelvis reviewed in detail with the patient.  Multiple comorbidities including coronary artery disease coronary bypass grafting history of stroke and on blood thinner specifically Plavix.  Patient has multiple questions and concerns rightfully so.     Review of Systems   No blood in stool or urine med list reviewed reconciled denies chest pain shortness of breath.  On the speaker phone with his wife in the background.       Adonis Trotter MD

## 2023-02-09 ENCOUNTER — TELEPHONE (OUTPATIENT)
Dept: INTERNAL MEDICINE | Facility: CLINIC | Age: 83
End: 2023-02-09
Payer: COMMERCIAL

## 2023-02-09 NOTE — TELEPHONE ENCOUNTER
Patient calling to state Dr. Luis Andino team didn't receive a referral.    patient requesting the referral be sent to the fax at :      fax: [phone]

## 2023-02-14 ENCOUNTER — TRANSFERRED RECORDS (OUTPATIENT)
Dept: HEALTH INFORMATION MANAGEMENT | Facility: CLINIC | Age: 83
End: 2023-02-14
Payer: COMMERCIAL

## 2023-02-16 NOTE — PROGRESS NOTES
[facility] Medical Spine Consultation    Date of visit: 2/16/2023    Assessment:  Juan Carlos Marley is a 82 year old male with a past medical history significant for HTN, CAD, JOCELINE, depression who presents with complaints of:    1. Chronic low back pain: axial in nature. Right > left. On exam he has pain with facet loading bilaterally. Suspect that his symptoms are secondary to lumbosacral spondylosis and facet arthropathy.     Plan:  The following recommendations were given to the patient. Diagnosis, treatment options, risks, benefits, and alternatives were discussed, and all questions were answered. The patient expressed understanding of the plan for management.     1. Therapies:  Will plan to start PT once he has recovered from his hernia surgery. He will contact me when he is ready.   2. Diagnostic Studies: Order placed for a lumbar x-ray since he did not have any imaging completed previously. Will my chart with results.   3. Medication Management: no changes made      4. Procedures recommended: none at this time  5. Follow up: he will f/u with me once he has completed PT if he is still having pain.     Reason for consultation:    Primary Care Provider is Adonis Trotter    Juan Carlos Marley is a 82 year old male who is self-referred.     Consultation and Evaluation for: Back pain    Review of Electronic Chart: Today I have also reviewed available medical information in the patient's medical record at [place] ([facility]), including relevant provider notes, laboratory work, and imaging.     Chief Complaint:    Chief Complaint   Patient presents with     Pain     History:  Juan Carlos Marley is an 82 year old male who presents for initial evaluation of chief pain complaint of low back pain. He has been having pain for many years. He did some PT for this in the past. The pain is intermittent. Worse on the right compared to left. It is aggravated with physical activity and overexertion. The pain is sharp in quality  when more severe. He has stiffness in the mornings which tends to improve with movement.      Severity/Intensity: 8/10 at worst, 2-3/10 on average    Red Flags: The patient denies bowel or bladder incontinence, parasthesias, weakness, saddle anesthesia, unintentional weight loss, or fever/chills/sweats.       Medications:       Current pain medications:  None        Past Pain Treatments:  PT: Yes  - has done in the past - H. Does not exercise now.   TENs Unit: no  Injections: no  Self-care:   no  Surgeries related to pain: none  Alternative Therapies:    Chiropractic: distant past   Acupuncture: no  Other: none    Diagnostic tests:  None    EMG/Testing:  NA    Labs:   0.71    Past Medical History:  Past Medical History:   Diagnosis Date     Cataract, nuclear sclerotic, left eye 6/2/2017     Coronary artery disease due to lipid rich plaque      Dyslipidemia, goal LDL below 70 12/20/2014     Essential hypertension with goal blood pressure less than 130/85      Hernia, abdominal      History of spinal cord injury      Nonsustained ventricular tachycardia     6 beat run, asymptomatic per Dr. Hackett     Paroxysmal atrial fibrillation (H) 09/02/2013    at the time of CABG; [facility] did a 4-week monitor in 2017 and did not find any atrial fibrillation and therefore stopped his Eliquis.     Stricture of esophagus 2013    dilated twice since then     Transient ischemic attack 09/2018    transient speech difficulty and dizziness while in [place]; did not seek medical attention     Transient ischemic attack 10/2018    same symptoms as in [place]     Transient ischemic attack 11/20/2016     Transient ischemic attack 01/24/2016    left cerebral hemisphere     Transient ischemic attack 12/20/2014       Past Surgical History:  Past Surgical History:   Procedure Laterality Date     BYPASS GRAFT ARTERY CORONARY  09/2013    underwent 5-vessel coronary bypass grafting at the [facility]     CYSTOSCOPY       FOOT SURGERY Left  12/19/2014    Dr. Carr     FRACTURE SURGERY Right     hand     HERNIA REPAIR  11/2013     IR PAE  12/1/2022     ORCHIECTOMY SCROTAL Right      ORIF FEMUR FRACTURE Right      PROSTATE SURGERY      half removed     PROSTATECTOMY  11/2013     TESTICLE SURGERY      removal of testicle       Medications:  Current Outpatient Medications   Medication Sig Dispense Refill     amoxicillin-clavulanate (AUGMENTIN) 875-125 MG tablet Take 1 tablet by mouth 2 times daily 14 tablet 0     aspirin (ASA) 81 MG chewable tablet Take 81 mg by mouth daily       calcium carbonate (OS-ISABEL) 500 MG tablet Take 1 tablet by mouth once 600 mg daily       clopidogrel (PLAVIX) 75 MG tablet Take 1 tablet (75 mg) by mouth daily 90 tablet 2     COZAAR 25 MG tablet TAKE 1 TABLET DAILY (Patient not taking: Reported on 1/24/2023) 90 tablet 3     Ferrous Gluconate 324 (37.5 Fe) MG TABS Take 324 mg by mouth 2 times daily       finasteride (PROSCAR) 5 MG tablet Take 1 tablet (5 mg) by mouth daily 90 tablet 1     LIPITOR 40 MG tablet TAKE 1 TABLET DAILY 90 tablet 3     methylPREDNISolone (MEDROL DOSEPAK) 4 MG tablet therapy pack Follow Package Directions: Start taking DosePak if you develop lower urinary tract symptoms. Complete pack if you start taking it. 21 tablet 0     metoprolol tartrate (LOPRESSOR) 25 MG tablet Take 1 tablet (25 mg) by mouth 2 times daily 180 tablet 2     Multiple Vitamins-Minerals (MULTIVITAMIN ADULT, MINERALS,) TABS Take 1 tablet by mouth daily       omeprazole (PRILOSEC) 20 MG DR capsule Take 1 capsule (20 mg) by mouth daily 90 capsule 11     oxybutynin (DITROPAN) 5 MG tablet Take 1 tablet (5 mg) by mouth every 8 hours as needed for bladder spasms (Patient not taking: Reported on 1/24/2023) 15 tablet 0     tamsulosin (FLOMAX) 0.4 MG capsule Take 1 capsule by mouth 2 times daily       vitamin C (ASCORBIC ACID) 500 MG tablet Take 500 mg by mouth daily          Allergies:   No Known Allergies    Family history:  Family History    Problem Relation Age of Onset     Other - See Comments Mother          accident age 49     Migraines Mother      Aortic aneurysm Father          84     Other - See Comments Sister         autistic      No Known Problems Daughter      Pulmonary Hypertension No family hx of      Congenital heart disease No family hx of        Social History:  Home situation: Lives in [place], [place]  Occupation/Schooling: retired  Tobacco use: former use  Drug use: none  Alcohol use: ocassional     Physical Exam:  Vitals:    23 1352   BP: 97/58   Pulse: 62   SpO2: 96%   Weight: 82.6 kg (182 lb 1.6 oz)     Exam:  Constitutional: Well developed, well nourished, appears stated age.  HEENT: Head atraumatic, normocephalic. Eyes without conjunctival injection or jaundice. Neck supple. No obvious neck masses.  Respiratory: breathing unlabored on room air  Skin: No rash, lesions of exposed skin.   Extremities: No clubbing, cyanosis, or edema.  Psychiatric/mental status: Alert, without lethargy or stupor. Speech fluent. Appropriate affect. Mood normal. Able to follow commands without difficulty.     Musculoskeletal exam:  Gait/Station/Posture:   Normal stance, arm swing, and stride; no antalgia  Normal bulk and tone. Unremarkable spinal curvature. ASIS heights even.     Lumbar spine:  Range of motion within normal limits    Rotation/ext to right: painful    Rotation/ext to left: painful   Myofascial tenderness:  No significant paraspinal tenderness    Neurologic exam:  CN:  Cranial nerves 2-12 are grossly intact  Motor Strength:  5/5 symmetric LE strength    Reflexes:     Patella L4:  R:  2/4 L: 2/4   Achilles S1:  R:  1/4 L: 1/4    Sensory:  (lower extremities):   Light touch: normal    Allodynia: absent    Hyperalgesia: absent     Bella Melgar MD  [facility]       BILLING TIME DOCUMENTATION:   The total TIME spent on this patient on the date of the encounter/appointment was 23 minutes.      TOTAL TIME  includes:   Time spent preparing to see the patient (reviewing records and tests)  Time spent face to face (or over the phone) with the patient   Time spent ordering tests, medications, procedures and referrals   Time spent documenting clinical information in Epic

## 2023-02-17 ENCOUNTER — ANCILLARY PROCEDURE (OUTPATIENT)
Dept: GENERAL RADIOLOGY | Facility: CLINIC | Age: 83
End: 2023-02-17
Attending: PHYSICAL MEDICINE & REHABILITATION
Payer: COMMERCIAL

## 2023-02-17 ENCOUNTER — OFFICE VISIT (OUTPATIENT)
Dept: PALLIATIVE MEDICINE | Facility: CLINIC | Age: 83
End: 2023-02-17
Payer: COMMERCIAL

## 2023-02-17 VITALS
DIASTOLIC BLOOD PRESSURE: 58 MMHG | HEART RATE: 62 BPM | WEIGHT: 182.1 LBS | OXYGEN SATURATION: 96 % | BODY MASS INDEX: 26.89 KG/M2 | SYSTOLIC BLOOD PRESSURE: 97 MMHG

## 2023-02-17 DIAGNOSIS — M47.817 LUMBOSACRAL SPONDYLOSIS WITHOUT MYELOPATHY: Primary | ICD-10-CM

## 2023-02-17 DIAGNOSIS — M47.817 LUMBOSACRAL SPONDYLOSIS WITHOUT MYELOPATHY: ICD-10-CM

## 2023-02-17 PROCEDURE — 99203 OFFICE O/P NEW LOW 30 MIN: CPT | Performed by: PHYSICAL MEDICINE & REHABILITATION

## 2023-02-17 PROCEDURE — 72100 X-RAY EXAM L-S SPINE 2/3 VWS: CPT | Mod: TC | Performed by: RADIOLOGY

## 2023-02-17 RX ORDER — FAMOTIDINE 10 MG
10 TABLET ORAL
COMMUNITY
End: 2023-03-31

## 2023-02-17 ASSESSMENT — PAIN SCALES - GENERAL: PAINLEVEL: MILD PAIN (3)

## 2023-02-27 NOTE — OR NURSING
Patient called the office with questions regarding his procedure this Wednesday. He stated his neurologist has instructed him to continue his blood thinners (baby ASA and Plavix) up until the day of surgery.

## 2023-02-28 ENCOUNTER — ANESTHESIA EVENT (OUTPATIENT)
Dept: SURGERY | Facility: HOSPITAL | Age: 83
End: 2023-02-28
Payer: COMMERCIAL

## 2023-02-28 ENCOUNTER — TRANSFERRED RECORDS (OUTPATIENT)
Dept: HEALTH INFORMATION MANAGEMENT | Facility: CLINIC | Age: 83
End: 2023-02-28
Payer: COMMERCIAL

## 2023-03-01 ENCOUNTER — ANESTHESIA (OUTPATIENT)
Dept: SURGERY | Facility: HOSPITAL | Age: 83
End: 2023-03-01
Payer: COMMERCIAL

## 2023-03-01 ENCOUNTER — HOSPITAL ENCOUNTER (OUTPATIENT)
Facility: HOSPITAL | Age: 83
Discharge: HOME | End: 2023-03-01
Attending: SURGERY | Admitting: SURGERY
Payer: COMMERCIAL

## 2023-03-01 VITALS
BODY MASS INDEX: 27.01 KG/M2 | DIASTOLIC BLOOD PRESSURE: 71 MMHG | OXYGEN SATURATION: 95 % | TEMPERATURE: 97.7 F | HEART RATE: 57 BPM | SYSTOLIC BLOOD PRESSURE: 156 MMHG | WEIGHT: 182.9 LBS | RESPIRATION RATE: 16 BRPM

## 2023-03-01 DIAGNOSIS — K43.9 HERNIA OF ABDOMINAL WALL: Primary | ICD-10-CM

## 2023-03-01 PROCEDURE — 250N000025 HC SEVOFLURANE, PER MIN: Performed by: SURGERY

## 2023-03-01 PROCEDURE — 999N000141 HC STATISTIC PRE-PROCEDURE NURSING ASSESSMENT: Performed by: SURGERY

## 2023-03-01 PROCEDURE — 710N000012 HC RECOVERY PHASE 2, PER MINUTE: Performed by: SURGERY

## 2023-03-01 PROCEDURE — 258N000003 HC RX IP 258 OP 636: Performed by: ANESTHESIOLOGY

## 2023-03-01 PROCEDURE — 272N000001 HC OR GENERAL SUPPLY STERILE: Performed by: SURGERY

## 2023-03-01 PROCEDURE — 250N000013 HC RX MED GY IP 250 OP 250 PS 637: Performed by: ANESTHESIOLOGY

## 2023-03-01 PROCEDURE — 360N000077 HC SURGERY LEVEL 4, PER MIN: Performed by: SURGERY

## 2023-03-01 PROCEDURE — 710N000009 HC RECOVERY PHASE 1, LEVEL 1, PER MIN: Performed by: SURGERY

## 2023-03-01 PROCEDURE — 250N000009 HC RX 250: Performed by: SURGERY

## 2023-03-01 PROCEDURE — 250N000011 HC RX IP 250 OP 636: Performed by: SURGERY

## 2023-03-01 PROCEDURE — 250N000011 HC RX IP 250 OP 636: Performed by: NURSE ANESTHETIST, CERTIFIED REGISTERED

## 2023-03-01 PROCEDURE — 250N000011 HC RX IP 250 OP 636: Performed by: ANESTHESIOLOGY

## 2023-03-01 PROCEDURE — 250N000009 HC RX 250: Performed by: NURSE ANESTHETIST, CERTIFIED REGISTERED

## 2023-03-01 PROCEDURE — C1781 MESH (IMPLANTABLE): HCPCS | Performed by: SURGERY

## 2023-03-01 PROCEDURE — 370N000017 HC ANESTHESIA TECHNICAL FEE, PER MIN: Performed by: SURGERY

## 2023-03-01 DEVICE — BARD MESH, 2" X 4" (5 CM X 10 CM)
Type: IMPLANTABLE DEVICE | Site: INGUINAL | Status: FUNCTIONAL
Brand: BARD

## 2023-03-01 DEVICE — COMPOSITE MESH MONOFILAMENT POLYPROPYLENE MESH WITH ABSORBABLE SYNTHETIC FILM AND MARKING
Type: IMPLANTABLE DEVICE | Site: ABDOMEN | Status: FUNCTIONAL
Brand: PARIETENE DS

## 2023-03-01 RX ORDER — HYDROCODONE BITARTRATE AND ACETAMINOPHEN 5; 325 MG/1; MG/1
1-2 TABLET ORAL EVERY 4 HOURS PRN
Qty: 20 TABLET | Refills: 0 | Status: SHIPPED | OUTPATIENT
Start: 2023-03-01 | End: 2023-07-10

## 2023-03-01 RX ORDER — NALOXONE HYDROCHLORIDE 1 MG/ML
0.4 INJECTION INTRAMUSCULAR; INTRAVENOUS; SUBCUTANEOUS
Status: DISCONTINUED | OUTPATIENT
Start: 2023-03-01 | End: 2023-03-01 | Stop reason: HOSPADM

## 2023-03-01 RX ORDER — NALOXONE HYDROCHLORIDE 1 MG/ML
0.2 INJECTION INTRAMUSCULAR; INTRAVENOUS; SUBCUTANEOUS
Status: DISCONTINUED | OUTPATIENT
Start: 2023-03-01 | End: 2023-03-01 | Stop reason: HOSPADM

## 2023-03-01 RX ORDER — OXYCODONE HYDROCHLORIDE 5 MG/1
5 TABLET ORAL
Status: COMPLETED | OUTPATIENT
Start: 2023-03-01 | End: 2023-03-01

## 2023-03-01 RX ORDER — ONDANSETRON 2 MG/ML
INJECTION INTRAMUSCULAR; INTRAVENOUS PRN
Status: DISCONTINUED | OUTPATIENT
Start: 2023-03-01 | End: 2023-03-01

## 2023-03-01 RX ORDER — EPHEDRINE SULFATE 50 MG/ML
INJECTION, SOLUTION INTRAMUSCULAR; INTRAVENOUS; SUBCUTANEOUS PRN
Status: DISCONTINUED | OUTPATIENT
Start: 2023-03-01 | End: 2023-03-01

## 2023-03-01 RX ORDER — LIDOCAINE HYDROCHLORIDE 10 MG/ML
INJECTION, SOLUTION INFILTRATION; PERINEURAL PRN
Status: DISCONTINUED | OUTPATIENT
Start: 2023-03-01 | End: 2023-03-01

## 2023-03-01 RX ORDER — OXYCODONE HYDROCHLORIDE 5 MG/1
10 TABLET ORAL
Status: DISCONTINUED | OUTPATIENT
Start: 2023-03-01 | End: 2023-03-01 | Stop reason: HOSPADM

## 2023-03-01 RX ORDER — CEFAZOLIN SODIUM/WATER 2 G/20 ML
2 SYRINGE (ML) INTRAVENOUS SEE ADMIN INSTRUCTIONS
Status: DISCONTINUED | OUTPATIENT
Start: 2023-03-01 | End: 2023-03-01 | Stop reason: HOSPADM

## 2023-03-01 RX ORDER — BUPIVACAINE HYDROCHLORIDE AND EPINEPHRINE 2.5; 5 MG/ML; UG/ML
INJECTION, SOLUTION INFILTRATION; PERINEURAL PRN
Status: DISCONTINUED | OUTPATIENT
Start: 2023-03-01 | End: 2023-03-01 | Stop reason: HOSPADM

## 2023-03-01 RX ORDER — HALOPERIDOL 5 MG/ML
1 INJECTION INTRAMUSCULAR
Status: DISCONTINUED | OUTPATIENT
Start: 2023-03-01 | End: 2023-03-01 | Stop reason: HOSPADM

## 2023-03-01 RX ORDER — DEXAMETHASONE SODIUM PHOSPHATE 10 MG/ML
INJECTION, SOLUTION INTRAMUSCULAR; INTRAVENOUS PRN
Status: DISCONTINUED | OUTPATIENT
Start: 2023-03-01 | End: 2023-03-01

## 2023-03-01 RX ORDER — FENTANYL CITRATE 50 UG/ML
25 INJECTION, SOLUTION INTRAMUSCULAR; INTRAVENOUS EVERY 5 MIN PRN
Status: DISCONTINUED | OUTPATIENT
Start: 2023-03-01 | End: 2023-03-01 | Stop reason: HOSPADM

## 2023-03-01 RX ORDER — PROPOFOL 10 MG/ML
INJECTION, EMULSION INTRAVENOUS PRN
Status: DISCONTINUED | OUTPATIENT
Start: 2023-03-01 | End: 2023-03-01

## 2023-03-01 RX ORDER — CEFAZOLIN SODIUM/WATER 2 G/20 ML
2 SYRINGE (ML) INTRAVENOUS
Status: COMPLETED | OUTPATIENT
Start: 2023-03-01 | End: 2023-03-01

## 2023-03-01 RX ORDER — ONDANSETRON 4 MG/1
4 TABLET, ORALLY DISINTEGRATING ORAL EVERY 30 MIN PRN
Status: DISCONTINUED | OUTPATIENT
Start: 2023-03-01 | End: 2023-03-01 | Stop reason: HOSPADM

## 2023-03-01 RX ORDER — SODIUM CHLORIDE, SODIUM LACTATE, POTASSIUM CHLORIDE, CALCIUM CHLORIDE 600; 310; 30; 20 MG/100ML; MG/100ML; MG/100ML; MG/100ML
INJECTION, SOLUTION INTRAVENOUS CONTINUOUS
Status: DISCONTINUED | OUTPATIENT
Start: 2023-03-01 | End: 2023-03-01 | Stop reason: HOSPADM

## 2023-03-01 RX ORDER — FENTANYL CITRATE 50 UG/ML
50 INJECTION, SOLUTION INTRAMUSCULAR; INTRAVENOUS EVERY 5 MIN PRN
Status: DISCONTINUED | OUTPATIENT
Start: 2023-03-01 | End: 2023-03-01 | Stop reason: HOSPADM

## 2023-03-01 RX ORDER — PROPOFOL 10 MG/ML
INJECTION, EMULSION INTRAVENOUS CONTINUOUS PRN
Status: DISCONTINUED | OUTPATIENT
Start: 2023-03-01 | End: 2023-03-01

## 2023-03-01 RX ORDER — FENTANYL CITRATE 50 UG/ML
INJECTION, SOLUTION INTRAMUSCULAR; INTRAVENOUS PRN
Status: DISCONTINUED | OUTPATIENT
Start: 2023-03-01 | End: 2023-03-01

## 2023-03-01 RX ORDER — FENTANYL CITRATE 50 UG/ML
25 INJECTION, SOLUTION INTRAMUSCULAR; INTRAVENOUS
Status: DISCONTINUED | OUTPATIENT
Start: 2023-03-01 | End: 2023-03-01 | Stop reason: HOSPADM

## 2023-03-01 RX ORDER — GLYCOPYRROLATE 0.2 MG/ML
INJECTION, SOLUTION INTRAMUSCULAR; INTRAVENOUS PRN
Status: DISCONTINUED | OUTPATIENT
Start: 2023-03-01 | End: 2023-03-01

## 2023-03-01 RX ORDER — KETAMINE HYDROCHLORIDE 10 MG/ML
INJECTION INTRAMUSCULAR; INTRAVENOUS PRN
Status: DISCONTINUED | OUTPATIENT
Start: 2023-03-01 | End: 2023-03-01

## 2023-03-01 RX ORDER — LABETALOL HYDROCHLORIDE 5 MG/ML
5 INJECTION, SOLUTION INTRAVENOUS EVERY 10 MIN PRN
Status: DISCONTINUED | OUTPATIENT
Start: 2023-03-01 | End: 2023-03-01 | Stop reason: HOSPADM

## 2023-03-01 RX ORDER — ONDANSETRON 2 MG/ML
4 INJECTION INTRAMUSCULAR; INTRAVENOUS EVERY 30 MIN PRN
Status: DISCONTINUED | OUTPATIENT
Start: 2023-03-01 | End: 2023-03-01 | Stop reason: HOSPADM

## 2023-03-01 RX ORDER — LIDOCAINE 40 MG/G
CREAM TOPICAL
Status: DISCONTINUED | OUTPATIENT
Start: 2023-03-01 | End: 2023-03-01 | Stop reason: HOSPADM

## 2023-03-01 RX ADMIN — OXYCODONE HYDROCHLORIDE 5 MG: 5 TABLET ORAL at 12:23

## 2023-03-01 RX ADMIN — HYDROMORPHONE HYDROCHLORIDE 0.5 MG: 1 INJECTION, SOLUTION INTRAMUSCULAR; INTRAVENOUS; SUBCUTANEOUS at 07:47

## 2023-03-01 RX ADMIN — SODIUM CHLORIDE, POTASSIUM CHLORIDE, SODIUM LACTATE AND CALCIUM CHLORIDE: 600; 310; 30; 20 INJECTION, SOLUTION INTRAVENOUS at 08:24

## 2023-03-01 RX ADMIN — SODIUM CHLORIDE, POTASSIUM CHLORIDE, SODIUM LACTATE AND CALCIUM CHLORIDE: 600; 310; 30; 20 INJECTION, SOLUTION INTRAVENOUS at 06:51

## 2023-03-01 RX ADMIN — Medication 10 MG: at 08:06

## 2023-03-01 RX ADMIN — FENTANYL CITRATE 25 MCG: 50 INJECTION, SOLUTION INTRAMUSCULAR; INTRAVENOUS at 10:03

## 2023-03-01 RX ADMIN — HYDROMORPHONE HYDROCHLORIDE 0.5 MG: 1 INJECTION, SOLUTION INTRAMUSCULAR; INTRAVENOUS; SUBCUTANEOUS at 08:09

## 2023-03-01 RX ADMIN — GLYCOPYRROLATE 0.3 MG: 0.2 INJECTION, SOLUTION INTRAMUSCULAR; INTRAVENOUS at 07:59

## 2023-03-01 RX ADMIN — SUGAMMADEX 400 MG: 100 INJECTION, SOLUTION INTRAVENOUS at 08:47

## 2023-03-01 RX ADMIN — LIDOCAINE HYDROCHLORIDE 30 MG: 10 INJECTION, SOLUTION INFILTRATION; PERINEURAL at 07:47

## 2023-03-01 RX ADMIN — KETAMINE HYDROCHLORIDE 30 MG: 10 INJECTION, SOLUTION INTRAMUSCULAR; INTRAVENOUS at 07:47

## 2023-03-01 RX ADMIN — ROCURONIUM BROMIDE 10 MG: 50 INJECTION, SOLUTION INTRAVENOUS at 08:14

## 2023-03-01 RX ADMIN — Medication 5 MG: at 08:04

## 2023-03-01 RX ADMIN — PROPOFOL 110 MG: 10 INJECTION, EMULSION INTRAVENOUS at 07:47

## 2023-03-01 RX ADMIN — Medication 10 MG: at 09:03

## 2023-03-01 RX ADMIN — ROCURONIUM BROMIDE 40 MG: 50 INJECTION, SOLUTION INTRAVENOUS at 07:47

## 2023-03-01 RX ADMIN — FENTANYL CITRATE 50 MCG: 50 INJECTION, SOLUTION INTRAMUSCULAR; INTRAVENOUS at 07:36

## 2023-03-01 RX ADMIN — DEXAMETHASONE SODIUM PHOSPHATE 10 MG: 10 INJECTION, SOLUTION INTRAMUSCULAR; INTRAVENOUS at 07:47

## 2023-03-01 RX ADMIN — FENTANYL CITRATE 25 MCG: 50 INJECTION, SOLUTION INTRAMUSCULAR; INTRAVENOUS at 09:11

## 2023-03-01 RX ADMIN — FENTANYL CITRATE 25 MCG: 50 INJECTION, SOLUTION INTRAMUSCULAR; INTRAVENOUS at 09:12

## 2023-03-01 RX ADMIN — ONDANSETRON 4 MG: 2 INJECTION INTRAMUSCULAR; INTRAVENOUS at 08:42

## 2023-03-01 RX ADMIN — Medication 2 G: at 07:27

## 2023-03-01 RX ADMIN — PROPOFOL 30 MCG/KG/MIN: 10 INJECTION, EMULSION INTRAVENOUS at 07:55

## 2023-03-01 ASSESSMENT — ACTIVITIES OF DAILY LIVING (ADL)
ADLS_ACUITY_SCORE: 22

## 2023-03-01 ASSESSMENT — ENCOUNTER SYMPTOMS: DYSRHYTHMIAS: 1

## 2023-03-01 ASSESSMENT — LIFESTYLE VARIABLES: TOBACCO_USE: 1

## 2023-03-01 NOTE — ANESTHESIA CARE TRANSFER NOTE
Patient: Juan Carlos Marley    Procedure: Procedure(s):  LAPAROSCOPIC INCARCERATED VENTRAL HERNIA REPAIR  AND OPEN LEFT INGUINAL HERNIA REPAIR       Diagnosis: Abdominal hernia with obstruction [K46.0]  Diagnosis Additional Information: No value filed.    Anesthesia Type:   General     Note:    Oropharynx: oropharynx clear of all foreign objects and spontaneously breathing  Level of Consciousness: drowsy  Oxygen Supplementation: face mask  Level of Supplemental Oxygen (L/min / FiO2): 6  Independent Airway: airway patency satisfactory and stable  Dentition: dentition unchanged  Vital Signs Stable: post-procedure vital signs reviewed and stable  Report to RN Given: handoff report given  Patient transferred to: PACU    Handoff Report: Identifed the Patient, Identified the Reponsible Provider, Reviewed the pertinent medical history, Discussed the surgical course, Reviewed Intra-OP anesthesia mangement and issues during anesthesia, Set expectations for post-procedure period and Allowed opportunity for questions and acknowledgement of understanding      Vitals:  Vitals Value Taken Time   /61 03/01/23 0937   Temp 36.1  C (96.9  F) 03/01/23 0936   Pulse 59 03/01/23 0939   Resp 15 03/01/23 0939   SpO2 100 % 03/01/23 0939   Vitals shown include unvalidated device data.    Electronically Signed By: ANGELINA Chino CRNA  March 1, 2023  9:40 AM

## 2023-03-01 NOTE — ANESTHESIA POSTPROCEDURE EVALUATION
Patient: Juan Carlos Marley    Procedure: Procedure(s):  LAPAROSCOPIC INCARCERATED VENTRAL HERNIA REPAIR  AND OPEN LEFT INGUINAL HERNIA REPAIR       Anesthesia Type:  General    Note:     Postop Pain Control: Uneventful            Sign Out: Well controlled pain   PONV: No   Neuro/Psych: Uneventful            Sign Out: Acceptable/Baseline neuro status   Airway/Respiratory: Uneventful            Sign Out: Acceptable/Baseline resp. status   CV/Hemodynamics: Uneventful            Sign Out: Acceptable CV status; No obvious hypovolemia; No obvious fluid overload   Other NRE: NONE   DID A NON-ROUTINE EVENT OCCUR? No           Last vitals:  Vitals Value Taken Time   /65 03/01/23 1016   Temp 36.1  C (96.9  F) 03/01/23 0936   Pulse 58 03/01/23 1018   Resp 20 03/01/23 1018   SpO2 92 % 03/01/23 1018   Vitals shown include unvalidated device data.    Electronically Signed By: Naima Banerjee MD  March 1, 2023  10:29 AM

## 2023-03-01 NOTE — INTERVAL H&P NOTE
"I have reviewed the surgical (or preoperative) H&P that is linked to this encounter, and examined the patient. There are no significant changes    Clinical Conditions Present on Arrival:  Clinically Significant Risk Factors Present on Admission                   # Drug Induced Platelet Defect: home medication list includes an antiplatelet medication  # Overweight: Estimated body mass index is 27.01 kg/m  as calculated from the following:    Height as of 1/31/23: 1.753 m (5' 9\").    Weight as of this encounter: 83 kg (182 lb 14.4 oz).       "

## 2023-03-01 NOTE — ANESTHESIA PROCEDURE NOTES
Airway       Patient location during procedure: OR       Procedure Start/Stop Times: 3/1/2023 7:51 AM  Staff -        Anesthesiologist:  Naima Banerjee MD       CRNA: Niko Morales APRN CRNA       Performed By: CRNA  Consent for Airway        Urgency: elective  Indications and Patient Condition       Indications for airway management: anurag-procedural       Induction type:intravenous       Mask difficulty assessment: 1 - vent by mask    Final Airway Details       Final airway type: endotracheal airway       Successful airway: ETT - single  Endotracheal Airway Details        ETT size (mm): 7.5       Cuffed: yes       Successful intubation technique: direct laryngoscopy       DL Blade Type: Nogueira 2       Grade View of Cords: 2       Adjucts: stylet       Position: Right       Measured from: lips       Secured at (cm): 22       Bite block used: None    Post intubation assessment        Placement verified by: capnometry, equal breath sounds and chest rise        Number of attempts at approach: 1       Number of other approaches attempted: 0       Secured with: silk tape       Ease of procedure: easy       Dentition: Intact and Unchanged       Dental guard used and removed. Dental Guard Type: Proguard Red.    Medication(s) Administered   Medication Administration Time: 3/1/2023 7:51 AM

## 2023-03-01 NOTE — OP NOTE
OPERATIVE REPORT    Juan Carlos Marley   [facility]  Medical Record #:  [id number]  YOB: 1940  Age:  82 year old    PROCEDURE DATE:  3/1/2023    PREOPERATIVE DIAGNOSIS: 1.  4 cm incarcerated ventral hernia 2.  Left inguinal hernia    POSTOPERATIVE DIAGNOSIS: 1.  Incarcerated 4 cm incarcerated ventral hernia 2.  Left indirect inguinal hernia    PROCEDURE: 1.  Laparoscopic repair of incarcerated ventral hernia 10 x 15 cm portion of Parietene mesh 2.  Left indirect inguinal hernia repair with Marlex mesh sac ligation    OPERATING SURGEON:  Luis Mitchell MD        ANESTHESIA: General    DESCRIPTION OF PROCEDURE: With the patient in the supine position under general anesthesia having reviewed the risk benefits and complications of surgical intervention with him the abdomen is prepped draped usual sterile fashion.  Pneumoperitoneum was instilled at the umbilical location and two 5 mm trocars are placed in the right mid abdominal wall.  Emanation revealed incarcerated contents in the ventral hernia.  The contents are reduced and the ligature device is used to remove soft tissue from the abdominal wall.  Appropriate measurements are made and a 10 x 15 cm mesh is placed and secured with the laparoscopic stapling device.  All air and fluid is retrieved from the abdomen trocar sites inspected for the absence of bleeding and procedure from the standpoint is terminated with removal of the air and fluid from the abdomen.  Left lower quadrant transverse incision is then made external bleak aponeurosis opened direction fibers cord structures and nerve identified at pubic tubercle and preserved throughout the major the procedure.  Dissection revealed indirect hernia the sac is open contents reduced sac suture-ligated with 3-0 Vicryl and preperitoneal space and a large portion Marlex mesh is placed and secured circumferentially with running interrupted 0 Prolene suture.  Wound is  irrigated and closed with running 3-0 Vicryl in the external bleak layer after returning cord structures and nerve to normal anatomical position.  Skin is closed with running 3-0 Vicryl subcuticular suture and Dermabond.  Estimated blood loss is minimal there were no complications and the patient tolerated the procedure well.  Sponge and needle counts are correct x2.    EBL:  [unfilled]    SPECIMENS:  * No specimens in log *    Luis frankel md  [facility]

## 2023-03-01 NOTE — ANESTHESIA PREPROCEDURE EVALUATION
Anesthesia Pre-Procedure Evaluation    Patient: Juan Carlos Marley   MRN: [id number] : 1940        Procedure : Procedure(s):  LAPAROSCOPIC INCARCERATED VENTRAL HERNIA REPAIR  AND OPEN LEFT INGUINAL HERNIA REPAIR          Past Medical History:   Diagnosis Date     Cataract, nuclear sclerotic, left eye 2017     Cerebral artery occlusion with cerebral infarction (H)      Coronary artery disease due to lipid rich plaque      Dyslipidemia, goal LDL below 70 2014     Essential hypertension with goal blood pressure less than 130/85      Gastroesophageal reflux disease      Hernia, abdominal      History of spinal cord injury      [place] (hard of hearing)      Nonsustained ventricular tachycardia     6 beat run, asymptomatic per Dr. Hackett     Paroxysmal atrial fibrillation (H) 2013    at the time of CABG; [facility] did a 4-week monitor in  and did not find any atrial fibrillation and therefore stopped his Eliquis.     Stricture of esophagus     dilated twice since then     Transient ischemic attack 2018    transient speech difficulty and dizziness while in [place]; did not seek medical attention     Transient ischemic attack 10/2018    same symptoms as in [place]     Transient ischemic attack 2016     Transient ischemic attack 2016    left cerebral hemisphere     Transient ischemic attack 2014      Past Surgical History:   Procedure Laterality Date     BYPASS GRAFT ARTERY CORONARY  2013    underwent 5-vessel coronary bypass grafting at the [facility]     CYSTOSCOPY       FOOT SURGERY Left 2014    Dr. Carr     FRACTURE SURGERY Right     hand     HERNIA REPAIR  2013     IR PAE  2022     ORCHIECTOMY SCROTAL Right      ORIF FEMUR FRACTURE Right      PROSTATE SURGERY      half removed     PROSTATECTOMY  2013     TESTICLE SURGERY      removal of testicle      No Known Allergies   Social History     Tobacco Use     Smoking status: Former     Packs/day: 1.00      Years: 10.00     Pack years: 10.00     Types: Cigarettes, Pipe     Quit date: 1970     Years since quittin.0     Smokeless tobacco: Never   Substance Use Topics     Alcohol use: Not Currently     Alcohol/week: 4.0 standard drinks     Types: 4 Glasses of wine per week      Wt Readings from Last 1 Encounters:   23 83 kg (182 lb 14.4 oz)        Anesthesia Evaluation   Pt has had prior anesthetic.     No history of anesthetic complications       ROS/MED HX  ENT/Pulmonary:     (+) tobacco use, Past use,     Neurologic:     (+) CVA, without deficits, TIA,     Cardiovascular:     (+) Dyslipidemia hypertension--CAD -CABG--dysrhythmias (resolved), a-fib,     METS/Exercise Tolerance: >4 METS    Hematologic:  - neg hematologic  ROS     Musculoskeletal:   (+) arthritis,     GI/Hepatic:     (+) GERD, Asymptomatic on medication,     Renal/Genitourinary:     (+) renal disease, type: CRI,     Endo:  - neg endo ROS     Psychiatric/Substance Use:       Infectious Disease:  - neg infectious disease ROS     Malignancy:       Other:            Physical Exam    Airway        Mallampati: III   TM distance: > 3 FB   Neck ROM: full     Respiratory Devices and Support         Dental       (+) Modest Abnormalities - crowns, retainers, 1 or 2 missing teeth      Cardiovascular          Rhythm and rate: regular     Pulmonary                   OUTSIDE LABS:  CBC:   Lab Results   Component Value Date    WBC 4.8 2023    WBC 5.7 2022    HGB 13.3 2023    HGB 12.8 (L) 2022    HCT 38.9 (L) 2023    HCT 37.4 (L) 2022     2023     (L) 2022     (L) 2022     BMP:   Lab Results   Component Value Date     2023     2022    POTASSIUM 4.2 2023    POTASSIUM 3.8 2022    CHLORIDE 108 (H) 2023    CHLORIDE 111 (H) 2022    CO2 24 2023    CO2 23 2022    BUN 19.9 2023    BUN 24.3 (H) 2022    CR 0.71  01/31/2023    CR 0.97 12/01/2022     (H) 01/31/2023    GLC 83 12/01/2022     COAGS:   Lab Results   Component Value Date    INR 1.14 12/01/2022     POC: No results found for: BGM, HCG, HCGS  HEPATIC:   Lab Results   Component Value Date    ALBUMIN 3.8 01/31/2023    PROTTOTAL 5.9 (L) 01/31/2023    ALT 22 01/31/2023    AST 26 01/31/2023    ALKPHOS 82 01/31/2023    BILITOTAL 0.4 01/31/2023     OTHER:   Lab Results   Component Value Date    A1C 5.2 11/24/2014    ISABEL 9.2 01/31/2023    LIPASE 21 01/31/2023    TSH 0.92 04/26/2022    SED 2 07/13/2018       Anesthesia Plan    ASA Status:  3   NPO Status:  NPO Appropriate    Anesthesia Type: General.     - Airway: ETT   Induction: Intravenous.   Maintenance: Balanced.        Consents    Anesthesia Plan(s) and associated risks, benefits, and realistic alternatives discussed. Questions answered and patient/representative(s) expressed understanding.     - Discussed: Risks, Benefits and Alternatives for the PROCEDURE were discussed     - Discussed with:  Patient      - Patient is DNR/DNI Status: No         Postoperative Care    Pain management: Multi-modal analgesia.   PONV prophylaxis: Ondansetron (or other 5HT-3), Dexamethasone or Solumedrol, Background Propofol Infusion     Comments:    Other Comments:     Dilaudid  Ketamine  Propofol gtt              Naima Banerjee MD

## 2023-03-27 ENCOUNTER — THERAPY VISIT (OUTPATIENT)
Dept: PHYSICAL THERAPY | Facility: CLINIC | Age: 83
End: 2023-03-27
Attending: PHYSICAL MEDICINE & REHABILITATION
Payer: COMMERCIAL

## 2023-03-27 DIAGNOSIS — G89.29 CHRONIC BILATERAL LOW BACK PAIN WITHOUT SCIATICA: ICD-10-CM

## 2023-03-27 DIAGNOSIS — M47.817 LUMBOSACRAL SPONDYLOSIS WITHOUT MYELOPATHY: ICD-10-CM

## 2023-03-27 DIAGNOSIS — M54.50 CHRONIC BILATERAL LOW BACK PAIN WITHOUT SCIATICA: ICD-10-CM

## 2023-03-27 PROCEDURE — 97161 PT EVAL LOW COMPLEX 20 MIN: CPT | Mod: GP | Performed by: PHYSICAL THERAPIST

## 2023-03-27 PROCEDURE — 97110 THERAPEUTIC EXERCISES: CPT | Mod: GP | Performed by: PHYSICAL THERAPIST

## 2023-03-27 NOTE — PROGRESS NOTES
Physical Therapy Initial Evaluation  Subjective:  The history is provided by the patient. No  was used.   Patient Health History  Juan Carlos Marley being seen for lower back pain, right side, intermident.       Problem occurred: approx. 50 years+ ago with off/on occasional pain   Pain is reported as 6/10 on pain scale.  General health as reported by patient is good.  Pertinent medical history includes: high blood pressure, pain at night/rest and other. Other medical history details: piercing  intermittent pain in lower right back; med taken for blood pressure.     Medical allergies: none.   Surgeries include:  Heart surgery and other. Other surgery history details: various hernia repairs, prostatic artery embolization, heart multiple bypass surgery,   broken leg, broken hand.    Current medications:  Cardiac medication, high blood pressure medication and other. Other medications details: two blood thinners.    Current occupation is retired.   Primary job tasks include:  Computer work and lifting/carrying.   Other job/home tasks details: minor home repairs.                Therapist Generated HPI Evaluation  Problem details: The patient reports to therapy with a chief complaint of chronic/intermittent LBP. He had a recent hernia repair and is now cleared for activity. Reports chronic pain over the past 50 years. Reports that a few weeks ago it was really bad, and generally the pain comes and goes. He knows it's there all the time, but it gets sharp occasionally. Most recent hernia repair 3 weeks ago, 2 months ago had a prostate procedure, taking blood thinners    Lumbar xray:   Chronic appearing bilateral L5 pars interarticularis  defects resulting in grade 1-2 spondylitic anterolisthesis of L5 upon  S1 again noted. Alignment otherwise normal. Vertebral body heights are  normal. No recent fractures. Facet arthropathy at L4-L5 and L5-S1.  Loss of disc space height and degenerative endplate spurring at  L1-L2,  L2-L3 and L5-S1..         Type of problem:  Lumbar.    This is a chronic condition.  Condition occurred with:  Degenerative joint disease.    Patient reports pain:  Lower lumbar spine and lumbar spine right.  Pain is described as aching and sharp and is intermittent.  Pain is the same all the time.    Associated symptoms:  Loss of motion/stiffness and loss of strength.                                Objective:  Standing Alignment:    Cervical/Thoracic:  Thoracic kyphosis increased  Shoulder/UE:  Rounded shoulders                             Lumbar/SI Evaluation  ROM:    AROM Lumbar:   Flexion:          Reaches mid tibia painfree  Ext:                    Max limited P+   Side Bend:        Left:  Finger tips ton mid thighs stretch     Right:  Finger tips to mid thighs P+ R  side   Rotation:           Left:     Right:   Side Glide:        Left:     Right:           Lumbar Myotomes:    T12-L3 (Hip Flex):  Left: 5    Right: 5  L2-4 (Quads):  Left:  5    Right:  5  L4 (Ankle DF):  Left:  5    Right:  5  L5 (Great Toe Ext): Left: 5    Right: 5   S1 (Toe Raise):  Left: 5    Right: 5        Neural Tension/Mobility:      Left side:SLR or Slump  negative.     Right side:   Slump or SLR  negative.   Lumbar Palpation:      Tenderness not present at Left:    Quadratus Lumborum or Erector Spinae    Tenderness not present at Right:  Quadratus Lumborum or Erector Spinae                                                     General     ROS    Assessment/Plan:    Patient is a 82 year old male with lumbar complaints.    Patient has the following significant findings with corresponding treatment plan.                Diagnosis 1:  Chronic Low back pain   Pain -  hot/cold therapy, electric stimulation, manual therapy, directional preference exercise and home program  Decreased ROM/flexibility - manual therapy and therapeutic exercise  Decreased joint mobility - manual therapy and therapeutic exercise  Decreased strength -  therapeutic exercise and therapeutic activities    Therapy Evaluation Codes:   1) History comprised of:   Personal factors that impact the plan of care:      Age, Overall behavior pattern, Social history/culture and Time since onset of symptoms.    Comorbidity factors that impact the plan of care are:      Cancer, Heart problems, High blood pressure and Pain at night/rest.     Medications impacting care: Cardiac and High blood pressure.  2) Examination of Body Systems comprised of:   Body structures and functions that impact the plan of care:      Lumbar spine.   Activity limitations that impact the plan of care are:      Bending, Driving, Lifting, Reading/Computer work, Sitting, Walking, Working and Sleeping.  3) Clinical presentation characteristics are:   Stable/Uncomplicated.  4) Decision-Making    Low complexity using standardized patient assessment instrument and/or measureable assessment of functional outcome.  Cumulative Therapy Evaluation is: Low complexity.    Previous and current functional limitations:  (See Goal Flow Sheet for this information)    Short term and Long term goals: (See Goal Flow Sheet for this information)     Communication ability:  Patient appears to be able to clearly communicate and understand verbal and written communication and follow directions correctly.  Treatment Explanation - The following has been discussed with the patient:   RX ordered/plan of care  Anticipated outcomes  Possible risks and side effects  This patient would benefit from PT intervention to resume normal activities.   Rehab potential is good.    Frequency:  1 X week, once daily  Duration:  for 12 weeks  Discharge Plan:  Achieve all LTG.  Independent in home treatment program.  Reach maximal therapeutic benefit.    Please refer to the daily flowsheet for treatment today, total treatment time and time spent performing 1:1 timed codes.

## 2023-03-28 PROBLEM — M54.50 CHRONIC BILATERAL LOW BACK PAIN WITHOUT SCIATICA: Status: ACTIVE | Noted: 2023-03-28

## 2023-03-28 PROBLEM — G89.29 CHRONIC BILATERAL LOW BACK PAIN WITHOUT SCIATICA: Status: ACTIVE | Noted: 2023-03-28

## 2023-03-28 NOTE — PROGRESS NOTES
[facility]    OUTPATIENT Physical Therapy ORTHOPEDIC EVALUATION  PLAN OF TREATMENT FOR OUTPATIENT REHABILITATION  (COMPLETE FOR INITIAL CLAIMS ONLY)  Patient's Last Name, First Name, M.I.  YOB: 1940  Juan Carlos Marley    Provider s Name:  [facility]   Medical Record No.  [id number]   Start of Care Date:  03/27/23   Onset Date:   03/21/23 (MD order)   Treatment Diagnosis:  Low Back Pain Medical Diagnosis:     Lumbosacral spondylosis without myelopathy  Chronic bilateral low back pain without sciatica       Goals:     03/28/23 0500   Body Part   Goals listed below are for low back pain   Goal #1   Goal #1 sleeping   Previous Functional Level No restrictions   Current Functional Level 3-5 hours without sleep per night   STG Target Performance 2-3 hours without sleep per night   Rationale to establish restorative sleep pattern   Due Date 04/25/23   LTG Target Performance 1-2 hours without sleep per night   Rationale to establish restorative sleep pattern   Due Date 06/20/23         Therapy Frequency:  1x/week  Predicted Duration of Therapy Intervention:  12 weeks    AVILA GONSALEZ, PT                 I CERTIFY THE NEED FOR THESE SERVICES FURNISHED UNDER        THIS PLAN OF TREATMENT AND WHILE UNDER MY CARE     (Physician attestation of this document indicates review and certification of the therapy plan).                     Certification Date From:  03/27/23   Certification Date To:  06/19/23    Referring Provider:  Bella Melgar    Initial Assessment        See Epic Evaluation SOC Date: 03/27/23

## 2023-03-31 ENCOUNTER — NURSE TRIAGE (OUTPATIENT)
Dept: NURSING | Facility: CLINIC | Age: 83
End: 2023-03-31
Payer: COMMERCIAL

## 2023-04-10 ENCOUNTER — THERAPY VISIT (OUTPATIENT)
Dept: PHYSICAL THERAPY | Facility: CLINIC | Age: 83
End: 2023-04-10
Payer: COMMERCIAL

## 2023-04-10 DIAGNOSIS — G89.29 CHRONIC BILATERAL LOW BACK PAIN WITHOUT SCIATICA: Primary | ICD-10-CM

## 2023-04-10 DIAGNOSIS — M54.50 CHRONIC BILATERAL LOW BACK PAIN WITHOUT SCIATICA: Primary | ICD-10-CM

## 2023-04-10 PROCEDURE — 97110 THERAPEUTIC EXERCISES: CPT | Mod: GP | Performed by: PHYSICAL THERAPIST

## 2023-04-10 PROCEDURE — 97112 NEUROMUSCULAR REEDUCATION: CPT | Mod: GP | Performed by: PHYSICAL THERAPIST

## 2023-04-17 ENCOUNTER — THERAPY VISIT (OUTPATIENT)
Dept: PHYSICAL THERAPY | Facility: CLINIC | Age: 83
End: 2023-04-17
Payer: COMMERCIAL

## 2023-04-17 DIAGNOSIS — G89.29 CHRONIC BILATERAL LOW BACK PAIN WITHOUT SCIATICA: Primary | ICD-10-CM

## 2023-04-17 DIAGNOSIS — M54.50 CHRONIC BILATERAL LOW BACK PAIN WITHOUT SCIATICA: Primary | ICD-10-CM

## 2023-04-17 PROCEDURE — 97110 THERAPEUTIC EXERCISES: CPT | Mod: GP | Performed by: PHYSICAL THERAPIST

## 2023-04-17 PROCEDURE — 97140 MANUAL THERAPY 1/> REGIONS: CPT | Mod: GP | Performed by: PHYSICAL THERAPIST

## 2023-04-20 ENCOUNTER — PATIENT OUTREACH (OUTPATIENT)
Dept: CARE COORDINATION | Facility: CLINIC | Age: 83
End: 2023-04-20
Payer: COMMERCIAL

## 2023-05-02 ENCOUNTER — THERAPY VISIT (OUTPATIENT)
Dept: PHYSICAL THERAPY | Facility: CLINIC | Age: 83
End: 2023-05-02
Payer: COMMERCIAL

## 2023-05-02 DIAGNOSIS — M54.50 CHRONIC BILATERAL LOW BACK PAIN WITHOUT SCIATICA: Primary | ICD-10-CM

## 2023-05-02 DIAGNOSIS — G89.29 CHRONIC BILATERAL LOW BACK PAIN WITHOUT SCIATICA: Primary | ICD-10-CM

## 2023-05-02 PROCEDURE — 97110 THERAPEUTIC EXERCISES: CPT | Mod: GP | Performed by: PHYSICAL THERAPIST

## 2023-05-02 PROCEDURE — 97112 NEUROMUSCULAR REEDUCATION: CPT | Mod: GP | Performed by: PHYSICAL THERAPIST

## 2023-05-03 ENCOUNTER — TELEPHONE (OUTPATIENT)
Dept: UROLOGY | Facility: CLINIC | Age: 83
End: 2023-05-03
Payer: COMMERCIAL

## 2023-05-03 DIAGNOSIS — I10 ESSENTIAL HYPERTENSION: Primary | ICD-10-CM

## 2023-05-03 DIAGNOSIS — N40.0 BPH (BENIGN PROSTATIC HYPERPLASIA): ICD-10-CM

## 2023-05-03 NOTE — TELEPHONE ENCOUNTER
[facility] Call Center    Phone Message    May a detailed message be left on voicemail: yes     Reason for Call: Medication Refill Request    Has the patient contacted the pharmacy for the refill? Yes   Name of medication being requested: finasteride (PROSCAR) 5 MG tablet  Provider who prescribed the medication:   Pharmacy: [facility] [place] [address]  Date medication is needed: asap         Action Taken: Message routed to:  Other: uro    Travel Screening: Not Applicable

## 2023-05-03 NOTE — TELEPHONE ENCOUNTER
Patient is requesting PCP to take over prescription for finasteride (PROSCAR) 5 mg tablet    Per patient he has appointment in June to follow up and see if recent procedure worked.  He does not fill he should stop medication at this time until he knows for sure procedure has worked.    [facility] is the preferred pharmacy.

## 2023-05-03 NOTE — TELEPHONE ENCOUNTER
Patient hasn't been seen since 1-22 here,has seen [facility] and [facility], I advised him he would need an appt to fill medications here or he could reach out to his PMD for a refill.  Marcela Ayon LPN

## 2023-05-04 RX ORDER — FINASTERIDE 5 MG/1
5 TABLET, FILM COATED ORAL DAILY
Qty: 90 TABLET | Refills: 1 | Status: SHIPPED | OUTPATIENT
Start: 2023-05-04 | End: 2023-12-01

## 2023-06-01 ENCOUNTER — HEALTH MAINTENANCE LETTER (OUTPATIENT)
Age: 83
End: 2023-06-01

## 2023-06-01 DIAGNOSIS — E78.5 DYSLIPIDEMIA, GOAL LDL BELOW 70: ICD-10-CM

## 2023-06-01 RX ORDER — ATORVASTATIN CALCIUM 40 MG/1
40 TABLET, FILM COATED ORAL DAILY
Qty: 90 TABLET | Refills: 0 | Status: SHIPPED | OUTPATIENT
Start: 2023-06-01 | End: 2023-07-26

## 2023-06-06 ENCOUNTER — VIRTUAL VISIT (OUTPATIENT)
Dept: RADIOLOGY | Facility: CLINIC | Age: 83
End: 2023-06-06
Attending: INTERNAL MEDICINE
Payer: COMMERCIAL

## 2023-06-06 DIAGNOSIS — N40.0 BPH (BENIGN PROSTATIC HYPERPLASIA): Primary | ICD-10-CM

## 2023-06-06 PROCEDURE — 99213 OFFICE O/P EST LOW 20 MIN: CPT | Mod: VID | Performed by: RADIOLOGY

## 2023-06-06 NOTE — PROGRESS NOTES
Virtual Visit Details    Type of service:  Video Visit   Video Start Time: 1:07  Video End Time: 1:19    Originating Location (pt. Location): Home    Distant Location (provider location):  Off-site  Platform used for Video Visit: [employer]       HPI:      Juan Carlos Marley is an 82-year-old male, with PMH of BPH w/ LUTS, s/p TURP, and S/P PAE on 12/1/22, who presents for his 6-month post-PAE follow up visit. Additional pertinent medical history also includes CAD s/p CABG, anemia, history of cerebrovascular disease with prior TIAs currently on ASA and Plavix and metoprolol.     At his 1 month follow up, he was experiencing minimal symptomatic improvement, with frequent nocturia upwards of 5xnightly.   IPSS before the PAE: 20  IPSS at 1 month: 16 (individual questions scoring a 1,4,1,4,2,1,3)  IPSS at 6-month: 11 (1,2,1,3,2,1,1)  QOL: 2    He is the most impressed by the reduction of his nocturia and frequency.    Past Medical History:   Diagnosis Date     Cataract, nuclear sclerotic, left eye 06/02/2017     Cerebral artery occlusion with cerebral infarction (H)      Coronary artery disease due to lipid rich plaque      Dyslipidemia, goal LDL below 70 12/20/2014     Essential hypertension with goal blood pressure less than 130/85      Gastroesophageal reflux disease      Hernia, abdominal      History of spinal cord injury      [place] (hard of hearing)      Nonsustained ventricular tachycardia (H)     6 beat run, asymptomatic per Dr. Hackett     Paroxysmal atrial fibrillation (H) 09/02/2013    at the time of CABG; [facility] did a 4-week monitor in 2017 and did not find any atrial fibrillation and therefore stopped his Eliquis.     Stricture of esophagus 2013    dilated twice since then     Transient ischemic attack 09/2018    transient speech difficulty and dizziness while in [place]; did not seek medical attention     Transient ischemic attack 10/2018    same symptoms as in [place]     Transient ischemic attack 11/20/2016      Transient ischemic attack 2016    left cerebral hemisphere     Transient ischemic attack 2014       Past Surgical History:   Procedure Laterality Date     BYPASS GRAFT ARTERY CORONARY  2013    underwent 5-vessel coronary bypass grafting at the [facility]     CYSTOSCOPY       FOOT SURGERY Left 2014    Dr. Carr     FRACTURE SURGERY Right     hand     HERNIA REPAIR  2013     HERNIORRHAPHY INGUINAL Left 3/1/2023    Procedure: AND OPEN LEFT INGUINAL HERNIA REPAIR;  Surgeon: Luis Mitchell MD;  Location: [facility] [place] OR     IR PAE  2022     LAPAROSCOPIC HERNIORRHAPHY VENTRAL N/A 3/1/2023    Procedure: LAPAROSCOPIC INCARCERATED VENTRAL HERNIA REPAIR;  Surgeon: Luis Mitchell MD;  Location: [facility] [place] OR     ORCHIECTOMY SCROTAL Right      ORIF FEMUR FRACTURE Right      PROSTATE SURGERY      half removed     PROSTATECTOMY  2013     TESTICLE SURGERY      removal of testicle       Family History   Problem Relation Age of Onset     Other - See Comments Mother          accident age 49     Migraines Mother      Aortic aneurysm Father          84     Other - See Comments Sister         autistic      No Known Problems Daughter      Pulmonary Hypertension No family hx of      Congenital heart disease No family hx of        Social History     Tobacco Use     Smoking status: Former     Packs/day: 1.00     Years: 10.00     Pack years: 10.00     Types: Cigarettes, Pipe     Quit date: 1970     Years since quittin.3     Smokeless tobacco: Never   Vaping Use     Vaping status: Not on file   Substance Use Topics     Alcohol use: Not Currently     Alcohol/week: 4.0 standard drinks of alcohol     Types: 4 Glasses of wine per week     Current Outpatient Medications   Medication     aspirin (ASA) 81 MG chewable tablet     atorvastatin (LIPITOR) 40 MG tablet     calcium carbonate (OS-ISABEL) 500 MG tablet     clopidogrel (PLAVIX) 75 MG tablet     famotidine (PEPCID) 10 MG tablet      Ferrous Gluconate 324 (37.5 Fe) MG TABS     finasteride (PROSCAR) 5 MG tablet     metoprolol tartrate (LOPRESSOR) 25 MG tablet     Multiple Vitamins-Minerals (MULTIVITAMIN ADULT, MINERALS,) TABS     tamsulosin (FLOMAX) 0.4 MG capsule     vitamin C (ASCORBIC ACID) 500 MG tablet     COZAAR 25 MG tablet     HYDROcodone-acetaminophen (NORCO) 5-325 MG tablet     oxybutynin (DITROPAN) 5 MG tablet     No current facility-administered medications for this visit.       Impression and plan:  Continued improvement post PAE at 6-month with IPSS of 11 and QOL od 2.   He is very satisfied.  He will continue taking his BPH meds.  I will see him in clinic at 1 year.

## 2023-06-06 NOTE — LETTER
6/6/2023         RE: Juan Carlos Marley  [address] [place] [address]        Dear Colleague,    Thank you for referring your patient, Juan Carlos Marley, to the [facility]. Please see a copy of my visit note below.    Virtual Visit Details    Type of service:  Video Visit   Video Start Time: 1:07  Video End Time: 1:19    Originating Location (pt. Location): Home    Distant Location (provider location):  Off-site  Platform used for Video Visit: [employer]       HPI:        Juan Carlos Marley is an 82-year-old male, with PMH of BPH w/ LUTS, s/p TURP, and S/P PAE on 12/1/22, who presents for his 6-month post-PAE follow up visit. Additional pertinent medical history also includes CAD s/p CABG, anemia, history of cerebrovascular disease with prior TIAs currently on ASA and Plavix and metoprolol.     At his 1 month follow up, he was experiencing minimal symptomatic improvement, with frequent nocturia upwards of 5xnightly.   IPSS before the PAE: 20  IPSS at 1 month: 16 (individual questions scoring a 1,4,1,4,2,1,3)  IPSS at 6-month: 11 (1,2,1,3,2,1,1)  QOL: 2    He is the most impressed by the reduction of his nocturia and frequency.    Past Medical History:   Diagnosis Date    Cataract, nuclear sclerotic, left eye 06/02/2017    Cerebral artery occlusion with cerebral infarction (H)     Coronary artery disease due to lipid rich plaque     Dyslipidemia, goal LDL below 70 12/20/2014    Essential hypertension with goal blood pressure less than 130/85     Gastroesophageal reflux disease     Hernia, abdominal     History of spinal cord injury     [place] (hard of hearing)     Nonsustained ventricular tachycardia (H)     6 beat run, asymptomatic per Dr. Hackett    Paroxysmal atrial fibrillation (H) 09/02/2013    at the time of CABG; [facility] did a 4-week monitor in 2017 and did not find any atrial fibrillation and therefore stopped his Eliquis.    Stricture of esophagus 2013    dilated twice since then     Transient ischemic attack 2018    transient speech difficulty and dizziness while in [place]; did not seek medical attention    Transient ischemic attack 10/2018    same symptoms as in [place]    Transient ischemic attack 2016    Transient ischemic attack 2016    left cerebral hemisphere    Transient ischemic attack 2014       Past Surgical History:   Procedure Laterality Date    BYPASS GRAFT ARTERY CORONARY  2013    underwent 5-vessel coronary bypass grafting at the [facility]    CYSTOSCOPY      FOOT SURGERY Left 2014    Dr. Carr    FRACTURE SURGERY Right     hand    HERNIA REPAIR  2013    HERNIORRHAPHY INGUINAL Left 3/1/2023    Procedure: AND OPEN LEFT INGUINAL HERNIA REPAIR;  Surgeon: Luis Mitchell MD;  Location: [facility] OR    IR PAE  2022    LAPAROSCOPIC HERNIORRHAPHY VENTRAL N/A 3/1/2023    Procedure: LAPAROSCOPIC INCARCERATED VENTRAL HERNIA REPAIR;  Surgeon: Luis Mitchell MD;  Location: [facility] OR    ORCHIECTOMY SCROTAL Right     ORIF FEMUR FRACTURE Right     PROSTATE SURGERY      half removed    PROSTATECTOMY  2013    TESTICLE SURGERY      removal of testicle       Family History   Problem Relation Age of Onset    Other - See Comments Mother          accident age 49    Migraines Mother     Aortic aneurysm Father          84    Other - See Comments Sister         autistic     No Known Problems Daughter     Pulmonary Hypertension No family hx of     Congenital heart disease No family hx of        Social History     Tobacco Use    Smoking status: Former     Packs/day: 1.00     Years: 10.00     Pack years: 10.00     Types: Cigarettes, Pipe     Quit date: 1970     Years since quittin.3    Smokeless tobacco: Never   Vaping Use    Vaping status: Not on file   Substance Use Topics    Alcohol use: Not Currently     Alcohol/week: 4.0 standard drinks of alcohol     Types: 4 Glasses of wine per week     Current Outpatient Medications    Medication    aspirin (ASA) 81 MG chewable tablet    atorvastatin (LIPITOR) 40 MG tablet    calcium carbonate (OS-ISABEL) 500 MG tablet    clopidogrel (PLAVIX) 75 MG tablet    famotidine (PEPCID) 10 MG tablet    Ferrous Gluconate 324 (37.5 Fe) MG TABS    finasteride (PROSCAR) 5 MG tablet    metoprolol tartrate (LOPRESSOR) 25 MG tablet    Multiple Vitamins-Minerals (MULTIVITAMIN ADULT, MINERALS,) TABS    tamsulosin (FLOMAX) 0.4 MG capsule    vitamin C (ASCORBIC ACID) 500 MG tablet    COZAAR 25 MG tablet    HYDROcodone-acetaminophen (NORCO) 5-325 MG tablet    oxybutynin (DITROPAN) 5 MG tablet     No current facility-administered medications for this visit.       Impression and plan:  Continued improvement post PAE at 6-month with IPSS of 11 and QOL od 2.   He is very satisfied.  He will continue taking his BPH meds.  I will see him in clinic at 1 year.      Sincerely,        Tiffany Valdez MD

## 2023-06-06 NOTE — NURSING NOTE
Is the patient currently in the state of [place]? YES    Visit mode:VIDEO    If the visit is dropped, the patient can be reconnected by: VIDEO VISIT: Text to cell phone: [phone]    Will anyone else be joining the visit? Yes, spouse Salud will there with him.    How would you like to obtain your AVS? MyChart    Are changes needed to the allergy or medication list? NO    Reason for visit: RECHECK (Video visit )  Paola Hoang

## 2023-06-07 ENCOUNTER — TELEPHONE (OUTPATIENT)
Dept: INTERNAL MEDICINE | Facility: CLINIC | Age: 83
End: 2023-06-07
Payer: COMMERCIAL

## 2023-06-07 DIAGNOSIS — R13.10 DYSPHAGIA, UNSPECIFIED TYPE: Primary | ICD-10-CM

## 2023-06-07 NOTE — TELEPHONE ENCOUNTER
Patient calling looking for a referral to the ENT    -stretched throat procedure  he has had it done 3 times cant do it anymore was put on meds    omeprazole was prescribed for a couple of years  - found out he shouldn't be takings for it that long     was put on famotidine     famotidine (PEPCID) 10 MG tablet   3/31/2023  No  Sig - Route: Take 2 tablets (20 mg) by mouth 2 times daily - Oral    --will this be as strong as omeprazole   [phone] (Mobile) Preferred phone number   wife can take message

## 2023-06-08 NOTE — PROGRESS NOTES
DISCHARGE  Reason for Discharge: Patient has failed to schedule further appointments.    Equipment Issued:     Discharge Plan: Patient to continue home program.    Referring Provider:  Bella Melgar

## 2023-06-23 DIAGNOSIS — I25.83 CORONARY ARTERY DISEASE DUE TO LIPID RICH PLAQUE: ICD-10-CM

## 2023-06-23 DIAGNOSIS — I25.10 CORONARY ARTERY DISEASE DUE TO LIPID RICH PLAQUE: ICD-10-CM

## 2023-06-23 RX ORDER — LOSARTAN POTASSIUM 25 MG/1
25 TABLET ORAL DAILY
Qty: 90 TABLET | Refills: 0 | Status: SHIPPED | OUTPATIENT
Start: 2023-06-23 | End: 2023-07-26

## 2023-06-23 NOTE — TELEPHONE ENCOUNTER
PC to pt clarifying if he is still taking this medication. He stated that he is, and also shared that he is changing cardiologists to Dr Félix Avitia, at the [place] location. Refill sent for 90 day with no refill to bridge to his upcoming appt with Dr Avitia.  -sea

## 2023-06-29 DIAGNOSIS — I10 ESSENTIAL HYPERTENSION: ICD-10-CM

## 2023-06-29 RX ORDER — CLOPIDOGREL BISULFATE 75 MG/1
75 TABLET ORAL DAILY
Qty: 90 TABLET | Refills: 1 | Status: SHIPPED | OUTPATIENT
Start: 2023-06-29 | End: 2024-01-10

## 2023-06-30 NOTE — TELEPHONE ENCOUNTER
"Last Written Prescription Date:  10/21/2022  Last Fill Quantity: 90,  # refills: 2   Last office visit provider:  2/6/2023     Requested Prescriptions   Pending Prescriptions Disp Refills     clopidogrel (PLAVIX) 75 MG tablet 90 tablet 2     Sig: Take 1 tablet (75 mg) by mouth daily       Plavix Passed - 6/29/2023 11:48 AM        Passed - No active PPI on record unless is Protonix        Passed - Normal HGB on file in past 12 months     Recent Labs   Lab Test 01/31/23  0954   HGB 13.3               Passed - Normal Platelets on file in past 12 months     Recent Labs   Lab Test 01/31/23  0954                  Passed - Recent (12 mo) or future (30 days) visit within the authorizing provider's specialty     Patient has had an office visit with the authorizing provider or a provider within the authorizing providers department within the previous 12 mos or has a future within next 30 days. See \"Patient Info\" tab in inbasket, or \"Choose Columns\" in Meds & Orders section of the refill encounter.              Passed - Medication is active on med list        Passed - Patient is age 18 or older             Cele Stevens RN 06/29/23 11:30 PM  "

## 2023-07-10 ENCOUNTER — OFFICE VISIT (OUTPATIENT)
Dept: INTERNAL MEDICINE | Facility: CLINIC | Age: 83
End: 2023-07-10
Payer: COMMERCIAL

## 2023-07-10 VITALS
RESPIRATION RATE: 19 BRPM | HEART RATE: 55 BPM | SYSTOLIC BLOOD PRESSURE: 148 MMHG | WEIGHT: 179.8 LBS | BODY MASS INDEX: 26.63 KG/M2 | HEIGHT: 69 IN | TEMPERATURE: 97.9 F | OXYGEN SATURATION: 99 % | DIASTOLIC BLOOD PRESSURE: 80 MMHG

## 2023-07-10 DIAGNOSIS — Z00.00 ROUTINE GENERAL MEDICAL EXAMINATION AT A HEALTH CARE FACILITY: Primary | ICD-10-CM

## 2023-07-10 DIAGNOSIS — Z12.5 SCREENING FOR PROSTATE CANCER: ICD-10-CM

## 2023-07-10 DIAGNOSIS — I48.0 PAROXYSMAL ATRIAL FIBRILLATION (H): ICD-10-CM

## 2023-07-10 DIAGNOSIS — Z00.00 ENCOUNTER FOR MEDICARE ANNUAL WELLNESS EXAM: ICD-10-CM

## 2023-07-10 LAB
ALBUMIN SERPL BCG-MCNC: 4 G/DL (ref 3.5–5.2)
ALBUMIN UR-MCNC: NEGATIVE MG/DL
ALP SERPL-CCNC: 83 U/L (ref 40–129)
ALT SERPL W P-5'-P-CCNC: 29 U/L (ref 0–70)
ANION GAP SERPL CALCULATED.3IONS-SCNC: 8 MMOL/L (ref 7–15)
APPEARANCE UR: CLEAR
AST SERPL W P-5'-P-CCNC: 26 U/L (ref 0–45)
BACTERIA #/AREA URNS HPF: ABNORMAL /HPF
BILIRUB SERPL-MCNC: 0.4 MG/DL
BILIRUB UR QL STRIP: NEGATIVE
BUN SERPL-MCNC: 17.2 MG/DL (ref 8–23)
CALCIUM SERPL-MCNC: 9.4 MG/DL (ref 8.8–10.2)
CHLORIDE SERPL-SCNC: 108 MMOL/L (ref 98–107)
CHOLEST SERPL-MCNC: 139 MG/DL
COLOR UR AUTO: YELLOW
CREAT SERPL-MCNC: 0.72 MG/DL (ref 0.67–1.17)
DEPRECATED HCO3 PLAS-SCNC: 27 MMOL/L (ref 22–29)
ERYTHROCYTE [DISTWIDTH] IN BLOOD BY AUTOMATED COUNT: 12.5 % (ref 10–15)
GFR SERPL CREATININE-BSD FRML MDRD: >90 ML/MIN/1.73M2
GLUCOSE SERPL-MCNC: 91 MG/DL (ref 70–99)
GLUCOSE UR STRIP-MCNC: NEGATIVE MG/DL
HCT VFR BLD AUTO: 38.7 % (ref 40–53)
HDLC SERPL-MCNC: 78 MG/DL
HGB BLD-MCNC: 13.4 G/DL (ref 13.3–17.7)
HGB UR QL STRIP: ABNORMAL
KETONES UR STRIP-MCNC: NEGATIVE MG/DL
LDLC SERPL CALC-MCNC: 52 MG/DL
LEUKOCYTE ESTERASE UR QL STRIP: NEGATIVE
MCH RBC QN AUTO: 32.8 PG (ref 26.5–33)
MCHC RBC AUTO-ENTMCNC: 34.6 G/DL (ref 31.5–36.5)
MCV RBC AUTO: 95 FL (ref 78–100)
MUCOUS THREADS #/AREA URNS LPF: PRESENT /LPF
NITRATE UR QL: NEGATIVE
NONHDLC SERPL-MCNC: 61 MG/DL
PH UR STRIP: 7 [PH] (ref 5–8)
PLATELET # BLD AUTO: 146 10E3/UL (ref 150–450)
POTASSIUM SERPL-SCNC: 4.2 MMOL/L (ref 3.4–5.3)
PROT SERPL-MCNC: 6.1 G/DL (ref 6.4–8.3)
PSA SERPL DL<=0.01 NG/ML-MCNC: 0.75 NG/ML
RBC # BLD AUTO: 4.08 10E6/UL (ref 4.4–5.9)
RBC #/AREA URNS AUTO: ABNORMAL /HPF
SODIUM SERPL-SCNC: 143 MMOL/L (ref 136–145)
SP GR UR STRIP: 1.02 (ref 1–1.03)
SQUAMOUS #/AREA URNS AUTO: ABNORMAL /LPF
TRIGL SERPL-MCNC: 47 MG/DL
TSH SERPL DL<=0.005 MIU/L-ACNC: 1.25 UIU/ML (ref 0.3–4.2)
UROBILINOGEN UR STRIP-ACNC: 0.2 E.U./DL
WBC # BLD AUTO: 5.6 10E3/UL (ref 4–11)
WBC #/AREA URNS AUTO: ABNORMAL /HPF

## 2023-07-10 PROCEDURE — 84443 ASSAY THYROID STIM HORMONE: CPT | Performed by: INTERNAL MEDICINE

## 2023-07-10 PROCEDURE — 36415 COLL VENOUS BLD VENIPUNCTURE: CPT | Performed by: INTERNAL MEDICINE

## 2023-07-10 PROCEDURE — 85027 COMPLETE CBC AUTOMATED: CPT | Performed by: INTERNAL MEDICINE

## 2023-07-10 PROCEDURE — G0103 PSA SCREENING: HCPCS | Performed by: INTERNAL MEDICINE

## 2023-07-10 PROCEDURE — 99213 OFFICE O/P EST LOW 20 MIN: CPT | Mod: 25 | Performed by: INTERNAL MEDICINE

## 2023-07-10 PROCEDURE — 81001 URINALYSIS AUTO W/SCOPE: CPT | Performed by: INTERNAL MEDICINE

## 2023-07-10 PROCEDURE — G0439 PPPS, SUBSEQ VISIT: HCPCS | Performed by: INTERNAL MEDICINE

## 2023-07-10 PROCEDURE — 80061 LIPID PANEL: CPT | Performed by: INTERNAL MEDICINE

## 2023-07-10 PROCEDURE — 80053 COMPREHEN METABOLIC PANEL: CPT | Performed by: INTERNAL MEDICINE

## 2023-07-10 RX ORDER — DOXYCYCLINE 100 MG/1
100 CAPSULE ORAL DAILY
Qty: 10 CAPSULE | Refills: 1 | Status: SHIPPED | OUTPATIENT
Start: 2023-07-10 | End: 2023-10-09

## 2023-07-10 ASSESSMENT — ENCOUNTER SYMPTOMS
HEARTBURN: 0
PARESTHESIAS: 0
DIARRHEA: 0
HEMATURIA: 0
NERVOUS/ANXIOUS: 0
WEAKNESS: 0
FEVER: 0
CONSTIPATION: 0
SORE THROAT: 0
CHILLS: 0
FREQUENCY: 0
HEMATOCHEZIA: 0
MYALGIAS: 0
NAUSEA: 0
DIZZINESS: 1
HEADACHES: 0
ABDOMINAL PAIN: 0
PALPITATIONS: 0
COUGH: 0
DYSURIA: 0
EYE PAIN: 0
ARTHRALGIAS: 0
SHORTNESS OF BREATH: 0
JOINT SWELLING: 0

## 2023-07-10 ASSESSMENT — ACTIVITIES OF DAILY LIVING (ADL): CURRENT_FUNCTION: NO ASSISTANCE NEEDED

## 2023-07-10 ASSESSMENT — PAIN SCALES - GENERAL: PAINLEVEL: NO PAIN (0)

## 2023-07-10 ASSESSMENT — PATIENT HEALTH QUESTIONNAIRE - PHQ9
SUM OF ALL RESPONSES TO PHQ QUESTIONS 1-9: 0
10. IF YOU CHECKED OFF ANY PROBLEMS, HOW DIFFICULT HAVE THESE PROBLEMS MADE IT FOR YOU TO DO YOUR WORK, TAKE CARE OF THINGS AT HOME, OR GET ALONG WITH OTHER PEOPLE: NOT DIFFICULT AT ALL
SUM OF ALL RESPONSES TO PHQ QUESTIONS 1-9: 0

## 2023-07-10 NOTE — PATIENT INSTRUCTIONS
Patient Education   Personalized Prevention Plan  You are due for the preventive services outlined below.  Your care team is available to assist you in scheduling these services.  If you have already completed any of these items, please share that information with your care team to update in your medical record.  Health Maintenance Due   Topic Date Due     Depression Action Plan  Never done     ANNUAL REVIEW OF HM ORDERS  08/18/2022     COVID-19 Vaccine (6 - Pfizer series) 02/18/2023     Your Health Risk Assessment indicates you feel you are not in good health    A healthy lifestyle helps keep the body fit and the mind alert. It helps protect you from disease, helps you fight disease, and helps prevent chronic disease (disease that doesn't go away) from getting worse. This is important as you get older and begin to notice twinges in muscles and joints and a decline in the strength and stamina you once took for granted. A healthy lifestyle includes good healthcare, good nutrition, weight control, recreation, and regular exercise. Avoid harmful substances and do what you can to keep safe. Another part of a healthy lifestyle is stay mentally active and socially involved.    Good healthcare     Have a wellness visit every year.     If you have new symptoms, let us know right away. Don't wait until the next checkup.     Take medicines exactly as prescribed and keep your medicines in a safe place. Tell us if your medicine causes problems.   Healthy diet and weight control     Eat 3 or 4 small, nutritious, low-fat, high-fiber meals a day. Include a variety of fruits, vegetables, and whole-grain foods.     Make sure you get enough calcium in your diet. Calcium, vitamin D, and exercise help prevent osteoporosis (bone thinning).     If you live alone, try eating with others when you can. That way you get a good meal and have company while you eat it.     Try to keep a healthy weight. If you eat more calories than your body  uses for energy, it will be stored as fat and you will gain weight.     Recreation   Recreation is not limited to sports and team events. It includes any activity that provides relaxation, interest, enjoyment, and exercise. Recreation provides an outlet for physical, mental, and social energy. It can give a sense of worth and achievement. It can help you stay healthy.    Mental Exercise and Social Involvement  Mental and emotional health is as important as physical health. Keep in touch with friends and family. Stay as active as possible. Continue to learn and challenge yourself.   Things you can do to stay mentally active are:    Learn something new, like a foreign language or musical instrument.     Play SCRABBLE or do crossword puzzles. If you cannot find people to play these games with you at home, you can play them with others on your computer through the Internet.     Join a games club--anything from card games to chess or checkers or lawn bowling.     Start a new hobby.     Go back to school.     Volunteer.     Read.   Keep up with world events.    Exercise for a Healthier Heart  You may wonder how you can improve the health of your heart. If you re thinking about exercise, you re on the right track. You don t need to become an athlete. But you do need a certain amount of brisk exercise to help strengthen your heart. If you have been diagnosed with a heart condition, your healthcare provider may advise exercise to help your condition. To help make exercise a habit, choose safe, fun activities.      Exercise with a friend. When activity is fun, you're more likely to stick with it.     Before you start  Check with your healthcare provider before starting an exercise program. This is especially important if you haven't been active for a while. It's also important if you have a long-term (chronic) health problem such as heart disease, diabetes, or obesity. Also check with your provider if you're at high risk for  having these problems.   Why exercise?  Exercising regularly offers many healthy rewards. It can help you do all of these:     Improve your blood cholesterol level to help prevent further heart trouble.    Lower your blood pressure to help prevent a stroke or heart attack.    Control diabetes or reduce your risk of getting this disease.    Improve your heart and lung function.    Reach and stay at a healthy weight.    Make your muscles stronger so you can stay active.    Prevent falls and fractures by slowing the loss of bone mass (osteoporosis).    Manage stress better.    Improve your sense of self and your body image.  Exercise tips      Ease into your routine. Set small goals. Then build on them. Talk with your healthcare provider first before starting an exercise routine if you're not sure what your activity level should be.    Exercise on most days. Aim for a total of at least 150 minutes (2 hours and 30 minutes) or more of moderate-intensity aerobic activity each week. You could also do 75 minutes (1 hour and 15 minutes) or more of vigorous-intensity aerobic activity each week. Or try for a combination of both. Moderate activity means that you breathe heavier and your heart rate increases, but you can still talk. Think about doing at least 30 minutes of moderate exercise, 5 times a week. It's OK to work up to the 30-minute period over time. Examples of moderate-intensity activity are brisk walking, gardening, and water aerobics.    Step up your daily activity level.  Along with your exercise program, try being more active the whole day. Walk instead of drive. Or park further away so that you take more steps each day. Do more household tasks or yard work. You may not be able to meet the advised amount of physical activity. But doing some moderate- or vigorous-intensity aerobic activity can help reduce your risk for heart disease. Your healthcare provider can help you figure out what is best for you.    Choose  1 or more activities you enjoy.  Walking is one of the easiest things you can do. You can also try swimming, riding a bike, dancing, or taking an exercise class.    Call 911  Call 911 right away if any of these occur:     Chest pain that doesn't go away quickly with rest    New burning, tightness, pressure, or heaviness in your chest, neck, shoulders, back, or arms    Abnormal or severe shortness of breath    A very fast or irregular heartbeat (palpitations)    Fainting  When to call your healthcare provider  Call your healthcare provider if you have any of these:     Dizziness or lightheadedness    Mild shortness of breath or chest pain    Increased or new joint or muscle pain    [employer] last reviewed this educational content on 7/1/2022 2000-2023 [employer]. All rights reserved. This information is not intended as a substitute for professional medical care. Always follow your healthcare professional's instructions.          Signs of Hearing Loss  Hearing loss is a problem shared by many people. In fact, it's one of the most common health problems, particularly as people age. Most people aged 65 and older have some hearing loss. By age 80, almost everyone does. Hearing loss often occurs slowly over the years. So, you may not realize your hearing has gotten worse.   When sudden hearing loss occurs, it's important to contact your healthcare provider right away. Your provider will do a medical exam and a hearing exam as soon as possible. This is to help find the cause and type of your sudden hearing loss. Based on your diagnosis, your healthcare provider will discuss possible treatments.      Hearing much better with one ear can be a sign of hearing loss.     Have your hearing checked  Call your healthcare provider if you:     Have to strain to hear normal conversation    Have to watch other people s faces very carefully to follow what they re saying    Need to ask people to repeat what they ve  said    Often misunderstand what people are saying    Turn the volume of the television or radio up so high that others complain    Feel that people are mumbling when they re talking to you    Find that the effort to hear leaves you feeling tired and irritated    Notice, when using the phone, that you hear better with one ear than the other  [employer] last reviewed this educational content on 6/1/2022 2000-2023 [employer]. All rights reserved. This information is not intended as a substitute for professional medical care. Always follow your healthcare professional's instructions.

## 2023-07-10 NOTE — PROGRESS NOTES
Annual Wellness Visit:  Juan Carlos Marley  is a 83 year old male  who presents for an annual wellness visit.  Annual wellness visit and physical examination.  We had a good discussion.  Physician and patient sharing was accomplished.    The patient has an area of infected hair follicle or a tick bite in the right groin.  There is no surrounding halo and its not a typical ECM rash of Lyme disease but that not withstanding we will try doxycycline 100 mg as Vibramycin capsule or tablet once daily for 10 days 1 refill.    Today labs to be done include hemogram comprehensive metabolic profile plus lipid panel PSA TSH urinalysis.    The patient's cognitive assessment was accomplished as well he knew 2 out of 3 words and was able to draw the face of a clock without difficulty indicating 12 noon.    The patient is not prescribed opioids by this examiner and the patient does not receive opioids from any other provider or individual.    The patient's provider list includes only myself as his PCP.  JOCELINE ALTAMIRANO    Labs ordered today include hemogram comprehensive metabolic profile lipid panel urinalysis TSH and PSA.    Advance care planning done.    Falls risk assessment also accomplished.    Cognitive assessment was completed and the current provider this examiner and patient sharing was also completed.  Assessment/Plan:  Annual wellness visit and physical examination.    Subjective:   Medical History: Graft non-smoker    8-10 alcoholic drinks per week.  Beer.    Allergies none known.  Coronary artery disease with history of coronary bypass grafting.  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  Due to atherosclerotic process stature.  Generalized.  Prior history of hernia repair history of prostatic arterial embolectomy for enlarged prostate.  [place] [facility] in the remote past.  Prior fractures of right hand and leg.  With any surgery no anesthetic complications.    Generalized  atherosclerotic cardiovascular disease with history of coronary bypass grafting of 5 vessels.  Stable.    History of hypertension and hyperlipidemia as well as BPH treated with prostatic embolectomy.  Successful.  No history of prostate cancer.  Past Medical History:   Diagnosis Date     Cataract, nuclear sclerotic, left eye 06/02/2017     Cerebral artery occlusion with cerebral infarction (H)      Coronary artery disease due to lipid rich plaque      Dyslipidemia, goal LDL below 70 12/20/2014     Essential hypertension with goal blood pressure less than 130/85      Gastroesophageal reflux disease      Hernia, abdominal      History of spinal cord injury      [place] (hard of hearing)      Nonsustained ventricular tachycardia (H)     6 beat run, asymptomatic per Dr. Hackett     Paroxysmal atrial fibrillation (H) 09/02/2013    at the time of CABG; [facility] did a 4-week monitor in 2017 and did not find any atrial fibrillation and therefore stopped his Eliquis.     Stricture of esophagus 2013    dilated twice since then     Transient ischemic attack 09/2018    transient speech difficulty and dizziness while in [place]; did not seek medical attention     Transient ischemic attack 10/2018    same symptoms as in [place]     Transient ischemic attack 11/20/2016     Transient ischemic attack 01/24/2016    left cerebral hemisphere     Transient ischemic attack 12/20/2014     Current Outpatient Medications   Medication     aspirin (ASA) 81 MG chewable tablet     atorvastatin (LIPITOR) 40 MG tablet     calcium carbonate (OS-ISABEL) 500 MG tablet     clopidogrel (PLAVIX) 75 MG tablet     famotidine (PEPCID) 10 MG tablet     Ferrous Gluconate 324 (37.5 Fe) MG TABS     finasteride (PROSCAR) 5 MG tablet     losartan (COZAAR) 25 MG tablet     metoprolol tartrate (LOPRESSOR) 25 MG tablet     Multiple Vitamins-Minerals (MULTIVITAMIN ADULT, MINERALS,) TABS     tamsulosin (FLOMAX) 0.4 MG capsule     vitamin C (ASCORBIC ACID) 500 MG tablet      HYDROcodone-acetaminophen (NORCO) 5-325 MG tablet     oxybutynin (DITROPAN) 5 MG tablet     No current facility-administered medications for this visit.     Immunization History   Administered Date(s) Administered     COVID-19 Bivalent 12+ (Pfizer) 10/18/2022     COVID-19 MONOVALENT 12+ (Pfizer) 01/31/2021, 02/21/2021, 09/28/2021     COVID-19 Monovalent 12+ (Pfizer 2022) 04/29/2022     FLU 6-35 months 10/02/2007     FLUAD(HD)65+ QUAD 10/08/2020     Flu, Unspecified 10/27/1993, 11/22/1995, 10/16/1996, 10/09/1997, 10/27/1998, 11/12/1999, 10/30/2002, 10/24/2003, 12/27/2004, 11/02/2005, 11/09/2006, 10/02/2007, 11/12/2008, 10/05/2009, 11/04/2010, 11/01/2011, 10/15/2020     Influenza (H1N1) 01/13/2010     Influenza (High Dose) 3 valent vaccine 10/23/2015, 11/28/2016, 10/25/2017, 10/29/2018, 10/29/2019     Influenza (IIV3) PF 11/12/1999, 10/30/2002, 10/24/2003, 12/27/2004, 11/02/2005, 11/09/2006, 10/02/2007, 11/12/2008, 10/05/2009, 11/04/2010, 11/01/2011, 12/06/2012, 11/22/2013, 11/07/2014     Pneumo Conj 13-V (2010&after) 01/19/2015     Pneumococcal 23 valent 02/25/2008     TDAP (Adacel,Boostrix) 02/21/2018     Td (Adult), Adsorbed 11/22/1995, 05/25/2002, 02/25/2008     Tdap (Adult) Unspecified Formulation 02/25/2008     Zoster recombinant adjuvanted (SHINGRIX) 10/30/2019, 12/31/2019, 01/09/2020     Zoster vaccine, live 07/30/2012       Surgical History:  Past Surgical History:   Procedure Laterality Date     BYPASS GRAFT ARTERY CORONARY  09/2013    underwent 5-vessel coronary bypass grafting at the [facility]     CYSTOSCOPY       FOOT SURGERY Left 12/19/2014    Dr. Carr     FRACTURE SURGERY Right     hand     HERNIA REPAIR  11/2013     HERNIORRHAPHY INGUINAL Left 3/1/2023    Procedure: AND OPEN LEFT INGUINAL HERNIA REPAIR;  Surgeon: Luis Mitchell MD;  Location: [facility]  12/1/2022     LAPAROSCOPIC HERNIORRHAPHY VENTRAL N/A 3/1/2023    Procedure: LAPAROSCOPIC INCARCERATED VENTRAL HERNIA REPAIR;   "Surgeon: Luis Mitchell MD;  [place] [facility] [place]     ORCHIECTOMY SCROTAL Right      ORIF FEMUR FRACTURE Right      PROSTATE SURGERY      half removed     PROSTATECTOMY  2013     TESTICLE SURGERY      removal of testicle        Family History:  Mother  49 accidental.    Father  84 generalized atherosclerotic cardiovascular disease.    1 daughter with type 2 diabetes.  Wife well history of leukocytoclastic vasculitis.  Stable.    Social History:  Stays active exercises regularly.  Walking.  Lives in [facility].  Enjoys it there.    Health Maintenances:  Immunizations and colonoscopy reviewed up-to-date.  Encouraged periodic vaccinations including flu vaccine and if necessary COVID-19 vaccine again.    Objective:  BP (!) 148/80 (BP Location: Right arm, Patient Position: Sitting, Cuff Size: Adult Regular)   Pulse 55   Temp 97.9  F (36.6  C) (Oral)   Resp 19   Ht 1.753 m (5' 9\")   Wt 81.6 kg (179 lb 12.8 oz)   SpO2 99%   BMI 26.55 kg/m    Neck veins are nondistended there is no thyromegaly or thyroid nodules no carotid bruits the back was straight no severe spine tenderness the chest was clear to auscultation and percussion heart tones revealed a regular rhythm without murmur rub or gallop neck veins were nondistended abdomen benign no organomegaly masses or tenderness bowel sounds present but hypoactive no areas of rebound genital examination was -1 testicle surgically absent.  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  Left testicle intact normal scrotal contents normal no groin hernias.  Rectal showed the prostate to be minimally enlarged without evidence for nodularity or induration there is no evidence for prostate cancer and clinically on exam PSA pending rest the rectal exam normal brown stool present extremities free of edema cyanosis or clubbing.  We encouraged salt restriction and diet.  The skin was negative to exam he is slightly pale in color he was not in acute distress or toxic.  " "No tachypnea.  No acral central cyanosis head eyes ears nose throat grossly intact.  We had a good discussion today.    Adonis Trotter MD    Internal Medicine  Answers for HPI/ROS submitted by the patient on 7/10/2023  If you checked off any problems, how difficult have these problems made it for you to do your work, take care of things at home, or get along with other people?: Not difficult at all  PHQ9 TOTAL SCORE: 0  In general, how would you rate your overall physical health?: fair  Frequency of exercise:: 1 day/week  Do you usually eat at least 4 servings of fruit and vegetables a day, include whole grains & fiber, and avoid regularly eating high fat or \"junk\" foods? : Yes  Taking medications regularly:: Yes  Medication side effects:: Muscle aches, Lightheadedness  Activities of Daily Living: no assistance needed  Home safety: no safety concerns identified  Hearing Impairment:: difficulty following a conversation in a noisy restaurant or crowded room, feel that people are mumbling or not speaking clearly, need to ask people to speak up or repeat themselves, difficulty understanding soft or whispered speech  In the past 6 months, have you been bothered by leaking of urine?: No  abdominal pain: No  Blood in stool: No  Blood in urine: No  chest pain: No  chills: No  congestion: No  constipation: No  cough: No  diarrhea: No  dizziness: Yes  ear pain: No  eye pain: No  nervous/anxious: No  fever: No  frequency: No  genital sores: No  headaches: No  hearing loss: Yes  heartburn: No  arthralgias: No  joint swelling: No  peripheral edema: No  mood changes: No  myalgias: No  nausea: No  dysuria: No  palpitations: No  Skin sensation changes: No  sore throat: No  urgency: No  rash: No  shortness of breath: No  visual disturbance: No  weakness: No  impotence: No  penile discharge: No  In general, how would you rate your overall mental or emotional health?: good  Additional concerns today:: No  Duration of exercise:: " Less than 15 minutes        The patient was provided with suggestions to help him develop a healthy physical lifestyle.  He is at risk for lack of exercise and has been provided with information to increase physical activity for the benefit of his well-being.  The patient was provided with written information regarding signs of hearing loss.

## 2023-07-10 NOTE — PROGRESS NOTES
"SUBJECTIVE:   Juan Carlos is a 83 year old who presents for Preventive Visit.      7/10/2023     1:01 PM   Additional Questions   Roomed by IGNACIO Esposito   Accompanied by n/a         Healthy Habits:     In general, how would you rate your overall health?  Fair    Frequency of exercise:  1 day/week    Duration of exercise:  Less than 15 minutes    Do you usually eat at least 4 servings of fruit and vegetables a day, include whole grains    & fiber and avoid regularly eating high fat or \"junk\" foods?  Yes    Taking medications regularly:  Yes    Medication side effects:  Muscle aches and Lightheadedness    Ability to successfully perform activities of daily living:  No assistance needed    Home Safety:  No safety concerns identified    Hearing Impairment:  Difficulty following a conversation in a noisy restaurant or crowded room, feel that people are mumbling or not speaking clearly, need to ask people to speak up or repeat themselves and difficulty understanding soft or whispered speech    In the past 6 months, have you been bothered by leaking of urine?  No    In general, how would you rate your overall mental or emotional health?  Good    Additional concerns today:  No        Have you ever done Advance Care Planning? (For example, a Health Directive, POLST, or a discussion with a medical provider or your loved ones about your wishes): No, advance care planning information given to patient to review.  Advanced care planning was discussed at today's visit.    Fall risk  Fallen 2 or more times in the past year?: No  Any fall with injury in the past year?: No  {If any of the above assessments are answered yes, consider ordering appropriate referrals (Optional):158634::\"click delete button to remove this line now\"}  Cognitive Screening   1) Repeat 3 items (Leader, Season, Table)  {Get patient's attention, then say, \"I am going to say three words that I want you to remember now and later.  The words are Leader, Season, and " "Table.  Please say them for me now.\"  If pt. unable after 3 tries, go to next item}  2) Clock draw: {Say the following phrases in order: \"Please draw a clock.  Start by drawing a large [place].  Put all the numbers in the [place] and set the hands to show 11:10 (10 past 11).\"  If pt cannot complete in 3 minutes, stop and ask for recall items.  \"NORMAL\" test = all twelve numbers in correct location, and clock hands correctly designating 11:10}NORMAL  3) 3 item recall: {Say: \"What were the three words I asked you to remember?\"}Recalls 2 objects   Results: NORMAL clock, 1-2 items recalled: COGNITIVE IMPAIRMENT LESS LIKELY    Mini-CogTM Copyright S Brian. Licensed by the author for use in [facility]; reprinted with permission (rizwan@[facility]). All rights reserved.      {Do you have sleep apnea, excessive snoring or daytime drowsiness? (Optional):111931}    Reviewed and updated as needed this visit by clinical staff   Tobacco  Allergies  Meds              Reviewed and updated as needed this visit by Provider                 Social History     Tobacco Use     Smoking status: Former     Packs/day: 1.00     Years: 10.00     Pack years: 10.00     Types: Cigarettes, Pipe     Quit date: 1970     Years since quittin.4     Smokeless tobacco: Never   Substance Use Topics     Alcohol use: Not Currently     Alcohol/week: 4.0 standard drinks of alcohol     Types: 4 Glasses of wine per week     {Rooming staff  Click this link to complete the Prescreen if response below is not for today's visit  Alcohol Use Prescreen >3 drinks/day or > 7 drinks/week.  If the prescreen question answer is YES, complete the full AUDIT  :517373}        7/10/2023    12:36 PM   Alcohol Use   Prescreen: >3 drinks/day or >7 drinks/week? No   {add AUDIT responses (Optional) (A score of 7 for adult men is an indication of hazardous drinking; a score of 8 or more is an indication of an alcohol use disorder.  A score of 7 or more for " "adult women is an indication of hazardous drinking or an alchohol use disorder):917204}  Do you have a current opioid prescription? { :391281}  Do you use any other controlled substances or medications that are not prescribed by a provider? {Substance Use :273361::\"None\"}  {Provider  If there are gaps in the social history shown above, please follow the link and refresh the note Link to Social and Substance History :763764}    {Outside tests to abstract? :079838}    {additional problems to add (Optional):656711}    Current providers sharing in care for this patient include: {Rooming staff:  Please update Care Team from storyboard, refresh this note and then delete this statement}  Patient Care Team:  Adonis Trotter MD as PCP - General (Internal Medicine)  Adonis Trotter MD as Assigned PCP  Fabio Irizarry MD as MD (Urology)  Bandar Darnell MD as MD (Neurology)  Liam Ac DO as Assigned Heart and Vascular Provider  Bandar Darnell MD as Assigned Neuroscience Provider  Gerardo Stallworth MD as MD (Urology)  Jaleesa Herrera, DPM, Podiatry/Foot and Ankle Surgery as Assigned Musculoskeletal Provider  Gerardo Stallworth MD as Assigned Surgical Provider  Félix Avitia MD as MD (Cardiovascular Disease)    The following health maintenance items are reviewed in Epic and correct as of today:  Health Maintenance   Topic Date Due     DEPRESSION ACTION PLAN  Never done     ANNUAL REVIEW OF HM ORDERS  08/18/2022     COVID-19 Vaccine (6 - Pfizer series) 02/18/2023     MEDICARE ANNUAL WELLNESS VISIT  04/26/2023     INFLUENZA VACCINE (1) 09/01/2023     PHQ-9  01/10/2024     FALL RISK ASSESSMENT  07/10/2024     ADVANCE CARE PLANNING  02/23/2026     DTAP/TDAP/TD IMMUNIZATION (3 - Td or Tdap) 02/21/2028     Pneumococcal Vaccine: 65+ Years  Completed     ZOSTER IMMUNIZATION  Completed     IPV IMMUNIZATION  Aged Out     MENINGITIS IMMUNIZATION  Aged Out     {Chronicprobdata " "(optional):407178}  {Decision Support (Optional):667519}        Review of Systems   Constitutional: Negative for chills and fever.   HENT: Positive for hearing loss. Negative for congestion, ear pain and sore throat.    Eyes: Negative for pain and visual disturbance.   Respiratory: Negative for cough and shortness of breath.    Cardiovascular: Negative for chest pain, palpitations and peripheral edema.   Gastrointestinal: Negative for abdominal pain, constipation, diarrhea, heartburn, hematochezia and nausea.   Genitourinary: Negative for dysuria, frequency, genital sores, hematuria, impotence, penile discharge and urgency.   Musculoskeletal: Negative for arthralgias, joint swelling and myalgias.   Skin: Negative for rash.   Neurological: Positive for dizziness. Negative for weakness, headaches and paresthesias.   Psychiatric/Behavioral: Negative for mood changes. The patient is not nervous/anxious.      {ROS COMP (Optional):563703}    OBJECTIVE:   There were no vitals taken for this visit. Estimated body mass index is 27.01 kg/m  as calculated from the following:    Height as of 23: 1.753 m (5' 9\").    Weight as of 3/1/23: 83 kg (182 lb 14.4 oz).  Physical Exam  {Exam (Optional) :885567}    {Diagnostic Test Results (Optional):255893::\"Diagnostic Test Results:\",\"Labs reviewed in Epic\"}    ASSESSMENT / PLAN:   {Diag Picklist:386064}    {Patient advised of split billing (Optional):631519}      COUNSELING:  {Medicare Counselin}      BMI:   Estimated body mass index is 27.01 kg/m  as calculated from the following:    Height as of 23: 1.753 m (5' 9\").    Weight as of 3/1/23: 83 kg (182 lb 14.4 oz).   {Weight Management Plan needed for ACO:198409}      He reports that he quit smoking about 53 years ago. His smoking use included cigarettes and pipe. He has a 10.00 pack-year smoking history. He has never used smokeless tobacco.      Appropriate preventive services were discussed with this patient, " including applicable screening as appropriate for cardiovascular disease, diabetes, osteopenia/osteoporosis, and glaucoma.  As appropriate for age/gender, discussed screening for colorectal cancer, prostate cancer, breast cancer, and cervical cancer. Checklist reviewing preventive services available has been given to the patient.    Reviewed patients plan of care and provided an AVS. The {CarePlan:325797} for Juan Carlos meets the Care Plan requirement. This Care Plan has been established and reviewed with the {PATIENT, FAMILY MEMBER, CAREGIVER:869695}.    {Counseling Resources  [place] Preventive Services Task Force  Cholesterol Screening  Health diet/nutrition  Pooled Cohorts Equation Calculator  [employer]'s MyPlate  ASA Prophylaxis  Lung CA Screening  Osteoporosis prevention/bone health :176105}  {Prostate Cancer Screening  Consider for men 55-69 per guidance from USPSTF :263274}    Adonis Trotter MD  [facility]    Identified Health Risks:  {Medicare required documentation of substance and opioid use disorders screening :867533}  Answers for HPI/ROS submitted by the patient on 7/10/2023  If you checked off any problems, how difficult have these problems made it for you to do your work, take care of things at home, or get along with other people?: Not difficult at all  PHQ9 TOTAL SCORE: 0

## 2023-07-25 ENCOUNTER — TRANSFERRED RECORDS (OUTPATIENT)
Dept: HEALTH INFORMATION MANAGEMENT | Facility: CLINIC | Age: 83
End: 2023-07-25
Payer: COMMERCIAL

## 2023-07-26 ENCOUNTER — OFFICE VISIT (OUTPATIENT)
Dept: CARDIOLOGY | Facility: CLINIC | Age: 83
End: 2023-07-26
Payer: COMMERCIAL

## 2023-07-26 VITALS
HEIGHT: 69 IN | BODY MASS INDEX: 26.32 KG/M2 | WEIGHT: 177.7 LBS | DIASTOLIC BLOOD PRESSURE: 60 MMHG | SYSTOLIC BLOOD PRESSURE: 110 MMHG | OXYGEN SATURATION: 97 % | HEART RATE: 64 BPM

## 2023-07-26 DIAGNOSIS — R01.1 SYSTOLIC MURMUR: ICD-10-CM

## 2023-07-26 DIAGNOSIS — E78.5 DYSLIPIDEMIA, GOAL LDL BELOW 70: ICD-10-CM

## 2023-07-26 DIAGNOSIS — I25.10 CORONARY ARTERY DISEASE DUE TO LIPID RICH PLAQUE: ICD-10-CM

## 2023-07-26 DIAGNOSIS — I25.708 CORONARY ARTERY DISEASE INVOLVING CORONARY BYPASS GRAFT OF NATIVE HEART WITH OTHER FORMS OF ANGINA PECTORIS (H): Primary | ICD-10-CM

## 2023-07-26 DIAGNOSIS — I25.83 CORONARY ARTERY DISEASE DUE TO LIPID RICH PLAQUE: ICD-10-CM

## 2023-07-26 PROCEDURE — 99215 OFFICE O/P EST HI 40 MIN: CPT | Performed by: INTERNAL MEDICINE

## 2023-07-26 RX ORDER — NITROGLYCERIN 0.4 MG/1
TABLET SUBLINGUAL
Qty: 25 TABLET | Refills: 3 | Status: SHIPPED | OUTPATIENT
Start: 2023-07-26 | End: 2024-01-25

## 2023-07-26 RX ORDER — ATORVASTATIN CALCIUM 40 MG/1
40 TABLET, FILM COATED ORAL AT BEDTIME
Qty: 90 TABLET | Refills: 3 | Status: SHIPPED | OUTPATIENT
Start: 2023-07-26 | End: 2024-01-25

## 2023-07-26 RX ORDER — METOPROLOL TARTRATE 25 MG/1
25 TABLET, FILM COATED ORAL 2 TIMES DAILY
Qty: 180 TABLET | Refills: 3 | Status: SHIPPED | OUTPATIENT
Start: 2023-07-26 | End: 2024-01-25

## 2023-07-26 RX ORDER — LOSARTAN POTASSIUM 25 MG/1
25 TABLET ORAL DAILY
Qty: 90 TABLET | Refills: 3 | Status: SHIPPED | OUTPATIENT
Start: 2023-07-26 | End: 2024-01-25

## 2023-07-26 RX ORDER — NITROGLYCERIN 0.4 MG/1
TABLET SUBLINGUAL
COMMUNITY
End: 2023-07-26

## 2023-07-26 NOTE — PROGRESS NOTES
Cardiology Clinic Progress Note:    July 26, 2023   Patient Name: Juan Carlos Marley  Patient MRN: [id number]     Consult indication: CAD    HPI:    I had the opportunity to see patient Juan Carlos Marley in cardiology clinic for a follow up visit. Patient is followed by our colleague Adonis Trotter MD with Primary Care.     Patient is a pleasant 83-year-old male with a past medical history significant for hypertension, CVA, dyslipidemia, GERD, hearing impairment, coronary artery disease status post CABG (2013), who presents for follow-up.    Previously received care through the [facility], and then later through [facility] with Dr. House and Dr. Ac.  Reviewed notes through care everywhere.  Patient underwent CABG in 2013 with LIMA to LAD, SVG to RCA, sequential SVG to ramus and OM and diagonal (diagonal touchdown presumed occluded on cath 2015). Postoperatively he was found to have paroxysmal atrial fibrillation, per chart review he was evaluated extensively by Neurology, anticoagulation with apixaban was discontinued and patient was placed on dual antiplatelet therapy.  Patient has had intermittent noncardiac chest pain since then.  Patient underwent coronary angiogram 7/17/2015 that demonstrated severe native coronary artery disease with subtotally occluded LAD and left circumflex, occluded RCA.  Presumed patent LIMA to LAD with competitive flow in LAD.  Patent sequential SVG to ramus and OM and diagonal, anastomosis to diagonal not seen presumably occluded.  Patent SVG to RCA.  Last ischemic evaluation Lexiscan stress test 7/6/2016 negative for inducible myocardial ischemia or infarction.    Overall patient reports that he has been doing well.  He denies any exertional chest pain, chest pressure, abnormal shortness of breath.  He does not exercise regularly but does stay active by maintaining his property.  He is accompanied today by his wife.  He has stairs at home and traverses the stairs 8-10 times  a day without issue.  Blood pressure today in clinic 110/60 mmHg.    Assessment and Plan/Recommendations:    # CAD s/p CABG 2013 (LIMA to LAD, SVG to RCA, sequential SVG to ramus and OM and diagonal (diagonal touchdown presumed occluded on cath 2015). Stable, no angina.   # CVA, on aspirin and clopidogrel per Neurology  # HL, lipids well controlled on statin  # HTN, BP well controlled    -Overall patient is in stable cardiac health without symptoms concerning for angina or decompensated heart failure  - Will reassess cardiac function with an echocardiogram, last echocardiogram was over 5 years ago  - Continue metoprolol, losartan, sublingual nitroglycerin as needed  - Aspirin and clopidogrel per Neurology  - Encouraged continued lifestyle habits including regular aerobic exercise, heart healthy diet  - Follow-up in 1 year with cardiology NINA, and with me the following year if stable, or sooner as needed      Thank you for allowing our team to participate in the care of Juan Carlos Marley.  Please do not hesitate to call or page me with any questions or concerns.    Sincerely,     Félix Avitia MD, [facility]  Cardiology  Text Page   July 26, 2023    Voice recognition software utilized.     Total time spent on this encounter today: 44 minutes, providing care in this encounter including, but not limited to, reviewing prior medical records, laboratory data, imaging studies, diagnostic studies, procedure notes, formulating an assessment and plan, recommendations, discussion and counseling with patient face to face, dictation.    Past Medical History:   The ASCVD Risk score (Baldev DK, et al., 2019) failed to calculate for the following reasons:    The 2019 ASCVD risk score is only valid for ages 40 to 79  Past Medical History:   Diagnosis Date    Cataract, nuclear sclerotic, left eye 06/02/2017    Cerebral artery occlusion with cerebral infarction (H)     Coronary artery disease due to lipid rich plaque      Dyslipidemia, goal LDL below 70 12/20/2014    Essential hypertension with goal blood pressure less than 130/85     Gastroesophageal reflux disease     Hernia, abdominal     History of spinal cord injury     [place] (hard of hearing)     Nonsustained ventricular tachycardia (H)     6 beat run, asymptomatic per Dr. Hackett    Paroxysmal atrial fibrillation (H) 09/02/2013    at the time of CABG; [facility] did a 4-week monitor in 2017 and did not find any atrial fibrillation and therefore stopped his Eliquis.    Stricture of esophagus 2013    dilated twice since then    Transient ischemic attack 09/2018    transient speech difficulty and dizziness while in [place]; did not seek medical attention    Transient ischemic attack 10/2018    same symptoms as in [place]    Transient ischemic attack 11/20/2016    Transient ischemic attack 01/24/2016    left cerebral hemisphere    Transient ischemic attack 12/20/2014        Past Surgical History:   Past Surgical History:   Procedure Laterality Date    BYPASS GRAFT ARTERY CORONARY  09/2013    underwent 5-vessel coronary bypass grafting at the [facility]    CYSTOSCOPY      FOOT SURGERY Left 12/19/2014    Dr. Carr    FRACTURE SURGERY Right     hand    HERNIA REPAIR  11/2013    HERNIORRHAPHY INGUINAL Left 3/1/2023    Procedure: AND OPEN LEFT INGUINAL HERNIA REPAIR;  Surgeon: Luis Mitchell MD;  [place] [facility] [place]    IR PAE  12/1/2022    LAPAROSCOPIC HERNIORRHAPHY VENTRAL N/A 3/1/2023    Procedure: LAPAROSCOPIC INCARCERATED VENTRAL HERNIA REPAIR;  Surgeon: Luis Mitchell MD;  [place] [facility] [place]    ORCHIECTOMY SCROTAL Right     ORIF FEMUR FRACTURE Right     PROSTATE SURGERY      half removed    PROSTATECTOMY  11/2013    TESTICLE SURGERY      removal of testicle       Medications (outpatient):  Current Outpatient Medications   Medication Sig Dispense Refill    aspirin (ASA) 81 MG chewable tablet Take 81 mg by mouth daily      atorvastatin (LIPITOR) 40 MG tablet Take 1  tablet (40 mg) by mouth At Bedtime -Needs to schedule cardiology 90 tablet 3    calcium carbonate (OS-ISABEL) 500 MG tablet Take 1 tablet by mouth once 600 mg daily      clopidogrel (PLAVIX) 75 MG tablet Take 1 tablet (75 mg) by mouth daily 90 tablet 1    famotidine (PEPCID) 10 MG tablet Take 2 tablets (20 mg) by mouth 2 times daily      Ferrous Gluconate 324 (37.5 Fe) MG TABS Take 324 mg by mouth 2 times daily      finasteride (PROSCAR) 5 MG tablet Take 1 tablet (5 mg) by mouth daily 90 tablet 1    losartan (COZAAR) 25 MG tablet Take 1 tablet (25 mg) by mouth daily 90 tablet 3    metoprolol tartrate (LOPRESSOR) 25 MG tablet Take 1 tablet (25 mg) by mouth 2 times daily 180 tablet 3    Multiple Vitamins-Minerals (MULTIVITAMIN ADULT, MINERALS,) TABS Take 1 tablet by mouth daily      nitroGLYcerin (NITROSTAT) 0.4 MG sublingual tablet For chest pain place 1 tablet under the tongue every 5 minutes for 3 doses. If symptoms persist 5 minutes after 1st dose call 911. 25 tablet 3    tamsulosin (FLOMAX) 0.4 MG capsule Take 1 capsule by mouth 2 times daily      vitamin C (ASCORBIC ACID) 500 MG tablet Take 500 mg by mouth daily       doxycycline hyclate (VIBRAMYCIN) 100 MG capsule Take 1 capsule (100 mg) by mouth daily 10 capsule 1       Allergies:  No Known Allergies    Social History:   History   Drug Use No      History   Smoking Status    Former    Packs/day: 1.00    Years: 10.00    Types: Cigarettes, Pipe    Quit date: 1970   Smokeless Tobacco    Never     Social History    Substance and Sexual Activity      Alcohol use: Not Currently        Alcohol/week: 4.0 standard drinks of alcohol        Types: 4 Glasses of wine per week       Family History:  Family History   Problem Relation Age of Onset    Other - See Comments Mother          accident age 49    Migraines Mother     Aortic aneurysm Father          84    Other - See Comments Sister         autistic     No Known Problems Daughter     Pulmonary Hypertension  "No family hx of     Congenital heart disease No family hx of        Review of Systems:   A complete review of systems was negative except as mentioned in the History of Present Illness.     Objective & Physical Exam:  /60   Pulse 64   Ht 1.753 m (5' 9\")   Wt 80.6 kg (177 lb 11.2 oz)   SpO2 97%   BMI 26.24 kg/m    Wt Readings from Last 2 Encounters:   07/26/23 80.6 kg (177 lb 11.2 oz)   07/10/23 81.6 kg (179 lb 12.8 oz)     Body mass index is 26.24 kg/m .   Body surface area is 1.98 meters squared.    Constitutional: appears stated age, in no apparent distress, appears to be well nourished  Head: normocephalic, atraumatic  Neck: supple, trachea midline  Pulmonary: clear to auscultation bilaterally  Cardiovascular: JVP normal, regular rate, regular rhythm, normal S1 and S2, no S3, S4, 1/6 SPENSER at the RUSB, no lower extremity edema  Gastrointestinal: no guarding, non-rigid   Neurologic: awake, alert, moves all extremities  Skin: no jaundice, warm on limited exam  Psychiatric: affect is normal, answers questions appropriately, oriented to self and place    Data reviewed:  Lab Results   Component Value Date    WBC 5.6 07/10/2023    RBC 4.08 (L) 07/10/2023    HGB 13.4 07/10/2023    HCT 38.7 (L) 07/10/2023    MCV 95 07/10/2023    MCH 32.8 07/10/2023    MCHC 34.6 07/10/2023    RDW 12.5 07/10/2023     (L) 07/10/2023     Sodium   Date Value Ref Range Status   07/10/2023 143 136 - 145 mmol/L Final     Potassium   Date Value Ref Range Status   07/10/2023 4.2 3.4 - 5.3 mmol/L Final   05/27/2022 3.2 (L) 3.4 - 5.3 mmol/L Final     Chloride   Date Value Ref Range Status   07/10/2023 108 (H) 98 - 107 mmol/L Final   05/27/2022 112 (H) 94 - 109 mmol/L Final     Carbon Dioxide (CO2)   Date Value Ref Range Status   07/10/2023 27 22 - 29 mmol/L Final   05/27/2022 25 20 - 32 mmol/L Final     Anion Gap   Date Value Ref Range Status   07/10/2023 8 7 - 15 mmol/L Final   05/27/2022 5 3 - 14 mmol/L Final     Glucose   Date " Value Ref Range Status   07/10/2023 91 70 - 99 mg/dL Final   05/27/2022 138 (H) 70 - 99 mg/dL Final     GLUCOSE BY METER POCT   Date Value Ref Range Status   12/01/2022 82 70 - 99 mg/dL Final     Urea Nitrogen   Date Value Ref Range Status   07/10/2023 17.2 8.0 - 23.0 mg/dL Final   05/27/2022 14 7 - 30 mg/dL Final     Creatinine   Date Value Ref Range Status   07/10/2023 0.72 0.67 - 1.17 mg/dL Final     GFR Estimate   Date Value Ref Range Status   07/10/2023 >90 >60 mL/min/1.73m2 Final   02/23/2021 >60 >60 mL/min/1.73m2 Final     GFR, ESTIMATED POCT   Date Value Ref Range Status   08/19/2021 >60 >60 mL/min/1.73m2 Final     Calcium   Date Value Ref Range Status   07/10/2023 9.4 8.8 - 10.2 mg/dL Final     Bilirubin Total   Date Value Ref Range Status   07/10/2023 0.4 <=1.2 mg/dL Final     Alkaline Phosphatase   Date Value Ref Range Status   07/10/2023 83 40 - 129 U/L Final     ALT   Date Value Ref Range Status   07/10/2023 29 0 - 70 U/L Final     Comment:     Reference intervals for this test were updated on 6/12/2023 to more accurately reflect our healthy population. There may be differences in the flagging of prior results with similar values performed with this method. Interpretation of those prior results can be made in the context of the updated reference intervals.       AST   Date Value Ref Range Status   07/10/2023 26 0 - 45 U/L Final     Comment:     Reference intervals for this test were updated on 6/12/2023 to more accurately reflect our healthy population. There may be differences in the flagging of prior results with similar values performed with this method. Interpretation of those prior results can be made in the context of the updated reference intervals.     Recent Labs   Lab Test 07/10/23  1328 04/26/22  1206   CHOL 139 138   HDL 78 66   LDL 52 64   TRIG 47 42      Lab Results   Component Value Date    A1C 5.2 11/24/2014    A1C 5.6 01/14/2014    A1C 5.6 11/22/2013    A1C 5.2 08/14/2013

## 2023-07-26 NOTE — PATIENT INSTRUCTIONS
July 26, 2023    Thank you for allowing our Cardiology team to participate in your care.     Please note the following changes to your heart treatment plan:     Medication changes:   - none    Tests to be done:  - TTE (heart ultrasound)    Follow up:  - Follow up in 1 year with cardiology NINA, or sooner as needed.      For scheduling, please call [phone].      Please contact our team through [employer] or our Nurse Team Voicemail service [phone], or the General Clinic [phone] for any questions or concerns.     If you are having a medical emergency, please call [phone].     Sincerely,    Félix Avitia MD, [facility]  Cardiology    [facility] - [facility] - [facility] - [place] [facility]

## 2023-07-26 NOTE — LETTER
7/26/2023    Adonis Trotter MD  [address] [facility] [address]    RE: Juan Carlos Marley       Dear Colleague,     I had the pleasure of seeing Juan Carlos Marley in the [facility].      Cardiology Clinic Progress Note:    July 26, 2023   Patient Name: Juan Carlos Marley  Patient MRN: [id number]     Consult indication: CAD    HPI:    I had the opportunity to see patient Juan Carlos Marley in cardiology clinic for a follow up visit. Patient is followed by our colleague Adonis Trotter MD with Primary Care.     Patient is a pleasant 83-year-old male with a past medical history significant for hypertension, CVA, dyslipidemia, GERD, hearing impairment, coronary artery disease status post CABG (2013), who presents for follow-up.    Previously received care through the [facility], and then later through [facility] with Dr. House and Dr. Ac.  Reviewed notes through care everywhere.  Patient underwent CABG in 2013 with LIMA to LAD, SVG to RCA, sequential SVG to ramus and OM and diagonal (diagonal touchdown presumed occluded on cath 2015). Postoperatively he was found to have paroxysmal atrial fibrillation, per chart review he was evaluated extensively by Neurology, anticoagulation with apixaban was discontinued and patient was placed on dual antiplatelet therapy.  Patient has had intermittent noncardiac chest pain since then.  Patient underwent coronary angiogram 7/17/2015 that demonstrated severe native coronary artery disease with subtotally occluded LAD and left circumflex, occluded RCA.  Presumed patent LIMA to LAD with competitive flow in LAD.  Patent sequential SVG to ramus and OM and diagonal, anastomosis to diagonal not seen presumably occluded.  Patent SVG to RCA.  Last ischemic evaluation Lexiscan stress test 7/6/2016 negative for inducible myocardial ischemia or infarction.    Overall patient reports that he has been doing well.  He denies any exertional chest pain, chest pressure,  abnormal shortness of breath.  He does not exercise regularly but does stay active by maintaining his property.  He is accompanied today by his wife.  He has stairs at home and traverses the stairs 8-10 times a day without issue.  Blood pressure today in clinic 110/60 mmHg.    Assessment and Plan/Recommendations:    # CAD s/p CABG 2013 (LIMA to LAD, SVG to RCA, sequential SVG to ramus and OM and diagonal (diagonal touchdown presumed occluded on cath 2015). Stable, no angina.   # CVA, on aspirin and clopidogrel per Neurology  # HL, lipids well controlled on statin  # HTN, BP well controlled    -Overall patient is in stable cardiac health without symptoms concerning for angina or decompensated heart failure  - Will reassess cardiac function with an echocardiogram, last echocardiogram was over 5 years ago  - Continue metoprolol, losartan, sublingual nitroglycerin as needed  - Aspirin and clopidogrel per Neurology  - Encouraged continued lifestyle habits including regular aerobic exercise, heart healthy diet  - Follow-up in 1 year with cardiology NINA, and with me the following year if stable, or sooner as needed      Thank you for allowing our team to participate in the care of Juan Carlos Marley.  Please do not hesitate to call or page me with any questions or concerns.    Sincerely,     Félix Avitia MD, [facility]  Cardiology  Text Page   July 26, 2023    Voice recognition software utilized.     Total time spent on this encounter today: 44 minutes, providing care in this encounter including, but not limited to, reviewing prior medical records, laboratory data, imaging studies, diagnostic studies, procedure notes, formulating an assessment and plan, recommendations, discussion and counseling with patient face to face, dictation.    Past Medical History:   The ASCVD Risk score (Baldev DK, et al., 2019) failed to calculate for the following reasons:    The 2019 ASCVD risk score is only valid for ages 40 to  79  Past Medical History:   Diagnosis Date    Cataract, nuclear sclerotic, left eye 06/02/2017    Cerebral artery occlusion with cerebral infarction (H)     Coronary artery disease due to lipid rich plaque     Dyslipidemia, goal LDL below 70 12/20/2014    Essential hypertension with goal blood pressure less than 130/85     Gastroesophageal reflux disease     Hernia, abdominal     History of spinal cord injury     [place] (hard of hearing)     Nonsustained ventricular tachycardia (H)     6 beat run, asymptomatic per Dr. Hackett    Paroxysmal atrial fibrillation (H) 09/02/2013    at the time of CABG; [facility] did a 4-week monitor in 2017 and did not find any atrial fibrillation and therefore stopped his Eliquis.    Stricture of esophagus 2013    dilated twice since then    Transient ischemic attack 09/2018    transient speech difficulty and dizziness while in [place]; did not seek medical attention    Transient ischemic attack 10/2018    same symptoms as in [place]    Transient ischemic attack 11/20/2016    Transient ischemic attack 01/24/2016    left cerebral hemisphere    Transient ischemic attack 12/20/2014        Past Surgical History:   Past Surgical History:   Procedure Laterality Date    BYPASS GRAFT ARTERY CORONARY  09/2013    underwent 5-vessel coronary bypass grafting at the [facility]    CYSTOSCOPY      FOOT SURGERY Left 12/19/2014    Dr. Carr    FRACTURE SURGERY Right     hand    HERNIA REPAIR  11/2013    HERNIORRHAPHY INGUINAL Left 3/1/2023    Procedure: AND OPEN LEFT INGUINAL HERNIA REPAIR;  Surgeon: Luis Mitchell MD;  Location: [facility] OR    IR PAE  12/1/2022    LAPAROSCOPIC HERNIORRHAPHY VENTRAL N/A 3/1/2023    Procedure: LAPAROSCOPIC INCARCERATED VENTRAL HERNIA REPAIR;  Surgeon: Luis Mitchell MD;  Location: [facility] OR    ORCHIECTOMY SCROTAL Right     ORIF FEMUR FRACTURE Right     PROSTATE SURGERY      half removed    PROSTATECTOMY  11/2013    TESTICLE SURGERY      removal of testicle        Medications (outpatient):  Current Outpatient Medications   Medication Sig Dispense Refill    aspirin (ASA) 81 MG chewable tablet Take 81 mg by mouth daily      atorvastatin (LIPITOR) 40 MG tablet Take 1 tablet (40 mg) by mouth At Bedtime -Needs to schedule cardiology 90 tablet 3    calcium carbonate (OS-ISABEL) 500 MG tablet Take 1 tablet by mouth once 600 mg daily      clopidogrel (PLAVIX) 75 MG tablet Take 1 tablet (75 mg) by mouth daily 90 tablet 1    famotidine (PEPCID) 10 MG tablet Take 2 tablets (20 mg) by mouth 2 times daily      Ferrous Gluconate 324 (37.5 Fe) MG TABS Take 324 mg by mouth 2 times daily      finasteride (PROSCAR) 5 MG tablet Take 1 tablet (5 mg) by mouth daily 90 tablet 1    losartan (COZAAR) 25 MG tablet Take 1 tablet (25 mg) by mouth daily 90 tablet 3    metoprolol tartrate (LOPRESSOR) 25 MG tablet Take 1 tablet (25 mg) by mouth 2 times daily 180 tablet 3    Multiple Vitamins-Minerals (MULTIVITAMIN ADULT, MINERALS,) TABS Take 1 tablet by mouth daily      nitroGLYcerin (NITROSTAT) 0.4 MG sublingual tablet For chest pain place 1 tablet under the tongue every 5 minutes for 3 doses. If symptoms persist 5 minutes after 1st dose call 911. 25 tablet 3    tamsulosin (FLOMAX) 0.4 MG capsule Take 1 capsule by mouth 2 times daily      vitamin C (ASCORBIC ACID) 500 MG tablet Take 500 mg by mouth daily       doxycycline hyclate (VIBRAMYCIN) 100 MG capsule Take 1 capsule (100 mg) by mouth daily 10 capsule 1       Allergies:  No Known Allergies    Social History:   History   Drug Use No      History   Smoking Status    Former    Packs/day: 1.00    Years: 10.00    Types: Cigarettes, Pipe    Quit date: 2/24/1970   Smokeless Tobacco    Never     Social History    Substance and Sexual Activity      Alcohol use: Not Currently        Alcohol/week: 4.0 standard drinks of alcohol        Types: 4 Glasses of wine per week       Family History:  Family History   Problem Relation Age of Onset    Other - See  "Comments Mother          accident age 49    Migraines Mother     Aortic aneurysm Father          84    Other - See Comments Sister         autistic     No Known Problems Daughter     Pulmonary Hypertension No family hx of     Congenital heart disease No family hx of        Review of Systems:   A complete review of systems was negative except as mentioned in the History of Present Illness.     Objective & Physical Exam:  /60   Pulse 64   Ht 1.753 m (5' 9\")   Wt 80.6 kg (177 lb 11.2 oz)   SpO2 97%   BMI 26.24 kg/m    Wt Readings from Last 2 Encounters:   23 80.6 kg (177 lb 11.2 oz)   07/10/23 81.6 kg (179 lb 12.8 oz)     Body mass index is 26.24 kg/m .   Body surface area is 1.98 meters squared.    Constitutional: appears stated age, in no apparent distress, appears to be well nourished  Head: normocephalic, atraumatic  Neck: supple, trachea midline  Pulmonary: clear to auscultation bilaterally  Cardiovascular: JVP normal, regular rate, regular rhythm, normal S1 and S2, no S3, S4, 1/6 SPENSER at the RUSB, no lower extremity edema  Gastrointestinal: no guarding, non-rigid   Neurologic: awake, alert, moves all extremities  Skin: no jaundice, warm on limited exam  Psychiatric: affect is normal, answers questions appropriately, oriented to self and place    Data reviewed:  Lab Results   Component Value Date    WBC 5.6 07/10/2023    RBC 4.08 (L) 07/10/2023    HGB 13.4 07/10/2023    HCT 38.7 (L) 07/10/2023    MCV 95 07/10/2023    MCH 32.8 07/10/2023    MCHC 34.6 07/10/2023    RDW 12.5 07/10/2023     (L) 07/10/2023     Sodium   Date Value Ref Range Status   07/10/2023 143 136 - 145 mmol/L Final     Potassium   Date Value Ref Range Status   07/10/2023 4.2 3.4 - 5.3 mmol/L Final   2022 3.2 (L) 3.4 - 5.3 mmol/L Final     Chloride   Date Value Ref Range Status   07/10/2023 108 (H) 98 - 107 mmol/L Final   2022 112 (H) 94 - 109 mmol/L Final     Carbon Dioxide (CO2)   Date Value Ref Range " Status   07/10/2023 27 22 - 29 mmol/L Final   05/27/2022 25 20 - 32 mmol/L Final     Anion Gap   Date Value Ref Range Status   07/10/2023 8 7 - 15 mmol/L Final   05/27/2022 5 3 - 14 mmol/L Final     Glucose   Date Value Ref Range Status   07/10/2023 91 70 - 99 mg/dL Final   05/27/2022 138 (H) 70 - 99 mg/dL Final     GLUCOSE BY METER POCT   Date Value Ref Range Status   12/01/2022 82 70 - 99 mg/dL Final     Urea Nitrogen   Date Value Ref Range Status   07/10/2023 17.2 8.0 - 23.0 mg/dL Final   05/27/2022 14 7 - 30 mg/dL Final     Creatinine   Date Value Ref Range Status   07/10/2023 0.72 0.67 - 1.17 mg/dL Final     GFR Estimate   Date Value Ref Range Status   07/10/2023 >90 >60 mL/min/1.73m2 Final   02/23/2021 >60 >60 mL/min/1.73m2 Final     GFR, ESTIMATED POCT   Date Value Ref Range Status   08/19/2021 >60 >60 mL/min/1.73m2 Final     Calcium   Date Value Ref Range Status   07/10/2023 9.4 8.8 - 10.2 mg/dL Final     Bilirubin Total   Date Value Ref Range Status   07/10/2023 0.4 <=1.2 mg/dL Final     Alkaline Phosphatase   Date Value Ref Range Status   07/10/2023 83 40 - 129 U/L Final     ALT   Date Value Ref Range Status   07/10/2023 29 0 - 70 U/L Final     Comment:     Reference intervals for this test were updated on 6/12/2023 to more accurately reflect our healthy population. There may be differences in the flagging of prior results with similar values performed with this method. Interpretation of those prior results can be made in the context of the updated reference intervals.       AST   Date Value Ref Range Status   07/10/2023 26 0 - 45 U/L Final     Comment:     Reference intervals for this test were updated on 6/12/2023 to more accurately reflect our healthy population. There may be differences in the flagging of prior results with similar values performed with this method. Interpretation of those prior results can be made in the context of the updated reference intervals.     Recent Labs   Lab Test  07/10/23  1328 04/26/22  1206   CHOL 139 138   HDL 78 66   LDL 52 64   TRIG 47 42      Lab Results   Component Value Date    A1C 5.2 11/24/2014    A1C 5.6 01/14/2014    A1C 5.6 11/22/2013    A1C 5.2 08/14/2013        Thank you for allowing me to participate in the care of your patient.      Sincerely,     Félix Avitia MD     [facility]  cc:   No referring provider defined for this encounter.

## 2023-07-27 ENCOUNTER — TRANSFERRED RECORDS (OUTPATIENT)
Dept: HEALTH INFORMATION MANAGEMENT | Facility: CLINIC | Age: 83
End: 2023-07-27
Payer: COMMERCIAL

## 2023-08-03 ENCOUNTER — HOSPITAL ENCOUNTER (OUTPATIENT)
Dept: RADIOLOGY | Facility: CLINIC | Age: 83
Discharge: HOME | End: 2023-08-03
Attending: OTOLARYNGOLOGY | Admitting: OTOLARYNGOLOGY
Payer: COMMERCIAL

## 2023-08-03 DIAGNOSIS — R13.10 DYSPHAGIA: ICD-10-CM

## 2023-08-03 PROCEDURE — 74220 X-RAY XM ESOPHAGUS 1CNTRST: CPT

## 2023-08-30 ENCOUNTER — HOSPITAL ENCOUNTER (OUTPATIENT)
Dept: CARDIOLOGY | Facility: CLINIC | Age: 83
Discharge: HOME | End: 2023-08-30
Attending: INTERNAL MEDICINE | Admitting: INTERNAL MEDICINE
Payer: COMMERCIAL

## 2023-08-30 DIAGNOSIS — I25.708 CORONARY ARTERY DISEASE INVOLVING CORONARY BYPASS GRAFT OF NATIVE HEART WITH OTHER FORMS OF ANGINA PECTORIS (H): ICD-10-CM

## 2023-08-30 DIAGNOSIS — R01.1 SYSTOLIC MURMUR: ICD-10-CM

## 2023-08-30 LAB — LVEF ECHO: NORMAL

## 2023-08-30 PROCEDURE — 93306 TTE W/DOPPLER COMPLETE: CPT

## 2023-08-30 PROCEDURE — 93306 TTE W/DOPPLER COMPLETE: CPT | Mod: 26 | Performed by: INTERNAL MEDICINE

## 2023-08-31 ENCOUNTER — TELEPHONE (OUTPATIENT)
Dept: CARDIOLOGY | Facility: CLINIC | Age: 83
End: 2023-08-31
Payer: COMMERCIAL

## 2023-08-31 NOTE — TELEPHONE ENCOUNTER
My Chart message sent to Patient regarding results.     Dr. Avitia reply 8-31-23   Results reviewed, please let the patient know that overall findings are reassuring, normal LVEF 55-60%, only mild to mild-mod valvular abnormalities. Follow up as previously planned, will plan on repeat TTE in 2 years thanks!     Dr. Avitia reviewed complete echo done 8-30-23   Echo   The visual ejection fraction is 55-60%.  Normal left ventricular wall motion  There is mild-moderate biatrial enlargement.  There is mild to moderate (1-2+) tricuspid regurgitation.  Right ventricular systolic pressure is elevated, consistent with mild  pulmonary hypertension.  There is mild (1+) aortic regurgitation.  There is mild (1+) pulmonic valvular regurgitation.

## 2023-08-31 NOTE — TELEPHONE ENCOUNTER
----- Message from Félix Avitia MD sent at 8/31/2023  7:45 AM CDT -----  Results reviewed, please let the patient know that overall findings are reassuring, normal LVEF 55-60%, only mild to mild-mod valvular abnormalities. Follow up as previously planned, will plan on repeat TTE in 2 years thanks!

## 2023-08-31 NOTE — RESULT ENCOUNTER NOTE
Results reviewed, please let the patient know that overall findings are reassuring, normal LVEF 55-60%, only mild to mild-mod valvular abnormalities. Follow up as previously planned, will plan on repeat TTE in 2 years thanks!

## 2023-09-19 ENCOUNTER — TRANSFERRED RECORDS (OUTPATIENT)
Dept: HEALTH INFORMATION MANAGEMENT | Facility: CLINIC | Age: 83
End: 2023-09-19
Payer: COMMERCIAL

## 2023-09-29 ENCOUNTER — TELEPHONE (OUTPATIENT)
Dept: INTERNAL MEDICINE | Facility: CLINIC | Age: 83
End: 2023-09-29
Payer: COMMERCIAL

## 2023-09-29 NOTE — TELEPHONE ENCOUNTER
Patient calling to get a call back     Patient is wondering when he will need  a colonoscopy exam done again     Call Back   [phone]

## 2023-10-09 ENCOUNTER — OFFICE VISIT (OUTPATIENT)
Dept: PEDIATRICS | Facility: CLINIC | Age: 83
End: 2023-10-09
Payer: COMMERCIAL

## 2023-10-09 VITALS
BODY MASS INDEX: 26.58 KG/M2 | SYSTOLIC BLOOD PRESSURE: 116 MMHG | TEMPERATURE: 98.2 F | WEIGHT: 180 LBS | DIASTOLIC BLOOD PRESSURE: 67 MMHG | HEART RATE: 52 BPM | OXYGEN SATURATION: 96 % | RESPIRATION RATE: 20 BRPM

## 2023-10-09 DIAGNOSIS — Z01.818 PREOP GENERAL PHYSICAL EXAM: Primary | ICD-10-CM

## 2023-10-09 DIAGNOSIS — R39.12 BENIGN PROSTATIC HYPERPLASIA WITH WEAK URINARY STREAM: ICD-10-CM

## 2023-10-09 DIAGNOSIS — I10 ESSENTIAL HYPERTENSION: Chronic | ICD-10-CM

## 2023-10-09 DIAGNOSIS — I25.10 CORONARY ARTERY DISEASE DUE TO LIPID RICH PLAQUE: Chronic | ICD-10-CM

## 2023-10-09 DIAGNOSIS — I25.83 CORONARY ARTERY DISEASE DUE TO LIPID RICH PLAQUE: Chronic | ICD-10-CM

## 2023-10-09 DIAGNOSIS — N40.1 BENIGN PROSTATIC HYPERPLASIA WITH WEAK URINARY STREAM: ICD-10-CM

## 2023-10-09 DIAGNOSIS — Z86.73 HISTORY OF TIA (TRANSIENT ISCHEMIC ATTACK) AND STROKE: ICD-10-CM

## 2023-10-09 DIAGNOSIS — R13.10 DYSPHAGIA, UNSPECIFIED TYPE: ICD-10-CM

## 2023-10-09 PROCEDURE — 99214 OFFICE O/P EST MOD 30 MIN: CPT | Performed by: INTERNAL MEDICINE

## 2023-10-09 ASSESSMENT — PAIN SCALES - GENERAL: PAINLEVEL: NO PAIN (0)

## 2023-10-09 NOTE — H&P (VIEW-ONLY)
[facility] [place]  [address]  [address]  [place] [address]  Phone: [phone]  Fax: [phone]  Primary Provider: Adonis Trotter  Pre-op Performing Provider: ANUSHA FERNANDEZ      PREOPERATIVE EVALUATION:  Today's date: 10/9/2023    Juan Carlos is a 83 year old male who presents for a preoperative evaluation.      10/9/2023    10:34 AM   Additional Questions   Roomed by DANNA VAUGHAN cma   Accompanied by N/A         10/9/2023    10:34 AM   Patient Reported Additional Medications   Patient reports taking the following new medications N/A       Surgical Information:  Surgery/Procedure: Esophagogastroduodenoscopy  Surgery Location: [facility]  Surgeon:  Rene Barry MD  Surgery Date: 11/7  Time of Surgery: TBD  Where patient plans to recover: At home with family  Fax number for surgical facility: Note does not need to be faxed, will be available electronically in Epic.    Assessment & Plan     The proposed surgical procedure is considered LOW risk.    Preop general physical exam      Dysphagia, unspecified type  Food getting stuck at times. Plan proceed with EGD. He has questions about the procedure today. He tells me he has had this procedure with dilation years ago, but we don't have any reports. Sounds like this was done at [facility], and EGD planned is with [facility] provider. I do see a report of hospitalization for esophageal obstruction due to food impaction 8/18/2020. Will see if we can get reports from EGD.    Coronary artery disease due to lipid rich plaque  Asymptomatic. Follows with cardiology.   Of note, problem list notes PAF. I see mention of this noted in 2013 at time of CABG, with eliquis discontinued in 2017 after normal 4 week heart monitor, but I don't see any reports confirming.     Benign prostatic hyperplasia with weak urinary stream  Controlled on medication.    Essential hypertension  Excellent control    History of TIA  Followed by neurology. On plavix and  asa per his neurologist.        Risks and Recommendations:  The patient has the following additional risks and recommendations for perioperative complications:  Cardiovascular:   - follows closely with cardiology. No current symptoms.  Anemia/Bleeding/Clotting:       Antiplatelet or Anticoagulation Medication Instructions:   - Bleeding risk is low for this procedure (e.g. dental, skin, cataract).   - aspirin: Bleeding risk is low for this procedure and daily aspirin may be continued without modification.    - clopidrogel (Plavix), prasugrel (Effient), ticagrelor (Brilinta): Patient has a cardiac stent. Medication will NOT be stopped until cleared by cardiology.  No stent, but on ASA and plavix due to history of TIAs without specific etiology being determined but he has been maintained on aspirin and Plavix at the the neurologist direction for an extended length of time. OK to continue Plavix as recommended by GI. OK to hold the day of procedure as he tells me this is what he was told they would want him to do. I recommended patient discuss plavix with GI when he is called to schedule.     Additional Medication Instructions:   - ACE/ARB: Continue without modification (e.g., MAC anesthesia, neurosurgery, spine surgery, heart failure, or labile hypertension with risk of hypertension).   - Beta Blockers: Continue taking on the day of surgery.   - Calcium Channel Blockers: May be continued on the day of surgery.   - Statins: Continue taking on the day of surgery.    - fiber, laxatives: HOLD day of surgery   - anticholinergics: Continue without modification.  Takes flomax and finasteride    RECOMMENDATION:  APPROVAL GIVEN to proceed with proposed procedure, without further diagnostic evaluation.    38 minutes spent by me on the date of the encounter doing chart review, history and exam, documentation and further activities per the note      Subjective       HPI related to upcoming procedure: feeling like food sometimes  gets stuck in esophagus. Was on famotidine, was recommended to switch back to omeprazole at 40 mg but didn't end up doing so.         10/2/2023     6:19 PM   Preop Questions   1. Have you ever had a heart attack or stroke? No   2. Have you ever had surgery on your heart or blood vessels, such as a stent placement, a coronary artery bypass, or surgery on an artery in your head, neck, heart, or legs? YES - CABG 2013   3. Do you have chest pain with activity? No   4. Do you have a history of  heart failure? No   5. Do you currently have a cold, bronchitis or symptoms of other infection? No   6. Do you have a cough, shortness of breath, or wheezing? No   7. Do you or anyone in your family have previous history of blood clots? No   8. Do you or does anyone in your family have a serious bleeding problem such as prolonged bleeding following surgeries or cuts? YES - just currently on plavix and ASA.    9. Have you ever had problems with anemia or been told to take iron pills? YES - in past   10. Have you had any abnormal blood loss such as black, tarry or bloody stools? No - episode hematuria several years ago   11. Have you ever had a blood transfusion? No   12. Are you willing to have a blood transfusion if it is medically needed before, during, or after your surgery? Yes   13. Have you or any of your relatives ever had problems with anesthesia? No   14. Do you have sleep apnea, excessive snoring or daytime drowsiness? No   15. Do you have any artifical heart valves or other implanted medical devices like a pacemaker, defibrillator, or continuous glucose monitor? No   16. Do you have artificial joints? No   17. Are you allergic to latex? No       Health Care Directive:  Patient does not have a Health Care Directive or Living Will: Patient states has Advance Directive and will bring in a copy to clinic.    Preoperative Review of :   reviewed - prescription for norco in March 2023 for hernia repair       Status of  Chronic Conditions:  See problem list for active medical problems.  Problems all longstanding and stable, except as noted/documented.  See ROS for pertinent symptoms related to these conditions.    CAD - Patient has a longstanding history of moderate-severe CAD. Patient denies recent chest pain or NTG use, denies exercise induced dyspnea or PND. Most recent stress test 7/6/2016    HYPERLIPIDEMIA - Patient has a long history of significant Hyperlipidemia requiring medication for treatment with recent good control. Patient reports no problems or side effects with the medication.     HYPERTENSION - Patient has longstanding history of HTN , currently denies any symptoms referable to elevated blood pressure. Specifically denies chest pain, palpitations, dyspnea, orthopnea, PND or peripheral edema. Blood pressure readings have been in normal range. Current medication regimen is as listed below. Patient denies any side effects of medication.     HISTORY OF TIA - on plavix and asa per neurology.    Review of Systems  CONSTITUTIONAL: NEGATIVE for fever, chills, change in weight  INTEGUMENTARY/SKIN: NEGATIVE for worrisome rashes, moles or lesions  EYES: NEGATIVE for vision changes or irritation  ENT/MOUTH: NEGATIVE for ear, mouth and throat problems  RESP: NEGATIVE for significant cough or SOB  CV: NEGATIVE for chest pain, palpitations or peripheral edema  GI: NEGATIVE for nausea, abdominal pain, heartburn, or change in bowel habits  : NEGATIVE for frequency, dysuria, or hematuria  MUSCULOSKELETAL: NEGATIVE for significant arthralgias or myalgia  NEURO: NEGATIVE for weakness, dizziness or paresthesias  PSYCHIATRIC: NEGATIVE for changes in mood or affect    Patient Active Problem List    Diagnosis Date Noted    Paroxysmal atrial fibrillation (H) 07/10/2023     Priority: Medium    Chronic bilateral low back pain without sciatica 03/28/2023     Priority: Medium    Urinary retention 05/24/2022     Priority: Medium    JOCELINE (acute  kidney injury) (H24) 05/24/2022     Priority: Medium    Anemia, unspecified type 05/24/2022     Priority: Medium    Hematuria, unspecified type 05/24/2022     Priority: Medium    Benign prostatic hyperplasia with weak urinary stream 08/18/2021     Priority: Medium    Chronic anticoagulation 08/18/2021     Priority: Medium    Essential hypertension      Priority: Medium    Dyslipidemia, goal LDL below 70 12/20/2014     Priority: Medium    Coronary artery disease due to lipid rich plaque 12/20/2014     Priority: Medium     underwent 5-vessel coronary bypass grafting at the [facility].            Past Medical History:   Diagnosis Date    Cancer (H) scalp; surgery 1 year ago    Cataract, nuclear sclerotic, left eye 06/02/2017    Cerebral artery occlusion with cerebral infarction (H)     Coronary artery disease due to lipid rich plaque     Dyslipidemia, goal LDL below 70 12/20/2014    Essential hypertension with goal blood pressure less than 130/85     Gastroesophageal reflux disease     Hernia, abdominal     History of spinal cord injury     [place] (hard of hearing)     Nonsustained ventricular tachycardia (H)     6 beat run, asymptomatic per Dr. Hackett    Paroxysmal atrial fibrillation (H) 09/02/2013    at the time of CABG; [facility] did a 4-week monitor in 2017 and did not find any atrial fibrillation and therefore stopped his Eliquis.    Stricture of esophagus 2013    dilated twice since then    Transient ischemic attack 09/2018    transient speech difficulty and dizziness while in [place]; did not seek medical attention    Transient ischemic attack 10/2018    same symptoms as in [place]    Transient ischemic attack 11/20/2016    Transient ischemic attack 01/24/2016    left cerebral hemisphere    Transient ischemic attack 12/20/2014     Past Surgical History:   Procedure Laterality Date    BIOPSY      BYPASS GRAFT ARTERY CORONARY  09/2013    underwent 5-vessel coronary bypass grafting at the [facility]    COLONOSCOPY       CYSTOSCOPY      ENT SURGERY  no  surgery but hearing aids approx. 5 yrs. ago.    EYE SURGERY  2 cataract surgery approx. 5 yrs.    FOOT SURGERY Left 12/19/2014    Dr. Carr    FRACTURE SURGERY Right     hand    GI SURGERY  throat stretches approx. 9 yrs. ago    HERNIA REPAIR  11/2013    HERNIORRHAPHY INGUINAL Left 03/01/2023    Procedure: AND OPEN LEFT INGUINAL HERNIA REPAIR;  Surgeon: Luis Mitchell MD;  [place] [facility] [place]    IR PAE  12/01/2022    LAPAROSCOPIC HERNIORRHAPHY VENTRAL N/A 03/01/2023    Procedure: LAPAROSCOPIC INCARCERATED VENTRAL HERNIA REPAIR;  Surgeon: uLis Mitchell MD;  [place] [facility] [place]    ORCHIECTOMY SCROTAL Right     ORIF FEMUR FRACTURE Right     ORTHOPEDIC SURGERY  broken leg, approx. 30 yrs. ago    PROSTATE SURGERY      half removed    PROSTATECTOMY  11/2013    TESTICLE SURGERY      removal of testicle    VASCULAR SURGERY       Current Outpatient Medications   Medication Sig Dispense Refill    aspirin (ASA) 81 MG chewable tablet Take 81 mg by mouth daily      atorvastatin (LIPITOR) 40 MG tablet Take 1 tablet (40 mg) by mouth At Bedtime -Needs to schedule cardiology 90 tablet 3    calcium carbonate (OS-ISABEL) 500 MG tablet Take 1 tablet by mouth once 600 mg daily      clopidogrel (PLAVIX) 75 MG tablet Take 1 tablet (75 mg) by mouth daily 90 tablet 1    doxycycline hyclate (VIBRAMYCIN) 100 MG capsule Take 1 capsule (100 mg) by mouth daily 10 capsule 1    famotidine (PEPCID) 10 MG tablet Take 2 tablets (20 mg) by mouth 2 times daily      Ferrous Gluconate 324 (37.5 Fe) MG TABS Take 324 mg by mouth 2 times daily      finasteride (PROSCAR) 5 MG tablet Take 1 tablet (5 mg) by mouth daily 90 tablet 1    losartan (COZAAR) 25 MG tablet Take 1 tablet (25 mg) by mouth daily 90 tablet 3    metoprolol tartrate (LOPRESSOR) 25 MG tablet Take 1 tablet (25 mg) by mouth 2 times daily 180 tablet 3    Multiple Vitamins-Minerals (MULTIVITAMIN ADULT, MINERALS,) TABS Take 1 tablet by mouth  daily      nitroGLYcerin (NITROSTAT) 0.4 MG sublingual tablet For chest pain place 1 tablet under the tongue every 5 minutes for 3 doses. If symptoms persist 5 minutes after 1st dose call 911. 25 tablet 3    tamsulosin (FLOMAX) 0.4 MG capsule Take 1 capsule by mouth 2 times daily      vitamin C (ASCORBIC ACID) 500 MG tablet Take 500 mg by mouth daily          No Known Allergies     Social History     Tobacco Use    Smoking status: Former     Packs/day: 0.50     Years: 10.00     Pack years: 5.00     Types: Cigarettes, Pipe     Start date: 1959     Quit date: 1969     Years since quittin.6    Smokeless tobacco: Never   Substance Use Topics    Alcohol use: Yes     Alcohol/week: 4.0 standard drinks of alcohol     Types: 4 Glasses of wine per week     Comment: modest use     Family History   Problem Relation Age of Onset    Other - See Comments Mother          accident age 49    Migraines Mother     Aortic aneurysm Father          84    Other - See Comments Sister         autistic     No Known Problems Daughter     Pulmonary Hypertension No family hx of     Congenital heart disease No family hx of      History   Drug Use No         Objective     /67 (BP Location: Right arm, Patient Position: Right side, Cuff Size: Adult Large)   Pulse 52   Temp 98.2  F (36.8  C) (Oral)   Resp 20   Wt 81.6 kg (180 lb)   SpO2 96%   BMI 26.58 kg/m      Physical Exam    GENERAL APPEARANCE: healthy, alert and no distress     EYES: EOMI,  PERRL     HENT: ear canals and TM's normal and nose and mouth without ulcers or lesions     NECK: no adenopathy, no asymmetry, masses, or scars and thyroid normal to palpation     RESP: lungs clear to auscultation - no rales, rhonchi or wheezes     CV: regular rates and rhythm, normal S1 S2, no S3 or S4 and no murmur, click or rub, trace LE edema     ABDOMEN:  soft, nontender, no HSM or masses and bowel sounds normal     MS: extremities normal- no gross deformities noted, no  evidence of inflammation in joints, FROM in all extremities.     SKIN: no suspicious lesions or rashes     NEURO: Normal strength and tone, mentation intact and speech normal     PSYCH: mentation appears normal. and affect normal/bright     LYMPHATICS: No cervical adenopathy    Recent Labs   Lab Test 07/10/23  1328 01/31/23  0954 12/01/22  1227 12/01/22  1209   HGB 13.4 13.3  --   --    * 157  --   --    INR  --   --   --  1.14    141   < >  --    POTASSIUM 4.2 4.2   < >  --    CR 0.72 0.71   < >  --     < > = values in this interval not displayed.        Diagnostics:  No labs were ordered during this visit.   No EKG required for low risk surgery (cataract, skin procedure, breast biopsy, etc).    Revised Cardiac Risk Index (RCRI):  The patient has the following serious cardiovascular risks for perioperative complications:   - Coronary Artery Disease (MI, positive stress test, angina, Qs on EKG) = 1 point     RCRI Interpretation: 1 point: Class II (low risk - 0.9% complication rate)         Signed Electronically by: Julieta Jones MD  Copy of this evaluation report is provided to requesting physician.

## 2023-10-09 NOTE — PATIENT INSTRUCTIONS
Set up appointment with Dr. Trotter for after your endoscopy.   Preparing for Your Surgery  Getting started  A nurse will call you to review your health history and instructions. They will give you an arrival time based on your scheduled surgery time. Please be ready to share:  Your doctor's clinic name and phone number  Your medical, surgical, and anesthesia history  A list of allergies and sensitivities  A list of medicines, including herbal treatments and over-the-counter drugs  Whether the patient has a legal guardian (ask how to send us the papers in advance)  Please tell us if you're pregnant--or if there's any chance you might be pregnant. Some surgeries may injure a fetus (unborn baby), so they require a pregnancy test. Surgeries that are safe for a fetus don't always need a test, and you can choose whether to have one.   If you have a child who's having surgery, please ask for a copy of Preparing for Your Child's Surgery.    Preparing for surgery  Within 10 to 30 days of surgery: Have a pre-op exam (sometimes called an H&P, or History and Physical). This can be done at a clinic or pre-operative center.  If you're having a , you may not need this exam. Talk to your care team.  At your pre-op exam, talk to your care team about all medicines you take. If you need to stop any medicines before surgery, ask when to start taking them again.  We do this for your safety. Many medicines can make you bleed too much during surgery. Some change how well surgery (anesthesia) drugs work.  Call your insurance company to let them know you're having surgery. (If you don't have insurance, call [phone].)  Call your clinic if there's any change in your health. This includes signs of a cold or flu (sore throat, runny nose, cough, rash, fever). It also includes a scrape or scratch near the surgery site.  If you have questions on the day of surgery, call your hospital or surgery center.  Eating and drinking  guidelines  For your safety: Unless your surgeon tells you otherwise, follow the guidelines below.  Eat and drink as usual until 8 hours before you arrive for surgery. After that, no food or milk.  Drink clear liquids until 2 hours before you arrive. These are liquids you can see through, like water, Gatorade, and Propel Water. They also include plain black coffee and tea (no cream or milk), candy, and breath mints. You can spit out gum when you arrive.  If you drink alcohol: Stop drinking it the night before surgery.  If your care team tells you to take medicine on the morning of surgery, it's okay to take it with a sip of water.  Preventing infection  Shower or bathe the night before and morning of your surgery. Follow the instructions your clinic gave you. (If no instructions, use regular soap.)  Don't shave or clip hair near your surgery site. We'll remove the hair if needed.  Don't smoke or vape the morning of surgery. You may chew nicotine gum up to 2 hours before surgery. A nicotine patch is okay.  Note: Some surgeries require you to completely quit smoking and nicotine. Check with your surgeon.  Your care team will make every effort to keep you safe from infection. We will:  Clean our hands often with soap and water (or an alcohol-based hand rub).  Clean the skin at your surgery site with a special soap that kills germs.  Give you a special gown to keep you warm. (Cold raises the risk of infection.)  Wear special hair covers, masks, gowns and gloves during surgery.  Give antibiotic medicine, if prescribed. Not all surgeries need antibiotics.  What to bring on the day of surgery  Photo ID and insurance card  Copy of your health care directive, if you have one  Glasses and hearing aids (bring cases)  You can't wear contacts during surgery  Inhaler and eye drops, if you use them (tell us about these when you arrive)  CPAP machine or breathing device, if you use them  A few personal items, if spending the  night  If you have . . .  A pacemaker, ICD (cardiac defibrillator) or other implant: Bring the ID card.  An implanted stimulator: Bring the remote control.  A legal guardian: Bring a copy of the certified (court-stamped) guardianship papers.  Please remove any jewelry, including body piercings. Leave jewelry and other valuables at home.  If you're going home the day of surgery  You must have a responsible adult drive you home. They should stay with you overnight as well.  If you don't have someone to stay with you, and you aren't safe to go home alone, we may keep you overnight. Insurance often won't pay for this.  After surgery  If it's hard to control your pain or you need more pain medicine, please call your surgeon's office.  Questions?   If you have any questions for your care team, list them here: _________________________________________________________________________________________________________________________________________________________________________ ____________________________________ ____________________________________ ____________________________________  For informational purposes only. Not to replace the advice of your health care provider. Copyright   2003, 2019 [employer]. All rights reserved. Clinically reviewed by Arabella Cardona MD. [employer] 850118 - REV 12/22.    How to Take Your Medication Before Surgery  - HOLD (do not take) fiber supplement, the day of surgery.  Take your other medications with a sip of water.  Confirm with the gastroenterologist, Dr. Gamboa, that he is OK with you continuing on your aspirin and plavix when they call you to prepare you for the procedure. They may want you to hold your plavix the day of your procedure.

## 2023-10-09 NOTE — PROGRESS NOTES
[facility] [place]  [address]  [address]  [place] [address]  Phone: [phone]  Fax: [phone]  Primary Provider: Adonis Trotter  Pre-op Performing Provider: ANUSHA FERNANDEZ      PREOPERATIVE EVALUATION:  Today's date: 10/9/2023    Juan Carlos is a 83 year old male who presents for a preoperative evaluation.      10/9/2023    10:34 AM   Additional Questions   Roomed by DANNA VAUGHAN cma   Accompanied by N/A         10/9/2023    10:34 AM   Patient Reported Additional Medications   Patient reports taking the following new medications N/A       Surgical Information:  Surgery/Procedure: Esophagogastroduodenoscopy  Surgery Location: [facility]  Surgeon:  Rene Barry MD  Surgery Date: 11/7  Time of Surgery: TBD  Where patient plans to recover: At home with family  Fax number for surgical facility: Note does not need to be faxed, will be available electronically in Epic.    Assessment & Plan     The proposed surgical procedure is considered LOW risk.    Preop general physical exam      Dysphagia, unspecified type  Food getting stuck at times. Plan proceed with EGD. He has questions about the procedure today. He tells me he has had this procedure with dilation years ago, but we don't have any reports. Sounds like this was done at [facility], and EGD planned is with [facility] provider. I do see a report of hospitalization for esophageal obstruction due to food impaction 8/18/2020. Will see if we can get reports from EGD.    Coronary artery disease due to lipid rich plaque  Asymptomatic. Follows with cardiology.   Of note, problem list notes PAF. I see mention of this noted in 2013 at time of CABG, with eliquis discontinued in 2017 after normal 4 week heart monitor, but I don't see any reports confirming.     Benign prostatic hyperplasia with weak urinary stream  Controlled on medication.    Essential hypertension  Excellent control    History of TIA  Followed by neurology. On plavix and  asa per his neurologist.        Risks and Recommendations:  The patient has the following additional risks and recommendations for perioperative complications:  Cardiovascular:   - follows closely with cardiology. No current symptoms.  Anemia/Bleeding/Clotting:       Antiplatelet or Anticoagulation Medication Instructions:   - Bleeding risk is low for this procedure (e.g. dental, skin, cataract).   - aspirin: Bleeding risk is low for this procedure and daily aspirin may be continued without modification.    - clopidrogel (Plavix), prasugrel (Effient), ticagrelor (Brilinta): Patient has a cardiac stent. Medication will NOT be stopped until cleared by cardiology.  No stent, but on ASA and plavix due to history of TIAs without specific etiology being determined but he has been maintained on aspirin and Plavix at the the neurologist direction for an extended length of time. OK to continue Plavix as recommended by GI. OK to hold the day of procedure as he tells me this is what he was told they would want him to do. I recommended patient discuss plavix with GI when he is called to schedule.     Additional Medication Instructions:   - ACE/ARB: Continue without modification (e.g., MAC anesthesia, neurosurgery, spine surgery, heart failure, or labile hypertension with risk of hypertension).   - Beta Blockers: Continue taking on the day of surgery.   - Calcium Channel Blockers: May be continued on the day of surgery.   - Statins: Continue taking on the day of surgery.    - fiber, laxatives: HOLD day of surgery   - anticholinergics: Continue without modification.  Takes flomax and finasteride    RECOMMENDATION:  APPROVAL GIVEN to proceed with proposed procedure, without further diagnostic evaluation.    38 minutes spent by me on the date of the encounter doing chart review, history and exam, documentation and further activities per the note      Subjective       HPI related to upcoming procedure: feeling like food sometimes  gets stuck in esophagus. Was on famotidine, was recommended to switch back to omeprazole at 40 mg but didn't end up doing so.         10/2/2023     6:19 PM   Preop Questions   1. Have you ever had a heart attack or stroke? No   2. Have you ever had surgery on your heart or blood vessels, such as a stent placement, a coronary artery bypass, or surgery on an artery in your head, neck, heart, or legs? YES - CABG 2013   3. Do you have chest pain with activity? No   4. Do you have a history of  heart failure? No   5. Do you currently have a cold, bronchitis or symptoms of other infection? No   6. Do you have a cough, shortness of breath, or wheezing? No   7. Do you or anyone in your family have previous history of blood clots? No   8. Do you or does anyone in your family have a serious bleeding problem such as prolonged bleeding following surgeries or cuts? YES - just currently on plavix and ASA.    9. Have you ever had problems with anemia or been told to take iron pills? YES - in past   10. Have you had any abnormal blood loss such as black, tarry or bloody stools? No - episode hematuria several years ago   11. Have you ever had a blood transfusion? No   12. Are you willing to have a blood transfusion if it is medically needed before, during, or after your surgery? Yes   13. Have you or any of your relatives ever had problems with anesthesia? No   14. Do you have sleep apnea, excessive snoring or daytime drowsiness? No   15. Do you have any artifical heart valves or other implanted medical devices like a pacemaker, defibrillator, or continuous glucose monitor? No   16. Do you have artificial joints? No   17. Are you allergic to latex? No       Health Care Directive:  Patient does not have a Health Care Directive or Living Will: Patient states has Advance Directive and will bring in a copy to clinic.    Preoperative Review of :   reviewed - prescription for norco in March 2023 for hernia repair       Status of  Chronic Conditions:  See problem list for active medical problems.  Problems all longstanding and stable, except as noted/documented.  See ROS for pertinent symptoms related to these conditions.    CAD - Patient has a longstanding history of moderate-severe CAD. Patient denies recent chest pain or NTG use, denies exercise induced dyspnea or PND. Most recent stress test 7/6/2016    HYPERLIPIDEMIA - Patient has a long history of significant Hyperlipidemia requiring medication for treatment with recent good control. Patient reports no problems or side effects with the medication.     HYPERTENSION - Patient has longstanding history of HTN , currently denies any symptoms referable to elevated blood pressure. Specifically denies chest pain, palpitations, dyspnea, orthopnea, PND or peripheral edema. Blood pressure readings have been in normal range. Current medication regimen is as listed below. Patient denies any side effects of medication.     HISTORY OF TIA - on plavix and asa per neurology.    Review of Systems  CONSTITUTIONAL: NEGATIVE for fever, chills, change in weight  INTEGUMENTARY/SKIN: NEGATIVE for worrisome rashes, moles or lesions  EYES: NEGATIVE for vision changes or irritation  ENT/MOUTH: NEGATIVE for ear, mouth and throat problems  RESP: NEGATIVE for significant cough or SOB  CV: NEGATIVE for chest pain, palpitations or peripheral edema  GI: NEGATIVE for nausea, abdominal pain, heartburn, or change in bowel habits  : NEGATIVE for frequency, dysuria, or hematuria  MUSCULOSKELETAL: NEGATIVE for significant arthralgias or myalgia  NEURO: NEGATIVE for weakness, dizziness or paresthesias  PSYCHIATRIC: NEGATIVE for changes in mood or affect    Patient Active Problem List    Diagnosis Date Noted    Paroxysmal atrial fibrillation (H) 07/10/2023     Priority: Medium    Chronic bilateral low back pain without sciatica 03/28/2023     Priority: Medium    Urinary retention 05/24/2022     Priority: Medium    JOCELINE (acute  kidney injury) (H24) 05/24/2022     Priority: Medium    Anemia, unspecified type 05/24/2022     Priority: Medium    Hematuria, unspecified type 05/24/2022     Priority: Medium    Benign prostatic hyperplasia with weak urinary stream 08/18/2021     Priority: Medium    Chronic anticoagulation 08/18/2021     Priority: Medium    Essential hypertension      Priority: Medium    Dyslipidemia, goal LDL below 70 12/20/2014     Priority: Medium    Coronary artery disease due to lipid rich plaque 12/20/2014     Priority: Medium     underwent 5-vessel coronary bypass grafting at the [facility].            Past Medical History:   Diagnosis Date    Cancer (H) scalp; surgery 1 year ago    Cataract, nuclear sclerotic, left eye 06/02/2017    Cerebral artery occlusion with cerebral infarction (H)     Coronary artery disease due to lipid rich plaque     Dyslipidemia, goal LDL below 70 12/20/2014    Essential hypertension with goal blood pressure less than 130/85     Gastroesophageal reflux disease     Hernia, abdominal     History of spinal cord injury     [place] (hard of hearing)     Nonsustained ventricular tachycardia (H)     6 beat run, asymptomatic per Dr. Hackett    Paroxysmal atrial fibrillation (H) 09/02/2013    at the time of CABG; [facility] did a 4-week monitor in 2017 and did not find any atrial fibrillation and therefore stopped his Eliquis.    Stricture of esophagus 2013    dilated twice since then    Transient ischemic attack 09/2018    transient speech difficulty and dizziness while in [place]; did not seek medical attention    Transient ischemic attack 10/2018    same symptoms as in [place]    Transient ischemic attack 11/20/2016    Transient ischemic attack 01/24/2016    left cerebral hemisphere    Transient ischemic attack 12/20/2014     Past Surgical History:   Procedure Laterality Date    BIOPSY      BYPASS GRAFT ARTERY CORONARY  09/2013    underwent 5-vessel coronary bypass grafting at the [facility]    COLONOSCOPY       CYSTOSCOPY      ENT SURGERY  no  surgery but hearing aids approx. 5 yrs. ago.    EYE SURGERY  2 cataract surgery approx. 5 yrs.    FOOT SURGERY Left 12/19/2014    Dr. Carr    FRACTURE SURGERY Right     hand    GI SURGERY  throat stretches approx. 9 yrs. ago    HERNIA REPAIR  11/2013    HERNIORRHAPHY INGUINAL Left 03/01/2023    Procedure: AND OPEN LEFT INGUINAL HERNIA REPAIR;  Surgeon: Luis Mitchell MD;  Location: [facility] OR    IR PAE  12/01/2022    LAPAROSCOPIC HERNIORRHAPHY VENTRAL N/A 03/01/2023    Procedure: LAPAROSCOPIC INCARCERATED VENTRAL HERNIA REPAIR;  Surgeon: Luis Mitchell MD;  Location: [facility] OR    ORCHIECTOMY SCROTAL Right     ORIF FEMUR FRACTURE Right     ORTHOPEDIC SURGERY  broken leg, approx. 30 yrs. ago    PROSTATE SURGERY      half removed    PROSTATECTOMY  11/2013    TESTICLE SURGERY      removal of testicle    VASCULAR SURGERY       Current Outpatient Medications   Medication Sig Dispense Refill    aspirin (ASA) 81 MG chewable tablet Take 81 mg by mouth daily      atorvastatin (LIPITOR) 40 MG tablet Take 1 tablet (40 mg) by mouth At Bedtime -Needs to schedule cardiology 90 tablet 3    calcium carbonate (OS-ISABEL) 500 MG tablet Take 1 tablet by mouth once 600 mg daily      clopidogrel (PLAVIX) 75 MG tablet Take 1 tablet (75 mg) by mouth daily 90 tablet 1    doxycycline hyclate (VIBRAMYCIN) 100 MG capsule Take 1 capsule (100 mg) by mouth daily 10 capsule 1    famotidine (PEPCID) 10 MG tablet Take 2 tablets (20 mg) by mouth 2 times daily      Ferrous Gluconate 324 (37.5 Fe) MG TABS Take 324 mg by mouth 2 times daily      finasteride (PROSCAR) 5 MG tablet Take 1 tablet (5 mg) by mouth daily 90 tablet 1    losartan (COZAAR) 25 MG tablet Take 1 tablet (25 mg) by mouth daily 90 tablet 3    metoprolol tartrate (LOPRESSOR) 25 MG tablet Take 1 tablet (25 mg) by mouth 2 times daily 180 tablet 3    Multiple Vitamins-Minerals (MULTIVITAMIN ADULT, MINERALS,) TABS Take 1 tablet by mouth  daily      nitroGLYcerin (NITROSTAT) 0.4 MG sublingual tablet For chest pain place 1 tablet under the tongue every 5 minutes for 3 doses. If symptoms persist 5 minutes after 1st dose call 911. 25 tablet 3    tamsulosin (FLOMAX) 0.4 MG capsule Take 1 capsule by mouth 2 times daily      vitamin C (ASCORBIC ACID) 500 MG tablet Take 500 mg by mouth daily          No Known Allergies     Social History     Tobacco Use    Smoking status: Former     Packs/day: 0.50     Years: 10.00     Pack years: 5.00     Types: Cigarettes, Pipe     Start date: 1959     Quit date: 1969     Years since quittin.6    Smokeless tobacco: Never   Substance Use Topics    Alcohol use: Yes     Alcohol/week: 4.0 standard drinks of alcohol     Types: 4 Glasses of wine per week     Comment: modest use     Family History   Problem Relation Age of Onset    Other - See Comments Mother          accident age 49    Migraines Mother     Aortic aneurysm Father          84    Other - See Comments Sister         autistic     No Known Problems Daughter     Pulmonary Hypertension No family hx of     Congenital heart disease No family hx of      History   Drug Use No         Objective     /67 (BP Location: Right arm, Patient Position: Right side, Cuff Size: Adult Large)   Pulse 52   Temp 98.2  F (36.8  C) (Oral)   Resp 20   Wt 81.6 kg (180 lb)   SpO2 96%   BMI 26.58 kg/m      Physical Exam    GENERAL APPEARANCE: healthy, alert and no distress     EYES: EOMI,  PERRL     HENT: ear canals and TM's normal and nose and mouth without ulcers or lesions     NECK: no adenopathy, no asymmetry, masses, or scars and thyroid normal to palpation     RESP: lungs clear to auscultation - no rales, rhonchi or wheezes     CV: regular rates and rhythm, normal S1 S2, no S3 or S4 and no murmur, click or rub, trace LE edema     ABDOMEN:  soft, nontender, no HSM or masses and bowel sounds normal     MS: extremities normal- no gross deformities noted, no  evidence of inflammation in joints, FROM in all extremities.     SKIN: no suspicious lesions or rashes     NEURO: Normal strength and tone, mentation intact and speech normal     PSYCH: mentation appears normal. and affect normal/bright     LYMPHATICS: No cervical adenopathy    Recent Labs   Lab Test 07/10/23  1328 01/31/23  0954 12/01/22  1227 12/01/22  1209   HGB 13.4 13.3  --   --    * 157  --   --    INR  --   --   --  1.14    141   < >  --    POTASSIUM 4.2 4.2   < >  --    CR 0.72 0.71   < >  --     < > = values in this interval not displayed.        Diagnostics:  No labs were ordered during this visit.   No EKG required for low risk surgery (cataract, skin procedure, breast biopsy, etc).    Revised Cardiac Risk Index (RCRI):  The patient has the following serious cardiovascular risks for perioperative complications:   - Coronary Artery Disease (MI, positive stress test, angina, Qs on EKG) = 1 point     RCRI Interpretation: 1 point: Class II (low risk - 0.9% complication rate)         Signed Electronically by: Julieta Jones MD  Copy of this evaluation report is provided to requesting physician.

## 2023-10-10 ENCOUNTER — TELEPHONE (OUTPATIENT)
Dept: INTERNAL MEDICINE | Facility: CLINIC | Age: 83
End: 2023-10-10
Payer: COMMERCIAL

## 2023-10-10 NOTE — TELEPHONE ENCOUNTER
CAROLINA Osbaldo    Hold Order    Requesting 7 day hold of the clopidogrel (PLAVIX) 75 MG tablet    7 days prior to 11/7, patient having EGD    CAROLINA Roblero  Phone: [phone]  Fax: [phone]

## 2023-10-12 NOTE — TELEPHONE ENCOUNTER
Dacia with [facility] is following up on Medication Hold Order.    Per Dacia [facility] was given okay for 7 day hold request. However patient received notification of 3 day hold.    Dacia would like provider to reconsider 7 day hold request and would like a call back at .      October 11, 2023  Adonis Trotter MD   to Jm Kuo    10/11/23  5:20 PM   Hold plavix 3days prior to procedure and resume it the 1st post-op day after surgery.  QUENTIN

## 2023-10-12 NOTE — TELEPHONE ENCOUNTER
Juan Carlos edward.    He is concerned about holding Plavix for 7 days as the last time he held it for 7 days for a surgery, he ended up in the ED for stroke-like symptoms and a blocked artery. He is uncomfortable holding it for 7 days and wondering if he needs to cancel the surgery. He is having difficulty with decision as the surgery is important but does not want to be in the ED again like last time.     Since Dr. Trotter said 3 day hold before, would Dr. Trotter re-consider 7 day hold for a shorter period that is still safe for surgery?    Would like call back, ok to leave detailed message.

## 2023-10-12 NOTE — TELEPHONE ENCOUNTER
Adonis Trotter MD   to [facility]   MB    10/12/23 10:05 AM  Ok for 7day hold.  QUENTIN

## 2023-10-13 NOTE — TELEPHONE ENCOUNTER
Called patient.    Assisted in scheduling phone visit per PCP request. Phone visit is two weeks before scheduled surgery. Discussed I last told [facility] 7 day hold, and patient can call [facility] to update if he would like. Patient very thankful for call.

## 2023-10-24 ENCOUNTER — TELEPHONE (OUTPATIENT)
Dept: CARDIOLOGY | Facility: CLINIC | Age: 83
End: 2023-10-24

## 2023-10-24 ENCOUNTER — TELEPHONE (OUTPATIENT)
Dept: NEUROLOGY | Facility: CLINIC | Age: 83
End: 2023-10-24

## 2023-10-24 NOTE — TELEPHONE ENCOUNTER
Félix Avitia MD Hiljus, Audrey G RN  Cc: JOSE Deng [facility] Team 2  Caller: Unspecified (Today, 10:20 AM)    Thanks agree, defer to Neurology

## 2023-10-24 NOTE — TELEPHONE ENCOUNTER
[facility] Call [facility]    Phone Message    May a detailed message be left on voicemail: yes     Reason for Call: Other: Patients spouse Salud is calling and states patient is having an EGD. Patient is on blood thinner and would like to discuss options for holding blood thinner medications. Please contact spouse to discuss at [phone]     Action Taken: Message routed to:  Other: [facility]    Travel Screening: Not Applicable

## 2023-10-24 NOTE — TELEPHONE ENCOUNTER
Pt has hx of basilar artery stenosis, last seen in 2022. Needs EGD procedure and would need to hold plavix and ASA temporarily for procedure. Is this ok from neurology perspective?     Raz Victoria RN, BSN  [facility]

## 2023-10-24 NOTE — TELEPHONE ENCOUNTER
[facility] Call Center    Phone Message    May a detailed message be left on voicemail: yes     Reason for Call: Other: pt has upcoming procedure on 11/7 on Esophagus and wants to clarify some medication, please reach out to further assist.     Action Taken: Other: Cardiology    Travel Screening: Not Applicable    Thank you!  [facility]

## 2023-10-24 NOTE — TELEPHONE ENCOUNTER
Spoke to patient's wife, Salud (consent on file). She states he is going to have an endoscopy on 11/7 at [facility] to assess the constriction of his esophagus and they want him to stop plavix and aspirin for 7 days prior. She is concerned about this due to his history of a stroke, has only held it for 3 days in the past. Multiple notes in Epic with PCP's approval to hold plavix for 7 days prior. Recommended contacting neurology or PCP again to discuss concerns related to med hold and Salud will do this. Dr Avitia updated.     LOV 7/26/23 w/ Dr Avitia-  # CAD s/p CABG 2013 (LIMA to LAD, SVG to RCA, sequential SVG to ramus and OM and diagonal (diagonal touchdown presumed occluded on cath 2015). Stable, no angina.   # CVA, on aspirin and clopidogrel per Neurology  # HL, lipids well controlled on statin  # HTN, BP well controlled  -Overall patient is in stable cardiac health without symptoms concerning for angina or decompensated heart failure  - Will reassess cardiac function with an echocardiogram, last echocardiogram was over 5 years ago  - Continue metoprolol, losartan, sublingual nitroglycerin as needed  - Aspirin and clopidogrel per Neurology  - Encouraged continued lifestyle habits including regular aerobic exercise, heart healthy diet  - Follow-up in 1 year with cardiology NINA, and with me the following year if stable, or sooner as needed

## 2023-10-25 NOTE — TELEPHONE ENCOUNTER
Received call from patient. Patient states he was informed by neurology to hold plavix 5 days prior to procedure. Patient wanted to update RN with cardiology team. Please contact patient if further questions.     Dmitri ROSALES RN 10/25/2023 at 12:06 PM

## 2023-10-25 NOTE — TELEPHONE ENCOUNTER
Salud informed of Dr. Darnell's recommendations below  No further questions at this time  Gabriela Benson, BRADLY on 10/25/2023 at 9:27 AM  [facility]

## 2023-10-26 ENCOUNTER — TELEPHONE (OUTPATIENT)
Dept: NEUROLOGY | Facility: CLINIC | Age: 83
End: 2023-10-26
Payer: COMMERCIAL

## 2023-10-26 NOTE — TELEPHONE ENCOUNTER
Previously you recommended 5 day hold of plavix before EGD, but per message below 7 day hold is mandatory for procedure, is that ok?     Raz Victoria RN, BSN  [facility]

## 2023-10-26 NOTE — TELEPHONE ENCOUNTER
[facility] Call [facility]    Phone Message    May a detailed message be left on voicemail: yes     Reason for Call:Verbal order to stop Plavix before EGD procedure.  Seven Day hold is needed.    Needed ASAP  Please call Gavi - [phone]    Action Taken: Other: [facility]    Travel Screening: Not Applicable

## 2023-10-26 NOTE — TELEPHONE ENCOUNTER
Bandar Darnell MD  You11 minutes ago (3:08 PM)       It is OK       Returned call to Gavi, relayed above message that it is ok for pt to hold plavix 7 days prior to EGD. She verbalizes understanding, will inform pt.     Raz Victoria RN, BSN  [facility]

## 2023-10-30 NOTE — TELEPHONE ENCOUNTER
He needs to check with the surgeon.  Some surgeons are okay with taking aspirin whereas some require stopping both aspirin and Plavix

## 2023-10-30 NOTE — TELEPHONE ENCOUNTER
Salud informed- she will contact the surgeon  No further questions at this time  Gabriela Benson, BRADLY on 10/30/2023 at 11:19 AM  [facility]

## 2023-10-30 NOTE — TELEPHONE ENCOUNTER
[facility] Call [facility]    Phone Message    May a detailed message be left on voicemail: yes     Reason for Call: Other: Pt is requesting a urgent call back in regards to his baby aspirin he is taking. Pt is having a procedure done and is needing to know if he can continue to take his baby aspirin.     Pt states he needs to know by tomorrow.     Please call pt back to advise at # [phone].    Action Taken: Message routed to:  Other: [facility]    Travel Screening: Not Applicable

## 2023-11-07 ENCOUNTER — ANESTHESIA (OUTPATIENT)
Dept: SURGERY | Facility: CLINIC | Age: 83
End: 2023-11-07
Payer: COMMERCIAL

## 2023-11-07 ENCOUNTER — ANESTHESIA EVENT (OUTPATIENT)
Dept: SURGERY | Facility: CLINIC | Age: 83
End: 2023-11-07
Payer: COMMERCIAL

## 2023-11-07 ENCOUNTER — HOSPITAL ENCOUNTER (OUTPATIENT)
Facility: CLINIC | Age: 83
Discharge: HOME | End: 2023-11-07
Attending: INTERNAL MEDICINE | Admitting: INTERNAL MEDICINE
Payer: COMMERCIAL

## 2023-11-07 VITALS
TEMPERATURE: 97.3 F | SYSTOLIC BLOOD PRESSURE: 144 MMHG | RESPIRATION RATE: 16 BRPM | DIASTOLIC BLOOD PRESSURE: 63 MMHG | HEIGHT: 69 IN | OXYGEN SATURATION: 98 % | WEIGHT: 180 LBS | BODY MASS INDEX: 26.66 KG/M2 | HEART RATE: 50 BPM

## 2023-11-07 DIAGNOSIS — I10 ESSENTIAL HYPERTENSION: ICD-10-CM

## 2023-11-07 LAB — UPPER GI ENDOSCOPY: NORMAL

## 2023-11-07 PROCEDURE — 370N000017 HC ANESTHESIA TECHNICAL FEE, PER MIN: Performed by: INTERNAL MEDICINE

## 2023-11-07 PROCEDURE — 999N000141 HC STATISTIC PRE-PROCEDURE NURSING ASSESSMENT: Performed by: INTERNAL MEDICINE

## 2023-11-07 PROCEDURE — 360N000075 HC SURGERY LEVEL 2, PER MIN: Performed by: INTERNAL MEDICINE

## 2023-11-07 PROCEDURE — 272N000001 HC OR GENERAL SUPPLY STERILE: Performed by: INTERNAL MEDICINE

## 2023-11-07 PROCEDURE — C1726 CATH, BAL DIL, NON-VASCULAR: HCPCS | Performed by: INTERNAL MEDICINE

## 2023-11-07 PROCEDURE — 250N000011 HC RX IP 250 OP 636: Performed by: NURSE ANESTHETIST, CERTIFIED REGISTERED

## 2023-11-07 PROCEDURE — 258N000003 HC RX IP 258 OP 636: Performed by: NURSE ANESTHETIST, CERTIFIED REGISTERED

## 2023-11-07 PROCEDURE — 710N000012 HC RECOVERY PHASE 2, PER MINUTE: Performed by: INTERNAL MEDICINE

## 2023-11-07 RX ORDER — SODIUM CHLORIDE, SODIUM LACTATE, POTASSIUM CHLORIDE, CALCIUM CHLORIDE 600; 310; 30; 20 MG/100ML; MG/100ML; MG/100ML; MG/100ML
INJECTION, SOLUTION INTRAVENOUS CONTINUOUS
Status: DISCONTINUED | OUTPATIENT
Start: 2023-11-07 | End: 2023-11-07 | Stop reason: HOSPADM

## 2023-11-07 RX ORDER — ONDANSETRON 2 MG/ML
4 INJECTION INTRAMUSCULAR; INTRAVENOUS EVERY 6 HOURS PRN
Status: DISCONTINUED | OUTPATIENT
Start: 2023-11-07 | End: 2023-11-07 | Stop reason: HOSPADM

## 2023-11-07 RX ORDER — HYDROMORPHONE HCL IN WATER/PF 6 MG/30 ML
0.2 PATIENT CONTROLLED ANALGESIA SYRINGE INTRAVENOUS EVERY 5 MIN PRN
Status: DISCONTINUED | OUTPATIENT
Start: 2023-11-07 | End: 2023-11-07 | Stop reason: HOSPADM

## 2023-11-07 RX ORDER — PROPOFOL 10 MG/ML
INJECTION, EMULSION INTRAVENOUS CONTINUOUS PRN
Status: DISCONTINUED | OUTPATIENT
Start: 2023-11-07 | End: 2023-11-07

## 2023-11-07 RX ORDER — FENTANYL CITRATE 50 UG/ML
50 INJECTION, SOLUTION INTRAMUSCULAR; INTRAVENOUS EVERY 5 MIN PRN
Status: DISCONTINUED | OUTPATIENT
Start: 2023-11-07 | End: 2023-11-07 | Stop reason: HOSPADM

## 2023-11-07 RX ORDER — ONDANSETRON 4 MG/1
4 TABLET, ORALLY DISINTEGRATING ORAL EVERY 6 HOURS PRN
Status: DISCONTINUED | OUTPATIENT
Start: 2023-11-07 | End: 2023-11-07 | Stop reason: HOSPADM

## 2023-11-07 RX ORDER — NALOXONE HYDROCHLORIDE 0.4 MG/ML
0.4 INJECTION, SOLUTION INTRAMUSCULAR; INTRAVENOUS; SUBCUTANEOUS
Status: DISCONTINUED | OUTPATIENT
Start: 2023-11-07 | End: 2023-11-07 | Stop reason: HOSPADM

## 2023-11-07 RX ORDER — PROCHLORPERAZINE MALEATE 5 MG
5 TABLET ORAL EVERY 6 HOURS PRN
Status: DISCONTINUED | OUTPATIENT
Start: 2023-11-07 | End: 2023-11-07 | Stop reason: HOSPADM

## 2023-11-07 RX ORDER — LIDOCAINE 40 MG/G
CREAM TOPICAL
Status: DISCONTINUED | OUTPATIENT
Start: 2023-11-07 | End: 2023-11-07 | Stop reason: HOSPADM

## 2023-11-07 RX ORDER — FENTANYL CITRATE 50 UG/ML
25 INJECTION, SOLUTION INTRAMUSCULAR; INTRAVENOUS EVERY 5 MIN PRN
Status: DISCONTINUED | OUTPATIENT
Start: 2023-11-07 | End: 2023-11-07 | Stop reason: HOSPADM

## 2023-11-07 RX ORDER — ONDANSETRON 2 MG/ML
4 INJECTION INTRAMUSCULAR; INTRAVENOUS EVERY 30 MIN PRN
Status: DISCONTINUED | OUTPATIENT
Start: 2023-11-07 | End: 2023-11-07 | Stop reason: HOSPADM

## 2023-11-07 RX ORDER — ONDANSETRON 4 MG/1
4 TABLET, ORALLY DISINTEGRATING ORAL EVERY 30 MIN PRN
Status: DISCONTINUED | OUTPATIENT
Start: 2023-11-07 | End: 2023-11-07 | Stop reason: HOSPADM

## 2023-11-07 RX ORDER — SODIUM CHLORIDE, SODIUM LACTATE, POTASSIUM CHLORIDE, CALCIUM CHLORIDE 600; 310; 30; 20 MG/100ML; MG/100ML; MG/100ML; MG/100ML
INJECTION, SOLUTION INTRAVENOUS CONTINUOUS PRN
Status: DISCONTINUED | OUTPATIENT
Start: 2023-11-07 | End: 2023-11-07

## 2023-11-07 RX ORDER — NALOXONE HYDROCHLORIDE 0.4 MG/ML
0.2 INJECTION, SOLUTION INTRAMUSCULAR; INTRAVENOUS; SUBCUTANEOUS
Status: DISCONTINUED | OUTPATIENT
Start: 2023-11-07 | End: 2023-11-07 | Stop reason: HOSPADM

## 2023-11-07 RX ORDER — HYDROMORPHONE HCL IN WATER/PF 6 MG/30 ML
0.4 PATIENT CONTROLLED ANALGESIA SYRINGE INTRAVENOUS EVERY 5 MIN PRN
Status: DISCONTINUED | OUTPATIENT
Start: 2023-11-07 | End: 2023-11-07 | Stop reason: HOSPADM

## 2023-11-07 RX ADMIN — PROPOFOL 50 MG: 10 INJECTION, EMULSION INTRAVENOUS at 10:23

## 2023-11-07 RX ADMIN — PROPOFOL 150 MCG/KG/MIN: 10 INJECTION, EMULSION INTRAVENOUS at 10:21

## 2023-11-07 RX ADMIN — SODIUM CHLORIDE, POTASSIUM CHLORIDE, SODIUM LACTATE AND CALCIUM CHLORIDE: 600; 310; 30; 20 INJECTION, SOLUTION INTRAVENOUS at 09:35

## 2023-11-07 ASSESSMENT — ENCOUNTER SYMPTOMS: DYSRHYTHMIAS: 1

## 2023-11-07 ASSESSMENT — ACTIVITIES OF DAILY LIVING (ADL)
ADLS_ACUITY_SCORE: 33
ADLS_ACUITY_SCORE: 35

## 2023-11-07 ASSESSMENT — LIFESTYLE VARIABLES: TOBACCO_USE: 1

## 2023-11-07 NOTE — ANESTHESIA CARE TRANSFER NOTE
Patient: Juan Carlos Marley    Procedure: Procedure(s):  ESOPHAGOGASTRODUODENOSCOPY WITH ESOPHAGEAL DILATION       Diagnosis: Esophageal stricture [K22.2]  Diagnosis Additional Information: No value filed.    Anesthesia Type:   MAC     Note:    Oropharynx: oropharynx clear of all foreign objects  Level of Consciousness: drowsy  Oxygen Supplementation: room air    Independent Airway: airway patency satisfactory and stable  Dentition: dentition unchanged  Vital Signs Stable: post-procedure vital signs reviewed and stable  Report to RN Given: handoff report given  Patient transferred to: Phase II    Handoff Report: Identifed the Patient, Identified the Reponsible Provider, Reviewed the pertinent medical history, Discussed the surgical course, Reviewed Intra-OP anesthesia mangement and issues during anesthesia, Set expectations for post-procedure period and Allowed opportunity for questions and acknowledgement of understanding      Vitals:  Vitals Value Taken Time   /54 11/07/23 1045   Temp 36.3  C (97.3  F) 11/07/23 1045   Pulse 46 11/07/23 1045   Resp 16 11/07/23 1045   SpO2 96 % 11/07/23 1045       Electronically Signed By: ANGELINA ZHU CRNA  November 7, 2023  10:45 AM

## 2023-11-07 NOTE — ANESTHESIA POSTPROCEDURE EVALUATION
Patient: Juan Carlos Marley    Procedure: Procedure(s):  ESOPHAGOGASTRODUODENOSCOPY WITH ESOPHAGEAL DILATION       Anesthesia Type:  MAC    Note:  Disposition: Outpatient   Postop Pain Control: Uneventful            Sign Out: Well controlled pain   PONV:    Neuro/Psych: Uneventful            Sign Out: Acceptable/Baseline neuro status   Airway/Respiratory: Uneventful            Sign Out: Acceptable/Baseline resp. status   CV/Hemodynamics: Uneventful            Sign Out: Acceptable CV status; No obvious hypovolemia; No obvious fluid overload   Other NRE: NONE   DID A NON-ROUTINE EVENT OCCUR? No           Last vitals:  Vitals Value Taken Time   /63 11/07/23 1101   Temp 36.3  C (97.3  F) 11/07/23 1045   Pulse 45 11/07/23 1103   Resp 16 11/07/23 1100   SpO2 98 % 11/07/23 1103   Vitals shown include unfiled device data.    Electronically Signed By: Stiven Reyes MD  November 7, 2023  2:48 PM

## 2023-11-07 NOTE — ANESTHESIA PREPROCEDURE EVALUATION
Anesthesia Pre-Procedure Evaluation    Patient: Juan Carlos Marley   MRN: [id number] : 1940        Procedure : Procedure(s):  LAPAROSCOPIC INCARCERATED VENTRAL HERNIA REPAIR  AND OPEN LEFT INGUINAL HERNIA REPAIR          Past Medical History:   Diagnosis Date    Cancer (H) scalp; surgery 1 year ago    Cataract, nuclear sclerotic, left eye 2017    Cerebral artery occlusion with cerebral infarction (H)     Coronary artery disease due to lipid rich plaque     Dyslipidemia, goal LDL below 70 2014    Essential hypertension with goal blood pressure less than 130/85     Gastroesophageal reflux disease     Hernia, abdominal     History of spinal cord injury     [place] (hard of hearing)     Nonsustained ventricular tachycardia (H)     6 beat run, asymptomatic per Dr. Hackett    Paroxysmal atrial fibrillation (H) 2013    at the time of CABG; [facility] did a 4-week monitor in  and did not find any atrial fibrillation and therefore stopped his Eliquis.    Stricture of esophagus 2013    dilated twice since then    Transient ischemic attack 2018    transient speech difficulty and dizziness while in [place]; did not seek medical attention    Transient ischemic attack 10/2018    same symptoms as in [place]    Transient ischemic attack 2016    Transient ischemic attack 2016    left cerebral hemisphere    Transient ischemic attack 2014      Past Surgical History:   Procedure Laterality Date    BIOPSY      BYPASS GRAFT ARTERY CORONARY  2013    underwent 5-vessel coronary bypass grafting at the [facility]    COLONOSCOPY      CYSTOSCOPY      ENT SURGERY  no  surgery but hearing aids approx. 5 yrs. ago.    EYE SURGERY  2 cataract surgery approx. 5 yrs.    FOOT SURGERY Left 2014    Dr. Carr    FRACTURE SURGERY Right     hand    GI SURGERY  throat stretches approx. 9 yrs. ago    HERNIA REPAIR  2013    HERNIORRHAPHY INGUINAL Left 2023    Procedure: AND OPEN LEFT INGUINAL  HERNIA REPAIR;  Surgeon: Luis Mitchell MD;  [place] [facility] [place]    IR PAE  2022    LAPAROSCOPIC HERNIORRHAPHY VENTRAL N/A 2023    Procedure: LAPAROSCOPIC INCARCERATED VENTRAL HERNIA REPAIR;  Surgeon: Luis Mitchell MD;  [place] [facility] [place]    ORCHIECTOMY SCROTAL Right     ORIF FEMUR FRACTURE Right     ORTHOPEDIC SURGERY  broken leg, approx. 30 yrs. ago    PROSTATE SURGERY      half removed    PROSTATECTOMY  2013    TESTICLE SURGERY      removal of testicle    VASCULAR SURGERY        No Known Allergies   Social History     Tobacco Use    Smoking status: Former     Packs/day: 0.50     Years: 10.00     Additional pack years: 0.00     Total pack years: 5.00     Types: Cigarettes, Pipe     Start date: 1959     Quit date: 1969     Years since quittin.7    Smokeless tobacco: Never   Substance Use Topics    Alcohol use: Yes     Alcohol/week: 4.0 standard drinks of alcohol     Types: 4 Glasses of wine per week     Comment: modest use      Wt Readings from Last 1 Encounters:   23 81.6 kg (180 lb)        Anesthesia Evaluation   Pt has had prior anesthetic.     No history of anesthetic complications       ROS/MED HX  ENT/Pulmonary:     (+)                tobacco use, Past use,                      Neurologic:     (+)          CVA,  without deficits,  TIA,                  Cardiovascular:     (+) Dyslipidemia hypertension- -  CAD -  CABG- -                        dysrhythmias (resolved), a-fib,             METS/Exercise Tolerance: >4 METS    Hematologic:  - neg hematologic  ROS     Musculoskeletal:   (+)  arthritis,             GI/Hepatic:     (+) GERD, Asymptomatic on medication,                  Renal/Genitourinary:     (+) renal disease, type: CRI,            Endo:  - neg endo ROS     Psychiatric/Substance Use:       Infectious Disease:  - neg infectious disease ROS     Malignancy:       Other:            Physical Exam    Airway        Mallampati: III   TM distance: > 3  "FB   Neck ROM: full     Respiratory Devices and Support         Dental       (+) Modest Abnormalities - crowns, retainers, 1 or 2 missing teeth      Cardiovascular          Rhythm and rate: regular     Pulmonary                   OUTSIDE LABS:  CBC:   Lab Results   Component Value Date    WBC 5.6 07/10/2023    WBC 4.8 01/31/2023    HGB 13.4 07/10/2023    HGB 13.3 01/31/2023    HCT 38.7 (L) 07/10/2023    HCT 38.9 (L) 01/31/2023     (L) 07/10/2023     01/31/2023     BMP:   Lab Results   Component Value Date     07/10/2023     01/31/2023    POTASSIUM 4.2 07/10/2023    POTASSIUM 4.2 01/31/2023    CHLORIDE 108 (H) 07/10/2023    CHLORIDE 108 (H) 01/31/2023    CO2 27 07/10/2023    CO2 24 01/31/2023    BUN 17.2 07/10/2023    BUN 19.9 01/31/2023    CR 0.72 07/10/2023    CR 0.71 01/31/2023    GLC 91 07/10/2023     (H) 01/31/2023     COAGS:   Lab Results   Component Value Date    INR 1.14 12/01/2022     POC: No results found for: \"BGM\", \"HCG\", \"HCGS\"  HEPATIC:   Lab Results   Component Value Date    ALBUMIN 4.0 07/10/2023    PROTTOTAL 6.1 (L) 07/10/2023    ALT 29 07/10/2023    AST 26 07/10/2023    ALKPHOS 83 07/10/2023    BILITOTAL 0.4 07/10/2023     OTHER:   Lab Results   Component Value Date    A1C 5.2 11/24/2014    ISABEL 9.4 07/10/2023    LIPASE 21 01/31/2023    TSH 1.25 07/10/2023    SED 2 07/13/2018       Anesthesia Plan    ASA Status:  3    NPO Status:  NPO Appropriate    Anesthesia Type: MAC.              Consents    Anesthesia Plan(s) and associated risks, benefits, and realistic alternatives discussed. Questions answered and patient/representative(s) expressed understanding.     - Discussed: Risks, Benefits and Alternatives for the PROCEDURE were discussed     - Discussed with:  Patient       - Patient is DNR/DNI Status: No          Postoperative Care    Pain management: Multi-modal analgesia.   PONV prophylaxis: Ondansetron (or other 5HT-3), Dexamethasone or Solumedrol "     Comments:    Other Comments:               Stiven Reyes MD

## 2023-11-07 NOTE — INTERVAL H&P NOTE
I have reviewed the surgical (or preoperative) H&P that is linked to this encounter, and examined the patient. There are no significant changes.    Rene Gamboa MD

## 2023-11-28 ENCOUNTER — TELEPHONE (OUTPATIENT)
Dept: VASCULAR SURGERY | Facility: CLINIC | Age: 83
End: 2023-11-28
Payer: COMMERCIAL

## 2023-11-28 NOTE — TELEPHONE ENCOUNTER
[facility] Call Center    Phone Message    May a detailed message be left on voicemail: yes     Reason for Call: Patient is having issues and doesn't think his previous procedure worked, Patient is also changing his insurance carrier in January - patient is unsure if new insurance will cover - patient is hoping to see someone to fix prostate issue before end of year    [phone] - Patient gives verbal permission to speak with his wife Salud regarding this info  Thank you      Action Taken: Message routed to:  [facility]    Travel Screening: Not Applicable

## 2023-11-29 NOTE — TELEPHONE ENCOUNTER
Pt returned call and was transferred to writer. He noted that the past couple months he has had a return of nocturia. He noted he also experiences weak stream, which he said never really improved. He is still taking finasteride and tamsulosin. During the procedure he noted his right side was embolized, but his left side was noted to already be blocked. He is wondering if it would be recommended to repeat imaging to see if he would potentially benefit from a repeat PAE. Noted this would be discussed with Dr Cordoba and Pt would receive a return call with recommendations. Pt verbalized understanding, agreed to current plan and denied any further questions.    Jenna Sumner LPN

## 2023-12-01 DIAGNOSIS — I10 ESSENTIAL HYPERTENSION: ICD-10-CM

## 2023-12-01 RX ORDER — FINASTERIDE 5 MG/1
5 TABLET, FILM COATED ORAL DAILY
Qty: 90 TABLET | Refills: 0 | Status: SHIPPED | OUTPATIENT
Start: 2023-12-01 | End: 2024-02-13

## 2023-12-01 NOTE — TELEPHONE ENCOUNTER
Reason for Call:  Medication refill:    Do you use a [facility]?  [place] of the pharmacy and phone number for the current request:  [facility]     Name of the medication requested:     finasteride (PROSCAR) 5 MG tablet     Last Visit with PCP: 7/10/23    Pharmacy is seeking update on refill request faxed 11/22/23 verified fax number - not valid and provided fax number     Medication Buddy'd up for approval if appropriate.      Call taken on 12/1/2023 at 9:24 AM by Laurie Love

## 2023-12-04 NOTE — TELEPHONE ENCOUNTER
"Pt following up with more questions. Mentioned expecting an update from \"Rama\". Backline busy. VM is good.  "

## 2023-12-05 NOTE — TELEPHONE ENCOUNTER
Called and spoke with Pt. Informed him Dr Cordoba has requested to review his records and make a determination on next steps. This has not been completed yet. Message sent to MD for update. Pt will receive call once recommendations received. Pt verbalized understanding, agreed to current plan and denied any further questions.    Jenna Sumner LPN

## 2023-12-06 NOTE — TELEPHONE ENCOUNTER
Called and spoke with Pt. Informed him he would not be a candidate for repeat PAE. Left artery was too small to access and right artery was treated. Slight chance the embolization didn't take, but minimal. Recommended Pt discuss sxs with urologist. Pt verbalized understanding, agreed to current plan and denied any further questions.    Jenna Sumner LPN

## 2024-01-04 ENCOUNTER — TRANSFERRED RECORDS (OUTPATIENT)
Dept: HEALTH INFORMATION MANAGEMENT | Facility: CLINIC | Age: 84
End: 2024-01-04
Payer: COMMERCIAL

## 2024-01-10 ENCOUNTER — TELEPHONE (OUTPATIENT)
Dept: INTERNAL MEDICINE | Facility: CLINIC | Age: 84
End: 2024-01-10
Payer: COMMERCIAL

## 2024-01-10 DIAGNOSIS — I10 ESSENTIAL HYPERTENSION: ICD-10-CM

## 2024-01-10 RX ORDER — TAMSULOSIN HYDROCHLORIDE 0.4 MG/1
0.4 CAPSULE ORAL 2 TIMES DAILY
Qty: 90 CAPSULE | Refills: 11 | Status: SHIPPED | OUTPATIENT
Start: 2024-01-10

## 2024-01-10 RX ORDER — FINASTERIDE 5 MG/1
5 TABLET, FILM COATED ORAL DAILY
Qty: 90 TABLET | Refills: 0 | OUTPATIENT
Start: 2024-01-10

## 2024-01-10 RX ORDER — CLOPIDOGREL BISULFATE 75 MG/1
75 TABLET ORAL DAILY
Qty: 90 TABLET | Refills: 1 | Status: SHIPPED | OUTPATIENT
Start: 2024-01-10 | End: 2024-02-28

## 2024-01-10 NOTE — TELEPHONE ENCOUNTER
Reason for Call:  Medication New Pharmacy    Do you use a [facility]? No  Name of the pharmacy and phone number for the current request:  [facility]    Name of the medication requested: All medications -     Other request: Patient has [employer] insurance for 2024. Pharmacy benefits for 2024 are with [facility].  Requesting all prescriptions are sent to [facility].    Call taken on 1/10/2024 at 11:02 AM by Laurie Love

## 2024-01-25 DIAGNOSIS — E78.5 DYSLIPIDEMIA, GOAL LDL BELOW 70: ICD-10-CM

## 2024-01-25 DIAGNOSIS — I25.83 CORONARY ARTERY DISEASE DUE TO LIPID RICH PLAQUE: ICD-10-CM

## 2024-01-25 DIAGNOSIS — I25.10 CORONARY ARTERY DISEASE DUE TO LIPID RICH PLAQUE: ICD-10-CM

## 2024-01-25 DIAGNOSIS — I25.708 CORONARY ARTERY DISEASE INVOLVING CORONARY BYPASS GRAFT OF NATIVE HEART WITH OTHER FORMS OF ANGINA PECTORIS (H): ICD-10-CM

## 2024-01-25 RX ORDER — NITROGLYCERIN 0.4 MG/1
TABLET SUBLINGUAL
Qty: 25 TABLET | Refills: 3 | Status: SHIPPED | OUTPATIENT
Start: 2024-01-25

## 2024-01-25 RX ORDER — LOSARTAN POTASSIUM 25 MG/1
25 TABLET ORAL DAILY
Qty: 90 TABLET | Refills: 1 | Status: SHIPPED | OUTPATIENT
Start: 2024-01-25 | End: 2024-06-19

## 2024-01-25 RX ORDER — METOPROLOL TARTRATE 25 MG/1
25 TABLET, FILM COATED ORAL 2 TIMES DAILY
Qty: 180 TABLET | Refills: 1 | Status: SHIPPED | OUTPATIENT
Start: 2024-01-25 | End: 2024-05-08

## 2024-01-25 RX ORDER — ATORVASTATIN CALCIUM 40 MG/1
40 TABLET, FILM COATED ORAL AT BEDTIME
Qty: 90 TABLET | Refills: 1 | Status: SHIPPED | OUTPATIENT
Start: 2024-01-25 | End: 2024-06-19

## 2024-02-09 ENCOUNTER — MEDICAL CORRESPONDENCE (OUTPATIENT)
Dept: SCHEDULING | Facility: CLINIC | Age: 84
End: 2024-02-09
Payer: COMMERCIAL

## 2024-02-13 DIAGNOSIS — I10 ESSENTIAL HYPERTENSION: ICD-10-CM

## 2024-02-13 RX ORDER — FINASTERIDE 5 MG/1
5 TABLET, FILM COATED ORAL DAILY
Qty: 90 TABLET | Refills: 0 | Status: SHIPPED | OUTPATIENT
Start: 2024-02-13 | End: 2024-04-15

## 2024-02-21 ENCOUNTER — ANCILLARY PROCEDURE (OUTPATIENT)
Dept: CT IMAGING | Facility: CLINIC | Age: 84
End: 2024-02-21
Attending: PHYSICIAN ASSISTANT
Payer: COMMERCIAL

## 2024-02-21 DIAGNOSIS — N40.1 ENLARGED PROSTATE WITH LOWER URINARY TRACT SYMPTOMS (LUTS): ICD-10-CM

## 2024-02-21 LAB
CREAT BLD-MCNC: 0.6 MG/DL (ref 0.7–1.3)
EGFRCR SERPLBLD CKD-EPI 2021: >60 ML/MIN/1.73M2

## 2024-02-21 PROCEDURE — 82565 ASSAY OF CREATININE: CPT | Performed by: PATHOLOGY

## 2024-02-21 PROCEDURE — 72191 CT ANGIOGRAPH PELV W/O&W/DYE: CPT | Performed by: RADIOLOGY

## 2024-02-21 RX ORDER — NITROGLYCERIN 0.4 MG/1
0.4 TABLET SUBLINGUAL ONCE
Status: COMPLETED | OUTPATIENT
Start: 2024-02-21 | End: 2024-02-21

## 2024-02-21 RX ORDER — IOPAMIDOL 755 MG/ML
90 INJECTION, SOLUTION INTRAVASCULAR ONCE
Status: COMPLETED | OUTPATIENT
Start: 2024-02-21 | End: 2024-02-21

## 2024-02-21 RX ADMIN — IOPAMIDOL 90 ML: 755 INJECTION, SOLUTION INTRAVASCULAR at 14:36

## 2024-02-21 RX ADMIN — NITROGLYCERIN 0.4 MG: 0.4 TABLET SUBLINGUAL at 14:47

## 2024-02-21 NOTE — DISCHARGE INSTRUCTIONS

## 2024-02-27 ENCOUNTER — OFFICE VISIT (OUTPATIENT)
Dept: INTERNAL MEDICINE | Facility: CLINIC | Age: 84
End: 2024-02-27
Payer: COMMERCIAL

## 2024-02-27 VITALS
TEMPERATURE: 98.1 F | DIASTOLIC BLOOD PRESSURE: 62 MMHG | HEART RATE: 77 BPM | RESPIRATION RATE: 16 BRPM | OXYGEN SATURATION: 98 % | BODY MASS INDEX: 28.69 KG/M2 | WEIGHT: 193.7 LBS | SYSTOLIC BLOOD PRESSURE: 130 MMHG | HEIGHT: 69 IN

## 2024-02-27 DIAGNOSIS — I48.0 PAROXYSMAL ATRIAL FIBRILLATION (H): ICD-10-CM

## 2024-02-27 DIAGNOSIS — R41.89 COGNITIVE DECLINE: Primary | ICD-10-CM

## 2024-02-27 DIAGNOSIS — I25.708 CORONARY ARTERY DISEASE INVOLVING CORONARY BYPASS GRAFT OF NATIVE HEART WITH OTHER FORMS OF ANGINA PECTORIS (H): ICD-10-CM

## 2024-02-27 DIAGNOSIS — I10 ESSENTIAL HYPERTENSION: ICD-10-CM

## 2024-02-27 DIAGNOSIS — Z29.11 NEED FOR VACCINATION AGAINST RESPIRATORY SYNCYTIAL VIRUS: ICD-10-CM

## 2024-02-27 LAB
TSH SERPL DL<=0.005 MIU/L-ACNC: 1.55 UIU/ML (ref 0.3–4.2)
VIT B12 SERPL-MCNC: 656 PG/ML (ref 232–1245)

## 2024-02-27 PROCEDURE — 84443 ASSAY THYROID STIM HORMONE: CPT | Performed by: INTERNAL MEDICINE

## 2024-02-27 PROCEDURE — 36415 COLL VENOUS BLD VENIPUNCTURE: CPT | Performed by: INTERNAL MEDICINE

## 2024-02-27 PROCEDURE — 99214 OFFICE O/P EST MOD 30 MIN: CPT | Performed by: INTERNAL MEDICINE

## 2024-02-27 PROCEDURE — 82607 VITAMIN B-12: CPT | Performed by: INTERNAL MEDICINE

## 2024-02-27 RX ORDER — DONEPEZIL HYDROCHLORIDE 5 MG/1
5 TABLET, FILM COATED ORAL AT BEDTIME
Qty: 30 TABLET | Refills: 11 | Status: SHIPPED | OUTPATIENT
Start: 2024-02-27

## 2024-02-27 RX ORDER — RESPIRATORY SYNCYTIAL VIRUS VACCINE 120MCG/0.5
0.5 KIT INTRAMUSCULAR ONCE
Qty: 1 EACH | Refills: 0 | Status: CANCELLED | OUTPATIENT
Start: 2024-02-27 | End: 2024-02-27

## 2024-02-27 NOTE — PROGRESS NOTES
"  {PROVIDER CHARTING PREFERENCE:493713}    Subjective   Juan Carlos is a 83 year old, presenting for the following health issues:  Memory Loss (Has been having some memory concerns)        2/27/2024    10:42 AM   Additional Questions   Roomed by Viviana   Accompanied by wife          {MA/LPN/RN Pre-Provider Visit Orders- hCG/UA/Strep (Optional):537245}  {SUPERLIST (Optional):391242}  {additonal problems for provider to add (Optional):404482}    {ROS Picklists (Optional):958392}      Objective    /62   Pulse 77   Temp 98.1  F (36.7  C) (Oral)   Resp 16   Ht 1.753 m (5' 9\")   Wt 87.9 kg (193 lb 11.2 oz)   SpO2 98%   BMI 28.60 kg/m    Body mass index is 28.6 kg/m .  Physical Exam   {Exam List (Optional):733241}    {Diagnostic Test Results (Optional):881799}        Signed Electronically by: Adonis Trotter MD  {Email feedback regarding this note to [email or web address]   :729793}  Answers submitted by the patient for this visit:  Patient Health Questionnaire (Submitted on 2/27/2024)  If you checked off any problems, how difficult have these problems made it for you to do your work, take care of things at home, or get along with other people?: Not difficult at all  PHQ9 TOTAL SCORE: 1  General Questionnaire (Submitted on 2/27/2024)  Chief Complaint: Chronic problems general questions HPI Form  What is the reason for your visit today? : medicare exam  How many servings of fruits and vegetables do you eat daily?: 2-3  On average, how many sweetened beverages do you drink each day (Examples: soda, juice, sweet tea, etc.  Do NOT count diet or artificially sweetened beverages)?: 0  How many minutes a day do you exercise enough to make your heart beat faster?: 9 or less  How many days a week do you exercise enough to make your heart beat faster?: 3 or less  How many days per week do you miss taking your medication?: 0    "

## 2024-02-27 NOTE — PROGRESS NOTES
Assessment/Plan:    Cognitive decline check TSH and B12 level start Aricept 5 mg daily.  Memory deficit without history of head trauma.  Known history of TIAs and previously seen at the [facility] Department of neurology [facility] on aspirin high-dose Plavix.    Hypertension controlled stable 130/62 same meds and cares.    Coronary artery disease status post coronary bypass grafting stable without chest pain or shortness of breath accompanied today by his wife who is very supportive.    Hyperlipidemia on statin therapy well-tolerated without myalgias arthralgias.  Lipid panel to be done with annual wellness visit and follow-up 4 months time suggested.    Prostatism.  Suggest continued follow-up with [place] [facility].  Also continue tamsulosin 0.4 mg daily.  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    25 minutes spent on the date of the encounter doing chart review, interpretation of tests, patient visit, documentation, and discussion with family     Subjective:  Juan Carlos Marley is a 83 year old male presents for the following health issues easily conversant good spirited impaired memory noted no recent history of head trauma no formal history of stroke but history of recurrent TIAs seen and evaluated previously at the [facility].  Some tortuosity noted in the vertebrobasilar system at the [facility] Department of neurology.  Aspirin and Plavix are on board with higher doses for that group at [place].    Denies chest pain or shortness of breath but history of coronary artery disease and coronary bypass grafting.  TIA-like symptomatology that short in duration minutes never longer than 24 hours.  No joanne stroke or partially reversible ischemic neurologic deficit having been diagnosed.    ROS:  Denies blood in stool or urine no chest pain or shortness of breath medication list reviewed reconciled in the chart.    Objective:  BP  "130/62   Pulse 77   Temp 98.1  F (36.7  C) (Oral)   Resp 16   Ht 1.753 m (5' 9\")   Wt 87.9 kg (193 lb 11.2 oz)   SpO2 98%   BMI 28.60 kg/m    Easily conversant good spirited looks to his wife for some of the history.  The chest is clear the heart tones revealed a regular rhythm with occasional premature beat noted abdomen benign without organomegaly masses or tenderness no abdominal bruits extremities are free of edema cyanosis or clubbing the neck veins were nondistended there was no thyromegaly no thyroid nodules and no carotid bruits or neck vein distention skin was dry slightly pale in color sallow.  Not in acute distress or toxic intelligent now retired.    Adonis Trotter MD  Internal Medicine    Answers submitted by the patient for this visit:  Patient Health Questionnaire (Submitted on 2/27/2024)  If you checked off any problems, how difficult have these problems made it for you to do your work, take care of things at home, or get along with other people?: Not difficult at all  PHQ9 TOTAL SCORE: 1  General Questionnaire (Submitted on 2/27/2024)  Chief Complaint: Chronic problems general questions HPI Form  What is the reason for your visit today? : medicare exam  How many servings of fruits and vegetables do you eat daily?: 2-3  On average, how many sweetened beverages do you drink each day (Examples: soda, juice, sweet tea, etc.  Do NOT count diet or artificially sweetened beverages)?: 0  How many minutes a day do you exercise enough to make your heart beat faster?: 9 or less  How many days a week do you exercise enough to make your heart beat faster?: 3 or less  How many days per week do you miss taking your medication?: 0    "

## 2024-02-28 ENCOUNTER — NURSE TRIAGE (OUTPATIENT)
Dept: INTERNAL MEDICINE | Facility: CLINIC | Age: 84
End: 2024-02-28
Payer: COMMERCIAL

## 2024-02-28 DIAGNOSIS — I10 ESSENTIAL HYPERTENSION: ICD-10-CM

## 2024-02-28 RX ORDER — CLOPIDOGREL BISULFATE 75 MG/1
75 TABLET ORAL DAILY
Qty: 90 TABLET | Refills: 1 | Status: SHIPPED | OUTPATIENT
Start: 2024-02-28 | End: 2024-06-19

## 2024-02-29 NOTE — TELEPHONE ENCOUNTER
Pt calls re potential medication issue.  Reports receiving a communication from [facility].  However, does not know which medication is being referenced, or what the issue is.  Advised pt to call [facility] and speak to an associate for explanation.  Then invited pt to call back with specifics so that next steps can be determined.  Pt agrees to plan.    Asha Baumann RN  [facility] Nurse Advisor     Reason for Disposition   Caller has medicine question only, adult not sick, and triager answers question     Unknown issue at this time.  Pt will call mailorder pharmacy for explanation.    Protocols used: Medication Question Call-A-[place]

## 2024-03-01 ENCOUNTER — TELEPHONE (OUTPATIENT)
Dept: INTERNAL MEDICINE | Facility: CLINIC | Age: 84
End: 2024-03-01
Payer: COMMERCIAL

## 2024-03-01 NOTE — TELEPHONE ENCOUNTER
General Call      Reason for Call:  Results mailed to home address on file    What are your questions or concerns:  Patient is requesting TSH and B12 results be mailed to his home address. He is unable ot view what he wants in my chart.    Date of last appointment with provider: 2.27.24    Could we [facility] this information to you [facility] or would you prefer to receive a phone call?:   Patient does not need a return call  Okay to leave a detailed message?: Yes at Cell number on file:    Telephone Information:   Mobile [phone]

## 2024-03-01 NOTE — LETTER
"March 1, 2024      Juan Carlos Marley  [address] [facility] [address]        Dear ,    We are writing to inform you of your test results.    Vitamin B12  Status: Final result       Visible to patient: Yes (not seen)       1 Patient Communication          Component  Ref Range & Units 3 d ago 5 yr ago    Vitamin B12  232 - 1,245 pg/mL 656 348 R        TSH  Status: Final result       Visible to patient: Yes (not seen)        1 Patient Communication          Component  Ref Range & Units 3 d ago 7 mo ago    TSH  0.30 - 4.20 uIU/mL 1.55 1.25          \"All very good!  MJB\" Dr. Trotter     If you have any questions or concerns, please call the clinic at the number listed above.       Sincerely, Nikole MCCRARY RN    "

## 2024-03-13 ENCOUNTER — TELEPHONE (OUTPATIENT)
Dept: INTERNAL MEDICINE | Facility: CLINIC | Age: 84
End: 2024-03-13
Payer: COMMERCIAL

## 2024-03-13 DIAGNOSIS — M25.519 SHOULDER PAIN: ICD-10-CM

## 2024-03-13 DIAGNOSIS — M75.120 COMPLETE TEAR OF ROTATOR CUFF, UNSPECIFIED LATERALITY, UNSPECIFIED WHETHER TRAUMATIC: Primary | ICD-10-CM

## 2024-03-13 NOTE — TELEPHONE ENCOUNTER
Adonis Trotter MD  [facility] Pool23 minutes ago (3:13 PM)     MB  Please milagros up order for physical therapy referral and diagnosis is shoulder pain.  Osteoarthritis.  And I will gladly cosign.  Please notify patient.  Many thanks.  JOCELINE ALTAMIRANO

## 2024-03-13 NOTE — TELEPHONE ENCOUNTER
Referral is milagros'd up for verification and approval, if appropriate.       Kartik Romano Jr., CMA on 3/13/2024 at 3:37 PM

## 2024-03-13 NOTE — TELEPHONE ENCOUNTER
Order/Referral Request    Who is requesting: PT    Orders being requested: Orthopedic Referral    Reason service is needed/diagnosis: Shoulder issues    When are orders needed by: Soon    Has this been discussed with Provider: No    Does patient have a preference on a Group/Provider/Facility? NA    Does patient have an appointment scheduled?: No    Where to send orders:  Email    Could we send this information to you in [facility] or would you prefer to receive a phone call?:   Patient would prefer a phone call   Okay to leave a detailed message?: No at Cell number on file:    Telephone Information:   Mobile [phone]

## 2024-03-18 ENCOUNTER — TRANSFERRED RECORDS (OUTPATIENT)
Dept: HEALTH INFORMATION MANAGEMENT | Facility: CLINIC | Age: 84
End: 2024-03-18
Payer: COMMERCIAL

## 2024-04-15 DIAGNOSIS — I10 ESSENTIAL HYPERTENSION: ICD-10-CM

## 2024-04-15 RX ORDER — FINASTERIDE 5 MG/1
1 TABLET, FILM COATED ORAL DAILY
Qty: 90 TABLET | Refills: 1 | Status: SHIPPED | OUTPATIENT
Start: 2024-04-15

## 2024-05-08 DIAGNOSIS — I25.10 CORONARY ARTERY DISEASE DUE TO LIPID RICH PLAQUE: ICD-10-CM

## 2024-05-08 DIAGNOSIS — I25.83 CORONARY ARTERY DISEASE DUE TO LIPID RICH PLAQUE: ICD-10-CM

## 2024-05-08 RX ORDER — METOPROLOL TARTRATE 25 MG/1
25 TABLET, FILM COATED ORAL 2 TIMES DAILY
Qty: 180 TABLET | Refills: 0 | Status: SHIPPED | OUTPATIENT
Start: 2024-05-08 | End: 2024-07-24

## 2024-05-18 NOTE — CONSULTS
[facility] Inpatient Consultation Note    Juan Carlos Marley MRN# [id number]   Age: 81 year old YOB: 1940     Date of Admission:  5/24/2022    Reason for consult: Gross hematuria        Requesting physician: Dr. Marley                 History of Present Illness:   Juan Carlos is an 80yo male with known enlarged prostate, follows with Dr. Spnecer. He recently had cystoscopy in the clinic earlier this week with findings notable for an enlarged and vascular prostate. He developed gross hematuria with clot passage and urinary retention after his clinic appointment and presented to the [facility] ER yd for evaluation. A 3-way catheter was placed and he was hand irrigated for clots and then initiated on CBI. Few clots irrigated overnight and this AM, urine is clear this AM to very slow rate CBI. He denies pain. Currently holding plavix. Hb was 12.9 on admission, slightly dropped to 10.7 this AM. Creat was notably elevated to 2.6 on admission, but improved to baseline of 0.7 this AM. UA does not appear infected.     From admission H&P: Juan Carlos Marley is a 81 year old male who presents with hematuria.  Admitted for further evaluation and treatment. Patient reporting to the emergency department reporting clots in his urine as well as difficulty with urination, and urgency.  Patient does have a history of enlarged prostate with recent cystoscopy.  Patient does endorse vomiting.  Reports nausea.  Patient denies headache, ear pain, eye pain, sore throat, cough, chest pain, shortness of breath, abdominal pain, blood in stool, lower extremity edema, rash, fever.  Creatinine is 2.64 in the emergency department.  White blood cell count is 11.5 with a hemoglobin of 12.9 and a platelet count of 180.  Urinalysis shows bloody appearance, pH of 7.5, greater than 600 protein, or blood, negative leukocyte esterase.  COVID testing is negative.          Past Medical History:     Past Medical History:   Diagnosis Date      Cataract, nuclear sclerotic, left eye 2017     Coronary artery disease due to lipid rich plaque      Dyslipidemia, goal LDL below 70 2014     Essential hypertension with goal blood pressure less than 130/85      Hernia, abdominal      History of spinal cord injury      Nonsustained ventricular tachycardia (H)     6 beat run, asymptomatic per Dr. Hackett     Paroxysmal atrial fibrillation (H) 2013    at the time of CABG; [facility] did a 4-week monitor in 2017 and did not find any atrial fibrillation and therefore stopped his Eliquis.     Stricture of esophagus     dilated twice since then     Transient ischemic attack 2018    transient speech difficulty and dizziness while in [place]; did not seek medical attention     Transient ischemic attack 10/2018    same symptoms as in [place]     Transient ischemic attack 2016     Transient ischemic attack 2016    left cerebral hemisphere     Transient ischemic attack 2014             Past Surgical History:     Past Surgical History:   Procedure Laterality Date     BYPASS GRAFT ARTERY CORONARY  2013    underwent 5-vessel coronary bypass grafting at the [facility]     CYSTOSCOPY       FOOT SURGERY Left 2014    Dr. Carr     FRACTURE SURGERY Right     hand     HERNIA REPAIR  2013     ORCHIECTOMY SCROTAL Right      ORIF FEMUR FRACTURE Right      PROSTATE SURGERY      half removed     PROSTATECTOMY  2013     TESTICLE SURGERY      removal of testicle             Social History:     Social History     Socioeconomic History     Marital status:      Spouse name: Not on file     Number of children: 1     Years of education: Not on file     Highest education level: Not on file   Occupational History     Not on file   Tobacco Use     Smoking status: Former Smoker     Packs/day: 1.00     Years: 10.00     Pack years: 10.00     Types: Cigarettes, Pipe     Quit date: 1970     Years since quittin.2     Smokeless  tobacco: Never Used   Substance and Sexual Activity     Alcohol use: Not Currently     Alcohol/week: 4.0 standard drinks     Types: 4 Glasses of wine per week     Drug use: No     Sexual activity: Yes     Partners: Female     Birth control/protection: Post-menopausal   Other Topics Concern     Parent/sibling w/ CABG, MI or angioplasty before 65F 55M? Not Asked   Social History Narrative    Lives with his wife Salud in [place] in a twin home.  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  One daughter.       Social Determinants of Health     Financial Resource Strain: Not on file   Food Insecurity: Not on file   Transportation Needs: Not on file   Physical Activity: Not on file   Stress: Not on file   Social Connections: Not on file   Intimate Partner Violence: Not on file   Housing Stability: Not on file             Family History:     Family History   Problem Relation Age of Onset     Other - See Comments Mother          accident age 49     Migraines Mother      Aortic aneurysm Father          84     Other - See Comments Sister         autistic      No Known Problems Daughter      Pulmonary Hypertension No family hx of      Congenital heart disease No family hx of              Immunizations:     Immunization History   Administered Date(s) Administered     COVID-19,PF,Pfizer (12+ Yrs) 2021, 2021, 2021     COVID-19,PF,Pfizer 12+ Yrs ( and After) 2022     FLU 6-35 months 10/02/2007     FLUAD(HD)65+ QUAD 10/08/2020     Flu, Unspecified 10/27/1993, 1995, 10/16/1996, 10/09/1997, 10/27/1998, 1999, 10/30/2002, 10/24/2003, 2004, 2005, 2006, 10/02/2007, 2008, 10/05/2009, 2010, 2011, 10/15/2020     Influenza (H1N1) 2010     Influenza (High Dose) 3 valent vaccine 10/23/2015, 2016, 10/25/2017, 10/29/2018, 10/29/2019     Influenza (IIV3) PF 1999, 10/30/2002, 10/24/2003, 2004, 2005, 2006, 10/02/2007, 2008,  10/05/2009, 11/04/2010, 11/01/2011, 12/06/2012, 11/22/2013, 11/07/2014     Pneumo Conj 13-V (2010&after) 01/19/2015     Pneumococcal 23 valent 02/25/2008     Td (Adult), Adsorbed 11/22/1995, 05/25/2002, 02/25/2008     Tdap (Adacel,Boostrix) 02/21/2018     Tdap (Adult) Unspecified Formulation 02/25/2008     Zoster vaccine recombinant adjuvanted (SHINGRIX) 10/30/2019, 12/31/2019, 01/09/2020     Zoster vaccine, live 07/30/2012             Allergies:   No Known Allergies          Medications:     Current Facility-Administered Medications   Medication     acetaminophen (TYLENOL) tablet 650 mg    Or     acetaminophen (TYLENOL) Suppository 650 mg     atorvastatin (LIPITOR) tablet 40 mg     bisacodyl (DULCOLAX) Suppository 10 mg     cefTRIAXone (ROCEPHIN) 1 g vial to attach to  mL bag for ADULTS or NS 50 mL bag for PEDS     ferrous gluconate (FERGON) tablet 324 mg     finasteride (PROSCAR) tablet 5 mg     lidocaine (LMX4) cream     lidocaine 1 % 0.1-1 mL     melatonin tablet 1 mg     metoprolol tartrate (LOPRESSOR) tablet 25 mg     ondansetron (ZOFRAN ODT) ODT tab 4 mg    Or     ondansetron (ZOFRAN) injection 4 mg     pantoprazole (PROTONIX) EC tablet 40 mg     senna-docusate (SENOKOT-S/PERICOLACE) 8.6-50 MG per tablet 1 tablet    Or     senna-docusate (SENOKOT-S/PERICOLACE) 8.6-50 MG per tablet 2 tablet     sodium chloride (PF) 0.9% PF flush 3 mL     sodium chloride (PF) 0.9% PF flush 3 mL     sodium chloride 0.9% infusion     tamsulosin (FLOMAX) capsule 0.4 mg     vitamin C (ASCORBIC ACID) tablet 500 mg             Review of Systems:   Comprehensive review of systems from the Admission note dated 5/24/22 at [facility] was reviewed with no changes except per HPI.     Examination:  /54 (BP Location: Right arm)   Pulse 63   Temp 98.9  F (37.2  C) (Oral)   Resp 18   Wt 85.8 kg (189 lb 2.5 oz)   SpO2 94%   BMI 27.93 kg/m    General: Alert and oriented, no distress  HEENT: Face symmetric,  mucous membranes moist and pink  Eyes: No scleral icterus  Neck: Symmetric  Chest wall: Symmetric  Respiratory: Breathing unlabored, no audible wheezing  Cardiac: Extremities warm and well perfused  Abdomen: soft, non tender, non distended  Back: No CVA or flank tenderness  : no suprapubic tenderness, alberto draining clear urine to slow rate CBI  Neuro:Grossly non focal  Pysch: Normal mood and affect  Skin: No evident rashes or lesions            Data:     Lab Results   Component Value Date    WBC 8.2 05/25/2022     Lab Results   Component Value Date    RBC 3.39 05/25/2022     Lab Results   Component Value Date    HGB 10.7 05/25/2022     Lab Results   Component Value Date    HCT 32.1 05/25/2022     Lab Results   Component Value Date     05/25/2022     Creatinine   Date Value Ref Range Status   05/25/2022 0.79 0.66 - 1.25 mg/dL Final   ]  Lab Results   Component Value Date    BUN 23 05/25/2022       Imaging:  N/A     Impression:  80yo male with enlarged prostate and gross hematuria with urinary retention     Plan:  - cont alberto, ok to stop CBI given clear urine this AM   - RN to hand irrigate PRN for clots/catheter obstruction   - trend labs (JOCELINE improved)  - Hold plavix one more day   - if remains stable and able to keep off CBI then will plan for catheter removal tomorrow AM and hopefully discharge to follow   - cont flomax and finasteride       Mila Ibrahim PA-C  [facility]   [email or web address]  Text Page (7:30am to 4:30pm)            EKG personally reviewed.  NSR at 70 bpm, LAD and LVH, no other acute ischemic changes, QTc 464 ms.    Labs personally reviewed.                        14.1   10.83 )-----------( 232      ( 17 May 2024 16:22 )             39.5     05-17    130<L>  |  93<L>  |  21  ----------------------------<  364<H>  4.5   |  19<L>  |  0.86    Ca    9.5      17 May 2024 16:22    TPro  8.0  /  Alb  3.3  /  TBili  29.5<H>  /  DBili  >20.0<H>  /  AST  71<H>  /  ALT  38  /  AlkPhos  532<H>  05-17    ACC: [id number] EXAM:  CT ABDOMEN AND PELVIS IC   ORDERED BY: DAVIDA ANNE   ACC: [id number] EXAM:  CT ANGIO CHEST PULM ART [facility]   ORDERED BY: DAVIDA ANNE   PROCEDURE DATE:  05/17/2024    FINDINGS:  CHEST:  LUNGS AND LARGE AIRWAYS: No endobronchial lesions. The lungs are clear. No suspicious pulmonary nodules.  PLEURA: No pleural effusion.  VESSELS: No pulmonary embolism. Atherosclerotic changes of the coronary arteries and aorta.  HEART: Heart size is normal. No pericardial effusion.  MEDIASTINUM AND DAR: No lymphadenopathy.  CHEST WALL AND LOWER NECK: Within normal limits.    ABDOMEN AND PELVIS:  LIVER: Within normal limits.  BILE DUCTS: Extrahepatic and intrahepatic biliary ductal dilatation. The common bile duct measures 2.2 cm with abrupt narrowing to the level of the pancreatic mass.  GALLBLADDER: Cholecystectomy.  SPLEEN: Within normal limits.  PANCREAS: A 3.5 x 4.5 x 3.0 cm heterogenous solid mass in the pancreatic head/uncinate process (303-52 and 604-70) with upstream main pancreatic ductal dilatation. The mass abuts the superior mesenteric vein and second portion of the duodenum. Mass also contacts the proximal SMA along the left than 180 degrees of its circumference.  ADRENALS: Within normal limits.  KIDNEYS/URETERS: No hydronephrosis. Right renal cysts and subcentimeter hypodensity too small to characterize.    BLADDER: Mild circumferential wall thickening.  REPRODUCTIVE ORGANS: Prostate within normal limits.    BOWEL: No bowel obstruction. Appendix is not visualized. No evidence of inflammation in the pericecal region.  PERITONEUM: No ascites.  VESSELS: Atherosclerotic changes.  RETROPERITONEUM/LYMPH NODES: No lymphadenopathy.  ABDOMINAL WALL: Small bilateral fat-containing inguinal hernias.  BONES: Degenerative changes.    IMPRESSION:  No pulmonary embolism.  Pancreatic head/uncinate process mass with upstream pancreatic and biliary ductal dilatation, suspicious for malignancy. Primary consideration is pancreatic adenocarcinoma.  Mild urinary bladder wall thickening, which may be on the basis of cystitis or underdistention. Correlate with urinalysis.

## 2024-05-20 ENCOUNTER — TRANSFERRED RECORDS (OUTPATIENT)
Dept: HEALTH INFORMATION MANAGEMENT | Facility: CLINIC | Age: 84
End: 2024-05-20
Payer: COMMERCIAL

## 2024-06-11 ENCOUNTER — OFFICE VISIT (OUTPATIENT)
Dept: PEDIATRICS | Facility: CLINIC | Age: 84
End: 2024-06-11
Payer: COMMERCIAL

## 2024-06-11 VITALS
BODY MASS INDEX: 27.27 KG/M2 | SYSTOLIC BLOOD PRESSURE: 124 MMHG | TEMPERATURE: 97.6 F | HEIGHT: 69 IN | HEART RATE: 68 BPM | RESPIRATION RATE: 10 BRPM | OXYGEN SATURATION: 98 % | DIASTOLIC BLOOD PRESSURE: 58 MMHG | WEIGHT: 184.1 LBS

## 2024-06-11 DIAGNOSIS — S70.361A TICK BITE OF RIGHT THIGH, INITIAL ENCOUNTER: Primary | ICD-10-CM
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
## 2024-06-11 PROCEDURE — 99213 OFFICE O/P EST LOW 20 MIN: CPT | Performed by: INTERNAL MEDICINE

## 2024-06-11 RX ORDER — RESPIRATORY SYNCYTIAL VIRUS VACCINE 120MCG/0.5
0.5 KIT INTRAMUSCULAR ONCE
Qty: 1 EACH | Refills: 0 | Status: CANCELLED | OUTPATIENT
Start: 2024-06-11 | End: 2024-06-11

## 2024-06-11 ASSESSMENT — PAIN SCALES - GENERAL: PAINLEVEL: NO PAIN (0)

## 2024-06-11 NOTE — PROGRESS NOTES
"  Assessment & Plan     (S70.361A,  W57.XXXA) Tick bite of right thigh, initial encounter  (primary encounter diagnosis)  Comment:   Plan: wood tick bite 7-10 days ago, no evidence of secondary infection.  Without acute systemic symptoms  Lyme unlikely. Follow-up dhruv Sparks   Juan aCrlos is a 83 year old, presenting for the following health issues:  Insect Bites        6/11/2024     7:16 AM   Additional Questions   Roomed by Natalie Mauricio   Accompanied by MELBA     History of Present Illness       Reason for visit:  Tick bite  Symptom onset:  1-2 weeks ago    He eats 0-1 servings of fruits and vegetables daily.He consumes 0 sweetened beverage(s) daily.He exercises with enough effort to increase his heart rate 20 to 29 minutes per day.  He exercises with enough effort to increase his heart rate 7 days per week.   He is taking medications regularly.     Working in [employer] 7-10 days ago  Found a bite on his right leg; later found a tick in his laundry  Denies fever, h/a, arthralgias, joint swelling fatigue or other systemic symptoms  Presents with tick in a bag today: wood tick  Tick bite has improved since then-did have some initial clear drainage and swelling.  Pt has concerns about lyme disease                  Objective    /58 (BP Location: Right arm, Patient Position: Sitting, Cuff Size: Adult Regular)   Pulse 68   Temp 97.6  F (36.4  C) (Tympanic)   Resp 10   Ht 1.74 m (5' 8.5\")   Wt 83.5 kg (184 lb 1.6 oz)   SpO2 98%   BMI 27.58 kg/m    Body mass index is 27.58 kg/m .  Physical Exam   GENERAL: alert and no distress  MS: no gross musculoskeletal defects noted, no edema  SKIN: approx 2cm region of induration inner right thigh without fluctuance or erythema, central raised pink plaque approx 5-7mm without discharge, nontender  PSYCH: mentation appears normal, affect normal/bright            Signed Electronically by: Jose Alfredo Koch MD    "

## 2024-06-17 DIAGNOSIS — I25.83 CORONARY ARTERY DISEASE DUE TO LIPID RICH PLAQUE: ICD-10-CM

## 2024-06-17 DIAGNOSIS — I25.10 CORONARY ARTERY DISEASE DUE TO LIPID RICH PLAQUE: ICD-10-CM

## 2024-06-17 DIAGNOSIS — E78.5 DYSLIPIDEMIA, GOAL LDL BELOW 70: ICD-10-CM

## 2024-06-17 DIAGNOSIS — I10 ESSENTIAL HYPERTENSION: ICD-10-CM

## 2024-06-17 RX ORDER — ATORVASTATIN CALCIUM 40 MG/1
40 TABLET, FILM COATED ORAL AT BEDTIME
Qty: 90 TABLET | Refills: 1 | OUTPATIENT
Start: 2024-06-17

## 2024-06-17 RX ORDER — CLOPIDOGREL BISULFATE 75 MG/1
75 TABLET ORAL DAILY
Qty: 90 TABLET | Refills: 1 | OUTPATIENT
Start: 2024-06-17

## 2024-06-17 RX ORDER — LOSARTAN POTASSIUM 25 MG/1
25 TABLET ORAL DAILY
Qty: 90 TABLET | Refills: 1 | OUTPATIENT
Start: 2024-06-17

## 2024-06-17 NOTE — TELEPHONE ENCOUNTER
Refill request for Clopidogrel, Atorvastatin, and Losartan    Kelli Vickers MA on 6/17/2024 at 1:59 PM

## 2024-06-19 DIAGNOSIS — I25.10 CORONARY ARTERY DISEASE DUE TO LIPID RICH PLAQUE: ICD-10-CM

## 2024-06-19 DIAGNOSIS — I10 ESSENTIAL HYPERTENSION: ICD-10-CM

## 2024-06-19 DIAGNOSIS — I25.83 CORONARY ARTERY DISEASE DUE TO LIPID RICH PLAQUE: ICD-10-CM

## 2024-06-19 DIAGNOSIS — E78.5 DYSLIPIDEMIA, GOAL LDL BELOW 70: ICD-10-CM

## 2024-06-19 RX ORDER — LOSARTAN POTASSIUM 25 MG/1
25 TABLET ORAL DAILY
Qty: 100 TABLET | Refills: 3 | Status: SHIPPED | OUTPATIENT
Start: 2024-06-19

## 2024-06-19 RX ORDER — ATORVASTATIN CALCIUM 40 MG/1
40 TABLET, FILM COATED ORAL AT BEDTIME
Qty: 100 TABLET | Refills: 3 | Status: SHIPPED | OUTPATIENT
Start: 2024-06-19 | End: 2024-07-30

## 2024-06-19 RX ORDER — CLOPIDOGREL BISULFATE 75 MG/1
75 TABLET ORAL DAILY
Qty: 90 TABLET | Refills: 1 | Status: SHIPPED | OUTPATIENT
Start: 2024-06-19

## 2024-06-19 NOTE — TELEPHONE ENCOUNTER
Juan Carlos CRISTINA Donell is overdue to fill his atorvastatin and losartan. Per our records last filled 1/25/24 for 100 day supply and is 46 days late to refill.     New prescription needed given insufficient refills so pended for prescriber review and signature.       Thank you!    Tejal Caceres, PharmD, [facility]  Population Health Pharmacist  [phone]

## 2024-07-03 DIAGNOSIS — I25.10 CORONARY ARTERY DISEASE DUE TO LIPID RICH PLAQUE: ICD-10-CM

## 2024-07-03 DIAGNOSIS — I25.83 CORONARY ARTERY DISEASE DUE TO LIPID RICH PLAQUE: ICD-10-CM

## 2024-07-03 RX ORDER — METOPROLOL TARTRATE 25 MG/1
25 TABLET, FILM COATED ORAL 2 TIMES DAILY
Qty: 180 TABLET | Refills: 0 | OUTPATIENT
Start: 2024-07-03

## 2024-07-16 ENCOUNTER — OFFICE VISIT (OUTPATIENT)
Dept: INTERNAL MEDICINE | Facility: CLINIC | Age: 84
End: 2024-07-16
Attending: INTERNAL MEDICINE
Payer: COMMERCIAL

## 2024-07-16 VITALS
RESPIRATION RATE: 20 BRPM | TEMPERATURE: 98.1 F | OXYGEN SATURATION: 97 % | SYSTOLIC BLOOD PRESSURE: 132 MMHG | DIASTOLIC BLOOD PRESSURE: 52 MMHG | BODY MASS INDEX: 26.87 KG/M2 | HEART RATE: 64 BPM | WEIGHT: 181.4 LBS | HEIGHT: 69 IN

## 2024-07-16 DIAGNOSIS — Z00.00 ROUTINE GENERAL MEDICAL EXAMINATION AT A HEALTH CARE FACILITY: Primary | ICD-10-CM

## 2024-07-16 DIAGNOSIS — I25.83 CORONARY ARTERY DISEASE DUE TO LIPID RICH PLAQUE: ICD-10-CM

## 2024-07-16 DIAGNOSIS — I25.10 CORONARY ARTERY DISEASE DUE TO LIPID RICH PLAQUE: ICD-10-CM

## 2024-07-16 DIAGNOSIS — Z12.5 SCREENING FOR PROSTATE CANCER: ICD-10-CM

## 2024-07-16 LAB
ALBUMIN SERPL BCG-MCNC: 3.8 G/DL (ref 3.5–5.2)
ALBUMIN UR-MCNC: NEGATIVE MG/DL
ALP SERPL-CCNC: 82 U/L (ref 40–150)
ALT SERPL W P-5'-P-CCNC: 22 U/L (ref 0–70)
ANION GAP SERPL CALCULATED.3IONS-SCNC: 6 MMOL/L (ref 7–15)
APPEARANCE UR: CLEAR
AST SERPL W P-5'-P-CCNC: 25 U/L (ref 0–45)
BILIRUB SERPL-MCNC: 0.4 MG/DL
BILIRUB UR QL STRIP: NEGATIVE
BUN SERPL-MCNC: 17.6 MG/DL (ref 8–23)
CALCIUM SERPL-MCNC: 9.3 MG/DL (ref 8.8–10.4)
CHLORIDE SERPL-SCNC: 108 MMOL/L (ref 98–107)
CHOLEST SERPL-MCNC: 121 MG/DL
COLOR UR AUTO: YELLOW
CREAT SERPL-MCNC: 0.73 MG/DL (ref 0.67–1.17)
EGFRCR SERPLBLD CKD-EPI 2021: 90 ML/MIN/1.73M2
ERYTHROCYTE [DISTWIDTH] IN BLOOD BY AUTOMATED COUNT: 12.5 % (ref 10–15)
FASTING STATUS PATIENT QL REPORTED: ABNORMAL
FASTING STATUS PATIENT QL REPORTED: NORMAL
GLUCOSE SERPL-MCNC: 81 MG/DL (ref 70–99)
GLUCOSE UR STRIP-MCNC: NEGATIVE MG/DL
HCO3 SERPL-SCNC: 28 MMOL/L (ref 22–29)
HCT VFR BLD AUTO: 37.2 % (ref 40–53)
HDLC SERPL-MCNC: 66 MG/DL
HGB BLD-MCNC: 12.7 G/DL (ref 13.3–17.7)
HGB UR QL STRIP: NEGATIVE
KETONES UR STRIP-MCNC: ABNORMAL MG/DL
LDLC SERPL CALC-MCNC: 30 MG/DL
LEUKOCYTE ESTERASE UR QL STRIP: NEGATIVE
MCH RBC QN AUTO: 31.8 PG (ref 26.5–33)
MCHC RBC AUTO-ENTMCNC: 34.1 G/DL (ref 31.5–36.5)
MCV RBC AUTO: 93 FL (ref 78–100)
NITRATE UR QL: NEGATIVE
NONHDLC SERPL-MCNC: 55 MG/DL
PH UR STRIP: 5.5 [PH] (ref 5–8)
PLATELET # BLD AUTO: 158 10E3/UL (ref 150–450)
POTASSIUM SERPL-SCNC: 4.3 MMOL/L (ref 3.4–5.3)
PROT SERPL-MCNC: 6.2 G/DL (ref 6.4–8.3)
PSA SERPL DL<=0.01 NG/ML-MCNC: 1.33 NG/ML
RBC # BLD AUTO: 3.99 10E6/UL (ref 4.4–5.9)
SODIUM SERPL-SCNC: 142 MMOL/L (ref 135–145)
SP GR UR STRIP: >=1.03 (ref 1–1.03)
TRIGL SERPL-MCNC: 125 MG/DL
TSH SERPL DL<=0.005 MIU/L-ACNC: 0.94 UIU/ML (ref 0.3–4.2)
UROBILINOGEN UR STRIP-ACNC: 0.2 E.U./DL
WBC # BLD AUTO: 6.2 10E3/UL (ref 4–11)

## 2024-07-16 PROCEDURE — 80053 COMPREHEN METABOLIC PANEL: CPT | Performed by: INTERNAL MEDICINE

## 2024-07-16 PROCEDURE — 36415 COLL VENOUS BLD VENIPUNCTURE: CPT | Performed by: INTERNAL MEDICINE

## 2024-07-16 PROCEDURE — 80061 LIPID PANEL: CPT | Performed by: INTERNAL MEDICINE

## 2024-07-16 PROCEDURE — 81003 URINALYSIS AUTO W/O SCOPE: CPT | Performed by: INTERNAL MEDICINE

## 2024-07-16 PROCEDURE — G0439 PPPS, SUBSEQ VISIT: HCPCS | Performed by: INTERNAL MEDICINE

## 2024-07-16 PROCEDURE — 85027 COMPLETE CBC AUTOMATED: CPT | Performed by: INTERNAL MEDICINE

## 2024-07-16 PROCEDURE — G0103 PSA SCREENING: HCPCS | Performed by: INTERNAL MEDICINE

## 2024-07-16 PROCEDURE — 84443 ASSAY THYROID STIM HORMONE: CPT | Performed by: INTERNAL MEDICINE

## 2024-07-16 SDOH — HEALTH STABILITY: PHYSICAL HEALTH: ON AVERAGE, HOW MANY DAYS PER WEEK DO YOU ENGAGE IN MODERATE TO STRENUOUS EXERCISE (LIKE A BRISK WALK)?: 2 DAYS

## 2024-07-16 ASSESSMENT — PATIENT HEALTH QUESTIONNAIRE - PHQ9
SUM OF ALL RESPONSES TO PHQ QUESTIONS 1-9: 1
SUM OF ALL RESPONSES TO PHQ QUESTIONS 1-9: 1
10. IF YOU CHECKED OFF ANY PROBLEMS, HOW DIFFICULT HAVE THESE PROBLEMS MADE IT FOR YOU TO DO YOUR WORK, TAKE CARE OF THINGS AT HOME, OR GET ALONG WITH OTHER PEOPLE: NOT DIFFICULT AT ALL

## 2024-07-16 ASSESSMENT — SOCIAL DETERMINANTS OF HEALTH (SDOH): HOW OFTEN DO YOU GET TOGETHER WITH FRIENDS OR RELATIVES?: THREE TIMES A WEEK

## 2024-07-16 NOTE — PROGRESS NOTES
Preventive Care Visit  [facility]  Adonis Trotter MD, Internal Medicine  Jul 16, 2024  {Provider  Link to SmartSet :359603}    {PROVIDER CHARTING PREFERENCE:075366}    Clare Rangel is a 84 year old, presenting for the following:  Wellness Visit (Not fasting, had breakfast)        7/16/2024     9:54 AM   Additional Questions   Roomed by Miroslava HAZEL CMA     {ROOMER if patient is in their first year of Medicare a vision screen is required click here to document the Vison screen and then refresh the note to pull in results  :363551}    Health Care Directive  Patient does not have a Health Care Directive or Living Will: {ADVANCE_DIRECTIVE_STATUS:376286}    HPI  ***  {MA/LPN/RN Pre-Provider Visit Orders- hCG/UA/Strep (Optional):720016}  {SUPERLIST (Optional):240922}  {additonal problems for provider to add (Optional):227738}      7/16/2024   General Health   How would you rate your overall physical health? (!) FAIR   Feel stress (tense, anxious, or unable to sleep) Not at all            7/16/2024   Nutrition   Diet: Regular (no restrictions)            7/16/2024   Exercise   Days per week of moderate/strenous exercise 2 days      (!) EXERCISE CONCERN      7/16/2024   Social Factors   Frequency of gathering with friends or relatives Three times a week   Worry food won't last until get money to buy more No   Food not last or not have enough money for food? No   Do you have housing? (Housing is defined as stable permanent housing and does not include staying ouside in a car, in a tent, in an abandoned building, in an overnight shelter, or couch-surfing.) Yes   Are you worried about losing your housing? No   Lack of transportation? No   Unable to get utilities (heat,electricity)? No            7/16/2024   Fall Risk   Fallen 2 or more times in the past year? No   Trouble with walking or balance? No             7/16/2024   Activities of Daily Living- Home Safety   Needs help with the following  daily activites None of the above   Safety concerns in the home None of the above            2024   Dental   Dentist two times every year? Yes            2024   Hearing Screening   Hearing concerns? (!) I NEED TO ASK PEOPLE TO SPEAK UP OR REPEAT THEMSELVES.    (!) IT'S HARD TO FOLLOW A CONVERSATION IN A NOISY RESTAURANT OR CROWDED ROOM.    (!) TROUBLE UNDERSTANDING SPEECH ON THE TELEPHONE       Multiple values from one day are sorted in reverse-chronological order         2024   Driving Risk Screening   Patient/family members have concerns about driving No            2024   General Alertness/Fatigue Screening   Have you been more tired than usual lately? No            2024   Urinary Incontinence Screening   Bothered by leaking urine in past 6 months No            2024   TB Screening   Were you born outside of the [place]? No          Today's PHQ-9 Score:       2024     9:46 AM   PHQ-9 SCORE   PHQ-9 Total Score MyChart 1 (Minimal depression)   PHQ-9 Total Score 1         2024   Substance Use   Alcohol more than 3/day or more than 7/wk No   Do you have a current opioid prescription? No   How severe/bad is pain from 1 to 10? 2/10   Do you use any other substances recreationally? No        Social History     Tobacco Use    Smoking status: Former     Current packs/day: 0.00     Average packs/day: 0.5 packs/day for 10.0 years (5.0 ttl pk-yrs)     Types: Cigarettes, Pipe     Start date: 1959     Quit date: 1969     Years since quittin.4    Smokeless tobacco: Never   Vaping Use    Vaping status: Never Used   Substance Use Topics    Alcohol use: Yes     Alcohol/week: 4.0 standard drinks of alcohol     Types: 4 Glasses of wine per week     Comment: modest use    Drug use: No     {Provider  If there are gaps in the social history shown above, please follow the link to update and then refresh the note Link to Social and Substance History :001089}    {Link to Fracture Risk  Assessment Tool (Optional):179480}    {Provider  REQUIRED FOR AWV Use the storyboard to review patient history, after sections have been marked as reviewed, refresh note to capture documentation:106377}  {Provider   REQUIRED AWV use this link to review and update sexual activity history  after section has been marked as reviewed, refresh note to capture documentation:867966}  Reviewed and updated as needed this visit by Provider                    {HISTORY OPTIONS (Optional):303352}  Current providers sharing in care for this patient include:  Patient Care Team:  Adonis Trotter MD as PCP - General (Internal Medicine)  Adonis Trotter MD as Assigned PCP  Fabio Irizarry MD as MD (Urology)  Bandar Darnell MD as MD (Neurology)  Gerardo Stallworth MD as MD (Urology)  Jaleesa Herrera DPM, Podiatry/Foot and Ankle Surgery as Assigned Musculoskeletal Provider  Félix Avitia MD as MD (Cardiovascular Disease)  Félix Avitia MD as Assigned Heart and Vascular Provider    The following health maintenance items are reviewed in Epic and correct as of today:  Health Maintenance   Topic Date Due    ANNUAL REVIEW OF HM ORDERS  08/18/2022    LIPID  07/10/2024    MEDICARE ANNUAL WELLNESS VISIT  07/10/2024    INFLUENZA VACCINE (1) 09/01/2024    FALL RISK ASSESSMENT  07/16/2025    DTAP/TDAP/TD IMMUNIZATION (3 - Td or Tdap) 02/21/2028    ADVANCE CARE PLANNING  10/09/2028    PHQ-2 (once per calendar year)  Completed    Pneumococcal Vaccine: 65+ Years  Completed    ZOSTER IMMUNIZATION  Completed    RSV VACCINE (Pregnancy & 60+)  Completed    COVID-19 Vaccine  Completed    IPV IMMUNIZATION  Aged Out    HPV IMMUNIZATION  Aged Out    MENINGITIS IMMUNIZATION  Aged Out    RSV MONOCLONAL ANTIBODY  Aged Out       {ROS Picklists (Optional):142342}     Objective    Exam  /52 (BP Location: Right arm, Patient Position: Sitting, Cuff Size: Adult Regular)   Pulse 64   Temp 98.1  F (36.7  C) (Oral)   Resp 20   Ht  "1.74 m (5' 8.5\")   Wt 82.3 kg (181 lb 6.4 oz)   SpO2 97%   BMI 27.18 kg/m     Estimated body mass index is 27.18 kg/m  as calculated from the following:    Height as of this encounter: 1.74 m (5' 8.5\").    Weight as of this encounter: 82.3 kg (181 lb 6.4 oz).    Physical Exam  {Exam Choices (Optional):471986}        7/16/2024   Mini Cog   Clock Draw Score 2 Normal   3 Item Recall 3 objects recalled   Mini Cog Total Score 5        {A Mini-Cog total score of 0-2 suggests the possibility of dementia, score of 3-5 suggests no dementia:937324}         Signed Electronically by: Adonis Trotter MD  {Email feedback regarding this note to [email or web address]   :775166}  "

## 2024-07-16 NOTE — PROGRESS NOTES
Annual Wellness Visit:  Juan Carlos Marley  is a 84 year old male  who presents for an annual wellness visit.  Annual wellness visit and physical exam.  Screen for prostate cancer.  RONDA plus PSA.  Position and patient sharing was accomplished.  I do not prescribe this patient any opioids and the patient does not receive opioids from any other individual or provider.  The patient's provider list includes myself only JOCELINE TIRADO MD as his primary care general internist.  Cognitive assessment was done the patient was able to draw the base of an analog clock plus remember 3 items.  His memory has improved with the medication prescribed by this examiner to enhance his memory.  Aricept low-dose 5 mg daily.  Labs ordered today include the hemogram comprehensive metabolic profile lipid panel TSH PSA urinalysis.  Emotional mental health normal.  Functional capacity ADLs and safety in the home was assessed and all good.  We had a good discussion good examination see below.    Advance care planning done.    Falls risk assessment also accomplished.    Cognitive assessment was completed and the current provider this examiner and patient sharing was also completed.  Assessment/Plan:  Annual wellness visit and physical examination.  Screen for prostate cancer.  PSA plus RONDA.    Subjective:   Medical History:    Non-smoker light social alcohol no drug allergies.  Enjoys a beer in the summer.    Prior surgeries without anesthetic complications include hernia repair prostate procedure for embolization of arteries for enlarged prostate no cancer prostatism.  Embolization of the prostate gland with beads [place] urologist has helped.  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  Wears hearing aids.  Atrial fibrillation as well intermittent.  History of skin cancer removed from the scalp.  Anticoagulants noted to be antiplatelet drug plus.sex  aspirin low-dose 81 mg daily other meds and cares reviewed and reconciled in the chart.  Past Medical History:   Diagnosis Date    Cancer (H) scalp; surgery 1 year ago    Cataract, nuclear sclerotic, left eye 06/02/2017    Cerebral artery occlusion with cerebral infarction (H)     Coronary artery disease due to lipid rich plaque     Dyslipidemia, goal LDL below 70 12/20/2014    Essential hypertension with goal blood pressure less than 130/85     Gastroesophageal reflux disease     Hernia, abdominal     History of spinal cord injury     [place] (hard of hearing)     Nonsustained ventricular tachycardia (H)     6 beat run, asymptomatic per Dr. Hackett    Paroxysmal atrial fibrillation (H) 09/02/2013    at the time of CABG; [facility] did a 4-week monitor in 2017 and did not find any atrial fibrillation and therefore stopped his Eliquis.    Stricture of esophagus 2013    dilated twice since then    Transient ischemic attack 09/2018    transient speech difficulty and dizziness while in [place]; did not seek medical attention    Transient ischemic attack 10/2018    same symptoms as in [place]    Transient ischemic attack 11/20/2016    Transient ischemic attack 01/24/2016    left cerebral hemisphere    Transient ischemic attack 12/20/2014     Current Outpatient Medications   Medication Sig Dispense Refill    aspirin (ASA) 81 MG chewable tablet Take 81 mg by mouth daily      atorvastatin (LIPITOR) 40 MG tablet Take 1 tablet (40 mg) by mouth at bedtime 100 tablet 3    calcium carbonate (OS-ISABEL) 500 MG tablet Take 1 tablet by mouth once 600 mg daily      clopidogrel (PLAVIX) 75 MG tablet Take 1 tablet (75 mg) by mouth daily 90 tablet 1    donepezil (ARICEPT) 5 MG tablet Take 1 tablet (5 mg) by mouth at bedtime 30 tablet 11    famotidine (PEPCID) 10 MG tablet Take 2 tablets (20 mg) by mouth 2 times daily      Ferrous Gluconate 324 (37.5 Fe) MG TABS Take 324 mg by mouth 2 times daily      finasteride (PROSCAR) 5 MG tablet TAKE 1  TABLET BY MOUTH DAILY 90 tablet 1    losartan (COZAAR) 25 MG tablet Take 1 tablet (25 mg) by mouth daily 100 tablet 3    metoprolol tartrate (LOPRESSOR) 25 MG tablet Take 1 tablet (25 mg) by mouth 2 times daily 180 tablet 0    Multiple Vitamins-Minerals (MULTIVITAMIN ADULT, MINERALS,) TABS Take 1 tablet by mouth daily      nitroGLYcerin (NITROSTAT) 0.4 MG sublingual tablet For chest pain place 1 tablet under the tongue every 5 minutes for 3 doses. If symptoms persist 5 minutes after 1st dose call 911. 25 tablet 3    tamsulosin (FLOMAX) 0.4 MG capsule Take 1 capsule (0.4 mg) by mouth 2 times daily 90 capsule 11    vitamin C (ASCORBIC ACID) 500 MG tablet Take 500 mg by mouth daily        No current facility-administered medications for this visit.     Immunization History   Administered Date(s) Administered    COVID-19 12+ (2023-24) (MODERNA) 10/27/2023    COVID-19 12+ (2023-24) (Pfizer) 04/09/2024    COVID-19 Bivalent 12+ (Pfizer) 10/18/2022    COVID-19 MONOVALENT 12+ (Pfizer) 01/31/2021, 02/21/2021, 09/28/2021    COVID-19 Monovalent 12+ (Pfizer 2022) 04/29/2022    Flu, Unspecified 10/27/1993, 11/22/1995, 10/16/1996, 10/09/1997, 10/27/1998, 11/12/1999, 10/30/2002, 10/24/2003, 12/27/2004, 11/02/2005, 11/09/2006, 10/02/2007, 11/12/2008, 10/05/2009, 11/04/2010, 11/01/2011, 10/15/2020    Influenza (H1N1) 01/13/2010    Influenza (High Dose) 3 valent vaccine 10/23/2015, 11/28/2016, 10/25/2017, 10/29/2018, 10/29/2019    Influenza (IIV3) PF 11/12/1999, 10/30/2002, 10/24/2003, 12/27/2004, 11/02/2005, 11/09/2006, 10/02/2007, 11/12/2008, 10/05/2009, 11/04/2010, 11/01/2011, 12/06/2012, 11/22/2013, 11/07/2014    Influenza Vaccine 65+ (FLUAD) 10/08/2020, 10/05/2021    Influenza Vaccine 65+ (Fluzone HD) 10/18/2022, 10/20/2023    Influenza, seasonal, injectable, PF 10/02/2007    Pneumo Conj 13-V (2010&after) 01/19/2015, 06/29/2020    Pneumococcal 20 valent Conjugate (Prevnar 20) 05/31/2023    Pneumococcal 23 valent 02/25/2008    RSV  Vaccine (Arexvy) 10/20/2023    TDAP (Adacel,Boostrix) 2018    Td (Adult), Adsorbed 1995, 2002, 2008    Tdap (Adult) Unspecified Formulation 2008    Zoster recombinant adjuvanted (SHINGRIX) 10/30/2019, 2019, 2020    Zoster vaccine, live 2012       Surgical History:  Past Surgical History:   Procedure Laterality Date    BIOPSY      BYPASS GRAFT ARTERY CORONARY  2013    underwent 5-vessel coronary bypass grafting at the [facility]    COLONOSCOPY      CYSTOSCOPY      ENT SURGERY  no  surgery but hearing aids approx. 5 yrs. ago.    ESOPHAGOSCOPY, GASTROSCOPY, DUODENOSCOPY (EGD), COMBINED N/A 2023    Procedure: ESOPHAGOGASTRODUODENOSCOPY WITH ESOPHAGEAL DILATION;  Surgeon: Rene Aguirre MD;  Location: [facility] OR    EYE SURGERY  2 cataract surgery approx. 5 yrs.    FOOT SURGERY Left 2014    Dr. Carr    FRACTURE SURGERY Right     hand    GI SURGERY  throat stretches approx. 9 yrs. ago    HERNIA REPAIR  2013    HERNIORRHAPHY INGUINAL Left 2023    Procedure: AND OPEN LEFT INGUINAL HERNIA REPAIR;  Surgeon: Luis Mitchell MD;  Location: [facility] OR    IR PAE  2022    LAPAROSCOPIC HERNIORRHAPHY VENTRAL N/A 2023    Procedure: LAPAROSCOPIC INCARCERATED VENTRAL HERNIA REPAIR;  Surgeon: Luis Mitchell MD;  Location: [facility] OR    ORCHIECTOMY SCROTAL Right     ORIF FEMUR FRACTURE Right     ORTHOPEDIC SURGERY  broken leg, approx. 30 yrs. ago    PROSTATE SURGERY      half removed    PROSTATECTOMY  2013    TESTICLE SURGERY      removal of testicle    VASCULAR SURGERY          Family History:  1 daughter well 1 wife well mother  accidentally at age 49.  Father  age 84 ruptured abdominal aortic aneurysm.    Social History:  Retired from [employer]        Health Maintenances:  Immunizations and vaccines reviewed and up-to-date.    Objective:  /52 (BP Location: Right arm, Patient Position:  "Sitting, Cuff Size: Adult Regular)   Pulse 64   Temp 98.1  F (36.7  C) (Oral)   Resp 20   Ht 1.74 m (5' 8.5\")   Wt 82.3 kg (181 lb 6.4 oz)   SpO2 97%   BMI 27.18 kg/m    Hard of hearing wears hearing aids easily conversant good spirited soft-spoken.  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  Wears hearing aids.  No carotid bruits.  No lymphadenopathy appreciated lymph bearing areas the spine was straight nontender.    Adonis Trotter MD    Internal Medicine  Answers submitted by the patient for this visit:  Patient Health Questionnaire (Submitted on 7/16/2024)  If you checked off any problems, how difficult have these problems made it for you to do your work, take care of things at home, or get along with other people?: Not difficult at all  PHQ9 TOTAL SCORE: 1    "

## 2024-07-24 DIAGNOSIS — I25.10 CORONARY ARTERY DISEASE DUE TO LIPID RICH PLAQUE: ICD-10-CM

## 2024-07-24 DIAGNOSIS — I25.83 CORONARY ARTERY DISEASE DUE TO LIPID RICH PLAQUE: ICD-10-CM

## 2024-07-26 RX ORDER — METOPROLOL TARTRATE 25 MG/1
25 TABLET, FILM COATED ORAL 2 TIMES DAILY
Qty: 180 TABLET | Refills: 0 | Status: SHIPPED | OUTPATIENT
Start: 2024-07-26

## 2024-07-30 ENCOUNTER — VIRTUAL VISIT (OUTPATIENT)
Dept: URGENT CARE | Facility: CLINIC | Age: 84
End: 2024-07-30
Payer: COMMERCIAL

## 2024-07-30 ENCOUNTER — TELEPHONE (OUTPATIENT)
Dept: INTERNAL MEDICINE | Facility: CLINIC | Age: 84
End: 2024-07-30

## 2024-07-30 DIAGNOSIS — U07.1 COVID: Primary | ICD-10-CM

## 2024-07-30 PROCEDURE — 99441 PR PHYSICIAN TELEPHONE EVALUATION 5-10 MIN: CPT | Mod: 93 | Performed by: EMERGENCY MEDICINE

## 2024-07-30 NOTE — PROGRESS NOTES
"The patient has been notified of following:     \"This telephone visit will be conducted via a call between you and your physician/provider. We have found that certain health care needs can be provided without the need for a physical exam.  This service lets us provide the care you need with a short phone conversation.  If a prescription is necessary we can send it directly to your pharmacy.  If lab work is needed we can place an order for that and you can then stop by our lab to have the test done at a later time.    Telephone visits are billed at different rates depending on your insurance coverage. During this emergency period, for some insurers they may be billed the same as an in-person visit.  Please reach out to your insurance provider with any questions.    If during the course of the call the physician/provider feels a telephone visit is not appropriate, you will not be charged for this service.\"    Patient has given verbal consent for Telephone visit?  Yes    What phone number would you like to be contacted at?  [phone]    How would you like to obtain your AVS? Cristhianhart    Subjective   CC: Juan Carlos Marley  is a 84 year old male who presents via phone visit today for the following health issues:   Chief Complaint   Patient presents with    Infection          COVID-19 Symptom Review  How many days ago did these symptoms start? 2    Are any of the following symptoms significant for you?  New or worsening difficulty breathing? No  Worsening cough? Yes, it's a dry cough.   Fever or chills? No  Headache: YES  Sore throat: No  Chest pain: No  Diarrhea: No  Body aches? No    What treatments has patient tried? Acetaminophen   Does patient live in a nursing home, group home, or shelter? No  Does patient have a way to get food/medications during quarantined? Yes, I have a friend or family member who can help me. and Yes                     Reviewed and updated as needed this visit by Provider               "     Review of Systems         Objective    Gen: Patient is alert, oriented  Gen: No acute distress on phone appointment.  (Wife in attendance).  Nondyspneic sounding speaking in full sentences.              Assessment/Plan:  1. COVID  Patient is an 84-year-old male who is on day 2 of COVID infection.  Symptoms are typical including no shortness of breath.  Patient has comorbidities of age at 84, coronary artery disease, hypertension and atrial fibrillation.  He consents to taking Paxlovid.  GFR is 90.  He understands to withhold his atorvastatin and Flomax for 1 week and then resume.  - nirmatrelvir and ritonavir (PAXLOVID) 300 mg/100 mg therapy pack; Take 3 tablets by mouth 2 times daily for 5 days (Take 2 Nirmatrelvir tablets and 1 Ritonavir tablet twice daily for 5 days)  Dispense: 30 tablet; Refill: 0    Phone call duration:  10 minutes    Alexis Davis MD

## 2024-07-30 NOTE — TELEPHONE ENCOUNTER
Patient calling in with positive home covid test, requesting appointment to be treated.  Writer able to schedule patient for virtual covid tx visit at 2 pm today.  Patient thankful for virtual visit.

## 2024-08-12 ENCOUNTER — TELEPHONE (OUTPATIENT)
Dept: INTERNAL MEDICINE | Facility: CLINIC | Age: 84
End: 2024-08-12
Payer: COMMERCIAL

## 2024-08-12 DIAGNOSIS — E78.5 DYSLIPIDEMIA, GOAL LDL BELOW 70: ICD-10-CM

## 2024-08-12 RX ORDER — ATORVASTATIN CALCIUM 40 MG/1
40 TABLET, FILM COATED ORAL AT BEDTIME
Qty: 100 TABLET | Refills: 3 | Status: SHIPPED | OUTPATIENT
Start: 2024-08-12

## 2024-08-12 NOTE — TELEPHONE ENCOUNTER
Reason for Call:  Other prescription    Detailed comments: Patient is calling back wants to make sure that [facility] is contacted regarding RX for         Disp Refills Start End ELIANA   atorvastatin (LIPITOR) 40 MG tablet (Discontinued) 100 tablet 3 6/19/2024 7/30/2024 No   Sig - Route: Take 1 tablet (40 mg) by mouth at bedtime - Oral   Sent to pharmacy as: Atorvastatin Calcium 40 MG Oral Tablet (Lipitor)   Class: E-Prescribe   Notes to Pharmacy: Please fill for 100 day supply per insurance   Order: [id number]         Per patient pharmacy told patient the RX is was cancelled.     Phone Number Patient can be reached at: Home number on file [phone] (home)    Best Time: any    Can we leave a detailed message on this number? YES    Call taken on 8/12/2024 at 3:22 PM by Laurie Love

## 2024-08-12 NOTE — TELEPHONE ENCOUNTER
Patient calling to get a medication refill on medication(s) attached         Disp Refills Start End ELIANA   atorvastatin (LIPITOR) 40 MG tablet (Discontinued) 100 tablet 3 6/19/2024 7/30/2024 No   Sig - Route: Take 1 tablet (40 mg) by mouth at bedtime - Oral   Sent to pharmacy as: Atorvastatin Calcium 40 MG Oral Tablet (Lipitor)   Class: E-Prescribe   Notes to Pharmacy: Please fill for 100 day supply per insurance   Order: [id number]       -Patient letting us know that pharmacy is looking to see if this is updated or patient actually is discontinued

## 2024-09-20 DIAGNOSIS — I25.83 CORONARY ARTERY DISEASE DUE TO LIPID RICH PLAQUE: ICD-10-CM

## 2024-09-20 DIAGNOSIS — I25.10 CORONARY ARTERY DISEASE DUE TO LIPID RICH PLAQUE: ICD-10-CM

## 2024-09-20 RX ORDER — METOPROLOL TARTRATE 25 MG/1
25 TABLET, FILM COATED ORAL 2 TIMES DAILY
Qty: 180 TABLET | Refills: 0 | OUTPATIENT
Start: 2024-09-20

## 2024-09-20 NOTE — TELEPHONE ENCOUNTER
FAX [facility] Refill Request    metoprolol tartrate (LOPRESSOR) 25 MG tablet 180 tablet 0 7/26/2024 -- No   Sig - Route: Take 1 tablet (25 mg) by mouth 2 times daily - Oral

## 2024-10-01 ENCOUNTER — NURSE TRIAGE (OUTPATIENT)
Dept: INTERNAL MEDICINE | Facility: CLINIC | Age: 84
End: 2024-10-01
Payer: COMMERCIAL

## 2024-10-01 NOTE — TELEPHONE ENCOUNTER
Order/Referral Request    Who is requesting: pt    Orders being requested: nerve related foot issue    Reason service is needed/diagnosis: sciatica worries    When are orders needed by: asap    Has this been discussed with Provider: No    Does patient have a preference on a Group/Provider/Facility? N/a    Does patient have an appointment scheduled?: No    Where to send orders: N/A    Could we send this information to you in [facility] or would you prefer to receive a phone call?:   Patient would prefer a phone call   Okay to leave a detailed message?: No at Work number on file:  [phone]

## 2024-10-02 NOTE — TELEPHONE ENCOUNTER
"Called pt to obtain more information regarding issues, pt has had low back pain that radiates down into left buttocks for the last 3 weeks.  Has been taking Ibuprofen & doing stretches but it is not helping.  Denies any weakness, tingling or numbess in extremities.  Denies any other symptoms.  Per protocol, should be seen within 3 days-no appts with PCP, would like to wait for PCP return tomorrow to advise.     Another issue is that his middle left toe sticks up which is causing pain when walking as it rubs on shoe.  Has seen podiatry in past.  Advised that he can be seen at [facility] in [place] for this issue.  Would like to know what PCP thinks as well.     Reason for Disposition   Pain radiates into the thigh or further down the leg    Answer Assessment - Initial Assessment Questions  1. ONSET: \"When did the pain begin?\"       3 weeks   2. LOCATION: \"Where does it hurt?\" (upper, mid or lower back)      Lower back  3. SEVERITY: \"How bad is the pain?\"  (e.g., Scale 1-10; mild, moderate, or severe)    - MILD (1-3): Doesn't interfere with normal activities.     - MODERATE (4-7): Interferes with normal activities or awakens from sleep.     - SEVERE (8-10): Excruciating pain, unable to do any normal activities.       moderate  4. PATTERN: \"Is the pain constant?\" (e.g., yes, no; constant, intermittent)       intermittent  5. RADIATION: \"Does the pain shoot into your legs or somewhere else?\"      Radiates into left buttocks  6. CAUSE:  \"What do you think is causing the back pain?\"       Sciatica   7. BACK OVERUSE:  \"Any recent lifting of heavy objects, strenuous work or exercise?\"      denies  8. MEDICINES: \"What have you taken so far for the pain?\" (e.g., nothing, acetaminophen, NSAIDS)      Ibuprofen- did not help  9. NEUROLOGIC SYMPTOMS: \"Do you have any weakness, numbness, or problems with bowel/bladder control?\"      denies  10. OTHER SYMPTOMS: \"Do you have any other symptoms?\" (e.g., fever, abdomen pain, burning " "with urination, blood in urine)        denies  11. PREGNANCY: \"Is there any chance you are pregnant?\" \"When was your last menstrual period?\"        na    Protocols used: Back Pain-A-[place]    "

## 2024-10-02 NOTE — TELEPHONE ENCOUNTER
LMTCB. Pt should make VV with provider to discuss further, per PCP recommendations. If pt returns call please help schedule.    ALICIA Collins.

## 2024-10-08 ENCOUNTER — TELEPHONE (OUTPATIENT)
Dept: INTERNAL MEDICINE | Facility: CLINIC | Age: 84
End: 2024-10-08
Payer: COMMERCIAL

## 2024-10-08 ENCOUNTER — TRANSFERRED RECORDS (OUTPATIENT)
Dept: HEALTH INFORMATION MANAGEMENT | Facility: CLINIC | Age: 84
End: 2024-10-08
Payer: COMMERCIAL

## 2024-10-08 DIAGNOSIS — I25.83 CORONARY ARTERY DISEASE DUE TO LIPID RICH PLAQUE: ICD-10-CM

## 2024-10-08 DIAGNOSIS — I25.10 CORONARY ARTERY DISEASE DUE TO LIPID RICH PLAQUE: ICD-10-CM

## 2024-10-08 RX ORDER — METOPROLOL TARTRATE 25 MG/1
25 TABLET, FILM COATED ORAL 2 TIMES DAILY
Qty: 180 TABLET | Refills: 3 | Status: SHIPPED | OUTPATIENT
Start: 2024-10-08

## 2024-10-08 NOTE — TELEPHONE ENCOUNTER
General Call    Contacts       Contact Date/Time Type Contact Phone/Fax    10/08/2024 11:13 AM CDT Phone (Incoming) Juan Carlos Marley (Self) [phone] (H)          Reason for Call:  patient has an appointment for 10/10/24 and wants to know if he needs it, he just wants a referral for lower back pain to a specialist.     What are your questions or concerns:  please contact patient    Date of last appointment with provider: 7/16/24    Could we send this information to you in [employer] or would you prefer to receive a phone call?:   Patient would prefer a phone call   Okay to leave a detailed message?: Yes at Cell number on file:    Telephone Information:   Mobile [phone]

## 2024-10-08 NOTE — TELEPHONE ENCOUNTER
[facility]X [facility] Urgent Refill Request    Last Visit with PCP = 07/16/2024    Disp Refills Start End ELIANA    metoprolol tartrate (LOPRESSOR) 25 MG tablet 180 tablet 0 7/26/2024 -- No   Sig - Route: Take 1 tablet (25 mg) by mouth 2 times daily - Oral       Medication Buddy'd Up for approval if appropriate

## 2024-10-10 ENCOUNTER — VIRTUAL VISIT (OUTPATIENT)
Dept: INTERNAL MEDICINE | Facility: CLINIC | Age: 84
End: 2024-10-10
Payer: COMMERCIAL

## 2024-10-10 DIAGNOSIS — M54.42 ACUTE LEFT-SIDED LOW BACK PAIN WITH LEFT-SIDED SCIATICA: Primary | ICD-10-CM

## 2024-10-10 PROCEDURE — 99442 PR PHYSICIAN TELEPHONE EVALUATION 11-20 MIN: CPT | Mod: 93 | Performed by: INTERNAL MEDICINE

## 2024-10-10 RX ORDER — CYCLOBENZAPRINE HCL 5 MG
5 TABLET ORAL 3 TIMES DAILY PRN
Qty: 30 TABLET | Refills: 3 | Status: SHIPPED | OUTPATIENT
Start: 2024-10-10

## 2024-10-10 NOTE — PROGRESS NOTES
Juan Carlos Marley is a 84 year oldwho is being evaluated via a billable telephone visit.       What phone number would you like to be contacted at? [phone]      Assessment & Plan  (M54.42) Acute left-sided low back pain with left-sided sciatica  (primary encounter diagnosis)  Comment: Left-sided sciatica previous history of same will try physical therapy plus the cyclobenzaprine and advised patient regarding stretching exercises and walking the latter being the best exercise to do to strengthen the core muscles.  We had a good discussion  Plan: Core strengthening exercises discussed including walking plus cyclobenzaprine 5 mg 3 times a day as needed plus arthritis strength Tylenol 2 tablets 3 times a day.  Plus physical therapy.         15 minutes spent on the date of the encounter doing chart review, patient visit and documentation I spoke with the patient directly on the phone 11 minutes at the patient was in his home and I was here at the [facility].       Subjective  Prior history of sciatica many years ago.  The significant degenerative disc disease in his lumbar spine the distribution is normal left side from the left hip across the left thigh near the knee.  There is no rash.  Distribution sounds like L3 nerve red light.  Being infected     Review of Systems   Like symptoms primarily recently saw foot specialist for callus was diagnosed on the middle toe of the left foot and a cath was placed.  Dr. Carr.  No blood in stool or urine no chest pain shortness of breath.    Medication list reviewed reconciled in the chart.       Adonis Trotter MD    The longitudinal plan of care for the diagnosis(es)/condition(s) as documented were addressed during this visit. Due to the added complexity in care, I will continue to support Juan Carlos in the subsequent management and with ongoing continuity of care.  Answers submitted by the patient for this visit:  General Questionnaire (Submitted on 10/5/2024)  Chief  Complaint: Chronic problems general questions HPI Form  How many days per week do you miss taking your medication?: 0  General Concern (Submitted on 10/5/2024)  Chief Complaint: Chronic problems general questions HPI Form  What is the reason for your visit today?: pain in left buttock and upper right thigh: sciatica?  Referral to specialist?  When did your symptoms begin?: More than a month  What are your symptoms?: intermittent pain in buttock and thigh--moderate to severe.  How would you describe these symptoms?: Moderate  Are your symptoms:: Worsening  Have you had these symptoms before?: No  Is there anything that makes you feel worse?: some pain in middle toe in left foot--could be related?  Is there anything that makes you feel better?: no

## 2024-10-15 ENCOUNTER — THERAPY VISIT (OUTPATIENT)
Dept: PHYSICAL THERAPY | Facility: CLINIC | Age: 84
End: 2024-10-15
Payer: COMMERCIAL

## 2024-10-15 DIAGNOSIS — M25.552 HIP PAIN, LEFT: Primary | ICD-10-CM

## 2024-10-15 PROCEDURE — 97161 PT EVAL LOW COMPLEX 20 MIN: CPT | Mod: GP | Performed by: PHYSICAL THERAPIST

## 2024-10-15 PROCEDURE — 97110 THERAPEUTIC EXERCISES: CPT | Mod: GP | Performed by: PHYSICAL THERAPIST

## 2024-10-15 NOTE — PROGRESS NOTES
PHYSICAL THERAPY EVALUATION  Type of Visit: Evaluation       Fall Risk Screen:  Fall screen completed by: PT  Have you fallen 2 or more times in the past year?: No  Have you fallen and had an injury in the past year?: No  Is patient a fall risk?: No    Subjective       Presenting condition or subjective complaint: Pt notes L low back/leg pain over the past two months, insidious onset. Pain primarily in L glutes, worse with walking and sitting.  Pt did have L middle toe pain for a few weeks, but that seems to be improving went to MD who gave him a pad for his shoe and that has really helped.  Pt has a history of low back pain, most recent episode was several years ago.  Pt has had several episodes off/on for 30 years but is typically located on the R side of low back.  Date of onset: 10/10/24    Relevant medical history: Arthritis; Cancer; Cold or hot arm or leg; COPD   Dates & types of surgery: heart 2022    Prior diagnostic imaging/testing results: X-ray     Prior therapy history for the same diagnosis, illness or injury:        Prior Level of Function  Transfers:   Ambulation:   ADL:   IADL:     Living Environment  Social support: With a significant other or spouse   Type of home: [facility]   Stairs to enter the home:         Ramp: No   Stairs inside the home: Yes       Help at home: None  Equipment owned: Walker; Crutches; Standard wheelchair     Employment:      Hobbies/Interests:      Patient goals for therapy: longer driving       Objective   HIP EVALUATION  PAIN: Pain Level at Rest: 7/10  Pain Level with Use: 7/10  Pain Location: hip  Pain Quality: Aching, Sharp, and Stabbing  Pain Frequency: intermittent  Pain is Worst: daytime  Pain is Exacerbated By: sitting especially on hard surface, prolonged walking  Pain is Relieved By: moving out of certain positions  Pain Progression: Unchanged  INTEGUMENTARY (edema, incisions):   POSTURE: Standing Posture: Lordosis decreased  GAIT:   Weightbearing Status:    Assistive Device(s):   Gait Deviations:   BALANCE/PROPRIOCEPTION:   WEIGHTBEARING ALIGNMENT:   NON-WEIGHTBEARING ALIGNMENT:    ROM:   (Degrees) Left AROM Left PROM  Right AROM Right PROM   Hip Flexion       Hip Extension       Hip Abduction       Hip Adduction       Hip Internal Rotation       Hip External Rotation       Knee Flexion       Knee Extension       Lumbar Side glide Min loss Min loss   Lumbar Flexion Min loss   Lumbar Extension Min loss   Pain:   End feel:     PELVIC/SI SCREEN:   STRENGTH:  core strength: fair.  MMT: glute max 4/5 B, hamstrings 4/5 B  LE FLEXIBILITY:  very tight hip flexors/quads, mild tight hamstrings B  SPECIAL TESTS:   FUNCTIONAL TESTS:   PALPATION:       Assessment & Plan   CLINICAL IMPRESSIONS  Medical Diagnosis: Acute left-sided low back pain with left-sided sciatica    Treatment Diagnosis: low back pain   Impression/Assessment: Patient is a 84 year old male with L hip/posterior upper thigh pain complaints.  The following significant findings have been identified: Pain, Decreased ROM/flexibility, Decreased strength, Inflammation, Impaired gait, Impaired muscle performance, Decreased activity tolerance, and Impaired posture. These impairments interfere with their ability to perform self care tasks, recreational activities, household chores, household mobility, and community mobility as compared to previous level of function.     Clinical Decision Making (Complexity):  Clinical Presentation: Stable/Uncomplicated  Clinical Presentation Rationale: based on medical and personal factors listed in PT evaluation  Clinical Decision Making (Complexity): Low complexity    PLAN OF CARE  Treatment Interventions:  Interventions: Gait Training, Manual Therapy, Neuromuscular Re-education, Therapeutic Activity, Therapeutic Exercise, Self-Care/Home Management    Long Term Goals     PT Goal 1  Goal Identifier: sitting  Goal Description: pt will be able to sit for 1 hour pain free  Rationale: to  maximize safety and independence with performance of ADLs and functional tasks  Target Date: 12/10/24      Frequency of Treatment: 1x/week  Duration of Treatment: 8 weeks    Recommended Referrals to Other Professionals:   Education Assessment:   Learner/Method: Patient;Listening;Demonstration;Pictures/Video  Education Comments: Educated pt on findings of the evaluation and reasoning for plan of care.  Pt was able to understand how treatment plan aligns with goals.    Risks and benefits of evaluation/treatment have been explained.   Patient/Family/caregiver agrees with Plan of Care.     Evaluation Time:     PT Eval, Low Complexity Minutes (32406): 15       Signing Clinician: XU Mcarthur [facility]                                                                                   OUTPATIENT PHYSICAL THERAPY      PLAN OF TREATMENT FOR OUTPATIENT REHABILITATION   Patient's Last Name, First Name, Juan Carlos Duarte YOB: 1940   Provider's Name   [facility]   Medical Record No.  [id number]     Onset Date: 10/10/24  Start of Care Date: 10/15/24     Medical Diagnosis:  Acute left-sided low back pain with left-sided sciatica      PT Treatment Diagnosis:  low back pain Plan of Treatment  Frequency/Duration: 1x/week/ 8 weeks    Certification date from 10/15/24 to 12/10/24         See note for plan of treatment details and functional goals     Fabio Schulz, PT                         I CERTIFY THE NEED FOR THESE SERVICES FURNISHED UNDER        THIS PLAN OF TREATMENT AND WHILE UNDER MY CARE     (Physician attestation of this document indicates review and certification of the therapy plan).              Referring Provider:  Adonis Trotter    Initial Assessment  See Epic Evaluation- Start of Care Date: 10/15/24

## 2024-10-26 DIAGNOSIS — I10 ESSENTIAL HYPERTENSION: ICD-10-CM

## 2024-10-28 RX ORDER — FINASTERIDE 5 MG/1
1 TABLET, FILM COATED ORAL DAILY
Qty: 90 TABLET | Refills: 3 | Status: SHIPPED | OUTPATIENT
Start: 2024-10-28

## 2024-11-12 ENCOUNTER — TELEPHONE (OUTPATIENT)
Dept: INTERNAL MEDICINE | Facility: CLINIC | Age: 84
End: 2024-11-12

## 2024-11-12 ENCOUNTER — VIRTUAL VISIT (OUTPATIENT)
Dept: INTERNAL MEDICINE | Facility: CLINIC | Age: 84
End: 2024-11-12
Payer: COMMERCIAL

## 2024-11-12 DIAGNOSIS — J40 BRONCHITIS: Primary | ICD-10-CM

## 2024-11-12 PROCEDURE — 99442 PR PHYSICIAN TELEPHONE EVALUATION 11-20 MIN: CPT | Mod: 95 | Performed by: INTERNAL MEDICINE

## 2024-11-12 RX ORDER — AZITHROMYCIN 250 MG/1
TABLET, FILM COATED ORAL
Qty: 6 TABLET | Refills: 0 | Status: SHIPPED | OUTPATIENT
Start: 2024-11-12 | End: 2024-11-17

## 2024-11-12 NOTE — TELEPHONE ENCOUNTER
Pt calling with cold like symptoms for the past three weeks.    Throat congestion, coughing up phlegm (light brown), runny nose.    No fever.    Pt is requesting an antibiotic to help with ongoing symptoms.    Pt advised to schedule a VV or Evisit with provider.    Pt has VV today 11/12 with PCP.    ALICIA Collins.

## 2024-11-12 NOTE — PROGRESS NOTES
Juan Carlos Marley is a 84 year oldwho is being evaluated via a billable telephone visit.       What phone number would you like to be contacted at? [phone]      Assessment & Plan  (J40) Bronchitis  (primary encounter diagnosis)  Comment: Sick with symptoms of a bronchial infection started like a cold his wife may have had similar symptoms 5 weeks ago.  Patient is requesting an antibiotic he has been sick for a number of days perhaps 14.  Productive cough yellow-green without blood non-smoker.  Plan: azithromycin (ZITHROMAX) 250 MG tablet        Push fluids rest use Robitussin DM cough syrup and Tylenol.         15 minutes spent on the date of the encounter doing chart review, patient visit and documentation I spoke with patient he was in his home and I was at the [facility] for 11 minutes.       Subjective  Started like an upper respiratory infection with bodyaches sore throat.  The patient is requesting an antibiotic sounds like bronchitis.  The purulent sputum is noted without blood non-smoker.  No weight loss.     Review of Systems   Blood in stool or urine med list reviewed reconciled.  No hemoptysis.       Adonis Trotter MD    The longitudinal plan of care for the diagnosis(es)/condition(s) as documented were addressed during this visit. Due to the added complexity in care, I will continue to support Juan Carlos in the subsequent management and with ongoing continuity of care.

## 2024-12-03 DIAGNOSIS — M54.42 ACUTE LEFT-SIDED LOW BACK PAIN WITH LEFT-SIDED SCIATICA: ICD-10-CM

## 2024-12-03 RX ORDER — CYCLOBENZAPRINE HCL 5 MG
5 TABLET ORAL 3 TIMES DAILY PRN
Qty: 30 TABLET | Refills: 3 | Status: SHIPPED | OUTPATIENT
Start: 2024-12-03

## 2024-12-03 NOTE — TELEPHONE ENCOUNTER
General Call      Reason for Call:  Patient calling to get a refill on:      cyclobenzaprine (FLEXERIL) 5 MG tablet 30 tablet 3 10/10/2024 -- No  Sig - Route: Take 1 tablet (5 mg) by mouth 3 times daily as needed for muscle spasms. - Oral  Sent to pharmacy as: Cyclobenzaprine HCl 5 MG Oral Tablet (FLEXERIL)  Class: E-Prescribe  Order: [id number]  E-Prescribing Status: Receipt confirmed by pharmacy (10/10/2024  8:19 AM CDT)    What are your questions or concerns:  Patient looking to get  a refill before the new year so he can use his insurance before he gets a new insurance carrier in 2025       Contact Information  [phone] (Home Phone)

## 2024-12-04 PROBLEM — M25.552 HIP PAIN, LEFT: Status: RESOLVED | Noted: 2024-10-15 | Resolved: 2024-12-04

## 2025-01-04 DIAGNOSIS — R41.89 COGNITIVE DECLINE: ICD-10-CM

## 2025-01-06 RX ORDER — DONEPEZIL HYDROCHLORIDE 5 MG/1
5 TABLET, FILM COATED ORAL AT BEDTIME
Qty: 70 TABLET | Refills: 4 | OUTPATIENT
Start: 2025-01-06

## 2025-01-08 DIAGNOSIS — I25.83 CORONARY ARTERY DISEASE DUE TO LIPID RICH PLAQUE: ICD-10-CM

## 2025-01-08 DIAGNOSIS — I10 ESSENTIAL HYPERTENSION: ICD-10-CM

## 2025-01-08 DIAGNOSIS — I25.10 CORONARY ARTERY DISEASE DUE TO LIPID RICH PLAQUE: ICD-10-CM

## 2025-01-08 DIAGNOSIS — M54.42 ACUTE LEFT-SIDED LOW BACK PAIN WITH LEFT-SIDED SCIATICA: ICD-10-CM

## 2025-01-08 DIAGNOSIS — E78.5 DYSLIPIDEMIA, GOAL LDL BELOW 70: ICD-10-CM

## 2025-01-08 DIAGNOSIS — R41.89 COGNITIVE DECLINE: ICD-10-CM

## 2025-01-08 NOTE — TELEPHONE ENCOUNTER
General Call      Reason for Call:  Medication refill and update     -Patient is looking to get his medications discontinued to [facility]     -[facility] as [facility]     Could we send this information to you in [facility] or would you prefer to receive a phone call?:   Patient would prefer a phone call   Okay to leave a detailed message?: Yes at Cell number on file:    No relevant phone numbers on file.

## 2025-01-09 RX ORDER — CYCLOBENZAPRINE HCL 5 MG
5 TABLET ORAL 3 TIMES DAILY PRN
Qty: 90 TABLET | Refills: 1 | Status: SHIPPED | OUTPATIENT
Start: 2025-01-09

## 2025-01-09 RX ORDER — LOSARTAN POTASSIUM 25 MG/1
25 TABLET ORAL DAILY
Qty: 100 TABLET | Refills: 3 | Status: SHIPPED | OUTPATIENT
Start: 2025-01-09

## 2025-01-09 RX ORDER — METOPROLOL TARTRATE 25 MG/1
25 TABLET, FILM COATED ORAL 2 TIMES DAILY
Qty: 180 TABLET | Refills: 3 | Status: SHIPPED | OUTPATIENT
Start: 2025-01-09

## 2025-01-09 RX ORDER — DONEPEZIL HYDROCHLORIDE 5 MG/1
5 TABLET, FILM COATED ORAL AT BEDTIME
Qty: 30 TABLET | Refills: 11 | Status: SHIPPED | OUTPATIENT
Start: 2025-01-09

## 2025-01-09 RX ORDER — CLOPIDOGREL BISULFATE 75 MG/1
75 TABLET ORAL DAILY
Qty: 90 TABLET | Refills: 1 | Status: SHIPPED | OUTPATIENT
Start: 2025-01-09

## 2025-01-09 RX ORDER — ATORVASTATIN CALCIUM 40 MG/1
40 TABLET, FILM COATED ORAL AT BEDTIME
Qty: 100 TABLET | Refills: 3 | Status: SHIPPED | OUTPATIENT
Start: 2025-01-09

## 2025-01-15 ENCOUNTER — TELEPHONE (OUTPATIENT)
Dept: INTERNAL MEDICINE | Facility: CLINIC | Age: 85
End: 2025-01-15
Payer: COMMERCIAL

## 2025-01-15 DIAGNOSIS — R41.89 COGNITIVE DECLINE: ICD-10-CM

## 2025-01-15 RX ORDER — DONEPEZIL HYDROCHLORIDE 5 MG/1
5 TABLET, FILM COATED ORAL AT BEDTIME
Qty: 90 TABLET | Refills: 3 | Status: SHIPPED | OUTPATIENT
Start: 2025-01-15

## 2025-01-15 NOTE — TELEPHONE ENCOUNTER
Pt calling to request an updated prescription sent to pharmacy. Current Donepezil prescription is for 30 day supply and pt would like this changed to 90 day supply so he doesn't have to call it in every month.        donepezil (ARICEPT) 5 MG tablet 30 tablet 11 1/9/2025 -- No   Sig - Route: Take 1 tablet (5 mg) by mouth at bedtime. - Oral   Sent to pharmacy as: Donepezil HCl 5 MG Oral Tablet (ARICEPT)   Class: E-Prescribe   Order: [id number]     ALICIA Collins.

## 2025-02-17 ENCOUNTER — TELEPHONE (OUTPATIENT)
Dept: INTERNAL MEDICINE | Facility: CLINIC | Age: 85
End: 2025-02-17
Payer: COMMERCIAL

## 2025-02-17 NOTE — TELEPHONE ENCOUNTER
Patient calling looking for refill of clopidogrel. Advised it was filled 1/9/25 for 90 days. Should last till April. Patient stated he had a week left. While on the phone patient  looked again and found the new bottle in his home      Kanwal Mendez RN     26-Aug-2019 02:25

## 2025-03-27 ENCOUNTER — TRANSFERRED RECORDS (OUTPATIENT)
Dept: HEALTH INFORMATION MANAGEMENT | Facility: CLINIC | Age: 85
End: 2025-03-27
Payer: COMMERCIAL

## 2025-07-10 NOTE — DISCHARGE INSTRUCTIONS
[facility]         Interventional Radiology  Discharge Instructions Post Prostate Artery Embolization    AFTER YOU GO HOME  If you were given sedation, for the first 24 hours: we recommend that an adult stay with you, DO NOT drive or operate machinery at home or at work, DO NOT smoke, DO NOT make any important or legal decisions.  DO NOT drink alcoholic beverages the day of your procedure  DO relax and take it easy for 24 hours  DO drink plenty of fluids  DO resume your regular diet, unless otherwise instructed by your Primary Physician  DO NOT take a shower for at least 12 hours following your procedure. No tub bath, hot tubs, or swimming for 5 days. Do NOT scrub the procedure site vigorously for 5 days.  For two weeks do not have sex      Care of groin site  It is normal to have a small bruise or lump at the site.  For the first 2 days, when you cough, sneeze or move your bowels, hold your hand over the puncture site and press gently.  Do NOT lift more than 10 pounds or do any strenuous exercise for at least 3 to 5 days.  Do not use lotion or powder near the puncture site for 3 days.  If you start bleeding from the site in your groin: lie down flat and press firmly on the site. Call your doctor as soon as you can.   Call 911 right away if you have: Heavy bleeding, bleeding that does not stop.      CALL THE PHYSICIAN IF:  You start bleeding from the procedure site.  You have numbness, coolness or change in color of the arm or leg of the puncture site  You experience pain or redness at the puncture site  You develop nausea or vomiting  You develop hives or a rash or unexplained itching  You develop a temperature of 101 degrees F or greater  You experience difficulty urinating, or increased frequency  You experience penile pain and/or discoloration  You develop bloody clots when urinating      ADDITIONAL INSTRUCTIONS  Instruction booklet given:  Angio Seal      [facility] INTERVENTIONAL RADIOLOGY  Neurology DEPARTMENT  Procedure Physician:   Damaso Valdez and Martín  Date of procedure: December 1, 2022  Telephone Numbers: [phone]      Monday-Friday 7:30 am to 4:00 pm  [phone] After 4:00 pm Monday-Friday, Weekends & Holidays.   Ask for the Interventional Radiologist on call.  Someone is on call 24 hrs/day  [facility] toll free number: [phone] Monday-Friday 8:00 am to 4:30 pm  [facility] Emergency Dept: [phone]      Hospitalist

## 2025-07-21 DIAGNOSIS — I10 ESSENTIAL HYPERTENSION: ICD-10-CM

## 2025-07-21 RX ORDER — CLOPIDOGREL BISULFATE 75 MG/1
75 TABLET ORAL DAILY
Qty: 90 TABLET | Refills: 0 | Status: SHIPPED | OUTPATIENT
Start: 2025-07-21

## 2025-08-08 ENCOUNTER — TRANSFERRED RECORDS (OUTPATIENT)
Dept: HEALTH INFORMATION MANAGEMENT | Facility: CLINIC | Age: 85
End: 2025-08-08
Payer: COMMERCIAL

## (undated) DEVICE — PLATE GROUNDING ADULT W/CORD 9165L

## (undated) DEVICE — MARKER SURG SKIN STRL 77734

## (undated) DEVICE — SUTURE SILK 0 TIES 30IN SA86G

## (undated) DEVICE — ADH SKIN CLOSURE PREMIERPRO EXOFIN 1.0ML 3470

## (undated) DEVICE — SOL WATER IRRIG 1000ML BOTTLE 2F7114

## (undated) DEVICE — CUSTOM PACK LAP CHOLE SBA5BLCHEA

## (undated) DEVICE — WECK EFX CONES AND SUTURE PASSER EFXCT1

## (undated) DEVICE — TUBE PENROSE 3/8 X 12 STRL LTX 0912020

## (undated) DEVICE — BAG PRESSURE INFUSION 1000ML COLORED GA 8808

## (undated) DEVICE — BLADE KNIFE SURG 15 371115

## (undated) DEVICE — DRAPE LAP/CHOLE W/O POUCH 89225

## (undated) DEVICE — SYSTEM LAPAROVUE VISIBILITY LAPVUE10

## (undated) DEVICE — SUCTION MANIFOLD NEPTUNE 2 SYS 1 PORT 702-025-000

## (undated) DEVICE — TUBING SUCTION MEDI-VAC 1/4"X20' N620A

## (undated) DEVICE — DEVICE ENDO STITCH APPLIER 10MM 173016

## (undated) DEVICE — SU VICRYL+ 3-0 27IN SH UND VCP416H

## (undated) DEVICE — ESU PENCIL SMOKE EVAC W/ROCKER SWITCH 0703-047-000

## (undated) DEVICE — Device

## (undated) DEVICE — PREP CHLORAPREP 26ML TINTED HI-LITE ORANGE 930815

## (undated) DEVICE — SU VICRYL+ 0 27 UR6 VLT VCP603H

## (undated) DEVICE — BLADE CLIPPER PIVOT PURPLE DISP 9660

## (undated) DEVICE — SU ENDO TIE KNOT DEVICE 5MM 030404

## (undated) DEVICE — DILATOR ESOPHAGEAL BALLOON #15

## (undated) DEVICE — MITT PRE-OP 7 L X 5 1/2 W 5177M1

## (undated) DEVICE — TUBING SMOKE EVAC PNEUMOCLEAR HIGH FLOW 0620050250

## (undated) DEVICE — SUTURE VICRYL+ 4-0 UNDYED PS-2 VCP496H

## (undated) DEVICE — NDL INSUFFLATION 13GA 120MM C2201

## (undated) DEVICE — NEEDLE HYPO 22X1-1/2 SAFETY 305900

## (undated) DEVICE — SU PROLENE 0 CT-1 30" 8424H

## (undated) DEVICE — ENDO TROCAR SHIELDED BLADED KII Z-THRD 11X100MM CTB33

## (undated) DEVICE — GLOVE BIOGEL PI ULTRATOUCH G SZ 7.5 42175

## (undated) DEVICE — DRAPE SHEET REV FOLD 3/4 9349

## (undated) DEVICE — ENDO TROCAR SLEEVE KII Z-THREADED 05X100MM CTS02

## (undated) DEVICE — SOL NACL 0.9% IRRIG 1000ML BOTTLE 2F7124

## (undated) DEVICE — INFLATION DEVICE BIG 60 ENDO-AN6012

## (undated) DEVICE — ENDO TROCAR SHIELDED BLADED KII Z-THRD 05X100MM CTB03

## (undated) DEVICE — DRAPE IOBAN INCISE 23X17" 6650EZ

## (undated) DEVICE — DEVICE ART RELIATACK RELOAD 8 DEEP PURC TACKS

## (undated) DEVICE — DEVICE ART RELIATACK RELOADÂ 5 DEEP PURC RELTACK5RDPTSW

## (undated) DEVICE — DRESSING BANDAID SURFING CAT ABN4075D

## (undated) DEVICE — ESU LIGASURE MARYLAND LAPAROSCOPIC SLR/DVDR 5MMX37CM LF1937

## (undated) DEVICE — DEVICE TACKER ENDO RELIATACK ARTIC HNDL 3X8 RELTACK4XDPTSW

## (undated) DEVICE — DECANTER VIAL 2006S

## (undated) DEVICE — GOWN IMPERVIOUS BREATHABLE SMART LG 89015

## (undated) DEVICE — SYR 10ML LL W/O NDL 302995

## (undated) DEVICE — DRESSING PRIMAPORE 4 X 3-1/8 66000317

## (undated) DEVICE — TUBING SUCTION MEDI-VAC 1/4"X20' N620A - HE

## (undated) RX ORDER — FENTANYL CITRATE 50 UG/ML
INJECTION, SOLUTION INTRAMUSCULAR; INTRAVENOUS
Status: DISPENSED
Start: 2022-12-01

## (undated) RX ORDER — HEPARIN SODIUM 200 [USP'U]/100ML
INJECTION, SOLUTION INTRAVENOUS
Status: DISPENSED
Start: 2022-12-01

## (undated) RX ORDER — FENTANYL CITRATE 50 UG/ML
INJECTION, SOLUTION INTRAMUSCULAR; INTRAVENOUS
Status: DISPENSED
Start: 2023-03-01

## (undated) RX ORDER — EPHEDRINE SULFATE 50 MG/ML
INJECTION, SOLUTION INTRAMUSCULAR; INTRAVENOUS; SUBCUTANEOUS
Status: DISPENSED
Start: 2023-03-01

## (undated) RX ORDER — PROPOFOL 10 MG/ML
INJECTION, EMULSION INTRAVENOUS
Status: DISPENSED
Start: 2023-03-01

## (undated) RX ORDER — BUPIVACAINE HYDROCHLORIDE AND EPINEPHRINE 2.5; 5 MG/ML; UG/ML
INJECTION, SOLUTION EPIDURAL; INFILTRATION; INTRACAUDAL; PERINEURAL
Status: DISPENSED
Start: 2023-03-01

## (undated) RX ORDER — SODIUM CHLORIDE 9 MG/ML
INJECTION, SOLUTION INTRAVENOUS
Status: DISPENSED
Start: 2022-12-01

## (undated) RX ORDER — ONDANSETRON 2 MG/ML
INJECTION INTRAMUSCULAR; INTRAVENOUS
Status: DISPENSED
Start: 2023-03-01

## (undated) RX ORDER — LIDOCAINE HYDROCHLORIDE 10 MG/ML
INJECTION, SOLUTION EPIDURAL; INFILTRATION; INTRACAUDAL; PERINEURAL
Status: DISPENSED
Start: 2023-03-01

## (undated) RX ORDER — NITROGLYCERIN 5 MG/ML
VIAL (ML) INTRAVENOUS
Status: DISPENSED
Start: 2022-12-01

## (undated) RX ORDER — CEFAZOLIN SODIUM 1 G/3ML
INJECTION, POWDER, FOR SOLUTION INTRAMUSCULAR; INTRAVENOUS
Status: DISPENSED
Start: 2023-03-01

## (undated) RX ORDER — DEXAMETHASONE SODIUM PHOSPHATE 10 MG/ML
INJECTION, SOLUTION INTRAMUSCULAR; INTRAVENOUS
Status: DISPENSED
Start: 2023-03-01

## (undated) RX ORDER — LIDOCAINE HYDROCHLORIDE 10 MG/ML
INJECTION, SOLUTION EPIDURAL; INFILTRATION; INTRACAUDAL; PERINEURAL
Status: DISPENSED
Start: 2022-12-01

## (undated) RX ORDER — KETAMINE HYDROCHLORIDE 10 MG/ML
INJECTION INTRAMUSCULAR; INTRAVENOUS
Status: DISPENSED
Start: 2023-03-01